# Patient Record
Sex: FEMALE | Race: WHITE | NOT HISPANIC OR LATINO
[De-identification: names, ages, dates, MRNs, and addresses within clinical notes are randomized per-mention and may not be internally consistent; named-entity substitution may affect disease eponyms.]

---

## 2019-09-03 ENCOUNTER — APPOINTMENT (OUTPATIENT)
Dept: OTOLARYNGOLOGY | Facility: CLINIC | Age: 49
End: 2019-09-03

## 2019-10-21 ENCOUNTER — OUTPATIENT (OUTPATIENT)
Dept: OUTPATIENT SERVICES | Facility: HOSPITAL | Age: 49
LOS: 1 days | Discharge: HOME | End: 2019-10-21

## 2019-10-21 DIAGNOSIS — E11.9 TYPE 2 DIABETES MELLITUS WITHOUT COMPLICATIONS: ICD-10-CM

## 2019-10-21 DIAGNOSIS — E55.9 VITAMIN D DEFICIENCY, UNSPECIFIED: ICD-10-CM

## 2019-10-21 DIAGNOSIS — E78.00 PURE HYPERCHOLESTEROLEMIA, UNSPECIFIED: ICD-10-CM

## 2019-10-21 DIAGNOSIS — D51.8 OTHER VITAMIN B12 DEFICIENCY ANEMIAS: ICD-10-CM

## 2020-11-24 ENCOUNTER — NON-APPOINTMENT (OUTPATIENT)
Age: 50
End: 2020-11-24

## 2020-12-03 ENCOUNTER — APPOINTMENT (OUTPATIENT)
Dept: BREAST CENTER | Facility: CLINIC | Age: 50
End: 2020-12-03
Payer: MEDICARE

## 2020-12-03 VITALS
WEIGHT: 195 LBS | HEIGHT: 64 IN | BODY MASS INDEX: 33.29 KG/M2 | TEMPERATURE: 97.8 F | DIASTOLIC BLOOD PRESSURE: 76 MMHG | SYSTOLIC BLOOD PRESSURE: 130 MMHG

## 2020-12-03 DIAGNOSIS — Z86.39 PERSONAL HISTORY OF OTHER ENDOCRINE, NUTRITIONAL AND METABOLIC DISEASE: ICD-10-CM

## 2020-12-03 DIAGNOSIS — Z87.09 PERSONAL HISTORY OF OTHER DISEASES OF THE RESPIRATORY SYSTEM: ICD-10-CM

## 2020-12-03 DIAGNOSIS — Z86.59 PERSONAL HISTORY OF OTHER MENTAL AND BEHAVIORAL DISORDERS: ICD-10-CM

## 2020-12-03 DIAGNOSIS — Z91.89 OTHER SPECIFIED PERSONAL RISK FACTORS, NOT ELSEWHERE CLASSIFIED: ICD-10-CM

## 2020-12-03 DIAGNOSIS — Z86.79 PERSONAL HISTORY OF OTHER DISEASES OF THE CIRCULATORY SYSTEM: ICD-10-CM

## 2020-12-03 PROCEDURE — 99072 ADDL SUPL MATRL&STAF TM PHE: CPT

## 2020-12-03 PROCEDURE — 99205 OFFICE O/P NEW HI 60 MIN: CPT

## 2020-12-03 PROCEDURE — 93702 BIS XTRACELL FLUID ANALYSIS: CPT

## 2020-12-03 RX ORDER — FLUPHENAZINE ENANTHATE 25 MG/ML
VIAL (ML) INJECTION
Refills: 0 | Status: ACTIVE | COMMUNITY

## 2020-12-03 NOTE — HISTORY OF PRESENT ILLNESS
[FreeTextEntry1] : PCP: Dr. Morales Moyer\par GYN: Dr. Willow Slater\par \par s/p US Guided Core Bx - 11/18/2020:\par Right, 10:00 N13, 4mm: (top-hat)\par - Invasive mammary carcinoma with lobular features, moderately differentiated\par - Atypical lobular hyperplasia \par Comment: Immunostain for E-cadherin is negative (granular cytoplasmic staining) supporting a lobular differentiation\par ER  (+) - % \par IA  (+) - % \par HER2 (-) (Score 1+)\par Ki-67 - 25%\par \par Pt denies any breast pain, palpable lumps, nipple discharge or inversion and / or skin changes.\par \par (-) family hx - breast / ovarian cancer.

## 2020-12-03 NOTE — PAST MEDICAL HISTORY
[Perimenopausal] : The patient is perimenopausal [Menarche Age ____] : age at menarche was [unfilled] [Total Preg ___] : G[unfilled] [Live Births ___] : P[unfilled]  [Age At Live Birth ___] : Age at live birth: [unfilled] [FreeTextEntry6] : Yes; Clomid. [FreeTextEntry7] : Yes; 12 years. [FreeTextEntry8] : No.

## 2020-12-03 NOTE — REASON FOR VISIT
[Initial Evaluation] : an initial evaluation [Parent] : parent [FreeTextEntry1] : Surgical consultation.

## 2020-12-03 NOTE — DATA REVIEWED
[FreeTextEntry1] : B/L Screening Mammo - 10/22/2020:\par Breast density: There are scattered areas of fibroglandular density.\par No significant masses, calcifications, or other findings are seen in the left breast. There is a stable small benign intramammary lymph node in the upper outer quadrant left breast.\par Examination of the right breast is significant for a  4 mm spiculated lesion in the upper outer quadrant. The patient will be recalled for 3-D spot compression views, 2-D/3-D 90 degrees lateral view and ultrasound of the right breast and right axilla.\par There are stable benign intramammary lymph nodes in the upper outer quadrant right breast. There are no suspicious microcalcifications in the right breast.\par There are scattered punctate benign calcifications in both breasts which are stable.\par IMPRESSION:\par 4 mm spiculated lesion in the upper outer quadrant right breast. The patient will be recalled for diagnostic mammographic views and right breast ultrasound\par There is no mammographic evidence of malignancy in the left breast. \par An additional imaging exam of the right breast(s) is recommended. The patient will be sent a letter to return for additional imaging.\par BI-RADS 0: INCOMPLETE - NEED ADDITIONAL IMAGING EVALUATION\par \par Right [facility]ammo [facility] - 10/27/2020:\par MAMMOGRAM: \par Tomosynthesis and 2D imaging of the breast(s) were performed.  Current study was also evaluated with a computer aided detection (CAD) system.\par Breast Density: There are scattered areas of fibroglandular density.\par There is a new 4 mm spiculated density in the 11:00 right breast at approximately 12 cm from the nipple, corresponding to a shadowing mass on ultrasound.\par Incidental mention is made of adjacent benign intramammary lymph nodes also in the 10:00 right breast. There are no enlarged axillary lymph nodes\par US BREAST LIMITED RIGHT\par Color flow, Gray scale and real-time ultrasound of the right breast was performed and targeted to the area(s) of interest.\par Ultrasound of the right breast was targeted to the posterior 10:00 location. There is a 4 mm discrete hypoechoic shadowing lesion, corresponding to the spiculated density on the mammogram. Further assessment with ultrasound-guided core biopsy is recommended. There is a 0.7 cm intramammary lymph node in the 10:00 right breast at 13 cm from the nipple. There is a 0.5 cm intramammary lymph node in the 10:00 right breast at 11 cm from..\par OVERALL IMPRESSION: \par 4 mm spiculated mass, corresponding to a shadowing lesion on ultrasound. The finding is suspicious and further assessment with ultrasound-guided core biopsy is recommended.\par The findings and recommendations were discussed with the patient by the dictating radiologist.\par Biopsy of the right breast(s) is recommended. A letter will be sent to the patient to return for a biopsy.\par The findings and recommendations were discussed with the patient.\par BI-RADS 4: SUSPICIOUS\par \par US Guided Core Bx - 11/18/2020:\par Right, 10:00 N13, 4mm: (top-hat)\par - Invasive mammary carcinoma with lobular features, moderately differentiated\par - Atypical lobular hyperplasia \par Comment: Immunostain for E-cadherin is negative (granular cytoplasmic staining) supporting a lobular differentiation\par ER  (+) - % \par MA  (+) - % \par HER2 (-) (Score 1+)\par Ki-67 - 25%

## 2020-12-03 NOTE — ASSESSMENT
[FreeTextEntry1] : SKYLAR is a miguel 50 year old patient who presented today for initial consultation.\par She is status post ultrasound guided core biopsy of the right breast on 11/18/2020.\par Pathology revealed invasive mammary carcinoma with lobular features, moderately differentiated\par Atypical lobular hyperplasia.\par The immunostain for E-cadherin is negative (granular cytoplasmic staining) supporting a lobular differentiation\par ER  (+) - %. CT  (+) - %. HER2 (-) (Score 1+). Ki-67 - 25%.\par \par On physical exam, there are no discrete masses in either breast or axilla.  There is no nipple discharge or inversion bilaterally.  There are no skin changes bilaterally.  \par \par We had a lengthy discussion regarding her diagnosis and treatment options.  Her pathology results were reviewed.  The patient will need a bilateral breast MRI w/wo contrast performed preoperatively.  This is for evaluation of extent of disease in the affected breast and to rule out disease in the contralateral breast. \par \par She will require evaluation of her axillary lymph nodes via sentinel lymph node biopsy since her disease is invasive.  Initial SOZO measurements were taken today.  [place] node biopsy is done by injecting the periareolar breast tissue with a radioactive tracer one day prior to her surgery and removing the first few lymph nodes that drain the breast.  If the sentinel lymph node is found to have metastatic disease either on the intraoperative evaluation or on final pathology, an axillary dissection may be performed/recommended.  Risks of axillary node dissection, including lymphedema, were discussed.  There is evidence that it may not be necessary to complete an axillary node dissection if one or two sentinel lymph nodes are positive and treated by lumpectomy and conventional tangential field radiation.  \par \par We discussed that there is no difference in survival rate between lumpectomy with radiation therapy versus a mastectomy.  However, the rate of local recurrence is slightly higher with lumpectomy.  The rate of recurrence with mastectomy is not zero, and is likely closer to 4-9%.  At this time, she is leaning towards undergoing breast conservation therapy with lumpectomy.  Since this is not readily palpable, it will need to be localized preoperatively with a wire placement on the day of her surgery.  She agrees to right breast lumpectomy with needle localization.   \par \par The benefits and risks of the lumpectomy procedure were explained to the patient, including but not limited to bleeding, infection, seroma and/or hematoma formation, pain, numbness of skin, scarring, and possible re-operation if the surgical excision demonstrates positive margins.  Informed consent was obtained today.  She is aware that she will meet with the pre-surgical department here at the hospital for pre-surgery testing.  She is also aware that she will likely need to meet with her primary care physician, and/or other medical specialists to obtain clearance for surgery, and to contact them appropriately.  \par \par Since the patient prefers lumpectomy, she will most likely need adjuvant radiation therapy.  The indication for radiation therapy and common side effects were discussed. She will be referred to radiation therapy to discuss her options.  \par \par With regard to systemic therapy, the general indications for the use of chemotherapy were discussed, but is dependent on final pathology results.  Oncotype DX, a genetic assay used to predict a patient’s benefit from chemotherapy, may be sent post operatively.  Hormonal therapy with an aromatase inhibitor or Tamoxifen, may be advised if the disease is estrogen receptor positive.  Not only can it help prevent breast cancer recurrence, it can help reduce the risk of developing a new primary tumor.  This medication is usually taken for five years.  She will be referred to medical oncology post-operatively for discussion.  \par \par PLAN:\par 1. Bilateral Breast MRI w/wo contrast.\par 2. Lumpectomy of right breast with needle localization, right sentinel lymph node mapping and biopsy with possible axillary node dissection.\par 3. Referral to Radiation Oncology will be made post operatively.\par 4. Patient will be referred to Medical Oncology post operatively.\par \par I spent a total of 60 minutes of face to face time with this patient, greater than 50% of which was spent in counseling and/or coordination of care.  All of her questions were appropriately answered.\par

## 2020-12-03 NOTE — PHYSICAL EXAM
[Normocephalic] : normocephalic [Atraumatic] : atraumatic [No Supraclavicular Adenopathy] : no supraclavicular adenopathy [No Cervical Adenopathy] : no cervical adenopathy [Examined in the supine and seated position] : examined in the supine and seated position [No dominant masses] : no dominant masses in right breast  [No dominant masses] : no dominant masses left breast [No Nipple Discharge] : no left nipple discharge [No Axillary Lymphadenopathy] : no left axillary lymphadenopathy [No Rashes] : no rashes [No Ulceration] : no ulceration [Breast Nipple Inversion] : nipples not inverted [Breast Nipple Retraction] : nipples not retracted [de-identified] : Right L-Dex Score: 16.1 (12/03/2020)

## 2020-12-10 ENCOUNTER — LABORATORY RESULT (OUTPATIENT)
Age: 50
End: 2020-12-10

## 2020-12-15 ENCOUNTER — RESULT REVIEW (OUTPATIENT)
Age: 50
End: 2020-12-15

## 2020-12-15 ENCOUNTER — OUTPATIENT (OUTPATIENT)
Dept: OUTPATIENT SERVICES | Facility: HOSPITAL | Age: 50
LOS: 1 days | Discharge: HOME | End: 2020-12-15
Payer: MEDICARE

## 2020-12-15 DIAGNOSIS — C50.411 MALIGNANT NEOPLASM OF UPPER-OUTER QUADRANT OF RIGHT FEMALE BREAST: ICD-10-CM

## 2020-12-15 PROCEDURE — 77049 MRI BREAST C-+ W/CAD BI: CPT | Mod: 26

## 2020-12-18 ENCOUNTER — OUTPATIENT (OUTPATIENT)
Dept: OUTPATIENT SERVICES | Facility: HOSPITAL | Age: 50
LOS: 1 days | Discharge: HOME | End: 2020-12-18

## 2020-12-18 DIAGNOSIS — Z02.9 ENCOUNTER FOR ADMINISTRATIVE EXAMINATIONS, UNSPECIFIED: ICD-10-CM

## 2020-12-22 ENCOUNTER — NON-APPOINTMENT (OUTPATIENT)
Age: 50
End: 2020-12-22

## 2020-12-23 ENCOUNTER — NON-APPOINTMENT (OUTPATIENT)
Age: 50
End: 2020-12-23

## 2020-12-28 ENCOUNTER — RESULT REVIEW (OUTPATIENT)
Age: 50
End: 2020-12-28

## 2020-12-28 ENCOUNTER — OUTPATIENT (OUTPATIENT)
Dept: OUTPATIENT SERVICES | Facility: HOSPITAL | Age: 50
LOS: 1 days | Discharge: HOME | End: 2020-12-28
Payer: MEDICARE

## 2020-12-28 VITALS
SYSTOLIC BLOOD PRESSURE: 131 MMHG | RESPIRATION RATE: 16 BRPM | TEMPERATURE: 98 F | HEART RATE: 83 BPM | DIASTOLIC BLOOD PRESSURE: 67 MMHG | HEIGHT: 64 IN | WEIGHT: 182.98 LBS | OXYGEN SATURATION: 96 %

## 2020-12-28 DIAGNOSIS — C50.411 MALIGNANT NEOPLASM OF UPPER-OUTER QUADRANT OF RIGHT FEMALE BREAST: ICD-10-CM

## 2020-12-28 DIAGNOSIS — Z01.818 ENCOUNTER FOR OTHER PREPROCEDURAL EXAMINATION: ICD-10-CM

## 2020-12-28 DIAGNOSIS — Z98.890 OTHER SPECIFIED POSTPROCEDURAL STATES: Chronic | ICD-10-CM

## 2020-12-28 LAB
A1C WITH ESTIMATED AVERAGE GLUCOSE RESULT: 10.6 % — HIGH (ref 4–5.6)
ALBUMIN SERPL ELPH-MCNC: 4.3 G/DL — SIGNIFICANT CHANGE UP (ref 3.5–5.2)
ALP SERPL-CCNC: 144 U/L — HIGH (ref 30–115)
ALT FLD-CCNC: 42 U/L — HIGH (ref 0–41)
ANION GAP SERPL CALC-SCNC: 14 MMOL/L — SIGNIFICANT CHANGE UP (ref 7–14)
APTT BLD: 32.7 SEC — SIGNIFICANT CHANGE UP (ref 27–39.2)
AST SERPL-CCNC: 23 U/L — SIGNIFICANT CHANGE UP (ref 0–41)
BASOPHILS # BLD AUTO: 0.01 K/UL — SIGNIFICANT CHANGE UP (ref 0–0.2)
BASOPHILS NFR BLD AUTO: 0.2 % — SIGNIFICANT CHANGE UP (ref 0–1)
BILIRUB SERPL-MCNC: 0.3 MG/DL — SIGNIFICANT CHANGE UP (ref 0.2–1.2)
BUN SERPL-MCNC: 13 MG/DL — SIGNIFICANT CHANGE UP (ref 10–20)
CALCIUM SERPL-MCNC: 10 MG/DL — SIGNIFICANT CHANGE UP (ref 8.5–10.1)
CHLORIDE SERPL-SCNC: 98 MMOL/L — SIGNIFICANT CHANGE UP (ref 98–110)
CO2 SERPL-SCNC: 22 MMOL/L — SIGNIFICANT CHANGE UP (ref 17–32)
CREAT SERPL-MCNC: 0.9 MG/DL — SIGNIFICANT CHANGE UP (ref 0.7–1.5)
EOSINOPHIL # BLD AUTO: 0.24 K/UL — SIGNIFICANT CHANGE UP (ref 0–0.7)
EOSINOPHIL NFR BLD AUTO: 3.8 % — SIGNIFICANT CHANGE UP (ref 0–8)
ESTIMATED AVERAGE GLUCOSE: 258 MG/DL — HIGH (ref 68–114)
GLUCOSE SERPL-MCNC: 385 MG/DL — HIGH (ref 70–99)
HCT VFR BLD CALC: 36.6 % — LOW (ref 37–47)
HGB BLD-MCNC: 12.2 G/DL — SIGNIFICANT CHANGE UP (ref 12–16)
IMM GRANULOCYTES NFR BLD AUTO: 0.8 % — HIGH (ref 0.1–0.3)
INR BLD: 0.92 RATIO — SIGNIFICANT CHANGE UP (ref 0.65–1.3)
LYMPHOCYTES # BLD AUTO: 1.31 K/UL — SIGNIFICANT CHANGE UP (ref 1.2–3.4)
LYMPHOCYTES # BLD AUTO: 20.8 % — SIGNIFICANT CHANGE UP (ref 20.5–51.1)
MCHC RBC-ENTMCNC: 28.4 PG — SIGNIFICANT CHANGE UP (ref 27–31)
MCHC RBC-ENTMCNC: 33.3 G/DL — SIGNIFICANT CHANGE UP (ref 32–37)
MCV RBC AUTO: 85.3 FL — SIGNIFICANT CHANGE UP (ref 81–99)
MONOCYTES # BLD AUTO: 0.31 K/UL — SIGNIFICANT CHANGE UP (ref 0.1–0.6)
MONOCYTES NFR BLD AUTO: 4.9 % — SIGNIFICANT CHANGE UP (ref 1.7–9.3)
NEUTROPHILS # BLD AUTO: 4.38 K/UL — SIGNIFICANT CHANGE UP (ref 1.4–6.5)
NEUTROPHILS NFR BLD AUTO: 69.5 % — SIGNIFICANT CHANGE UP (ref 42.2–75.2)
NRBC # BLD: 0 /100 WBCS — SIGNIFICANT CHANGE UP (ref 0–0)
PLATELET # BLD AUTO: 109 K/UL — LOW (ref 130–400)
POTASSIUM SERPL-MCNC: 4.2 MMOL/L — SIGNIFICANT CHANGE UP (ref 3.5–5)
POTASSIUM SERPL-SCNC: 4.2 MMOL/L — SIGNIFICANT CHANGE UP (ref 3.5–5)
PROT SERPL-MCNC: 6.8 G/DL — SIGNIFICANT CHANGE UP (ref 6–8)
PROTHROM AB SERPL-ACNC: 10.6 SEC — SIGNIFICANT CHANGE UP (ref 9.95–12.87)
RBC # BLD: 4.29 M/UL — SIGNIFICANT CHANGE UP (ref 4.2–5.4)
RBC # FLD: 14.3 % — SIGNIFICANT CHANGE UP (ref 11.5–14.5)
SODIUM SERPL-SCNC: 134 MMOL/L — LOW (ref 135–146)
WBC # BLD: 6.3 K/UL — SIGNIFICANT CHANGE UP (ref 4.8–10.8)
WBC # FLD AUTO: 6.3 K/UL — SIGNIFICANT CHANGE UP (ref 4.8–10.8)

## 2020-12-28 PROCEDURE — 71046 X-RAY EXAM CHEST 2 VIEWS: CPT | Mod: 26

## 2020-12-28 PROCEDURE — 93010 ELECTROCARDIOGRAM REPORT: CPT

## 2020-12-28 NOTE — H&P PST ADULT - REASON FOR ADMISSION
49 yo female presents for PAST in preparation for right breast lumpectomy with needle localization right sentinel node mapping and biopsy possible axillary node dissection on 1/8/21 under general anesthesia by Dr. Zabala

## 2020-12-28 NOTE — H&P PST ADULT - NSICDXPASTMEDICALHX_GEN_ALL_CORE_FT
PAST MEDICAL HISTORY:  Asthma     Bipolar disorder     Breast CA     DM (diabetes mellitus)     High cholesterol     HTN (hypertension)

## 2020-12-28 NOTE — H&P PST ADULT - HISTORY OF PRESENT ILLNESS
Pt recently diagnosed with breast CA and is having area removed. Denies any symptoms. Denies any chest pain, difficulty breathing, SOB, palpitations, dysuria, URI, or any other infections in the last 2 weeks. Denies any recent travel, contact, or exposure to any persons with known or suspected COVID-19. Pt also denies COVID testing within the last 2 weeks. Denies any suicidal or homicidal ideations. Pt advised to self quarantine until day of procedure. Exercise tolerance of 1 flight of stairs without dyspnea. ABI reviewed with patient. Pt verbalized understanding of all pre-operative instructions.    Anesthesia Alert  YES--Difficult Airway: Class IV  NO--History of neck surgery or radiation  NO--Limited ROM of neck  NO--History of Malignant hyperthermia  NO--No personal or family history of Pseudocholinesterase deficiency.  NO--Prior Anesthesia Complication  NO--Latex Allergy  NO--Loose teeth  NO--History of Rheumatoid Arthritis  YES--ABI  NO--Other_____

## 2021-01-04 PROBLEM — J45.909 UNSPECIFIED ASTHMA, UNCOMPLICATED: Chronic | Status: ACTIVE | Noted: 2020-12-28

## 2021-01-04 PROBLEM — I10 ESSENTIAL (PRIMARY) HYPERTENSION: Chronic | Status: ACTIVE | Noted: 2020-12-28

## 2021-01-04 PROBLEM — F31.9 BIPOLAR DISORDER, UNSPECIFIED: Chronic | Status: ACTIVE | Noted: 2020-12-28

## 2021-01-04 PROBLEM — E78.00 PURE HYPERCHOLESTEROLEMIA, UNSPECIFIED: Chronic | Status: ACTIVE | Noted: 2020-12-28

## 2021-01-05 ENCOUNTER — LABORATORY RESULT (OUTPATIENT)
Age: 51
End: 2021-01-05

## 2021-01-05 ENCOUNTER — OUTPATIENT (OUTPATIENT)
Dept: OUTPATIENT SERVICES | Facility: HOSPITAL | Age: 51
LOS: 1 days | Discharge: HOME | End: 2021-01-05

## 2021-01-05 DIAGNOSIS — Z98.890 OTHER SPECIFIED POSTPROCEDURAL STATES: Chronic | ICD-10-CM

## 2021-01-05 DIAGNOSIS — Z11.59 ENCOUNTER FOR SCREENING FOR OTHER VIRAL DISEASES: ICD-10-CM

## 2021-01-11 ENCOUNTER — NON-APPOINTMENT (OUTPATIENT)
Age: 51
End: 2021-01-11

## 2021-01-26 ENCOUNTER — OUTPATIENT (OUTPATIENT)
Dept: OUTPATIENT SERVICES | Facility: HOSPITAL | Age: 51
LOS: 1 days | Discharge: HOME | End: 2021-01-26

## 2021-01-26 DIAGNOSIS — Z11.59 ENCOUNTER FOR SCREENING FOR OTHER VIRAL DISEASES: ICD-10-CM

## 2021-01-26 DIAGNOSIS — Z98.890 OTHER SPECIFIED POSTPROCEDURAL STATES: Chronic | ICD-10-CM

## 2021-01-28 ENCOUNTER — RESULT REVIEW (OUTPATIENT)
Age: 51
End: 2021-01-28

## 2021-01-28 ENCOUNTER — OUTPATIENT (OUTPATIENT)
Dept: OUTPATIENT SERVICES | Facility: HOSPITAL | Age: 51
LOS: 1 days | Discharge: HOME | End: 2021-01-28
Payer: MEDICARE

## 2021-01-28 DIAGNOSIS — Z98.890 OTHER SPECIFIED POSTPROCEDURAL STATES: Chronic | ICD-10-CM

## 2021-01-28 PROCEDURE — 78195 LYMPH SYSTEM IMAGING: CPT | Mod: 26

## 2021-01-28 NOTE — ASU PATIENT PROFILE, ADULT - PMH
Asthma    Asthma    Bipolar disorder    Breast CA    DM (diabetes mellitus)    High cholesterol    HTN (hypertension)

## 2021-01-29 ENCOUNTER — APPOINTMENT (OUTPATIENT)
Dept: BREAST CENTER | Facility: AMBULATORY SURGERY CENTER | Age: 51
End: 2021-01-29
Payer: MEDICARE

## 2021-01-29 ENCOUNTER — RESULT REVIEW (OUTPATIENT)
Age: 51
End: 2021-01-29

## 2021-01-29 ENCOUNTER — OUTPATIENT (OUTPATIENT)
Dept: OUTPATIENT SERVICES | Facility: HOSPITAL | Age: 51
LOS: 1 days | Discharge: HOME | End: 2021-01-29
Payer: MEDICARE

## 2021-01-29 VITALS
DIASTOLIC BLOOD PRESSURE: 60 MMHG | SYSTOLIC BLOOD PRESSURE: 111 MMHG | RESPIRATION RATE: 20 BRPM | OXYGEN SATURATION: 95 % | HEART RATE: 87 BPM | TEMPERATURE: 99 F

## 2021-01-29 VITALS
WEIGHT: 190.92 LBS | DIASTOLIC BLOOD PRESSURE: 55 MMHG | HEIGHT: 64 IN | TEMPERATURE: 98 F | SYSTOLIC BLOOD PRESSURE: 119 MMHG | HEART RATE: 89 BPM | RESPIRATION RATE: 18 BRPM | OXYGEN SATURATION: 98 %

## 2021-01-29 DIAGNOSIS — Z98.890 OTHER SPECIFIED POSTPROCEDURAL STATES: Chronic | ICD-10-CM

## 2021-01-29 DIAGNOSIS — C50.411 MALIGNANT NEOPLASM OF UPPER-OUTER QUADRANT OF RIGHT FEMALE BREAST: ICD-10-CM

## 2021-01-29 LAB — GLUCOSE BLDC GLUCOMTR-MCNC: 115 MG/DL — HIGH (ref 70–99)

## 2021-01-29 PROCEDURE — 88307 TISSUE EXAM BY PATHOLOGIST: CPT | Mod: 26

## 2021-01-29 PROCEDURE — 88342 IMHCHEM/IMCYTCHM 1ST ANTB: CPT | Mod: 26

## 2021-01-29 PROCEDURE — 38525 BIOPSY/REMOVAL LYMPH NODES: CPT | Mod: RT

## 2021-01-29 PROCEDURE — 38900 IO MAP OF SENT LYMPH NODE: CPT | Mod: RT

## 2021-01-29 PROCEDURE — 88341 IMHCHEM/IMCYTCHM EA ADD ANTB: CPT | Mod: 26

## 2021-01-29 PROCEDURE — 19301 PARTIAL MASTECTOMY: CPT | Mod: RT

## 2021-01-29 PROCEDURE — 19285 PERQ DEV BREAST 1ST US IMAG: CPT | Mod: RT

## 2021-01-29 PROCEDURE — 77065 DX MAMMO INCL CAD UNI: CPT | Mod: 26,RT

## 2021-01-29 PROCEDURE — 88305 TISSUE EXAM BY PATHOLOGIST: CPT | Mod: 26

## 2021-01-29 RX ORDER — OXYCODONE AND ACETAMINOPHEN 5; 325 MG/1; MG/1
1 TABLET ORAL EVERY 4 HOURS
Refills: 0 | Status: DISCONTINUED | OUTPATIENT
Start: 2021-01-29 | End: 2021-01-29

## 2021-01-29 RX ORDER — HYDROMORPHONE HYDROCHLORIDE 2 MG/ML
1 INJECTION INTRAMUSCULAR; INTRAVENOUS; SUBCUTANEOUS
Refills: 0 | Status: DISCONTINUED | OUTPATIENT
Start: 2021-01-29 | End: 2021-01-29

## 2021-01-29 RX ORDER — ONDANSETRON 8 MG/1
4 TABLET, FILM COATED ORAL ONCE
Refills: 0 | Status: DISCONTINUED | OUTPATIENT
Start: 2021-01-29 | End: 2021-02-12

## 2021-01-29 RX ORDER — SODIUM CHLORIDE 9 MG/ML
1000 INJECTION, SOLUTION INTRAVENOUS
Refills: 0 | Status: DISCONTINUED | OUTPATIENT
Start: 2021-01-29 | End: 2021-02-12

## 2021-01-29 RX ORDER — HYDROMORPHONE HYDROCHLORIDE 2 MG/ML
0.5 INJECTION INTRAMUSCULAR; INTRAVENOUS; SUBCUTANEOUS
Refills: 0 | Status: DISCONTINUED | OUTPATIENT
Start: 2021-01-29 | End: 2021-01-29

## 2021-01-29 RX ADMIN — SODIUM CHLORIDE 100 MILLILITER(S): 9 INJECTION, SOLUTION INTRAVENOUS at 14:38

## 2021-01-29 NOTE — CHART NOTE - NSCHARTNOTEFT_GEN_A_CORE
PACU ANESTHESIA ADMISSION NOTE      Procedure: Lumpectomy, breast, after wire localization, with sentinel lymph node biopsy      Post op diagnosis:      ____  Intubated  TV:______       Rate: ______      FiO2: ______    __x__  Patent Airway    __x__  Full return of protective reflexes    __x__  Full recovery from anesthesia / back to baseline     Vitals:   T: 99.3          R: 18                 BP:  105/59                Sat:  98                 P: 78      Mental Status:  _x___ Awake   __x___ Alert   _____ Drowsy   _____ Sedated    Nausea/Vomiting:  __x__ NO  ______Yes,   See Post - Op Orders          Pain Scale (0-10):  _____    Treatment: __x__ None    ____ See Post - Op/PCA Orders    Post - Operative Fluids:   ____ Oral   __x__ See Post - Op Orders    Plan: Discharge:   ___x _Home       _____Floor     _____Critical Care    _____  Other:_________________    Comments:    Pt brought to RR spontaneously breathing, hemodynamically stable.

## 2021-01-29 NOTE — PRE-ANESTHESIA EVALUATION ADULT - NSPROPOSEDPROCEDFT_GEN_ALL_CORE
right breast lumpectomy with needle localization right sentinel node mapping and biopsy possible axillary node dissection

## 2021-01-29 NOTE — BRIEF OPERATIVE NOTE - NSICDXBRIEFPROCEDURE_GEN_ALL_CORE_FT
PROCEDURES:  Lumpectomy, breast, after wire localization, with sentinel lymph node biopsy 29-Jan-2021 13:36:08  Ankit Zabala

## 2021-01-29 NOTE — ASU PREOP CHECKLIST - AICD PRESENT
LFV:8/31/17 with TB  Future Appt: none on file  Last Rx:12/15/17 for 90 tablets  Last Labs:cbc and cmp on 9/17/17 abnormal  Per protocol to provider no

## 2021-02-03 LAB — SURGICAL PATHOLOGY STUDY: SIGNIFICANT CHANGE UP

## 2021-02-05 DIAGNOSIS — C50.919 MALIGNANT NEOPLASM OF UNSPECIFIED SITE OF UNSPECIFIED FEMALE BREAST: ICD-10-CM

## 2021-02-09 ENCOUNTER — APPOINTMENT (OUTPATIENT)
Dept: BREAST CENTER | Facility: CLINIC | Age: 51
End: 2021-02-09
Payer: MEDICARE

## 2021-02-09 PROBLEM — J45.909 UNSPECIFIED ASTHMA, UNCOMPLICATED: Chronic | Status: ACTIVE | Noted: 2021-01-29

## 2021-02-09 PROCEDURE — 99024 POSTOP FOLLOW-UP VISIT: CPT

## 2021-02-09 NOTE — REASON FOR VISIT
[Post Op: _________] : a [unfilled] post op visit [Family Member] : family member [FreeTextEntry1] : s/p Right NLOC / SNB - 01/29/2021.

## 2021-02-09 NOTE — ASSESSMENT
[FreeTextEntry1] : SKYLAR is a miguel 50 year old patient who presented today to the office for her initial post-operative visit.\par She is status post right NLOC / SNB on 01/29/2021.\par She is feeling well.\par She denies any fever / chills or erythema and / or drainage related to the incision.\par Her pain is well controlled, only complains of mild soreness of the area.\par Her sutures were removed and pathology was discussed.\par \par Plan:\par 1. Consultation with Radiation Oncology will be arranged.\par 2. Consultation with Medical Oncology will be arranged.\par 3. Follow-up and clinical breast exam to be scheduled for three months.\par \par I spent a total of 15 minutes of face to face time with this patient, greater than 50% of which was spent in counseling and/or coordination of care.\par All of her questions were appropriately answered.\par She knows to call with any concerns.\par

## 2021-02-09 NOTE — HISTORY OF PRESENT ILLNESS
[FreeTextEntry1] : PCP: Dr. Morales Moyer\par GYN: Dr. Willow Slater\par \par s/p US Guided Core Bx - 11/18/2020:\par Right, 10:00 N13, 4mm: (top-hat)\par - Invasive mammary carcinoma with lobular features, moderately differentiated\par - Atypical lobular hyperplasia \par Comment: Immunostain for E-cadherin is negative (granular cytoplasmic staining) supporting a lobular differentiation\par ER  (+) - % \par IL  (+) - % \par HER2 (-) (Score 1+)\par Ki-67 - 25%\par \par (-) family hx - breast / ovarian cancer.\par \par \par s/p Right NLOC / SNB - 01/29/2021.

## 2021-02-09 NOTE — DATA REVIEWED
[FreeTextEntry1] : Surgical Final Report\par \par \par Final Diagnosis\par 1. Breast, right axillary sentinel lymph node #1, biopsy:\par - One benign lymph node (0/1). Negative for carcinoma with\par evaluation of multiple H T E levels and with negative\par immunohistochemical stains for cytokeratins (CK AE1/AE3 and CK\par 8/18).\par \par 2. Breast, right upper outer quadrant 10 N12 mass, needle\par localized lumpectomy:\par - Healing prior biopsy site with invasive moderately\par differentiated classical type lobular carcinoma, 4.5 mm, located\par 3.5 mm from the nearest inked and designated inferior surgical\par margin (microscopic measurements).\par - Widespread lobular carcinoma in-situ (LCIS), classical type,\par and atypical lobular hyperplasia (ALH).\par - No lymphovascular or perineural invasion is seen.\par - Early atrophic fatty breast tissue with proliferative type\par fibrocystic changes.\par - For hormone receptor and oncoprotein expression/gene\par amplification status please see the pathology report from the\par prior needle core biopsy specimen ([id number]).\par - AJCC 8th Edition Pathologic Stage: pT1a, p(sn)N0, pMx.\par \par 3. Breast, right superior margin, excision:\par - Benign fibroadipose connective tissue without histopathologic\par abnormality. Negative for carcinoma.\par \par 4. Breast, right posterior margin, excision:\par - Benign fibroadipose connective tissue without histopathologic\par abnormality. Negative for carcinoma.\par \par 5. Breast, right lateral margin, excision:\par - Benign fibroadipose connective tissue without histopathologic\par abnormality.\par Negative for carcinoma.\par

## 2021-02-11 DIAGNOSIS — I10 ESSENTIAL (PRIMARY) HYPERTENSION: ICD-10-CM

## 2021-02-11 DIAGNOSIS — E11.9 TYPE 2 DIABETES MELLITUS WITHOUT COMPLICATIONS: ICD-10-CM

## 2021-02-11 DIAGNOSIS — C50.911 MALIGNANT NEOPLASM OF UNSPECIFIED SITE OF RIGHT FEMALE BREAST: ICD-10-CM

## 2021-02-11 DIAGNOSIS — Z79.84 LONG TERM (CURRENT) USE OF ORAL HYPOGLYCEMIC DRUGS: ICD-10-CM

## 2021-02-11 DIAGNOSIS — Z17.0 ESTROGEN RECEPTOR POSITIVE STATUS [ER+]: ICD-10-CM

## 2021-02-11 DIAGNOSIS — Z88.0 ALLERGY STATUS TO PENICILLIN: ICD-10-CM

## 2021-02-11 DIAGNOSIS — J45.909 UNSPECIFIED ASTHMA, UNCOMPLICATED: ICD-10-CM

## 2021-02-19 ENCOUNTER — APPOINTMENT (OUTPATIENT)
Dept: RADIATION ONCOLOGY | Facility: HOSPITAL | Age: 51
End: 2021-02-19
Payer: MEDICARE

## 2021-02-19 VITALS
HEIGHT: 64 IN | SYSTOLIC BLOOD PRESSURE: 124 MMHG | RESPIRATION RATE: 12 BRPM | WEIGHT: 188.5 LBS | BODY MASS INDEX: 32.18 KG/M2 | DIASTOLIC BLOOD PRESSURE: 58 MMHG | TEMPERATURE: 98.8 F | HEART RATE: 96 BPM

## 2021-02-19 DIAGNOSIS — Z78.9 OTHER SPECIFIED HEALTH STATUS: ICD-10-CM

## 2021-02-19 DIAGNOSIS — Z63.5 DISRUPTION OF FAMILY BY SEPARATION AND DIVORCE: ICD-10-CM

## 2021-02-19 PROCEDURE — 99205 OFFICE O/P NEW HI 60 MIN: CPT

## 2021-02-19 SDOH — SOCIAL STABILITY - SOCIAL INSECURITY: DISRUPTION OF FAMILY BY SEPARATION AND DIVORCE: Z63.5

## 2021-02-19 NOTE — HISTORY OF PRESENT ILLNESS
[FreeTextEntry1] : \par The patient is a 50 year  old woman who was recently noted on screening mammogram (10/26/2020) to have a 4 mm spiculated lesion in the right breast in the upper outer quadrant.  Diagnostic mammogram and ultrasound on 11/5/2020 showed a new 4 mm spiculated density in the 11 o'clock, 12 cm FN right breast.  BI-RADS 4 Suspicious.  Recommend biopsy. On 11/18/2020, she had a biopsy of the right breast abnormality at 10 o’clock, 12 cm FN and pathology showed invasive mammary carcinoma with lobular features, moderately differentiated.  \par \par On 12/10/2020, outside slides reviewed at [facility] from [facility] shows, invasive well differentiated lobular carcinoma, classical type and lobular carcinoma in-situ. It was ER/WA positive /HER 2 negative.  Ki-67 index was 25%. \par \par She also had an MRI of bilateral breasts on 12/15/2020, which showed right breast, there is a 0.4 cm mass with associated biopsy clip in the upper outer quadrant of the right breast consistent with the biopsy-proven carcinoma. Surrounding postbiopsy enhancement is noted. Benign intramammary lymph nodes are seen in the upper outer quadrant of the right breast. No additional suspicious abnormal enhancement is seen in the right breast. There is no axillary adenopathy.\par Left breast, benign intramammary lymph node in the upper outer quadrant of the left breast. No suspicious abnormal enhancement is seen in the left breast. There is no axillary adenopathy. BI-RADS 6: Known biopsy-proven malignancy.\par \par On 1/29/2021, She underwent a lumpectomy and sentinel node biopsy.  She was found to have a 4.5 mm  invasive lobular carcinoma, G2, ER/WA positive / HER 2 negative, Ki 67 index is 25 %.  Surgical margins were negative as well as 0/1 negative sentinel lymph node.  There was no LVSI/PNI. \par \par She has been doing well since surgery. However, has some breast discomfort but getting better.\par She  is here with her mother to discuss radiation. \par \par Med/Onc: Dr. Morgan 3/5/2021\par Sx: Dr. Zabala\par \par

## 2021-02-19 NOTE — OB/GYN HISTORY
[unknown] : the patient is unsure of the date of her LMP [Experiencing Menopausal Sxs] : experiencing menopausal symptoms [___] : Living: [unfilled]

## 2021-02-19 NOTE — PHYSICAL EXAM
[Obese] : obese [Sclera] : the sclera and conjunctiva were normal [Hearing Threshold Finger Rub Not [place]] : hearing was normal [Breast Abnormal Lactation (Galactorrhea)] : no nipple discharge [No Discharge] : no discharge [Tenderness Of The Right Breast] : tenderness [Abdomen Soft] : soft [Nail Clubbing] : no clubbing  or cyanosis of the fingernails [Normal Mental Status] : the patient's orientation, memory, attention, language and fund of knowledge were normal [Flat] : flat [Rate Slowed] : slowed [Heart Rate And Rhythm] : heart rate and rhythm were normal [Normal] : no palpable adenopathy [de-identified] : She is examined in both the upright and supine positions.  The right breast has a well healing scar and a sizeable seroma under the axillary scar. It is uncomfortable for her.  The left breast is unremarkable.  No palpable mass in either breast.

## 2021-02-19 NOTE — VITALS
[Date: ____________] : Patient's last distress assessment performed on [unfilled]. [3 - Distress Level] : Distress Level: 3 [80: Normal activity with effort; some signs or symptoms of disease.] : 80: Normal activity with effort; some signs or symptoms of disease.

## 2021-02-19 NOTE — LETTER CLOSING
[Consult Closing:] : Thank you for allowing me to participate in the care of this patient.  If you have any questions, please do not hesitate to contact me. [Sincerely yours,] : Sincerely yours, [FreeTextEntry3] : Judie Posada M.D. \par \par Electronically proofread and signed by:  Judie Posada MD\par Attending, Department of Radiation Medicine\par [facility]\par \par CC: Dr. Morgan\par

## 2021-02-19 NOTE — DISEASE MANAGEMENT
[Pathological] : TNM Stage: p [IA] : IA [FreeTextEntry4] : invasive lobular carcinoma of the right breast, G2, ER/ID positive / HER 2 negative, upper outer quadrant [TTNM] : 1a [NTNM] : 0 [MTNM] : 0 [de-identified] : right breast

## 2021-02-19 NOTE — REVIEW OF SYSTEMS
[Fatigue] : fatigue [Constipation] : constipation [Diarrhea] : diarrhea [Negative] : Integumentary [Patient Intake Form Reviewed] : Patient intake form was reviewed [Fever] : no fever [Chills] : no chills [Dysphagia] : no dysphagia [Chest Pain] : no chest pain [Lower Ext Edema] : no lower extremity edema [Palpitations] : no palpitations [Abdominal Pain] : no abdominal pain [Vomiting] : no vomiting [Vaginal Discharge] : no vaginal discharge [Dysmenorrhea/Abn Vaginal Bleeding] : no dysmenorrhea/abnormal vaginal bleeding [Confused] : no confusion [Dizziness] : no dizziness [Fainting] : no fainting [Suicidal] : not suicidal [Insomnia] : no insomnia [Anxiety] : no anxiety [Depression] : no depression

## 2021-02-19 NOTE — REASON FOR VISIT
[Other: _________] : [unfilled] [Breast Cancer] : breast cancer [Family Member] : family member [Other: _____] : [unfilled]

## 2021-02-23 ENCOUNTER — APPOINTMENT (OUTPATIENT)
Dept: BREAST CENTER | Facility: CLINIC | Age: 51
End: 2021-02-23
Payer: MEDICARE

## 2021-02-23 PROCEDURE — 99024 POSTOP FOLLOW-UP VISIT: CPT

## 2021-02-23 NOTE — ASSESSMENT
[facility]    [address]    [address]    [facility] [address]    Phone:  [phone]    Fax:  [phone]                                       After Visit Summary   2/8/2018    Destiny Saint    MRN: [id number]           After Visit Summary Signature Page     I have received my discharge instructions, and my questions have been answered. I have discussed any challenges I see with this plan with the nurse or doctor.    ..........................................................................................................................................  Patient/Patient Representative Signature      ..........................................................................................................................................  Patient Representative Print Name and Relationship to Patient    ..................................................               ................................................  Date                                            Time    ..........................................................................................................................................  Reviewed by Signature/Title    ...................................................              ..............................................  Date                                                            Time           [FreeTextEntry1] : SKYLAR is a miguel 50 year old patient who presented today to the office for a post-operative visit.\par She is status post right NLOC / SNB on 01/29/2021.\par \par Right axillary seroma has decreased in size and currently no evidence of infection.\par Reassured patient and her mother that this will continue to decrease and there is no need for any further intervention at this time.\par \par Plan:\par 1. Continue follow-up with Radiation Oncology. \par 2. Consultation with Medical Oncology pending - set up for March 5, 2021.\par 3. Follow-up and clinical breast exam to be scheduled for two months.\par \par I spent a total of 15 minutes of face to face time with this patient, greater than 50% of which was spent in counseling and/or coordination of care.\par All of her questions were appropriately answered.\par She knows to call with any concerns.\par

## 2021-02-23 NOTE — REVIEW OF SYSTEMS
[Breast Pain] : breast pain [Breast Lump] : breast lump [Negative] : Constitutional [Fever] : no fever [Chills] : no chills

## 2021-02-23 NOTE — HISTORY OF PRESENT ILLNESS
[FreeTextEntry1] : PCP: Dr. Morales Moyer\par GYN: Dr. Willow Slater\par \par s/p US Guided Core Bx - 11/18/2020:\par Right, 10:00 N13, 4mm: (top-hat)\par - Invasive mammary carcinoma with lobular features, moderately differentiated\par - Atypical lobular hyperplasia \par Comment: Immunostain for E-cadherin is negative (granular cytoplasmic staining) supporting a lobular differentiation\par ER  (+) - % \par [place]  (+) - % \par HER2 (-) (Score 1+)\par Ki-67 - 25%\par \par (-) family hx - breast / ovarian cancer.\par \par s/p Right NLOC / SNB - 01/29/2021 = (0/1); invasive moderately\par differentiated classical type lobular carcinoma, 4.5 mm, Widespread lobular carcinoma in-situ (LCIS), classical type,\par and atypical lobular hyperplasia (ALH). No lymphovascular or perineural invasion is seen.\par AJCC 8th Edition Pathologic Stage: pT1a, p(sn)N0, pMx.\par \par Pt sent by Dr. Judie Posada for evaluation of right axillary seroma. \par

## 2021-03-02 PROCEDURE — 77290 THER RAD SIMULAJ FIELD CPLX: CPT | Mod: 26

## 2021-03-03 PROCEDURE — 77263 THER RADIOLOGY TX PLNG CPLX: CPT

## 2021-03-05 ENCOUNTER — OUTPATIENT (OUTPATIENT)
Dept: OUTPATIENT SERVICES | Facility: HOSPITAL | Age: 51
LOS: 1 days | Discharge: HOME | End: 2021-03-05

## 2021-03-05 ENCOUNTER — APPOINTMENT (OUTPATIENT)
Dept: HEMATOLOGY ONCOLOGY | Facility: CLINIC | Age: 51
End: 2021-03-05
Payer: MEDICARE

## 2021-03-05 ENCOUNTER — LABORATORY RESULT (OUTPATIENT)
Age: 51
End: 2021-03-05

## 2021-03-05 VITALS
HEIGHT: 64 IN | SYSTOLIC BLOOD PRESSURE: 124 MMHG | BODY MASS INDEX: 32.1 KG/M2 | WEIGHT: 188 LBS | DIASTOLIC BLOOD PRESSURE: 68 MMHG | TEMPERATURE: 98.5 F | HEART RATE: 86 BPM

## 2021-03-05 DIAGNOSIS — Z98.890 OTHER SPECIFIED POSTPROCEDURAL STATES: Chronic | ICD-10-CM

## 2021-03-05 PROCEDURE — 99205 OFFICE O/P NEW HI 60 MIN: CPT

## 2021-03-05 RX ORDER — FLUPHENAZINE DECANOATE 25 MG/ML
25 INJECTION, SOLUTION INTRAMUSCULAR; SUBCUTANEOUS
Refills: 0 | Status: ACTIVE | COMMUNITY

## 2021-03-08 PROCEDURE — 77295 3-D RADIOTHERAPY PLAN: CPT | Mod: 26

## 2021-03-08 PROCEDURE — 77300 RADIATION THERAPY DOSE PLAN: CPT | Mod: 26

## 2021-03-08 PROCEDURE — 77334 RADIATION TREATMENT AID(S): CPT | Mod: 26

## 2021-03-10 PROCEDURE — 77280 THER RAD SIMULAJ FIELD SMPL: CPT | Mod: 26

## 2021-03-10 NOTE — CONSULT LETTER
[Dear  ___] : Dear  [unfilled], [Consult Letter:] : I had the pleasure of evaluating your patient, [unfilled]. [Please see my note below.] : Please see my note below. [Consult Closing:] : Thank you very much for allowing me to participate in the care of this patient.  If you have any questions, please do not hesitate to contact me. [Sincerely,] : Sincerely, [DrLaila  ___] : Dr. LEWIS [FreeTextEntry3] : Angel Morgan MD

## 2021-03-10 NOTE — OB HISTORY
[Menarche Age: ____] : age at menarche was [unfilled] [___] : Full Term: [unfilled] [Regular Cycle Intervals] : periods have been irregular

## 2021-03-10 NOTE — ASSESSMENT
[FreeTextEntry1] : 50 year old perimenopausal female has Stage IA (bV2wT5C2) classical type lobular carcinoma of the right breast, G2, ER/WY positive, Her-2 negative, s/p right breast lumpectomy and SLNB with negative margins. \par \par H/o iron deficiency anemia - off Iron tabs for 2 months now\par \par Recommendations:\par We had a detailed discussion regarding to her diagnosis, staging, prognosis and adjuvant systemic therapy. We reviewed pathology report. Shama has hormone receptor positive and Her-2 negative early stage breast cancer with favorable pathologic features (4.5 mm small tumor, G2, negative SLN, Her-2 negative). Given small subcentimeter tumor with low clinical risk, she is unlikely benefit from adjuvant chemotherapy. She is recommended to take adjuvant endocrine therapy. \par \par We discussed the choice, benefits and side effects of adjuvant endocrine therapy. She is perimenopause and her last period was within one year. We will order blood work to check her hormone levels. She will need to start with Tamoxifen and may switch to Tamoxifen after she is confirmed postmenopause. We discussed S.E profile for tamoxifen which may include but not limit to small increased risk of cardiovascular events, blood clots and endometrial cancer, hot flashes, vasomotor symptoms, depression and cataract. She has bipolar disorder and has been on carbamazepine which is a strong CY inducer and may decrease serum concentration of tamoxifen and its active metabolites. \par \par We also discussed S.E profile for aromatase inhibitor which may include but not limit to muscular and skeletal symptoms, arthralgia, hot flashes, increasing osteopenia and osteoporosis, a small increased risk of cardiovascular events and slight increase of hyperlipidemia. She will need to Zoladex injection for OFS if she is going to take AI. \par \par We will check her Estradiol, FSH and LH and will decide which endocrine therapy she should start with. We will contact her psychiatrist to find out if there alternative drug for carbamazepine.  \par \par She will followup with Dr. Posada for adjuvant breast radiotherapy. She will start endocrine therapy after completion of radiation. \par \par Will also check CBC, iron studies given her h/o iron def anemia. \par \par All questions were answered appropriately.  They agreed with the plan.  \par \par We will call her to discuss blood work results and decision on endocrine therapy. She will come back for followup visit in 3 months. \par \par Patient was seen and examined and discussed with . \par

## 2021-03-10 NOTE — PHYSICAL EXAM
[Restricted in physically strenuous activity but ambulatory and able to carry out work of a light or sedentary nature] : Status 1- Restricted in physically strenuous activity but ambulatory and able to carry out work of a light or sedentary nature, e.g., light house work, office work [Normal] : grossly intact [de-identified] : Rt lumpectomy scar is healing well. No palpable masses in b/l breast and no axillary LNs

## 2021-03-10 NOTE — HISTORY OF PRESENT ILLNESS
[de-identified] :  50 year old female with PMH of Bipolar disorder, HTN, DM , asthma is referred for consultation of adjuvant systemic treatment for new diagnosis of breast cancer. She is here accompanied by her mother. \par \par She had a screening mammogram (10/26/2020) which showed a 4 mm spiculated lesion in the right breast in the upper outer quadrant. Diagnostic mammogram and ultrasound on 2020 showed a new 4 mm spiculated density in the 11 o'clock, 12 cm FN right breast. BI-RADS 4 Suspicious. Recommend biopsy. On 2020, she had a biopsy of the right breast abnormality at 10 o’clock, 12 cm FN and pathology that showed invasive mammary carcinoma with lobular features, moderately differentiated. \par \par On 12/10/2020, outside slides reviewed at [facility] shows, invasive well differentiated lobular carcinoma, classical type and lobular carcinoma in-situ. It was ER/SD positive %, HER 2 negative. Ki-67 index was 25%.  She also had an MRI of bilateral breasts on 12/15/2020, which showed in right breast, there is a 0.4 cm mass with associated biopsy clip in the upper outer quadrant of the right breast consistent with the biopsy-proven carcinoma. Surrounding postbiopsy enhancement is noted. Benign intramammary lymph nodes are seen in the upper outer quadrant of the right breast. No additional suspicious abnormal enhancement is seen in the right breast. There is no axillary adenopathy. In the left breast, benign intramammary lymph node in the upper outer quadrant of the left breast. No suspicious abnormal enhancement is seen in the left breast. There is no axillary adenopathy. BI-RADS 6: Known biopsy-proven malignancy.\par \par On 2021, She underwent a lumpectomy and sentinel node biopsy. She was found to have a 4.5 mm invasive lobular carcinoma, G2, ER/SD positive % / HER 2 negative, Ki 67 index is 25 %. Surgical margins were negative as well as 0/1 negative sentinel lymph node. There was no LVSI/PNI.  She recovered well since surgery. However, has some breast discomfort/lump at the lumpectomy site but getting better. She also saw Dr Swetha Vega onc and is going to be offered radiation therapy. \par \par She is . The age at menarche was 12.  Age at live birth was 32  She received fertility Treatments with Clomid.  She used Birth Control Pill for 14 years. She is perimenopause. She does not remember when her last periods was but was less than a year ago. Also to note she has h/o bipolar disorder and is controlled with medications and receives Prolixin shot every 2 weeks and is on Lithium and carbamazepine. \par \par She has no family h/o of breast or ovarian cancer. \par \par She was also on oral ferrous sulfate for h/o NESTOR in the past and was started by PMD which she stopped taking in Dec 2020.

## 2021-03-11 ENCOUNTER — APPOINTMENT (OUTPATIENT)
Dept: OBGYN | Facility: CLINIC | Age: 51
End: 2021-03-11
Payer: MEDICARE

## 2021-03-11 VITALS
BODY MASS INDEX: 31.58 KG/M2 | SYSTOLIC BLOOD PRESSURE: 132 MMHG | TEMPERATURE: 98 F | HEIGHT: 64 IN | DIASTOLIC BLOOD PRESSURE: 81 MMHG | WEIGHT: 185 LBS | HEART RATE: 87 BPM

## 2021-03-11 DIAGNOSIS — Z78.9 OTHER SPECIFIED HEALTH STATUS: ICD-10-CM

## 2021-03-11 DIAGNOSIS — Z87.42 PERSONAL HISTORY OF OTHER DISEASES OF THE FEMALE GENITAL TRACT: ICD-10-CM

## 2021-03-11 DIAGNOSIS — Z01.419 ENCOUNTER FOR GYNECOLOGICAL EXAMINATION (GENERAL) (ROUTINE) W/OUT ABNORMAL FINDINGS: ICD-10-CM

## 2021-03-11 DIAGNOSIS — Z83.3 FAMILY HISTORY OF DIABETES MELLITUS: ICD-10-CM

## 2021-03-11 PROCEDURE — G0101: CPT

## 2021-03-15 ENCOUNTER — NON-APPOINTMENT (OUTPATIENT)
Age: 51
End: 2021-03-15

## 2021-03-15 VITALS
WEIGHT: 186 LBS | BODY MASS INDEX: 31.93 KG/M2 | HEART RATE: 94 BPM | TEMPERATURE: 98.1 F | RESPIRATION RATE: 16 BRPM | DIASTOLIC BLOOD PRESSURE: 61 MMHG | SYSTOLIC BLOOD PRESSURE: 130 MMHG

## 2021-03-15 LAB
C TRACH RRNA SPEC QL NAA+PROBE: NOT DETECTED
HPV HIGH+LOW RISK DNA PNL CVX: NOT DETECTED
N GONORRHOEA RRNA SPEC QL NAA+PROBE: NOT DETECTED
SOURCE AMPLIFICATION: NORMAL
SOURCE AMPLIFICATION: NORMAL
T VAGINALIS RRNA SPEC QL NAA+PROBE: NOT DETECTED

## 2021-03-15 NOTE — DISEASE MANAGEMENT
[Pathological] : TNM Stage: p [IA] : IA [FreeTextEntry4] : invasive lobular carcinoma of the right breast, G2, ER/LA positive / HER 2 negative, upper outer quadrant [TTNM] : 1a [NTNM] : 0 [MTNM] : 0 [de-identified] : [id number] [de-identified] : [id number] [de-identified] : right breast

## 2021-03-15 NOTE — PHYSICAL EXAM
[] : no respiratory distress [Exaggerated Use Of Accessory Muscles For Inspiration] : no accessory muscle use [Normal] : oriented to person, place and time, the affect was normal, the mood was normal and not anxious [de-identified] : No skin reaction yet

## 2021-03-15 NOTE — HISTORY OF PRESENT ILLNESS
[FreeTextEntry1] : 3/15/21 OTV Nursing: occasional pain 6/10 to right breast and arm. pt denies any N/V/D. skin care reviewed. pt has Aquaphor. \par \par MD Note:  She is doing well.  Mild breast discomfort.  No new concerns\par

## 2021-03-16 ENCOUNTER — NON-APPOINTMENT (OUTPATIENT)
Age: 51
End: 2021-03-16

## 2021-03-18 LAB
ESTRADIOL SERPL-MCNC: 6 PG/ML
FERRITIN SERPL-MCNC: 106 NG/ML
FSH SERPL-MCNC: 22.5 IU/L
HCT VFR BLD CALC: 38 %
HGB BLD-MCNC: 13 G/DL
IRON SATN MFR SERPL: 22 %
IRON SERPL-MCNC: 75 UG/DL
LH SERPL-ACNC: 11.1 IU/L
MCHC RBC-ENTMCNC: 28.6 PG
MCHC RBC-ENTMCNC: 34.2 G/DL
MCV RBC AUTO: 83.7 FL
PLATELET # BLD AUTO: 124 K/UL
PMV BLD: 11.5 FL
RBC # BLD: 4.54 M/UL
RBC # FLD: 14.1 %
TIBC SERPL-MCNC: 341 UG/DL
UIBC SERPL-MCNC: 266 UG/DL
WBC # FLD AUTO: 9.5 K/UL

## 2021-03-19 LAB — CYTOLOGY CVX/VAG DOC THIN PREP: ABNORMAL

## 2021-03-22 ENCOUNTER — LABORATORY RESULT (OUTPATIENT)
Age: 51
End: 2021-03-22

## 2021-03-22 ENCOUNTER — NON-APPOINTMENT (OUTPATIENT)
Age: 51
End: 2021-03-22

## 2021-03-22 VITALS
SYSTOLIC BLOOD PRESSURE: 127 MMHG | DIASTOLIC BLOOD PRESSURE: 67 MMHG | OXYGEN SATURATION: 96 % | RESPIRATION RATE: 14 BRPM | WEIGHT: 191.5 LBS | TEMPERATURE: 97.7 F | BODY MASS INDEX: 32.87 KG/M2 | HEART RATE: 95 BPM

## 2021-03-22 VITALS
SYSTOLIC BLOOD PRESSURE: 127 MMHG | HEART RATE: 95 BPM | WEIGHT: 191.5 LBS | OXYGEN SATURATION: 96 % | RESPIRATION RATE: 14 BRPM | DIASTOLIC BLOOD PRESSURE: 67 MMHG | TEMPERATURE: 97.7 F | BODY MASS INDEX: 32.87 KG/M2

## 2021-03-22 DIAGNOSIS — R31.9 HEMATURIA, UNSPECIFIED: ICD-10-CM

## 2021-03-22 NOTE — DISEASE MANAGEMENT
[Pathological] : TNM Stage: p [IA] : IA [FreeTextEntry4] : invasive lobular carcinoma of the right breast, G2, ER/UT positive / HER 2 negative, upper outer quadrant [TTNM] : 1a [NTNM] : 0 [MTNM] : 0 [de-identified] : 4953kGy [de-identified] : 8606oBq [de-identified] : right breast

## 2021-03-22 NOTE — DISEASE MANAGEMENT
[Pathological] : TNM Stage: p [IA] : IA [FreeTextEntry4] : invasive lobular carcinoma of the right breast, G2, ER/WY positive / HER 2 negative, upper outer quadrant [TTNM] : 1a [NTNM] : 0 [MTNM] : 0 [de-identified] : 3165rGy [de-identified] : [id number] [de-identified] : right breast

## 2021-03-22 NOTE — REVIEW OF SYSTEMS
[Fatigue: Grade 1 - Fatigue relieved by rest] : Fatigue: Grade 1 - Fatigue relieved by rest [Hematuria: Grade 1 - Asymptomatic; clinical or diagnostic observations only; intervention not indicated] : Hematuria: Grade 1 - Asymptomatic; clinical or diagnostic observations only; intervention not indicated [Dermatitis Radiation: Grade 0] : Dermatitis Radiation: Grade 0

## 2021-03-22 NOTE — PHYSICAL EXAM
[] : no respiratory distress [Normal] : oriented to person, place and time, the affect was normal, the mood was normal and not anxious [de-identified] : Mild erythema in radiation field.  No desquamation.

## 2021-03-22 NOTE — PHYSICAL EXAM
[] : no respiratory distress [Normal] : oriented to person, place and time, the affect was normal, the mood was normal and not anxious [de-identified] : Mild erythema in radiation field.  No desquamation.

## 2021-03-22 NOTE — REASON FOR VISIT
[Routine On-Treatment] : a routine on-treatment visit for [Breast Cancer] : breast cancer Detail Level: Detailed Wound Evaluated By: An HEATH Add 83815 Cpt? (Important Note: In 2017 The Use Of 00680 Is Being Tracked By Cms To Determine Future Global Period Reimbursement For Global Periods): yes

## 2021-03-22 NOTE — HISTORY OF PRESENT ILLNESS
[FreeTextEntry1] : Patient reports doing well.  She is not applying moisturizer nightly. Otherwise, she denies breast pain.and reports the "lump" under her right armpit remains but getting smaller.  Also, she mentions of slight blood , pinkish in color in the toilet after urination and clear urine today.  She has not started her Anastrozole.  She had pap smear on 3/11/2021.  She can't recall her last day of her period.  She denies any dysuria.

## 2021-03-22 NOTE — DISEASE MANAGEMENT
[Pathological] : TNM Stage: p [IA] : IA [FreeTextEntry4] : invasive lobular carcinoma of the right breast, G2, ER/WI positive / HER 2 negative, upper outer quadrant [TTNM] : 1a [NTNM] : 0 [MTNM] : 0 [de-identified] : 9995yGy [de-identified] : 4628hOs [de-identified] : right breast

## 2021-03-29 ENCOUNTER — NON-APPOINTMENT (OUTPATIENT)
Age: 51
End: 2021-03-29

## 2021-03-29 VITALS
HEART RATE: 99 BPM | BODY MASS INDEX: 32.44 KG/M2 | TEMPERATURE: 99 F | OXYGEN SATURATION: 100 % | RESPIRATION RATE: 12 BRPM | WEIGHT: 189 LBS | DIASTOLIC BLOOD PRESSURE: 83 MMHG | SYSTOLIC BLOOD PRESSURE: 140 MMHG

## 2021-03-29 NOTE — PHYSICAL EXAM
[Obese] : obese [Heart Rate And Rhythm] : heart rate and rhythm were normal [Nail Clubbing] : no clubbing  or cyanosis of the fingernails [No Focal Deficits] : no focal deficits [Normal] : oriented to person, place and time, the affect was normal, the mood was normal and not anxious [de-identified] : mod erythma to right breast/skin folds, no desquamation

## 2021-03-29 NOTE — VITALS
[Maximal Pain Intensity: 2/10] : 2/10 [Least Pain Intensity: 0/10] : 0/10 [OTC] : OTC [80: Normal activity with effort; some signs or symptoms of disease.] : 80: Normal activity with effort; some signs or symptoms of disease.

## 2021-03-29 NOTE — HISTORY OF PRESENT ILLNESS
[FreeTextEntry1] : Patient reports doing well,  right axillary seroma is getting smaller, has mild fatigue, increased sensitivity to the nipple, no pain.  She continues to provide skin care.  Will start Anastrozole on 4/28/2021, as instructed by Dr. Morgan.

## 2021-03-29 NOTE — DISEASE MANAGEMENT
[Pathological] : TNM Stage: p [IA] : IA [FreeTextEntry4] : invasive lobular carcinoma of the right breast, G2, ER/IL positive / HER 2 negative, upper outer quadrant [TTNM] : 1a [NTNM] : 0 [MTNM] : 0 [de-identified] : [id number] [de-identified] : [id number] [de-identified] : right breast

## 2021-03-30 PROBLEM — Z01.419 ENCOUNTER FOR ANNUAL ROUTINE GYNECOLOGICAL EXAMINATION: Status: RESOLVED | Noted: 2021-03-30 | Resolved: 2021-04-13

## 2021-03-30 PROBLEM — Z78.9 CONSUMES ALCOHOL OCCASIONALLY: Status: ACTIVE | Noted: 2021-03-30

## 2021-03-30 PROBLEM — Z78.9 DOES NOT USE ILLICIT DRUGS: Status: ACTIVE | Noted: 2021-03-30

## 2021-03-30 PROBLEM — Z83.3 FAMILY HISTORY OF DIABETES MELLITUS: Status: ACTIVE | Noted: 2021-03-30

## 2021-03-30 PROBLEM — Z78.9 EXERCISES OCCASIONALLY: Status: ACTIVE | Noted: 2021-03-30

## 2021-03-30 PROBLEM — Z87.42 HISTORY OF ABNORMAL CERVICAL PAPANICOLAOU SMEAR: Status: RESOLVED | Noted: 2021-03-30 | Resolved: 2021-03-30

## 2021-03-30 RX ORDER — CHROMIUM 200 MCG
TABLET ORAL
Refills: 0 | Status: ACTIVE | COMMUNITY

## 2021-03-30 NOTE — COUNSELING
[Nutrition/ Exercise/ Weight Management] : nutrition, exercise, weight management [Vitamins/Supplements] : vitamins/supplements [Breast Self Exam] : breast self exam [Bladder Hygiene] : bladder hygiene [Contraception/ Emergency Contraception/ Safe Sexual Practices] : contraception, emergency contraception, safe sexual practices [Lab Results] : lab results [STD (testing, results, tx)] : STD (testing, results, tx) [Medication Management] : medication management

## 2021-03-30 NOTE — HISTORY OF PRESENT ILLNESS
[Patient reported mammogram was abnormal] : Patient reported mammogram was abnormal [Patient reported PAP Smear was normal] : Patient reported PAP Smear was normal [Patient reported colonoscopy was normal] : Patient reported colonoscopy was normal [LMP unknown] : LMP unknown [perimenopausal] : perimenopausal [N] : Patient is not sexually active [Y] : Positive pregnancy history [unknown] : Patient is unsure of the date of her LMP [Menarche Age: ____] : age at menarche was [unfilled] [Previously active] : previously active [Men] : men [Gonorrhea test offered] : Gonorrhea test offered [Chlamydia test offered] : Chlamydia test offered [Trichomonas test offered] : Trichomonas test offered [HPV test offered] : HPV test offered [No] : No [FreeTextEntry1] : Pt is here for here annual exam. Overall pt feels good, denies any pelvic pain or abnormal bleeding. Pt does state she was just diagnosed with Breast Ca stage 1 and will start radiation today. [TextBox_4] : 49yo  in perimenopause (patient unsure of last menses, but states has not been over a year, has been intermittent), encourage menstrual diary, but patient was just recently diagnosed with breast cancer and due to start treatment which she was counseled may also alter/limit menses.  She denies AUB, pelvic pain, discharge, dysuria or other GYN complaints.  She does have a h/o abnl pap and colposcopy but states has been normal since, denies other STDs, fibroids or cysts.  She is not currently sexually active. [Mammogramdate] : 10/2020 [TextBox_19] : Pt diagnosed stage 1 Breast CA [PapSmeardate] : 2019 [ColonoscopyDate] : 2017 [TextBox_43] : f/u 2022 [PGHxTotal] : 2 [[facility]] : 2 [PGHxPremature] : 0 [PGHxAbortions] : 0 [[facility] MaryvalexLiving] : 2 [PGHxABInduced] : 0 [PGHxABSpont] : 0 [PGHxEctopic] : 0 [PGHxMultBirths] : 0

## 2021-03-30 NOTE — DISCUSSION/SUMMARY
[FreeTextEntry1] : A: 49yo for annual GYN exam, perimenopause, breast cancer, BMI 31\par \par P: GYN Exam done\par     pap smear done\par      safe sex practices if active\par      menstrual diary\par      perimenopause/menopause counseling\par      pain and bleeding precautions\par       f/u with breast surgeon/oncology as scheduled\par       encourage healthy diet and exercise\par       f/u 1 yr or PRN

## 2021-03-30 NOTE — PHYSICAL EXAM
[Appropriately responsive] : appropriately responsive [Alert] : alert [No Acute Distress] : no acute distress [No Lymphadenopathy] : no lymphadenopathy [Soft] : soft [Non-tender] : non-tender [Non-distended] : non-distended [No Mass] : no mass [Oriented x3] : oriented x3 [FreeTextEntry6] : declined due to recent surgery and starting radiation today [No Lesions] : no lesions  [Labia Majora] : normal [Labia Minora] : normal [No Bleeding] : There was no active vaginal bleeding [Normal] : normal [Tenderness] : nontender [Enlarged ___ wks] : not enlarged [Mass ___ cm] : no uterine mass was palpated [Uterine Adnexae] : normal [FreeTextEntry5] : pap smear done

## 2021-04-01 ENCOUNTER — OUTPATIENT (OUTPATIENT)
Dept: OUTPATIENT SERVICES | Facility: HOSPITAL | Age: 51
LOS: 1 days | Discharge: HOME | End: 2021-04-01
Payer: MEDICARE

## 2021-04-01 DIAGNOSIS — Z98.890 OTHER SPECIFIED POSTPROCEDURAL STATES: Chronic | ICD-10-CM

## 2021-04-01 DIAGNOSIS — C50.411 MALIGNANT NEOPLASM OF UPPER-OUTER QUADRANT OF RIGHT FEMALE BREAST: ICD-10-CM

## 2021-04-06 DIAGNOSIS — C50.411 MALIGNANT NEOPLASM OF UPPER-OUTER QUADRANT OF RIGHT FEMALE BREAST: ICD-10-CM

## 2021-04-19 ENCOUNTER — NON-APPOINTMENT (OUTPATIENT)
Age: 51
End: 2021-04-19

## 2021-04-19 VITALS
BODY MASS INDEX: 32.96 KG/M2 | SYSTOLIC BLOOD PRESSURE: 119 MMHG | OXYGEN SATURATION: 99 % | HEART RATE: 89 BPM | DIASTOLIC BLOOD PRESSURE: 60 MMHG | RESPIRATION RATE: 12 BRPM | TEMPERATURE: 96.8 F | WEIGHT: 192 LBS

## 2021-04-19 DIAGNOSIS — L29.9 PRURITUS, UNSPECIFIED: ICD-10-CM

## 2021-04-21 NOTE — DISEASE MANAGEMENT
[Pathological] : TNM Stage: p [IA] : IA [FreeTextEntry4] : invasive lobular carcinoma of the right breast, G2, ER/MO positive / HER 2 negative, upper outer quadrant [TTNM] : 1a [NTNM] : 0 [MTNM] : 0 [de-identified] : right breast Clear bilaterally, pupils equal, round and reactive to light. Clear bilaterally, pupils equal, round and reactive to light. pink conjunctiva

## 2021-04-21 NOTE — HISTORY OF PRESENT ILLNESS
[FreeTextEntry1] : Patient reports, her right breast is very itchy without any significant relief of hydrocortisone cream and Benadryl gel.  She denies any open wounds and breast discomfort.  Otherwise, she is well.

## 2021-04-21 NOTE — REVIEW OF SYSTEMS
[Skin Rash] : skin rash [Negative] : Constitutional [de-identified] : localized to the right breast -

## 2021-04-21 NOTE — PHYSICAL EXAM
[Obese] : obese [Hearing Threshold Finger Rub Not [place]] : hearing was normal [Normal] : no respiratory distress, lungs were clear to auscultation bilaterally [No Discharge] : no discharge [___] : a [unfilled] ~Ucm area of erythema [Tenderness Of The Right Breast] : tenderness [Oriented To Time, Place, And Person] : oriented to person, place, and time [Not Anxious] : not anxious [de-identified] : pruritus, no broken skin - mod  erythema, tender to touch at the nipple

## 2021-05-05 ENCOUNTER — APPOINTMENT (OUTPATIENT)
Dept: RADIATION ONCOLOGY | Facility: HOSPITAL | Age: 51
End: 2021-05-05
Payer: MEDICARE

## 2021-05-05 VITALS
HEART RATE: 85 BPM | WEIGHT: 182 LBS | SYSTOLIC BLOOD PRESSURE: 134 MMHG | OXYGEN SATURATION: 99 % | BODY MASS INDEX: 31.24 KG/M2 | RESPIRATION RATE: 16 BRPM | DIASTOLIC BLOOD PRESSURE: 71 MMHG | TEMPERATURE: 97.7 F

## 2021-05-05 PROCEDURE — 99024 POSTOP FOLLOW-UP VISIT: CPT

## 2021-05-05 RX ORDER — ATORVASTATIN CALCIUM 80 MG/1
TABLET, FILM COATED ORAL
Refills: 0 | Status: DISCONTINUED | COMMUNITY
End: 2021-05-05

## 2021-05-05 RX ORDER — AMLODIPINE BESYLATE 5 MG/1
TABLET ORAL
Refills: 0 | Status: DISCONTINUED | COMMUNITY
End: 2021-05-05

## 2021-05-05 RX ORDER — MONTELUKAST 10 MG/1
10 TABLET, FILM COATED ORAL
Refills: 0 | Status: DISCONTINUED | COMMUNITY
End: 2021-05-05

## 2021-05-05 NOTE — PHYSICAL EXAM
[Sclera] : the sclera and conjunctiva were normal [Heart Rate And Rhythm] : heart rate and rhythm were normal [Heart Sounds] : normal S1 and S2 [Normal] : oriented to person, place and time, the affect was normal, the mood was normal and not anxious [de-identified] : She is examined in both the upright and supine positions.  The right breast has mild residual hyperpigmentation.   The left breast is unremarkable. .  No palpable mass in either breast.

## 2021-05-05 NOTE — VITALS
[Maximal Pain Intensity: 1/10] : 1/10 [Least Pain Intensity: 0/10] : 0/10 [90: Able to carry normal activity; minor signs or symptoms of disease.] : 90: Able to carry normal activity; minor signs or symptoms of disease.

## 2021-05-05 NOTE — DISEASE MANAGEMENT
[Pathological] : TNM Stage: p [IA] : IA [FreeTextEntry4] : invasive lobular carcinoma of the right breast, G2, ER/WY positive / HER 2 negative, upper outer quadrant [TTNM] : 1a [NTNM] : 0 [MTNM] : 0

## 2021-05-05 NOTE — LETTER CLOSING
[Consult Closing:] : Thank you for allowing me to participate in the care of this patient.  If you have any questions, please do not hesitate to contact me. [Sincerely yours,] : Sincerely yours, [FreeTextEntry3] : Judie Posada M.D. \par \par Electronically proofread and signed by:  Judie Posada MD\par Attending, Department of Radiation Medicine\par [facility]\par \par CC: Dr. Morgan\par

## 2021-05-05 NOTE — HISTORY OF PRESENT ILLNESS
[FreeTextEntry1] : SKYLAR VUONG returns in follow up visit.  As you know, SKYLAR VUONG is a 50 year old female with invasive lobular carcinoma of the right breast, ER/AR positive, HER 2 negative, Stage IA. She is s/p breast conserving surgery. She received 4,240cGy to the right breast from 3/11/21 to 4/1/2021. The course of radiation therapy was completed without any complications. She returned for a skin check April 19th and is here today for her post treatment follow up. Right breast pruritus has resolved, no longer needing cortisone or Benadryl cream. She denies pain or discomfort at treatment. Started taking anastrozole as prescribed, tolerating well. She is scheduled to follow up with Dr. Zabala 5/13 and Dr. Morgan 6/9.

## 2021-06-09 ENCOUNTER — APPOINTMENT (OUTPATIENT)
Dept: HEMATOLOGY ONCOLOGY | Facility: CLINIC | Age: 51
End: 2021-06-09
Payer: MEDICARE

## 2021-06-09 ENCOUNTER — RESULT REVIEW (OUTPATIENT)
Age: 51
End: 2021-06-09

## 2021-06-09 VITALS
WEIGHT: 190 LBS | DIASTOLIC BLOOD PRESSURE: 72 MMHG | SYSTOLIC BLOOD PRESSURE: 138 MMHG | HEIGHT: 64 IN | TEMPERATURE: 97.9 F | RESPIRATION RATE: 16 BRPM | HEART RATE: 97 BPM | BODY MASS INDEX: 32.44 KG/M2

## 2021-06-09 DIAGNOSIS — E61.1 IRON DEFICIENCY: ICD-10-CM

## 2021-06-09 PROCEDURE — 99214 OFFICE O/P EST MOD 30 MIN: CPT

## 2021-06-18 PROBLEM — E61.1 IRON DEFICIENCY: Status: ACTIVE | Noted: 2021-06-18

## 2021-06-18 NOTE — ASSESSMENT
[FreeTextEntry1] : 50 year old perimenopausal female has Stage IA ([id number]) classical type lobular carcinoma of the right breast, G2, ER/WA positive, Her-2 negative, s/p right breast lumpectomy and SLNB with negative margins. \par \par H/o iron deficiency anemia - off Iron tabs for 2 months now\par \par Recommendations:\par -- Continue Anastrozole daily. \par -- Breast exam today did not reveal palpable abnormality. She will have right breast dx mammo and US in 7/2021.\par -- Discussed bone health management while on AI. She was referred for bone density. Advised to take calcium and vitamin D supplement, regular exercise. \par -- H/o iron deficiency anemia. Blood work from 3/2021 showed normal serum Fe, Ferritin and % saturation.\par -- Followup with Dr. Zabala and Dr. Posada as scheduled. \par -- She will come back for followup visit in 3 months. \par \par

## 2021-06-18 NOTE — PHYSICAL EXAM
[Restricted in physically strenuous activity but ambulatory and able to carry out work of a light or sedentary nature] : Status 1- Restricted in physically strenuous activity but ambulatory and able to carry out work of a light or sedentary nature, e.g., light house work, office work [Normal] : grossly intact [de-identified] : Rt lumpectomy scar is healing well. No palpable masses in b/l breast and no axillary LNs

## 2021-06-18 NOTE — HISTORY OF PRESENT ILLNESS
[de-identified] :  50 year old female with PMH of Bipolar disorder, HTN, DM , asthma is referred for consultation of adjuvant systemic treatment for new diagnosis of breast cancer. She is here accompanied by her mother. \par \par She had a screening mammogram (10/26/2020) which showed a 4 mm spiculated lesion in the right breast in the upper outer quadrant. Diagnostic mammogram and ultrasound on 2020 showed a new 4 mm spiculated density in the 11 o'clock, 12 cm FN right breast. BI-RADS 4 Suspicious. Recommend biopsy. On 2020, she had a biopsy of the right breast abnormality at 10 o’clock, 12 cm FN and pathology that showed invasive mammary carcinoma with lobular features, moderately differentiated. \par \par On 12/10/2020, outside slides reviewed at [facility] from [facility] shows, invasive well differentiated lobular carcinoma, classical type and lobular carcinoma in-situ. It was ER/HI positive %, HER 2 negative. Ki-67 index was 25%.  She also had an MRI of bilateral breasts on 12/15/2020, which showed in right breast, there is a 0.4 cm mass with associated biopsy clip in the upper outer quadrant of the right breast consistent with the biopsy-proven carcinoma. Surrounding postbiopsy enhancement is noted. Benign intramammary lymph nodes are seen in the upper outer quadrant of the right breast. No additional suspicious abnormal enhancement is seen in the right breast. There is no axillary adenopathy. In the left breast, benign intramammary lymph node in the upper outer quadrant of the left breast. No suspicious abnormal enhancement is seen in the left breast. There is no axillary adenopathy. BI-RADS 6: Known biopsy-proven malignancy.\par \par On 2021, She underwent a lumpectomy and sentinel node biopsy. She was found to have a 4.5 mm invasive lobular carcinoma, G2, ER/HI positive % / HER 2 negative, Ki 67 index is 25 %. Surgical margins were negative as well as 0/1 negative sentinel lymph node. There was no LVSI/PNI.  She recovered well since surgery. However, has some breast discomfort/lump at the lumpectomy site but getting better. She also saw Dr Swetha Vega onc and is going to be offered radiation therapy. \par \par She is . The age at menarche was 12.  Age at live birth was 32  She received fertility Treatments with Clomid.  She used Birth Control Pill for 14 years. She is perimenopause. She does not remember when her last periods was but was less than a year ago. Also to note she has h/o bipolar disorder and is controlled with medications and receives Prolixin shot every 2 weeks and is on Lithium and carbamazepine. \par \par She has no family h/o of breast or ovarian cancer. \par \par She was also on oral ferrous sulfate for h/o NESTOR in the past and was started by PMD which she stopped taking in Dec 2020.  [de-identified] : 6/9/21:\par The patient is here today for f/u visit. She has Stage IA ([id number]) classical type lobular carcinoma of the right breast, G2, ER/MI positive, Her-2 negative, s/p right breast lumpectomy and SLNB on 1/29/2021 with negative margins. She saw Dr. ray and received adjuvant WBI between 3/11 to 4/1/21. She has started on adjuvant endocrine therapy with Anastrozole and tolerated well so far. She is feeling well and dose not have new complains.\par

## 2021-06-18 NOTE — ASSESSMENT
[FreeTextEntry1] : 50 year old perimenopausal female has Stage IA ([id number]) classical type lobular carcinoma of the right breast, G2, ER/[place] positive, Her-2 negative, s/p right breast lumpectomy and SLNB with negative margins. \par \par H/o iron deficiency anemia - off Iron tabs for 2 months now\par \par Recommendations:\par -- Continue Anastrozole daily. \par -- Breast exam today did not reveal palpable abnormality. She will have right breast dx mammo and US in 7/2021.\par -- Discussed bone health management while on AI. She was referred for bone density. Advised to take calcium and vitamin D supplement, regular exercise. \par -- H/o iron deficiency anemia. Blood work from 3/2021 showed normal serum Fe, Ferritin and % saturation.\par -- Followup with Dr. Zabala and Dr. Posada as scheduled. \par -- She will come back for followup visit in 3 months. \par \par

## 2021-06-18 NOTE — HISTORY OF PRESENT ILLNESS
[de-identified] :  50 year old female with PMH of Bipolar disorder, HTN, DM , asthma is referred for consultation of adjuvant systemic treatment for new diagnosis of breast cancer. She is here accompanied by her mother. \par \par She had a screening mammogram (10/26/2020) which showed a 4 mm spiculated lesion in the right breast in the upper outer quadrant. Diagnostic mammogram and ultrasound on 2020 showed a new 4 mm spiculated density in the 11 o'clock, 12 cm FN right breast. BI-RADS 4 Suspicious. Recommend biopsy. On 2020, she had a biopsy of the right breast abnormality at 10 o’clock, 12 cm FN and pathology that showed invasive mammary carcinoma with lobular features, moderately differentiated. \par \par On 12/10/2020, outside slides reviewed at [facility] from [facility] shows, invasive well differentiated lobular carcinoma, classical type and lobular carcinoma in-situ. It was ER/OH positive %, HER 2 negative. Ki-67 index was 25%.  She also had an MRI of bilateral breasts on 12/15/2020, which showed in right breast, there is a 0.4 cm mass with associated biopsy clip in the upper outer quadrant of the right breast consistent with the biopsy-proven carcinoma. Surrounding postbiopsy enhancement is noted. Benign intramammary lymph nodes are seen in the upper outer quadrant of the right breast. No additional suspicious abnormal enhancement is seen in the right breast. There is no axillary adenopathy. In the left breast, benign intramammary lymph node in the upper outer quadrant of the left breast. No suspicious abnormal enhancement is seen in the left breast. There is no axillary adenopathy. BI-RADS 6: Known biopsy-proven malignancy.\par \par On 2021, She underwent a lumpectomy and sentinel node biopsy. She was found to have a 4.5 mm invasive lobular carcinoma, G2, ER/OH positive % / HER 2 negative, Ki 67 index is 25 %. Surgical margins were negative as well as 0/1 negative sentinel lymph node. There was no LVSI/PNI.  She recovered well since surgery. However, has some breast discomfort/lump at the lumpectomy site but getting better. She also saw Dr Swetha Vega onc and is going to be offered radiation therapy. \par \par She is . The age at menarche was 12.  Age at live birth was 32  She received fertility Treatments with Clomid.  She used Birth Control Pill for 14 years. She is perimenopause. She does not remember when her last periods was but was less than a year ago. Also to note she has h/o bipolar disorder and is controlled with medications and receives Prolixin shot every 2 weeks and is on Lithium and carbamazepine. \par \par She has no family h/o of breast or ovarian cancer. \par \par She was also on oral ferrous sulfate for h/o NESTOR in the past and was started by PMD which she stopped taking in Dec 2020.  [de-identified] : 6/9/21:\par The patient is here today for f/u visit. She has Stage IA ([id number]) classical type lobular carcinoma of the right breast, G2, ER/MT positive, Her-2 negative, s/p right breast lumpectomy and SLNB on 1/29/2021 with negative margins. She saw Dr. ray and received adjuvant WBI between 3/11 to 4/1/21. She has started on adjuvant endocrine therapy with Anastrozole and tolerated well so far. She is feeling well and dose not have new complains.\par

## 2021-06-18 NOTE — PHYSICAL EXAM
[Restricted in physically strenuous activity but ambulatory and able to carry out work of a light or sedentary nature] : Status 1- Restricted in physically strenuous activity but ambulatory and able to carry out work of a light or sedentary nature, e.g., light house work, office work [Normal] : grossly intact [de-identified] : Rt lumpectomy scar is healing well. No palpable masses in b/l breast and no axillary LNs

## 2021-06-18 NOTE — CONSULT LETTER
[Dear  ___] : Dear  [unfilled], [Please see my note below.] : Please see my note below. [Sincerely,] : Sincerely, [DrLaila  ___] : Dr. LEWIS [Courtesy Letter:] : I had the pleasure of seeing your patient, [unfilled], in my office today. [FreeTextEntry3] : Angel Morgan MD

## 2021-06-28 ENCOUNTER — RESULT REVIEW (OUTPATIENT)
Age: 51
End: 2021-06-28

## 2021-06-28 ENCOUNTER — OUTPATIENT (OUTPATIENT)
Dept: OUTPATIENT SERVICES | Facility: HOSPITAL | Age: 51
LOS: 1 days | Discharge: HOME | End: 2021-06-28
Payer: MEDICARE

## 2021-06-28 DIAGNOSIS — Z98.890 OTHER SPECIFIED POSTPROCEDURAL STATES: Chronic | ICD-10-CM

## 2021-06-28 DIAGNOSIS — R92.8 OTHER ABNORMAL AND INCONCLUSIVE FINDINGS ON DIAGNOSTIC IMAGING OF BREAST: ICD-10-CM

## 2021-06-28 PROCEDURE — G0279: CPT | Mod: 26

## 2021-06-28 PROCEDURE — 76641 ULTRASOUND BREAST COMPLETE: CPT | Mod: 26,RT

## 2021-06-28 PROCEDURE — 77065 DX MAMMO INCL CAD UNI: CPT | Mod: 26,RT

## 2021-06-29 DIAGNOSIS — M89.9 DISORDER OF BONE, UNSPECIFIED: ICD-10-CM

## 2021-06-29 DIAGNOSIS — Z13.820 ENCOUNTER FOR SCREENING FOR OSTEOPOROSIS: ICD-10-CM

## 2021-06-29 DIAGNOSIS — Z78.0 ASYMPTOMATIC MENOPAUSAL STATE: ICD-10-CM

## 2021-06-30 ENCOUNTER — NON-APPOINTMENT (OUTPATIENT)
Age: 51
End: 2021-06-30

## 2021-08-02 NOTE — ASU DISCHARGE PLAN (ADULT/PEDIATRIC) - ***IN THE EVENT THAT YOU DEVELOP A COMPLICATION AND YOU ARE UNABLE TO REACH YOUR OWN PHYSICIAN, YOU MAY CONTACT:
Statement Selected Abnormal echocardiogram CVA (cerebral vascular accident) Colon cancer Palliative care encounter

## 2021-08-06 ENCOUNTER — APPOINTMENT (OUTPATIENT)
Dept: BREAST CENTER | Facility: CLINIC | Age: 51
End: 2021-08-06
Payer: MEDICARE

## 2021-08-06 VITALS
HEIGHT: 64 IN | TEMPERATURE: 98 F | WEIGHT: 190 LBS | DIASTOLIC BLOOD PRESSURE: 74 MMHG | BODY MASS INDEX: 32.44 KG/M2 | SYSTOLIC BLOOD PRESSURE: 118 MMHG

## 2021-08-06 PROCEDURE — 99213 OFFICE O/P EST LOW 20 MIN: CPT

## 2021-08-06 NOTE — DATA REVIEWED
[FreeTextEntry1] : Right Uni Dx Mammo & Sono - 06/28/2021:\par \par Diagnostic mammogram with spot magnification views of the right lumpectomy site were performed and submitted for evaluation.\par \par Computer-aided detection was utilized in the interpretation of this examination.\par \par Comparison is made to the prior studies dated 2016.\par \par Breast composition:The breasts are heterogeneously dense, which may obscure small masses.\par \par The patient is status post a right lumpectomy with expected architectural distortion most consistent with postsurgical change. Enlarged ovoid masses noted in the right axilla measuring up to 4 cm. Scattered calcifications in the central aspect of the right breast are stable since 2016.\par \par Right whole breast ultrasound:\par \par Predominantly anechoic collection is noted in the right breast/axillary tail in the region of the palpable concern most likely reflecting a seroma in the region of the axillary scar. This collection measures 3.3 x 3.2 x 2.7 cm.\par \par Impression:\par \par Expected postoperative changes of the right lumpectomy site\par \par Seroma in the right axilla is benign. This is amenable to aspiration if clinically warranted.\par \par No evidence of malignancy\par \par Recommendation: As per post lumpectomy routine, a follow-up unilateral right mammogram is recommended.\par \par BI-RADS Category 2: Benign

## 2021-08-06 NOTE — PHYSICAL EXAM
[Normocephalic] : normocephalic [Atraumatic] : atraumatic [No Supraclavicular Adenopathy] : no supraclavicular adenopathy [No dominant masses] : no dominant masses in right breast  [No dominant masses] : no dominant masses left breast [No Nipple Discharge] : no left nipple discharge [No Rashes] : no rashes [No Ulceration] : no ulceration [Breast Nipple Inversion] : nipples not inverted [Breast Nipple Retraction] : nipples not retracted [de-identified] : well healed surgical scars.\par palpable scar tissue / seroma.  [de-identified] : No axillary lymphadenopathy appreciated. [de-identified] : No axillary lymphadenopathy appreciated.

## 2021-08-06 NOTE — REASON FOR VISIT
[Follow-Up: _____] : a [unfilled] follow-up visit [Family Member] : family member [FreeTextEntry1] : right breast cancer; imaging review.

## 2021-08-06 NOTE — HISTORY OF PRESENT ILLNESS
[FreeTextEntry1] : PCP: Dr. Morales Moyer\par GYN: Dr. Willow Slater\par \par s/p US Guided Core Bx - 11/18/2020:\par Right, 10:00 N13, 4mm: (top-hat)\par - Invasive mammary carcinoma with lobular features, moderately differentiated\par - Atypical lobular hyperplasia \par Comment: Immunostain for E-cadherin is negative (granular cytoplasmic staining) supporting a lobular differentiation\par ER  (+) - % \par PA  (+) - % \par HER2 (-) (Score 1+)\par Ki-67 - 25%\par \par (-) family hx - breast / ovarian cancer.\par \par s/p Right NLOC / SNB - 01/29/2021 = (0/1); invasive moderately\par differentiated classical type lobular carcinoma, 4.5 mm, Widespread lobular carcinoma in-situ (LCIS), classical type,\par and atypical lobular hyperplasia (ALH). No lymphovascular or perineural invasion is seen.\par AJCC 8th Edition Pathologic Stage: pT1a, p(sn)N0, pMx.\par \par Dr. Judie Posada - (+) WBI (03/11 - 04/01/2021).\par Dr. Angel Morgan - Anastrozole.\par \par SKYLAR VUONG is a 51 year old female patient who presents today in follow up for right breast cancer.\par Since her last visit, she has no new breast related complaints. \par Imaging of the right breast was done on 06/28/2021, which revealed expected postoperative changes of the right lumpectomy site. Seroma in the right axilla is benign. No evidence of malignancy.\par \par She presents today for evaluation and imaging review.\par \par

## 2021-08-06 NOTE — ASSESSMENT
[FreeTextEntry1] : SKYLAR is a miguel 51 year old patient who presented today in follow up for a history of right breast cancer.  \par She has been doing well with no new breast related complaints. \par Imaging of the right breast was done recently which revealed expected postoperative changes of the right lumpectomy site. Seroma in the right axilla is benign. No evidence of malignancy, as detailed above. \par Physical exam was unrevealing today.\par \par Imaging with a bilateral diagnostic mammogram and sonogram will be due in December 2021, and that will be scheduled today. \par She will return for follow-up and clinical breast exam following.\par She will continue follow-up with medical oncology and radiation oncology as scheduled.\par \par The patient was informed that Dr. Ankit Zabala will no longer be practicing here as of the end of August 2021; her care will be continued with the practice.\par \par I spent a total of 20 minutes of face to face time with this patient, greater than 50% of which was spent in counseling and/or coordination of care.\par All of her questions were appropriately answered.\par She knows to call with any concerns.\par \par \par \par \par \par

## 2021-09-08 ENCOUNTER — APPOINTMENT (OUTPATIENT)
Dept: HEMATOLOGY ONCOLOGY | Facility: CLINIC | Age: 51
End: 2021-09-08
Payer: MEDICARE

## 2021-09-08 ENCOUNTER — OUTPATIENT (OUTPATIENT)
Dept: OUTPATIENT SERVICES | Facility: HOSPITAL | Age: 51
LOS: 1 days | Discharge: HOME | End: 2021-09-08

## 2021-09-08 VITALS
BODY MASS INDEX: 33.31 KG/M2 | HEIGHT: 63 IN | DIASTOLIC BLOOD PRESSURE: 65 MMHG | TEMPERATURE: 98.2 F | WEIGHT: 188 LBS | HEART RATE: 89 BPM | SYSTOLIC BLOOD PRESSURE: 127 MMHG

## 2021-09-08 DIAGNOSIS — E61.1 IRON DEFICIENCY: ICD-10-CM

## 2021-09-08 DIAGNOSIS — Z98.890 OTHER SPECIFIED POSTPROCEDURAL STATES: Chronic | ICD-10-CM

## 2021-09-08 DIAGNOSIS — C50.411 MALIGNANT NEOPLASM OF UPPER-OUTER QUADRANT OF RIGHT FEMALE BREAST: ICD-10-CM

## 2021-09-08 DIAGNOSIS — Z51.81 ENCOUNTER FOR THERAPEUTIC DRUG LEVEL MONITORING: ICD-10-CM

## 2021-09-08 PROCEDURE — 99214 OFFICE O/P EST MOD 30 MIN: CPT

## 2021-09-12 NOTE — ASSESSMENT
[FreeTextEntry1] : 50 year old perimenopausal female has Stage IA ([id number]) classical type lobular carcinoma of the right breast, G2, ER/IL positive, Her-2 negative, s/p right breast lumpectomy and SLNB with negative margins. \par \par H/o iron deficiency anemia - off Iron tabs for 2 months now\par \par Recommendations:\par -- Continue Anastrozole daily. \par -- Breast exam today did not reveal palpable abnormality. The right breast dx mammo and US on 6/28/21 showed expected postoperative changes of the right lumpectomy site. She will have b/l dx mammo in 12/2021.\par -- Discussed bone health management while on AI. Bone density showed osteopenia in the femoral neck. Continue calcium and vitamin D supplement, regular exercise. \par -- H/o iron deficiency anemia. Blood work from 3/2021 showed normal serum Fe, Ferritin and % saturation.\par -- Followup with Dr. Lim and Dr. Posada as scheduled. \par -- She will come back for followup visit in 6 months. \par \par

## 2021-09-12 NOTE — HISTORY OF PRESENT ILLNESS
[de-identified] :  50 year old female with PMH of Bipolar disorder, HTN, DM , asthma is referred for consultation of adjuvant systemic treatment for new diagnosis of breast cancer. She is here accompanied by her mother. \par \par She had a screening mammogram (10/26/2020) which showed a 4 mm spiculated lesion in the right breast in the upper outer quadrant. Diagnostic mammogram and ultrasound on 2020 showed a new 4 mm spiculated density in the 11 o'clock, 12 cm FN right breast. BI-RADS 4 Suspicious. Recommend biopsy. On 2020, she had a biopsy of the right breast abnormality at 10 o’clock, 12 cm FN and pathology that showed invasive mammary carcinoma with lobular features, moderately differentiated. \par \par On 12/10/2020, outside slides reviewed at [facility] from [facility] shows, invasive well differentiated lobular carcinoma, classical type and lobular carcinoma in-situ. It was ER/PA positive %, HER 2 negative. Ki-67 index was 25%.  She also had an MRI of bilateral breasts on 12/15/2020, which showed in right breast, there is a 0.4 cm mass with associated biopsy clip in the upper outer quadrant of the right breast consistent with the biopsy-proven carcinoma. Surrounding postbiopsy enhancement is noted. Benign intramammary lymph nodes are seen in the upper outer quadrant of the right breast. No additional suspicious abnormal enhancement is seen in the right breast. There is no axillary adenopathy. In the left breast, benign intramammary lymph node in the upper outer quadrant of the left breast. No suspicious abnormal enhancement is seen in the left breast. There is no axillary adenopathy. BI-RADS 6: Known biopsy-proven malignancy.\par \par On 2021, She underwent a lumpectomy and sentinel node biopsy. She was found to have a 4.5 mm invasive lobular carcinoma, G2, ER/PA positive % / HER 2 negative, Ki 67 index is 25 %. Surgical margins were negative as well as 0/1 negative sentinel lymph node. There was no LVSI/PNI.  She recovered well since surgery. However, has some breast discomfort/lump at the lumpectomy site but getting better. She also saw Dr Swetha Vega onc and is going to be offered radiation therapy. \par \par She is . The age at menarche was 12.  Age at live birth was 32  She received fertility Treatments with Clomid.  She used Birth Control Pill for 14 years. She is perimenopause. She does not remember when her last periods was but was less than a year ago. Also to note she has h/o bipolar disorder and is controlled with medications and receives Prolixin shot every 2 weeks and is on Lithium and carbamazepine. \par \par She has no family h/o of breast or ovarian cancer. \par \par She was also on oral ferrous sulfate for h/o NESTOR in the past and was started by PMD which she stopped taking in Dec 2020.  [de-identified] : 6/9/21:\par The patient is here today for f/u visit. She has Stage IA ([id number]) classical type lobular carcinoma of the right breast, G2, ER/NM positive, Her-2 negative, s/p right breast lumpectomy and SLNB on 1/29/2021 with negative margins. She saw Dr. ray and received adjuvant WBI between 3/11 to 4/1/21. She has started on adjuvant endocrine therapy with Anastrozole and tolerated well so far. She is feeling well and dose not have new complains.\par \par 9/8/21:\par The patient is here today for f/u visit. She has Stage IA ([id number]) classical type lobular carcinoma of the right breast, G2, ER/NM positive, Her-2 negative, s/p right breast lumpectomy and SLNB on 1/29/2021 with negative margins. She saw Dr. Ray and received adjuvant WBI between 3/11 to 4/1/21. She has been taking adjuvant endocrine therapy with Anastrozole and tolerated well. She is feeling well and dose not have new complains. On 6/28/21, she had right breast dx mammo and US which showed Expected postoperative changes of the right lumpectomy site. There was no suspicious finding. Bone density on 6/28/21 showed osteopenia in the femoral neck with T score -1.7.

## 2021-09-12 NOTE — PHYSICAL EXAM
[Restricted in physically strenuous activity but ambulatory and able to carry out work of a light or sedentary nature] : Status 1- Restricted in physically strenuous activity but ambulatory and able to carry out work of a light or sedentary nature, e.g., light house work, office work [Normal] : grossly intact [de-identified] : Rt lumpectomy scar is healing well. No palpable masses in b/l breast and no axillary LNs

## 2021-09-12 NOTE — CONSULT LETTER
[Courtesy Letter:] : I had the pleasure of seeing your patient, [unfilled], in my office today. [Please see my note below.] : Please see my note below. [Sincerely,] : Sincerely, [DrLaila  ___] : Dr. LEWIS [Dear  ___] : Dear  [unfilled], [FreeTextEntry3] : Angel Morgan MD

## 2021-09-16 DIAGNOSIS — M85.80 OTHER SPECIFIED DISORDERS OF BONE DENSITY AND STRUCTURE, UNSPECIFIED SITE: ICD-10-CM

## 2021-11-10 ENCOUNTER — OUTPATIENT (OUTPATIENT)
Dept: OUTPATIENT SERVICES | Facility: HOSPITAL | Age: 51
LOS: 1 days | Discharge: HOME | End: 2021-11-10

## 2021-11-10 ENCOUNTER — APPOINTMENT (OUTPATIENT)
Dept: RADIATION ONCOLOGY | Facility: HOSPITAL | Age: 51
End: 2021-11-10
Payer: MEDICARE

## 2021-11-10 VITALS
DIASTOLIC BLOOD PRESSURE: 60 MMHG | WEIGHT: 184.38 LBS | BODY MASS INDEX: 32.66 KG/M2 | OXYGEN SATURATION: 97 % | HEART RATE: 95 BPM | TEMPERATURE: 97 F | RESPIRATION RATE: 14 BRPM | SYSTOLIC BLOOD PRESSURE: 120 MMHG

## 2021-11-10 DIAGNOSIS — Z98.890 OTHER SPECIFIED POSTPROCEDURAL STATES: Chronic | ICD-10-CM

## 2021-11-10 DIAGNOSIS — C50.411 MALIGNANT NEOPLASM OF UPPER-OUTER QUADRANT OF RIGHT FEMALE BREAST: ICD-10-CM

## 2021-11-10 DIAGNOSIS — R53.83 OTHER FATIGUE: ICD-10-CM

## 2021-11-10 PROCEDURE — 99213 OFFICE O/P EST LOW 20 MIN: CPT

## 2021-11-11 NOTE — REVIEW OF SYSTEMS
[Fatigue] : fatigue [Negative] : Neurological [Chills] : no chills [Dysphagia] : no dysphagia [Chest Pain] : no chest pain [Palpitations] : no palpitations [Shortness Of Breath] : no shortness of breath [Wheezing] : no wheezing [Cough] : no cough [Urinary Frequency] : no change in urinary frequency [Vaginal Discharge] : no vaginal discharge [Dysmenorrhea/Abn Vaginal Bleeding] : no dysmenorrhea/abnormal vaginal bleeding [Joint Pain] : no joint pain [Muscle Pain] : no muscle pain [Suicidal] : not suicidal [Anxiety] : no anxiety [Depression] : no depression

## 2021-11-11 NOTE — LETTER CLOSING
[Consult Closing:] : Thank you for allowing me to participate in the care of this patient.  If you have any questions, please do not hesitate to contact me. [Sincerely yours,] : Sincerely yours, [FreeTextEntry3] : Judie Posada M.D. \par \par Electronically proofread and signed by:  Judie Posada MD\par Attending, Department of Radiation Medicine\par [facility]\par \par CC: Dr. Morgan\par

## 2021-11-11 NOTE — PHYSICAL EXAM
[Obese] : obese [Sclera] : the sclera and conjunctiva were normal [Hearing Threshold Finger Rub Not [place]] : hearing was normal [] : no respiratory distress [Respiration, Rhythm And Depth] : normal respiratory rhythm and effort [Heart Rate And Rhythm] : heart rate and rhythm were normal [Exaggerated Use Of Accessory Muscles For Inspiration] : no accessory muscle use [No Discharge] : no discharge [No Masses] : no breast masses were palpable [Cervical Lymph Nodes Enlarged Posterior Bilaterally] : posterior cervical [Cervical Lymph Nodes Enlarged Anterior Bilaterally] : anterior cervical [Supraclavicular Lymph Nodes Enlarged Bilaterally] : supraclavicular [Musculoskeletal - Swelling] : no joint swelling [Nail Clubbing] : no clubbing  or cyanosis of the fingernails [Motor Tone] : muscle strength and tone were normal [No Focal Deficits] : no focal deficits [Normal Mental Status] : the patient's orientation, memory, attention, language and fund of knowledge were normal [Flat] : flat [___] : no erythema [Axillary Lymph Nodes Enlarged Bilaterally] : no enlarged nodes [Normal] : no palpable adenopathy [de-identified] : She is examined in both the upright and supine positions.  The right breast has some resudual hyperpigmentation.   The left breast is unremarkable.  No palpable mass in either breast.

## 2021-11-11 NOTE — DISEASE MANAGEMENT
[Pathological] : TNM Stage: p [IA] : IA [FreeTextEntry4] : invasive lobular carcinoma of the right breast, G2, ER/FL positive / HER 2 negative, upper outer quadrant [TTNM] : 1a [NTNM] : 0 [MTNM] : 0

## 2021-11-11 NOTE — HISTORY OF PRESENT ILLNESS
[FreeTextEntry1] : SKYLAR VUONG returns in follow up visit.  As you know, SKYLAR VUONG is a 51 year old female with invasive lobular carcinoma of the right breast, ER/AL positive, HER 2 negative, Stage IA. She is s/p breast conserving surgery. She received 4,240cGy to the right breast from 3/11/21 to 4/1/2021. The course of radiation therapy was completed without any complications. She returned for a skin check April 19th.  Right breast pruritus has resolved, no longer needing cortisone or Benadryl cream. She denies pain or discomfort at treatment site. Since she started taking anastrozole, she has been feeling more fatigued at times.  \par \par Dr. Morgan 03/24/2022\par Beena Bean 1/12/2022\par B/L mammo/US in Dec 2021

## 2021-12-17 ENCOUNTER — RESULT REVIEW (OUTPATIENT)
Age: 51
End: 2021-12-17

## 2021-12-17 ENCOUNTER — OUTPATIENT (OUTPATIENT)
Dept: OUTPATIENT SERVICES | Facility: HOSPITAL | Age: 51
LOS: 1 days | Discharge: HOME | End: 2021-12-17
Payer: MEDICARE

## 2021-12-17 DIAGNOSIS — Z98.890 OTHER SPECIFIED POSTPROCEDURAL STATES: Chronic | ICD-10-CM

## 2021-12-17 DIAGNOSIS — R92.8 OTHER ABNORMAL AND INCONCLUSIVE FINDINGS ON DIAGNOSTIC IMAGING OF BREAST: ICD-10-CM

## 2021-12-17 PROCEDURE — G0279: CPT | Mod: 26

## 2021-12-17 PROCEDURE — 77066 DX MAMMO INCL CAD BI: CPT | Mod: 26

## 2021-12-17 PROCEDURE — 76641 ULTRASOUND BREAST COMPLETE: CPT | Mod: 26,50

## 2022-01-12 ENCOUNTER — APPOINTMENT (OUTPATIENT)
Dept: BREAST CENTER | Facility: CLINIC | Age: 52
End: 2022-01-12
Payer: MEDICARE

## 2022-01-12 VITALS
WEIGHT: 184 LBS | HEIGHT: 63 IN | TEMPERATURE: 97.2 F | BODY MASS INDEX: 32.6 KG/M2 | SYSTOLIC BLOOD PRESSURE: 112 MMHG | DIASTOLIC BLOOD PRESSURE: 84 MMHG

## 2022-01-12 PROCEDURE — 99213 OFFICE O/P EST LOW 20 MIN: CPT

## 2022-01-12 NOTE — REASON FOR VISIT
[Follow-Up: _____] : a [unfilled] follow-up visit [Family Member] : family member [FreeTextEntry1] : Stage IA right breast cancer; imaging review.

## 2022-01-12 NOTE — PHYSICAL EXAM
[Normocephalic] : normocephalic [Atraumatic] : atraumatic [No Supraclavicular Adenopathy] : no supraclavicular adenopathy [No dominant masses] : no dominant masses in right breast  [No dominant masses] : no dominant masses left breast [No Nipple Discharge] : no left nipple discharge [No Rashes] : no rashes [No Ulceration] : no ulceration [Breast Nipple Inversion] : nipples not inverted [Breast Nipple Retraction] : nipples not retracted [de-identified] : well healed surgical scars.\par palpable scar tissue / seroma.  [de-identified] : No axillary lymphadenopathy appreciated. [de-identified] : No axillary lymphadenopathy appreciated.

## 2022-01-12 NOTE — DATA REVIEWED
[FreeTextEntry1] : B/L Dx Mammo & Sono - 12/17/2021:\par \par Breast composition: The breasts are heterogeneously dense, which may obscure small masses.\par \par The patient is status post a right lumpectomy with expected architectural distortion most consistent with postsurgical change.\par \par Stable asymmetry is again seen in the left breast. No suspicious masses, calcifications or abnormalities are seen within the left breast.\par \par Bilateral whole breast ultrasound:\par \par No suspicious solid or cystic masses. No axillary adenopathy. Previously identified seroma in the right axilla has resolved.\par \par Impression:\par \par Expected postoperative changes of the right lumpectomy site.\par \par No mammographic and sonographic evidence of malignancy in the left breast.\par \par Recommendation: As per post lumpectomy routine, a follow-up unilateral right mammogram is recommended.\par \par BI-RADS Category 2: Benign

## 2022-01-12 NOTE — ASSESSMENT
[FreeTextEntry1] : SKYLAR is a miguel 51 year old patient who presented today in follow up for a history of right breast cancer.  \par She has been doing well with no new breast related complaints. She continues to complain of occasional right breast pain and tenderness.\par Imaging with a bilateral diagnostic mammogram and sonogram was done on 12/17/2021, which revealed expected postoperative changes of the right lumpectomy site. No mammographic and sonographic evidence of malignancy in the left breast.\par BI-RADS Category 2: Benign, as detailed above. \par Physical exam was unrevealing today.\par \par Imaging with a right unilateral diagnostic mammogram and sonogram will be due in June 2022, and that will be scheduled today. \par She will return for follow-up and clinical breast exam following.\par She will continue follow-up with medical oncology and radiation oncology as scheduled.\par \par The patient was informed that Dr. Ankit Zabala will no longer be practicing here as of the end of August 2021; her care will be continued with the practice.\par \par I spent a total of 20 minutes of face to face time with this patient, greater than 50% of which was spent in counseling and/or coordination of care.\par All of her questions were appropriately answered.\par She knows to call with any concerns.\par \par \par \par \par \par

## 2022-01-12 NOTE — HISTORY OF PRESENT ILLNESS
[FreeTextEntry1] : PCP: Dr. Morales Moyer\par GYN: Dr. Willow Slater\par \par s/p US Guided Core Bx - 11/18/2020:\par Right, 10:00 N13, 4mm: (top-hat)\par - Invasive mammary carcinoma with lobular features, moderately differentiated\par - Atypical lobular hyperplasia \par Comment: Immunostain for E-cadherin is negative (granular cytoplasmic staining) supporting a lobular differentiation\par ER  (+) - % \par NH  (+) - % \par HER2 (-) (Score 1+)\par Ki-67 - 25%\par \par (-) family hx - breast / ovarian cancer.\par \par s/p Right NLOC / SNB - 01/29/2021 = (0/1); invasive moderately\par differentiated classical type lobular carcinoma, 4.5 mm, Widespread lobular carcinoma in-situ (LCIS), classical type,\par and atypical lobular hyperplasia (ALH). No lymphovascular or perineural invasion is seen.\par AJCC 8th Edition Pathologic Stage: pT1a, p(sn)N0, pMx.\par \par Dr. Judie Posada - (+) WBI (03/11 - 04/01/2021).\par Dr. Angel Morgan - Anastrozole.\par \par SKYLAR VUONG is a 51 year old female patient who presents today in follow up for Stage IA right breast cancer.\par Since her last visit, she has no new breast related complaints. She continues to complain of occasional right breast pain and tenderness.\par Imaging with a bilateral diagnostic mammogram and sonogram was done on 12/17/2021, which revealed expected postoperative changes of the right lumpectomy site. No mammographic and sonographic evidence of malignancy in the left breast.\par BI-RADS Category 2: Benign\par \par \par She presents today for evaluation and imaging review.\par \par

## 2022-02-08 ENCOUNTER — APPOINTMENT (OUTPATIENT)
Dept: OBGYN | Facility: CLINIC | Age: 52
End: 2022-02-08
Payer: MEDICARE

## 2022-02-08 VITALS
TEMPERATURE: 97.7 F | BODY MASS INDEX: 31.58 KG/M2 | WEIGHT: 185 LBS | SYSTOLIC BLOOD PRESSURE: 116 MMHG | HEART RATE: 88 BPM | DIASTOLIC BLOOD PRESSURE: 73 MMHG | HEIGHT: 64 IN

## 2022-02-08 DIAGNOSIS — Z78.0 ASYMPTOMATIC MENOPAUSAL STATE: ICD-10-CM

## 2022-02-08 DIAGNOSIS — Z85.3 PERSONAL HISTORY OF MALIGNANT NEOPLASM OF BREAST: ICD-10-CM

## 2022-02-08 DIAGNOSIS — R82.998 OTHER ABNORMAL FINDINGS IN URINE: ICD-10-CM

## 2022-02-08 DIAGNOSIS — N89.8 OTHER SPECIFIED NONINFLAMMATORY DISORDERS OF VAGINA: ICD-10-CM

## 2022-02-08 LAB
BILIRUB UR QL STRIP: NORMAL
CLARITY UR: CLEAR
COLLECTION METHOD: NORMAL
GLUCOSE UR-MCNC: ABNORMAL
HCG UR QL: 0.2 EU/DL
HGB UR QL STRIP.AUTO: NORMAL
KETONES UR-MCNC: ABNORMAL
LEUKOCYTE ESTERASE UR QL STRIP: ABNORMAL
NITRITE UR QL STRIP: NORMAL
PH UR STRIP: 6.5
PROT UR STRIP-MCNC: NORMAL
SP GR UR STRIP: 1.02

## 2022-02-08 PROCEDURE — 99213 OFFICE O/P EST LOW 20 MIN: CPT

## 2022-02-08 PROCEDURE — 81003 URINALYSIS AUTO W/O SCOPE: CPT | Mod: QW

## 2022-02-10 NOTE — DISCUSSION/SUMMARY
[Diagnosis Date (year): ____] : Diagnosis Date (year): [unfilled] [Surgery] : Surgery: Yes [Surgery Date(s) (year): ____] : Surgery Date(s) (year): [unfilled] [Surgical Procedure / Location / Findings: _________] : Surgical Procedure / Location / Findings: [unfilled] [Cancer Type / Location / Histology Subtype: ________] : Cancer Type / Location / Histology Subtype: [unfilled] [Radiation] : Radiation: Yes [Body Area Treated: _________] : Body Area Treated: [unfilled] [End Date (year): ____] : End Date (year): [unfilled] [Follow up with Oncologist in _____] : Follow up with Oncologist in [unfilled] [Follow up with Surgeon in _____] : Follow up with Surgeon in [unfilled] [Follow up with Radiation MD in _____] : Follow up with Radiation MD in [unfilled] [Primary care physician] : primary care physician [Colonoscopy] : colonoscopy [Mammogram] : Mammogram [Skin Checks] : skin checks [Anxiety and Depression] : anxiety and depression [Emotional and mental health] : Emotional and mental health [Physical Functioning] : Physical functioning [Fatigue] : Fatigue [Diet] : Diet [Sun screen use] : Sun screen use [Path to Wellness Survivorship Program] : Path to Wellness Survivorship Program [Bridge to Survivorship Breast Cancer] : Bridge to Survivorship Breast Cancer [FreeTextEntry1] : [id number] [FreeTextEntry2] : Anastrazole [FreeTextEntry8] : Alicia Peña RN

## 2022-02-12 LAB
A VAGINAE DNA VAG QL NAA+PROBE: NORMAL
BACTERIA UR CULT: NORMAL
BVAB2 DNA VAG QL NAA+PROBE: NORMAL
C KRUSEI DNA VAG QL NAA+PROBE: NEGATIVE
C TRACH RRNA SPEC QL NAA+PROBE: NEGATIVE
MEGA1 DNA VAG QL NAA+PROBE: NORMAL
N GONORRHOEA RRNA SPEC QL NAA+PROBE: NEGATIVE
T VAGINALIS RRNA SPEC QL NAA+PROBE: NEGATIVE

## 2022-03-06 PROBLEM — Z78.0 MENOPAUSE: Status: ACTIVE | Noted: 2021-03-30

## 2022-03-06 PROBLEM — Z85.3 HISTORY OF MALIGNANT NEOPLASM OF BREAST: Status: RESOLVED | Noted: 2022-03-06 | Resolved: 2022-03-06

## 2022-03-06 RX ORDER — CARBAMAZEPINE 200 MG/1
TABLET ORAL
Refills: 0 | Status: DISCONTINUED | COMMUNITY
End: 2022-03-06

## 2022-03-06 NOTE — DISCUSSION/SUMMARY
[FreeTextEntry1] : A: 51-year-old in menopause, vaginal itching, BMI 31\par \par P: GYN exam done\par Genital cultures done\par Pain and bleeding precautions\par Safe sex practices if active\par Encourage healthy diet and exercise\par Follow-up for routine exam or as needed

## 2022-03-06 NOTE — HISTORY OF PRESENT ILLNESS
[unknown] : Patient is unsure of the date of her LMP [Previously active] : previously active [Gonorrhea test offered] : Gonorrhea test offered [Chlamydia test offered] : Chlamydia test offered [Trichomonas test offered] : Trichomonas test offered [Currently In Menopause] : currently in menopause [Men] : men [No] : No [TextBox_4] : 51-year-old para 2-0-0-2 and presumed menopause with no vaginal bleeding for greater than a year however had started breast cancer treatment which may also cause amenorrhea.  She denies any postmenopausal bleeding or pelvic pain or dysuria.  1 patient made appointment she had noted some vaginal discharge with itching but the symptoms have since resolved.  She attributes this to starting Myrbetriq for overactive bladder and had stopped the carbamazepine.  She has a history of abnormal Pap and colposcopy but denies other STDs fibroids or cysts.  She is not currently sexually active. [FreeTextEntry1] : Pt is here for a follow-up exam, pt states she started taking bladder medication and starting having vaginal itchiness, pt states symptoms have subsided.

## 2022-03-06 NOTE — PHYSICAL EXAM
[Appropriately responsive] : appropriately responsive [Alert] : alert [No Acute Distress] : no acute distress [Regular Rate Rhythm] : regular rate rhythm [Soft] : soft [Non-tender] : non-tender [Non-distended] : non-distended [No Mass] : no mass [Oriented x3] : oriented x3 [No Lesions] : no lesions  [Labia Minora] : normal [Labia Majora] : normal [No Bleeding] : There was no active vaginal bleeding [Normal] : normal [Tenderness] : nontender [Enlarged ___ wks] : not enlarged [Mass ___ cm] : no uterine mass was palpated [Uterine Adnexae] : normal [FreeTextEntry4] : White/yellow discharge appears physiologic-GEN culture done [FreeTextEntry6] : Exam limited to habitus

## 2022-03-24 ENCOUNTER — OUTPATIENT (OUTPATIENT)
Dept: OUTPATIENT SERVICES | Facility: HOSPITAL | Age: 52
LOS: 1 days | Discharge: HOME | End: 2022-03-24

## 2022-03-24 ENCOUNTER — APPOINTMENT (OUTPATIENT)
Dept: HEMATOLOGY ONCOLOGY | Facility: CLINIC | Age: 52
End: 2022-03-24
Payer: MEDICARE

## 2022-03-24 VITALS
HEIGHT: 64 IN | BODY MASS INDEX: 31.58 KG/M2 | DIASTOLIC BLOOD PRESSURE: 63 MMHG | WEIGHT: 185 LBS | HEART RATE: 99 BPM | TEMPERATURE: 98.6 F | RESPIRATION RATE: 16 BRPM | SYSTOLIC BLOOD PRESSURE: 132 MMHG

## 2022-03-24 DIAGNOSIS — Z98.890 OTHER SPECIFIED POSTPROCEDURAL STATES: Chronic | ICD-10-CM

## 2022-03-24 DIAGNOSIS — C50.919 MALIGNANT NEOPLASM OF UNSPECIFIED SITE OF UNSPECIFIED FEMALE BREAST: ICD-10-CM

## 2022-03-24 PROCEDURE — 99214 OFFICE O/P EST MOD 30 MIN: CPT

## 2022-03-24 NOTE — ASSESSMENT
[FreeTextEntry1] : 50 year old perimenopausal female has Stage IA ([id number]) classical type lobular carcinoma of the right breast, G2, ER/TN positive, Her-2 negative, s/p right breast lumpectomy and SLNB with negative margins. \par \par H/o iron deficiency anemia - off Iron tabs for 2 months now\par \par Recommendations:\par -- Continue Anastrozole daily. \par -- Breast exam today revealed stable scar tissue at lumpectomy site of the right breast. b/l dx mammo in 12/2021  showed no suspicious finding. She will have right breast dx mammo and US in 6/2022.\par -- Discussed bone health management while on AI. Bone density showed osteopenia in the femoral neck. Continue calcium and vitamin D supplement, regular exercise. \par -- H/o iron deficiency anemia. Blood work from 3/2021 showed normal serum Fe, Ferritin and % saturation.\par -- Followup with Dr. Lim on 6/23/22 and Dr. Posada on 5/11/22 as scheduled. \par -- She will come back for followup visit in 6 months. \par \par Case was seen and discussed with Dr. Morgan who agreed with the assessment and plan.\par

## 2022-03-24 NOTE — HISTORY OF PRESENT ILLNESS
[de-identified] :  50 year old female with PMH of Bipolar disorder, HTN, DM , asthma is referred for consultation of adjuvant systemic treatment for new diagnosis of breast cancer. She is here accompanied by her mother. \par \par She had a screening mammogram (10/26/2020) which showed a 4 mm spiculated lesion in the right breast in the upper outer quadrant. Diagnostic mammogram and ultrasound on 2020 showed a new 4 mm spiculated density in the 11 o'clock, 12 cm FN right breast. BI-RADS 4 Suspicious. Recommend biopsy. On 2020, she had a biopsy of the right breast abnormality at 10 o’clock, 12 cm FN and pathology that showed invasive mammary carcinoma with lobular features, moderately differentiated. \par \par On 12/10/2020, outside slides reviewed at [facility] from [facility] shows, invasive well differentiated lobular carcinoma, classical type and lobular carcinoma in-situ. It was ER/AK positive %, HER 2 negative. Ki-67 index was 25%.  She also had an MRI of bilateral breasts on 12/15/2020, which showed in right breast, there is a 0.4 cm mass with associated biopsy clip in the upper outer quadrant of the right breast consistent with the biopsy-proven carcinoma. Surrounding postbiopsy enhancement is noted. Benign intramammary lymph nodes are seen in the upper outer quadrant of the right breast. No additional suspicious abnormal enhancement is seen in the right breast. There is no axillary adenopathy. In the left breast, benign intramammary lymph node in the upper outer quadrant of the left breast. No suspicious abnormal enhancement is seen in the left breast. There is no axillary adenopathy. BI-RADS 6: Known biopsy-proven malignancy.\par \par On 2021, She underwent a lumpectomy and sentinel node biopsy. She was found to have a 4.5 mm invasive lobular carcinoma, G2, ER/AK positive % / HER 2 negative, Ki 67 index is 25 %. Surgical margins were negative as well as 0/1 negative sentinel lymph node. There was no LVSI/PNI.  She recovered well since surgery. However, has some breast discomfort/lump at the lumpectomy site but getting better. She also saw Dr Swetha Vega onc and is going to be offered radiation therapy. \par \par She is . The age at menarche was 12.  Age at live birth was 32  She received fertility Treatments with Clomid.  She used Birth Control Pill for 14 years. She is perimenopause. She does not remember when her last periods was but was less than a year ago. Also to note she has h/o bipolar disorder and is controlled with medications and receives Prolixin shot every 2 weeks and is on Lithium and carbamazepine. \par \par She has no family h/o of breast or ovarian cancer. \par \par She was also on oral ferrous sulfate for h/o NESTOR in the past and was started by PMD which she stopped taking in Dec 2020.  [de-identified] : 6/9/21:\par The patient is here today for f/u visit. She has Stage IA ([id number]) classical type lobular carcinoma of the right breast, G2, ER/OH positive, Her-2 negative, s/p right breast lumpectomy and SLNB on 1/29/2021 with negative margins. She saw Dr. ray and received adjuvant WBI between 3/11 to 4/1/21. She has started on adjuvant endocrine therapy with Anastrozole and tolerated well so far. She is feeling well and dose not have new complains.\par \par 9/8/21:\par The patient is here today for f/u visit. She has Stage IA ([id number]) classical type lobular carcinoma of the right breast, G2, ER/OH positive, Her-2 negative, s/p right breast lumpectomy and SLNB on 1/29/2021 with negative margins. She saw Dr. Ray and received adjuvant WBI between 3/11 to 4/1/21. She has been taking adjuvant endocrine therapy with Anastrozole and tolerated well. She is feeling well and dose not have new complains. On 6/28/21, she had right breast dx mammo and US which showed Expected postoperative changes of the right lumpectomy site. There was no suspicious finding. Bone density on 6/28/21 showed osteopenia in the femoral neck with T score -1.7.\par \par 3/24/22\par The patient is here today for f/u visit. She has Stage IA ([id number]) classical type lobular carcinoma of the right breast, G2, ER/OH positive, Her-2 negative, s/p right breast lumpectomy and SLNB on 1/29/2021 with negative margins. She saw Dr. Ray and received adjuvant WBI between 3/11 to 4/1/21. She has been taking adjuvant endocrine therapy with Anastrozole since 3/2021 and tolerated well. On 12/17/21, she had b/l breast dx mammo and US which showed Expected postoperative changes of the right lumpectomy site. There was no suspicious finding. Bone density on 6/28/21 showed osteopenia in the femoral neck with T score -1.7. She complains pain in the right lumpectomy site sometimes.

## 2022-03-24 NOTE — PHYSICAL EXAM
[Restricted in physically strenuous activity but ambulatory and able to carry out work of a light or sedentary nature] : Status 1- Restricted in physically strenuous activity but ambulatory and able to carry out work of a light or sedentary nature, e.g., light house work, office work [Normal] : grossly intact [de-identified] : Rt lumpectomy scar is healing well. No palpable masses in b/l breast and no axillary LNs

## 2022-03-24 NOTE — CONSULT LETTER
[Dear  ___] : Dear  [unfilled], [Courtesy Letter:] : I had the pleasure of seeing your patient, [unfilled], in my office today. [Please see my note below.] : Please see my note below. [Sincerely,] : Sincerely, [DrLaila  ___] : Dr. LEWIS [FreeTextEntry3] : Angel Morgan MD

## 2022-03-25 DIAGNOSIS — Z51.81 ENCOUNTER FOR THERAPEUTIC DRUG LEVEL MONITORING: ICD-10-CM

## 2022-03-25 DIAGNOSIS — C50.411 MALIGNANT NEOPLASM OF UPPER-OUTER QUADRANT OF RIGHT FEMALE BREAST: ICD-10-CM

## 2022-03-25 DIAGNOSIS — M85.80 OTHER SPECIFIED DISORDERS OF BONE DENSITY AND STRUCTURE, UNSPECIFIED SITE: ICD-10-CM

## 2022-05-11 ENCOUNTER — OUTPATIENT (OUTPATIENT)
Dept: OUTPATIENT SERVICES | Facility: HOSPITAL | Age: 52
LOS: 1 days | Discharge: HOME | End: 2022-05-11

## 2022-05-11 ENCOUNTER — APPOINTMENT (OUTPATIENT)
Dept: RADIATION ONCOLOGY | Facility: HOSPITAL | Age: 52
End: 2022-05-11
Payer: MEDICARE

## 2022-05-11 VITALS
TEMPERATURE: 97.2 F | WEIGHT: 186.13 LBS | DIASTOLIC BLOOD PRESSURE: 66 MMHG | RESPIRATION RATE: 12 BRPM | OXYGEN SATURATION: 97 % | BODY MASS INDEX: 31.95 KG/M2 | HEART RATE: 94 BPM | SYSTOLIC BLOOD PRESSURE: 123 MMHG

## 2022-05-11 DIAGNOSIS — Z98.890 OTHER SPECIFIED POSTPROCEDURAL STATES: Chronic | ICD-10-CM

## 2022-05-11 PROCEDURE — 99213 OFFICE O/P EST LOW 20 MIN: CPT

## 2022-05-15 NOTE — LETTER CLOSING
[Consult Closing:] : Thank you for allowing me to participate in the care of this patient.  If you have any questions, please do not hesitate to contact me. [Sincerely yours,] : Sincerely yours, [FreeTextEntry3] : Judie Posada M.D. \par \par Electronically proofread and signed by:  Judie Posada MD\par Attending, Department of Radiation Medicine\par [facility]\par \par CC: Dr. Morgan\par

## 2022-05-15 NOTE — REVIEW OF SYSTEMS
[Fatigue] : fatigue [Negative] : Neurological [Fever] : no fever [Chills] : no chills [Chest Pain] : no chest pain [Palpitations] : no palpitations [Shortness Of Breath] : no shortness of breath [Wheezing] : no wheezing [Cough] : no cough

## 2022-05-15 NOTE — HISTORY OF PRESENT ILLNESS
[FreeTextEntry1] : SKYLAR VUONG returns in follow up visit.  As you know, SKYLAR VUONG is a 51 year old female with invasive lobular carcinoma of the right breast, ER/MD positive, HER 2 negative, Stage IA. She is s/p breast conserving surgery. She received 4,240cGy to the right breast from 3/11/21 to 4/1/2021. The course of radiation therapy was completed without any complications. In the interim, patient reports doing well.  She denies breast pain.  She is tolerating Anastrozole.  \par \par Dr. Morgan 9/12/2022\par Dr. Lim 6/23/2022\par B/L mammo/US 12/17/2021 shows no evidence of malignancy.\par Upcoming Dx right breast mammo & US 6/13/2022

## 2022-05-15 NOTE — PHYSICAL EXAM
[Sclera] : the sclera and conjunctiva were normal [Heart Rate And Rhythm] : heart rate and rhythm were normal [Heart Sounds] : normal S1 and S2 [Murmurs] : no murmurs present [Edema] : no peripheral edema present [No Discharge] : no discharge [No Masses] : no breast masses were palpable [Abdomen Soft] : soft [Nondistended] : nondistended [Abdomen Tenderness] : non-tender [Cervical Lymph Nodes Enlarged Posterior Bilaterally] : posterior cervical [Cervical Lymph Nodes Enlarged Anterior Bilaterally] : anterior cervical [Supraclavicular Lymph Nodes Enlarged Bilaterally] : supraclavicular [Normal] : no joint swelling, no clubbing or cyanosis of the fingernails and muscle strength and tone were normal [Skin Color & Pigmentation] : normal skin color and pigmentation [No Focal Deficits] : no focal deficits [Oriented To Time, Place, And Person] : oriented to person, place, and time [Not Anxious] : not anxious [Flat] : flat [Enlargement Of The Right Breast] : no swelling [Tenderness Of The Right Breast] : no tenderness [___] :  no erythema [Tenderness Of The Left Breast] : no tenderness [Enlargement Of The Left Breast] : no swelling [Axillary Lymph Nodes Enlarged Bilaterally] : no enlarged nodes [de-identified] : faint residual hyperpigmentation

## 2022-05-15 NOTE — DISEASE MANAGEMENT
[Pathological] : TNM Stage: p [IA] : IA [FreeTextEntry4] : invasive lobular carcinoma of the right breast, G2, ER/[place] positive / HER 2 negative, upper outer quadrant [TTNM] : 1a [NTNM] : 0 [MTNM] : 0 [de-identified] : Right Breast

## 2022-06-16 ENCOUNTER — RESULT REVIEW (OUTPATIENT)
Age: 52
End: 2022-06-16

## 2022-06-16 ENCOUNTER — OUTPATIENT (OUTPATIENT)
Dept: OUTPATIENT SERVICES | Facility: HOSPITAL | Age: 52
LOS: 1 days | Discharge: HOME | End: 2022-06-16
Payer: MEDICARE

## 2022-06-16 DIAGNOSIS — Z98.890 OTHER SPECIFIED POSTPROCEDURAL STATES: Chronic | ICD-10-CM

## 2022-06-16 DIAGNOSIS — R92.8 OTHER ABNORMAL AND INCONCLUSIVE FINDINGS ON DIAGNOSTIC IMAGING OF BREAST: ICD-10-CM

## 2022-06-16 PROCEDURE — 76642 ULTRASOUND BREAST LIMITED: CPT | Mod: 26,RT

## 2022-06-16 PROCEDURE — G0279: CPT | Mod: 26

## 2022-06-16 PROCEDURE — 77065 DX MAMMO INCL CAD UNI: CPT | Mod: 26,RT

## 2022-07-06 ENCOUNTER — APPOINTMENT (OUTPATIENT)
Dept: BREAST CENTER | Facility: CLINIC | Age: 52
End: 2022-07-06

## 2022-07-25 NOTE — HISTORY OF PRESENT ILLNESS
[FreeTextEntry1] : PCP: Dr. Morales Moyer\par GYN: Dr. Willow Slater\par \par s/p US Guided Core Bx - 11/18/2020:\par Right, 10:00 N13, 4mm: (top-hat)\par - Invasive mammary carcinoma with lobular features, moderately differentiated\par - Atypical lobular hyperplasia \par Comment: Immunostain for E-cadherin is negative (granular cytoplasmic staining) supporting a lobular differentiation\par ER  (+) - % \par AK  (+) - % \par HER2 (-) (Score 1+)\par Ki-67 - 25%\par \par (-) family hx - breast / ovarian cancer.\par \par s/p Right NLOC / SNB - 01/29/2021 = (0/1); invasive moderately\par differentiated classical type lobular carcinoma, 4.5 mm, Widespread lobular carcinoma in-situ (LCIS), classical type,\par and atypical lobular hyperplasia (ALH). No lymphovascular or perineural invasion is seen.\par AJCC 8th Edition Pathologic Stage: pT1a, p(sn)N0, pMx.\par \par Dr. Judie Posada - (+) WBI (03/11 - 04/01/2021).\par Dr. Angel Morgan - Anastrozole.\par \par SKYLAR VUONG is a 51 year old female patient who presents today in follow up for Stage IA right breast cancer.\par Since her last visit, she has no new breast related complaints. She continues to complain of occasional right breast pain and tenderness.\par Imaging with a bilateral diagnostic mammogram and sonogram was done on 12/17/2021, which revealed expected postoperative changes of the right lumpectomy site. No mammographic and sonographic evidence of malignancy in the left breast.\par BI-RADS Category 2: Benign\par \par \par INTERVAL HISTORY 7/26/22\dima Sesay is here for her six months follow up visit\par \par Her imaging is as follows:\par 06/16/2022 left dx mammo and US\par -breasts are heterogeneously dense\par -Unchanged post surgical architectural distortion right breast.\par \par RIGHT US:-Postsurgical changes noted in the upper outer quadrant of the right breast at lumpectomy site\par BI-RADS2\par \par

## 2022-07-25 NOTE — DATA REVIEWED
[FreeTextEntry1] : ACC: [id number]     EXAM:  MG US BREAST LIMITED RT\par ACC: [id number]     EXAM:  MG MAMMO DIAG W NAILA RT#\par \par PROCEDURE DATE:  06/16/2022\par \par \par \par INTERPRETATION:  Clinical History / Reason for exam: Right breast malignancy status post lumpectomy 2021.\par \par The patient reports last clinical breast examination was performed about: Within the past year.\par \par Family history of breast cancer: There is no family history of breast cancer.\par \par Comparisons: Mammograms dating back 2019.\par \par Views obtained: right full field digital 2D and digital tomosynthesis images as well as magnification views.\par \par Computer-aided detection was utilized in the interpretation of this examination.\par \par Breast composition: The breasts are heterogeneously dense, which may obscure small masses.\par \par Findings:\par \par Mammogram:\par \par No suspicious mass, microcalcifications or areas of architectural distortion seen in the right breast. Unchanged post surgical architectural distortion right breast.\par \par Ultrasound:\par \par Targeted unilateral right breast ultrasound was performed.\par \par Postsurgical changes noted in the upper outer quadrant of the right breast at lumpectomy site. No suspicious mass or architectural distortion noted at lumpectomy site.\par \par Impression: No mammographic evidence of malignancy.\par \par Recommendation: As per post lumpectomy routine, a follow-up bilateral mammogram is recommended.\par \par BI-RADS Category 2: Benign\par \par

## 2022-07-25 NOTE — ASSESSMENT
[FreeTextEntry1] : SKYLAR is a miguel 52 year old patient who presented today in follow up for a history of right breast cancer.  \par She has been doing well with no new breast related complaints. She continues to complain of occasional right breast pain and tenderness.\par . \par Physical exam was unrevealing today.\par \par \par Her imaging is as follows:\par 06/16/2022 left dx mammo and US\par -breasts are heterogeneously dense\par -Unchanged post surgical architectural distortion right breast.\par \par RIGHT US:-Postsurgical changes noted in the upper outer quadrant of the right breast at lumpectomy site\par BI-RADS2\par \par Imaging with a B/L diagnostic mammogram and sonogram will be due in December 18, 2022, and that will be scheduled today. \par She will return for follow-up and clinical breast exam following.\par She will continue follow-up with medical oncology and radiation oncology as scheduled.\par \par All of her questions were appropriately answered.\par She knows to call with any concerns.\par \par \par \par \par \par

## 2022-07-26 ENCOUNTER — APPOINTMENT (OUTPATIENT)
Dept: BREAST CENTER | Facility: CLINIC | Age: 52
End: 2022-07-26

## 2022-09-12 ENCOUNTER — OUTPATIENT (OUTPATIENT)
Dept: OUTPATIENT SERVICES | Facility: HOSPITAL | Age: 52
LOS: 1 days | Discharge: HOME | End: 2022-09-12

## 2022-09-12 ENCOUNTER — APPOINTMENT (OUTPATIENT)
Dept: HEMATOLOGY ONCOLOGY | Facility: CLINIC | Age: 52
End: 2022-09-12

## 2022-09-12 VITALS
DIASTOLIC BLOOD PRESSURE: 93 MMHG | HEIGHT: 64 IN | HEART RATE: 87 BPM | WEIGHT: 186 LBS | TEMPERATURE: 97.6 F | BODY MASS INDEX: 31.76 KG/M2 | SYSTOLIC BLOOD PRESSURE: 162 MMHG | RESPIRATION RATE: 18 BRPM

## 2022-09-12 VITALS — WEIGHT: 177 LBS | BODY MASS INDEX: 30.38 KG/M2

## 2022-09-12 DIAGNOSIS — Z98.890 OTHER SPECIFIED POSTPROCEDURAL STATES: Chronic | ICD-10-CM

## 2022-09-12 DIAGNOSIS — Z00.00 ENCOUNTER FOR GENERAL ADULT MEDICAL EXAMINATION W/OUT ABNORMAL FINDINGS: ICD-10-CM

## 2022-09-12 PROCEDURE — 99214 OFFICE O/P EST MOD 30 MIN: CPT

## 2022-09-18 LAB
ESTRADIOL SERPL-MCNC: <5 PG/ML
FSH SERPL-MCNC: 20.7 IU/L
LH SERPL-ACNC: 6 IU/L

## 2022-09-18 NOTE — HISTORY OF PRESENT ILLNESS
[de-identified] :  50 year old female with PMH of Bipolar disorder, HTN, DM , asthma is referred for consultation of adjuvant systemic treatment for new diagnosis of breast cancer. She is here accompanied by her mother. \par \par She had a screening mammogram (10/26/2020) which showed a 4 mm spiculated lesion in the right breast in the upper outer quadrant. Diagnostic mammogram and ultrasound on 2020 showed a new 4 mm spiculated density in the 11 o'clock, 12 cm FN right breast. BI-RADS 4 Suspicious. Recommend biopsy. On 2020, she had a biopsy of the right breast abnormality at 10 o’clock, 12 cm FN and pathology that showed invasive mammary carcinoma with lobular features, moderately differentiated. \par \par On 12/10/2020, outside slides reviewed at [facility] from [facility] shows, invasive well differentiated lobular carcinoma, classical type and lobular carcinoma in-situ. It was ER/UT positive %, HER 2 negative. Ki-67 index was 25%.  She also had an MRI of bilateral breasts on 12/15/2020, which showed in right breast, there is a 0.4 cm mass with associated biopsy clip in the upper outer quadrant of the right breast consistent with the biopsy-proven carcinoma. Surrounding postbiopsy enhancement is noted. Benign intramammary lymph nodes are seen in the upper outer quadrant of the right breast. No additional suspicious abnormal enhancement is seen in the right breast. There is no axillary adenopathy. In the left breast, benign intramammary lymph node in the upper outer quadrant of the left breast. No suspicious abnormal enhancement is seen in the left breast. There is no axillary adenopathy. BI-RADS 6: Known biopsy-proven malignancy.\par \par On 2021, She underwent a lumpectomy and sentinel node biopsy. She was found to have a 4.5 mm invasive lobular carcinoma, G2, ER/UT positive % / HER 2 negative, Ki 67 index is 25 %. Surgical margins were negative as well as 0/1 negative sentinel lymph node. There was no LVSI/PNI.  She recovered well since surgery. However, has some breast discomfort/lump at the lumpectomy site but getting better. She also saw Dr Swetha Vega onc and is going to be offered radiation therapy. \par \par She is . The age at menarche was 12.  Age at live birth was 32  She received fertility Treatments with Clomid.  She used Birth Control Pill for 14 years. She is perimenopause. She does not remember when her last periods was but was less than a year ago. Also to note she has h/o bipolar disorder and is controlled with medications and receives Prolixin shot every 2 weeks and is on Lithium and carbamazepine. \par \par She has no family h/o of breast or ovarian cancer. \par \par She was also on oral ferrous sulfate for h/o NESTOR in the past and was started by PMD which she stopped taking in Dec 2020.  [de-identified] : 6/9/21:\par The patient is here today for f/u visit. She has Stage IA ([id number]) classical type lobular carcinoma of the right breast, G2, ER/FL positive, Her-2 negative, s/p right breast lumpectomy and SLNB on 1/29/2021 with negative margins. She saw Dr. ray and received adjuvant WBI between 3/11 to 4/1/21. She has started on adjuvant endocrine therapy with Anastrozole and tolerated well so far. She is feeling well and dose not have new complains.\par \par 9/8/21:\par The patient is here today for f/u visit. She has Stage IA ([id number]) classical type lobular carcinoma of the right breast, G2, ER/FL positive, Her-2 negative, s/p right breast lumpectomy and SLNB on 1/29/2021 with negative margins. She saw Dr. Ray and received adjuvant WBI between 3/11 to 4/1/21. She has been taking adjuvant endocrine therapy with Anastrozole and tolerated well. She is feeling well and dose not have new complains. On 6/28/21, she had right breast dx mammo and US which showed Expected postoperative changes of the right lumpectomy site. There was no suspicious finding. Bone density on 6/28/21 showed osteopenia in the femoral neck with T score -1.7.\par \par 3/24/22\par The patient is here today for f/u visit. She has Stage IA ([id number]) classical type lobular carcinoma of the right breast, G2, ER/FL positive, Her-2 negative, s/p right breast lumpectomy and SLNB on 1/29/2021 with negative margins. She saw Dr. Ray and received adjuvant WBI between 3/11 to 4/1/21. She has been taking adjuvant endocrine therapy with Anastrozole since 3/2021 and tolerated well. On 12/17/21, she had b/l breast dx mammo and US which showed Expected postoperative changes of the right lumpectomy site. There was no suspicious finding. Bone density on 6/28/21 showed osteopenia in the femoral neck with T score -1.7. She complains pain in the right lumpectomy site sometimes. \par \par 9/12/22\par The patient is here today for f/u visit, accompanied by her mother. She has Stage IA ([id number]) classical type lobular carcinoma of the right breast, G2, ER/FL positive, Her-2 negative, s/p right breast lumpectomy and SLNB on 1/29/2021 with negative margins. She saw Dr. Ray and received adjuvant WBI between 3/11 to 4/1/21. She has been taking adjuvant endocrine therapy with Anastrozole since 3/2021 and tolerated well. On 12/17/21, she had b/l breast dx mammo and US which showed Expected postoperative changes of the right lumpectomy site. There was no suspicious finding. Bone density on 6/28/21 showed osteopenia in the femoral neck with T score -1.7. She had R diagnostic mammogram done in 6/22 which was negative for malignancy, and R US showed post surgical changes in the upper outer quadrant with no suspicious masses BIRADS2. She has had 2  hospitalizations this summer for psychiatric issues, one of the admissions for lithium overdose requiring dialysis in the hospital. She is currently on partial hospitalization program. Per pt, she had her LMP in 8/2022, with spotting for 4-5 days, which occurred after 1-2 years of no menstrual periods.

## 2022-09-18 NOTE — ASSESSMENT
[FreeTextEntry1] : 50 year old perimenopausal female has Stage IA ([id number]) classical type lobular carcinoma of the right breast, G2, ER/OH positive, Her-2 negative, s/p right breast lumpectomy and SLNB  with negative margins. \par \par H/o iron deficiency anemia - off Iron tabs for 2 months now\par \par Recommendations:\par -- As pt reported having menstrual periods last month with spotting occurring over a few days, after having no periods for 1-2 years, we would hold Anastrozole at this time. Her last estradiol levels in 3/2021 were  6, LH 11.1 and FSH 22.5; We will recheck the hormone levels today. \par -- We discussed switching to Tamoxifen. Given her recent flare up of psychiatric issues, we will hold off on starting Tamoxifen yet. Pt encouraged to continue psych follow up. She will also follow up with Ob/Gyn with regards to recent bleeding. She will call us after she sees Gyn, and we will discuss with her restarting endocrine therapy. \par -- Breast exam today revealed stable scar tissue at lumpectomy site of the right breast. Right breast dx mammo and US in 6/2022 did not show any suspicious masses. She is scheduled for B/L dx mammo and US in 12/22 (script given). \par -- Continue calcium and vitamin D supplement, regular exercise. \par -- H/o iron deficiency anemia. Blood work from 3/2021 showed normal serum Fe, Ferritin and % saturation.\par -- Followup with Breast surgery and Dr. Posada as scheduled. \par -- She will come back for followup visit in 3 months. \par \par Case was seen and discussed with Dr. Morgan who agreed with the assessment and plan.\par \par Addendum:\par Repeat blood work reviewed. Estradiol is < 5 pg/ml, consistent with menopausal status. She may resume Anastrozole. She will f/u GYN for an episode of vaginal spotting.

## 2022-09-18 NOTE — PHYSICAL EXAM
[Restricted in physically strenuous activity but ambulatory and able to carry out work of a light or sedentary nature] : Status 1- Restricted in physically strenuous activity but ambulatory and able to carry out work of a light or sedentary nature, e.g., light house work, office work [Normal] : grossly intact [de-identified] : Rt lumpectomy scar is healing well. No palpable masses in b/l breast and no axillary LNs

## 2022-09-19 DIAGNOSIS — Z00.00 ENCOUNTER FOR GENERAL ADULT MEDICAL EXAMINATION WITHOUT ABNORMAL FINDINGS: ICD-10-CM

## 2022-09-19 DIAGNOSIS — Z51.81 ENCOUNTER FOR THERAPEUTIC DRUG LEVEL MONITORING: ICD-10-CM

## 2022-09-19 DIAGNOSIS — C50.411 MALIGNANT NEOPLASM OF UPPER-OUTER QUADRANT OF RIGHT FEMALE BREAST: ICD-10-CM

## 2022-09-22 ENCOUNTER — INPATIENT (INPATIENT)
Facility: HOSPITAL | Age: 52
LOS: 13 days | Discharge: HOME | End: 2022-10-06
Attending: PSYCHIATRY & NEUROLOGY | Admitting: PSYCHIATRY & NEUROLOGY

## 2022-09-22 ENCOUNTER — NON-APPOINTMENT (OUTPATIENT)
Age: 52
End: 2022-09-22

## 2022-09-22 VITALS
HEIGHT: 64 IN | HEART RATE: 82 BPM | SYSTOLIC BLOOD PRESSURE: 158 MMHG | TEMPERATURE: 98 F | OXYGEN SATURATION: 98 % | RESPIRATION RATE: 20 BRPM | DIASTOLIC BLOOD PRESSURE: 79 MMHG | WEIGHT: 173.94 LBS

## 2022-09-22 DIAGNOSIS — F31.9 BIPOLAR DISORDER, UNSPECIFIED: ICD-10-CM

## 2022-09-22 DIAGNOSIS — I10 ESSENTIAL (PRIMARY) HYPERTENSION: ICD-10-CM

## 2022-09-22 DIAGNOSIS — F31.63 BIPOLAR DISORDER, CURRENT EPISODE MIXED, SEVERE, WITHOUT PSYCHOTIC FEATURES: ICD-10-CM

## 2022-09-22 DIAGNOSIS — J45.909 UNSPECIFIED ASTHMA, UNCOMPLICATED: ICD-10-CM

## 2022-09-22 DIAGNOSIS — Z98.890 OTHER SPECIFIED POSTPROCEDURAL STATES: Chronic | ICD-10-CM

## 2022-09-22 DIAGNOSIS — E11.9 TYPE 2 DIABETES MELLITUS WITHOUT COMPLICATIONS: ICD-10-CM

## 2022-09-22 DIAGNOSIS — K59.00 CONSTIPATION, UNSPECIFIED: ICD-10-CM

## 2022-09-22 DIAGNOSIS — E78.00 PURE HYPERCHOLESTEROLEMIA, UNSPECIFIED: ICD-10-CM

## 2022-09-22 LAB
ALBUMIN SERPL ELPH-MCNC: 5 G/DL — SIGNIFICANT CHANGE UP (ref 3.5–5.2)
ALP SERPL-CCNC: 92 U/L — SIGNIFICANT CHANGE UP (ref 30–115)
ALT FLD-CCNC: 15 U/L — SIGNIFICANT CHANGE UP (ref 0–41)
ANION GAP SERPL CALC-SCNC: 15 MMOL/L — HIGH (ref 7–14)
APAP SERPL-MCNC: <5 UG/ML — LOW (ref 10–30)
AST SERPL-CCNC: 16 U/L — SIGNIFICANT CHANGE UP (ref 0–41)
BASOPHILS # BLD AUTO: 0.03 K/UL — SIGNIFICANT CHANGE UP (ref 0–0.2)
BASOPHILS NFR BLD AUTO: 0.4 % — SIGNIFICANT CHANGE UP (ref 0–1)
BILIRUB SERPL-MCNC: 0.3 MG/DL — SIGNIFICANT CHANGE UP (ref 0.2–1.2)
BUN SERPL-MCNC: 18 MG/DL — SIGNIFICANT CHANGE UP (ref 10–20)
CALCIUM SERPL-MCNC: 10.1 MG/DL — SIGNIFICANT CHANGE UP (ref 8.4–10.5)
CHLORIDE SERPL-SCNC: 101 MMOL/L — SIGNIFICANT CHANGE UP (ref 98–110)
CO2 SERPL-SCNC: 21 MMOL/L — SIGNIFICANT CHANGE UP (ref 17–32)
CREAT SERPL-MCNC: 0.9 MG/DL — SIGNIFICANT CHANGE UP (ref 0.7–1.5)
EGFR: 76 ML/MIN/1.73M2 — SIGNIFICANT CHANGE UP
EOSINOPHIL # BLD AUTO: 0.22 K/UL — SIGNIFICANT CHANGE UP (ref 0–0.7)
EOSINOPHIL NFR BLD AUTO: 3.1 % — SIGNIFICANT CHANGE UP (ref 0–8)
ETHANOL SERPL-MCNC: <10 MG/DL — SIGNIFICANT CHANGE UP
GLUCOSE BLDC GLUCOMTR-MCNC: 299 MG/DL — HIGH (ref 70–99)
GLUCOSE BLDC GLUCOMTR-MCNC: 337 MG/DL — HIGH (ref 70–99)
GLUCOSE SERPL-MCNC: 243 MG/DL — HIGH (ref 70–99)
HCG SERPL QL: NEGATIVE — SIGNIFICANT CHANGE UP
HCT VFR BLD CALC: 34.2 % — LOW (ref 37–47)
HGB BLD-MCNC: 12.2 G/DL — SIGNIFICANT CHANGE UP (ref 12–16)
IMM GRANULOCYTES NFR BLD AUTO: 0.4 % — HIGH (ref 0.1–0.3)
LYMPHOCYTES # BLD AUTO: 1.67 K/UL — SIGNIFICANT CHANGE UP (ref 1.2–3.4)
LYMPHOCYTES # BLD AUTO: 23.6 % — SIGNIFICANT CHANGE UP (ref 20.5–51.1)
MCHC RBC-ENTMCNC: 28.1 PG — SIGNIFICANT CHANGE UP (ref 27–31)
MCHC RBC-ENTMCNC: 35.7 G/DL — SIGNIFICANT CHANGE UP (ref 32–37)
MCV RBC AUTO: 78.8 FL — LOW (ref 81–99)
MONOCYTES # BLD AUTO: 0.37 K/UL — SIGNIFICANT CHANGE UP (ref 0.1–0.6)
MONOCYTES NFR BLD AUTO: 5.2 % — SIGNIFICANT CHANGE UP (ref 1.7–9.3)
NEUTROPHILS # BLD AUTO: 4.77 K/UL — SIGNIFICANT CHANGE UP (ref 1.4–6.5)
NEUTROPHILS NFR BLD AUTO: 67.3 % — SIGNIFICANT CHANGE UP (ref 42.2–75.2)
NRBC # BLD: 0 /100 WBCS — SIGNIFICANT CHANGE UP (ref 0–0)
PLATELET # BLD AUTO: 127 K/UL — LOW (ref 130–400)
POTASSIUM SERPL-MCNC: 4.4 MMOL/L — SIGNIFICANT CHANGE UP (ref 3.5–5)
POTASSIUM SERPL-SCNC: 4.4 MMOL/L — SIGNIFICANT CHANGE UP (ref 3.5–5)
PROT SERPL-MCNC: 7.5 G/DL — SIGNIFICANT CHANGE UP (ref 6–8)
RBC # BLD: 4.34 M/UL — SIGNIFICANT CHANGE UP (ref 4.2–5.4)
RBC # FLD: 14.3 % — SIGNIFICANT CHANGE UP (ref 11.5–14.5)
SALICYLATES SERPL-MCNC: <0.3 MG/DL — LOW (ref 4–30)
SARS-COV-2 RNA SPEC QL NAA+PROBE: SIGNIFICANT CHANGE UP
SODIUM SERPL-SCNC: 137 MMOL/L — SIGNIFICANT CHANGE UP (ref 135–146)
WBC # BLD: 7.09 K/UL — SIGNIFICANT CHANGE UP (ref 4.8–10.8)
WBC # FLD AUTO: 7.09 K/UL — SIGNIFICANT CHANGE UP (ref 4.8–10.8)

## 2022-09-22 PROCEDURE — 90792 PSYCH DIAG EVAL W/MED SRVCS: CPT | Mod: 95

## 2022-09-22 PROCEDURE — 99285 EMERGENCY DEPT VISIT HI MDM: CPT

## 2022-09-22 PROCEDURE — 93010 ELECTROCARDIOGRAM REPORT: CPT

## 2022-09-22 RX ORDER — FLUPHENAZINE HYDROCHLORIDE 1 MG/1
10 TABLET, FILM COATED ORAL AT BEDTIME
Refills: 0 | Status: DISCONTINUED | OUTPATIENT
Start: 2022-09-22 | End: 2022-10-06

## 2022-09-22 RX ORDER — SODIUM CHLORIDE 9 MG/ML
1000 INJECTION, SOLUTION INTRAVENOUS
Refills: 0 | Status: DISCONTINUED | OUTPATIENT
Start: 2022-09-22 | End: 2022-10-06

## 2022-09-22 RX ORDER — DEXTROSE 50 % IN WATER 50 %
12.5 SYRINGE (ML) INTRAVENOUS ONCE
Refills: 0 | Status: DISCONTINUED | OUTPATIENT
Start: 2022-09-22 | End: 2022-10-06

## 2022-09-22 RX ORDER — ALBUTEROL 90 UG/1
2 AEROSOL, METERED ORAL EVERY 6 HOURS
Refills: 0 | Status: DISCONTINUED | OUTPATIENT
Start: 2022-09-22 | End: 2022-10-06

## 2022-09-22 RX ORDER — ATORVASTATIN CALCIUM 80 MG/1
10 TABLET, FILM COATED ORAL AT BEDTIME
Refills: 0 | Status: DISCONTINUED | OUTPATIENT
Start: 2022-09-22 | End: 2022-10-06

## 2022-09-22 RX ORDER — SENNA PLUS 8.6 MG/1
2 TABLET ORAL AT BEDTIME
Refills: 0 | Status: DISCONTINUED | OUTPATIENT
Start: 2022-09-22 | End: 2022-10-06

## 2022-09-22 RX ORDER — AMLODIPINE BESYLATE 2.5 MG/1
5 TABLET ORAL DAILY
Refills: 0 | Status: DISCONTINUED | OUTPATIENT
Start: 2022-09-22 | End: 2022-10-06

## 2022-09-22 RX ORDER — IBUPROFEN 200 MG
400 TABLET ORAL EVERY 6 HOURS
Refills: 0 | Status: DISCONTINUED | OUTPATIENT
Start: 2022-09-22 | End: 2022-10-06

## 2022-09-22 RX ORDER — GEMFIBROZIL 600 MG
600 TABLET ORAL EVERY 12 HOURS
Refills: 0 | Status: DISCONTINUED | OUTPATIENT
Start: 2022-09-22 | End: 2022-10-06

## 2022-09-22 RX ORDER — INSULIN LISPRO 100/ML
4 VIAL (ML) SUBCUTANEOUS ONCE
Refills: 0 | Status: COMPLETED | OUTPATIENT
Start: 2022-09-22 | End: 2022-09-22

## 2022-09-22 RX ORDER — METFORMIN HYDROCHLORIDE 850 MG/1
1000 TABLET ORAL EVERY 12 HOURS
Refills: 0 | Status: DISCONTINUED | OUTPATIENT
Start: 2022-09-22 | End: 2022-10-06

## 2022-09-22 RX ORDER — DEXTROSE 50 % IN WATER 50 %
25 SYRINGE (ML) INTRAVENOUS ONCE
Refills: 0 | Status: DISCONTINUED | OUTPATIENT
Start: 2022-09-22 | End: 2022-10-06

## 2022-09-22 RX ORDER — INSULIN GLARGINE 100 [IU]/ML
20 INJECTION, SOLUTION SUBCUTANEOUS AT BEDTIME
Refills: 0 | Status: DISCONTINUED | OUTPATIENT
Start: 2022-09-22 | End: 2022-10-06

## 2022-09-22 RX ORDER — DEXTROSE 50 % IN WATER 50 %
15 SYRINGE (ML) INTRAVENOUS ONCE
Refills: 0 | Status: DISCONTINUED | OUTPATIENT
Start: 2022-09-22 | End: 2022-10-06

## 2022-09-22 RX ORDER — FLUPHENAZINE HYDROCHLORIDE 1 MG/1
5 TABLET, FILM COATED ORAL EVERY 6 HOURS
Refills: 0 | Status: DISCONTINUED | OUTPATIENT
Start: 2022-09-22 | End: 2022-10-06

## 2022-09-22 RX ORDER — CHLORHEXIDINE GLUCONATE 213 G/1000ML
1 SOLUTION TOPICAL
Refills: 0 | Status: COMPLETED | OUTPATIENT
Start: 2022-09-22 | End: 2022-09-23

## 2022-09-22 RX ORDER — GLUCAGON INJECTION, SOLUTION 0.5 MG/.1ML
1 INJECTION, SOLUTION SUBCUTANEOUS ONCE
Refills: 0 | Status: DISCONTINUED | OUTPATIENT
Start: 2022-09-22 | End: 2022-10-06

## 2022-09-22 RX ORDER — POLYETHYLENE GLYCOL 3350 17 G/17G
17 POWDER, FOR SOLUTION ORAL DAILY
Refills: 0 | Status: DISCONTINUED | OUTPATIENT
Start: 2022-09-22 | End: 2022-10-06

## 2022-09-22 RX ORDER — INSULIN LISPRO 100/ML
VIAL (ML) SUBCUTANEOUS
Refills: 0 | Status: DISCONTINUED | OUTPATIENT
Start: 2022-09-22 | End: 2022-10-06

## 2022-09-22 RX ADMIN — METFORMIN HYDROCHLORIDE 1000 MILLIGRAM(S): 850 TABLET ORAL at 19:57

## 2022-09-22 RX ADMIN — Medication 600 MILLIGRAM(S): at 19:57

## 2022-09-22 RX ADMIN — Medication 4 UNIT(S): at 20:06

## 2022-09-22 RX ADMIN — FLUPHENAZINE HYDROCHLORIDE 10 MILLIGRAM(S): 1 TABLET, FILM COATED ORAL at 19:58

## 2022-09-22 RX ADMIN — INSULIN GLARGINE 20 UNIT(S): 100 INJECTION, SOLUTION SUBCUTANEOUS at 19:58

## 2022-09-22 RX ADMIN — SENNA PLUS 2 TABLET(S): 8.6 TABLET ORAL at 19:57

## 2022-09-22 RX ADMIN — Medication 1 MILLIGRAM(S): at 19:57

## 2022-09-22 RX ADMIN — Medication 400 MILLIGRAM(S): at 20:56

## 2022-09-22 RX ADMIN — ATORVASTATIN CALCIUM 10 MILLIGRAM(S): 80 TABLET, FILM COATED ORAL at 19:57

## 2022-09-22 RX ADMIN — FLUPHENAZINE HYDROCHLORIDE 5 MILLIGRAM(S): 1 TABLET, FILM COATED ORAL at 19:57

## 2022-09-22 NOTE — ED BEHAVIORAL HEALTH ASSESSMENT NOTE - DESCRIPTION
HTN, DM2, asthma, HLD In past six years was doing well - before that, was psychiatrically hospitalized once every year on average. Flight of ideas, emotional lability. Worsening/declining psychiatrically for past few weeks. Calling about random things early in the morning. Not sleeping.     Briefly admitted psychiatrically about one month ago. see hpi Collateral from cousin :   In past six years was doing well - before that, was psychiatrically hospitalized once every year on average. Flight of ideas, emotional lability. Worsening/declining psychiatrically for past few weeks. Calling about random things early in the morning. Not sleeping. Spending a lot of money lately. Impulsive. This is not her baseline. Family very concerned. Briefly admitted psychiatrically about one month ago.

## 2022-09-22 NOTE — ED PROVIDER NOTE - ATTENDING CONTRIBUTION TO CARE
52-year-old female with history of hypertension, diabetes, bipolar presents evaluation of psych evaluation.  Family ports that patient been erratic with impulsive behavior and was missing portion last night there something that the patient labs to care for self and will need psychiatric evaluation.  Here the patient denies any suicidal homicidal ideation.  Agree with above exam  Impression  Patient here with erratic and disorganized behavior seen by psych and admitted.  Patient is medically cleared.

## 2022-09-22 NOTE — BH SAFETY PLAN - ENVIRONMENT SAFETY 1:
I would feel safer if I had someone to make sure I stayed consistent in taking my medications, so I don't get flooded with symptoms.

## 2022-09-22 NOTE — ED PROVIDER NOTE - PHYSICAL EXAMINATION
GENERAL: NAD   SKIN: warm, dry  HEAD: Normocephalic; atraumatic.  EYES: PERRLA, EOMI, no conjunctival erythema  CARD: S1, S2 normal; no murmurs, gallops, or rubs. Regular rate and rhythm.   RESP: LCTAB; No wheezes, rales, rhonchi, or stridor.  ABD: soft, nontender, and nondistended  NEURO: Alert, oriented, grossly unremarkable  PSYCH: Flat affect

## 2022-09-22 NOTE — ED BEHAVIORAL HEALTH ASSESSMENT NOTE - SUMMARY
Pt is a 52 year old woman, , living with her two daughters and dog, on disability; pmhx HTN, DM2, asthma, HLD; pphx bipolar disorder, multiple psychiatric admissions in past (up to 4-5 in lifetime per pt), recently admitted to [facility] about one month ago? and previous suicide attempt (unclear), no self harming/cutting; social/intermittent alcohol use; no known legal/violence hx; BIBA after family called EMS for wellness check.    On interview, pt is tangential, pressured, with labile/noncongruent affect, tearful for most of exam. She does endorse impulsive behavior lately including spending significant amounts of money as well as insomnia/decreased need for sleep. Collateral from her cousin is concerning for worsening psychiatric symptoms, very different from baseline, family concerned that she needs psychiatric admission at this time.    Pt does meet criteria for inpatient psychiatric admission at this time for acute stabilization.

## 2022-09-22 NOTE — H&P ADULT - NSICDXPASTMEDICALHX_GEN_ALL_CORE_FT
PAST MEDICAL HISTORY:  Asthma     Asthma     Bipolar disorder     Breast CA Right: s/p lumpectomy x 2, + Radiation Rx    DM (diabetes mellitus) Type 2    H/O suicide attempt 1986    High cholesterol     History of herpes zoster of eye     HTN (hypertension)      PAST MEDICAL HISTORY:  Asthma     Asthma     Bipolar disorder     Breast CA Right: s/p lumpectomy x 2, + Radiation Rx    DM (diabetes mellitus) Type 2    H/O suicide attempt 1986    High cholesterol     History of herpes zoster of eye     HTN (hypertension)     ABI (obstructive sleep apnea) doesn't use her CPAP at home

## 2022-09-22 NOTE — ED BEHAVIORAL HEALTH ASSESSMENT NOTE - DETAILS
20yo and 21yo daughters lithium toxicity in past? SI/suicide attempt in past - unspecified Spoke with on call psychiatrist unspecified spoke to family

## 2022-09-22 NOTE — ED PROVIDER NOTE - NS ED ATTENDING STATEMENT MOD
This was a shared visit with the MARCELO. I reviewed and verified the documentation and independently performed the documented: Attending with

## 2022-09-22 NOTE — ED BEHAVIORAL HEALTH ASSESSMENT NOTE - RISK ASSESSMENT
moderate acute risk: mother concerned that pt had expressed SI; currently with manic symptoms, recent hospitalization.  elevated chronic risk: hx of suicide attempt in past? hx of psychiatric admission/s in past. Moderate Acute Suicide Risk

## 2022-09-22 NOTE — PATIENT PROFILE BEHAVIORAL HEALTH - FALL HARM RISK - UNIVERSAL INTERVENTIONS
Bed in lowest position, wheels locked, appropriate side rails in place/Call bell, personal items and telephone in reach/Instruct patient to call for assistance before getting out of bed or chair/Non-slip footwear when patient is out of bed/Aneta to call system/Physically safe environment - no spills, clutter or unnecessary equipment/Purposeful Proactive Rounding/Room/bathroom lighting operational, light cord in reach

## 2022-09-22 NOTE — H&P ADULT - NSHPPHYSICALEXAM_GEN_ALL_CORE
Vital Signs Last 24 Hrs    T(F): 98.1 (09-22-22 @ 16:36), Max: 98.3 (09-22-22 @ 09:32)  HR: 75 (09-22-22 @ 16:36) (75 - 82)  BP: 140/74 (09-22-22 @ 16:36)  RR: 20 (09-22-22 @ 16:36) (20 - 20)  SpO2: 98% (09-22-22 @ 09:32) (98% - 98%)    PHYSICAL EXAM:      Constitutional: NAD, A&O x3, conversation: needs redirection often    Eyes: PERRLA    Respiratory: +air entry, no rales, no rhonchi, no wheezes    Cardiovascular: +S1 and S2, regular rate and rhythm    Gastrointestinal: +BS, Firm, non-tender, not distended    Extremities:  no edema, no calf tenderness    Vascular: +dorsal pedis and radial pulses, no extremity cyanosis    Neurological: sensation intact, ROM equal B/L, CN II-XII intact    Skin: no rashes, normal turgor

## 2022-09-22 NOTE — H&P ADULT - HISTORY OF PRESENT ILLNESS
Pt is a 52 year old woman, , living with her two daughters and dog, on disability; pmhx HTN, DM2, asthma, HLD; pphx bipolar disorder, multiple psychiatric admissions in past (up to 4-5 in lifetime per pt), recently admitted to [facility] about one month ago? and previous suicide attempt (unclear), no self harming/cutting; social/intermittent alcohol use; no known legal/violence hx; BIBA after family called EMS for wellness check    On interview, pt is tangential, pressured, with labile/noncongruent affect, tearful for most of exam. She does endorse impulsive behavior lately including spending significant amounts of money as well as insomnia/decreased need for sleep. Collateral from her cousin is concerning for worsening psychiatric symptoms, very different from baseline, family concerned that she needs psychiatric admission at this time.    Patient meets criteria for admission to [facility] for stabilazation of Bipolar Disorder

## 2022-09-22 NOTE — H&P ADULT - NSHPLABSRESULTS_GEN_ALL_CORE
12.2   7.09  )-----------( 127      ( 22 Sep 2022 09:50 )             34.2     09-22    137  |  101  |  18  ----------------------------<  243<H>  4.4   |  21  |  0.9    Ca    10.1      22 Sep 2022 09:50    TPro  7.5  /  Alb  5.0  /  TBili  0.3  /  DBili  x   /  AST  16  /  ALT  15  /  AlkPhos  92  09-22      LIVER FUNCTIONS - ( 22 Sep 2022 09:50 )  Alb: 5.0 g/dL / Pro: 7.5 g/dL / ALK PHOS: 92 U/L / ALT: 15 U/L / AST: 16 U/L / GGT: x

## 2022-09-22 NOTE — ED BEHAVIORAL HEALTH NOTE - BEHAVIORAL HEALTH NOTE
==================  PRE-HOSPITAL COURSE  ===================  SOURCE:  RN and secondhand ED documentation  DETAILS:  Per chart, patient was BIBEMS after family called 911 for a psychiatric evaluation due to increase spending, missing for the past few days, and not sleeping.   ============  ED COURSE  =========  SOURCE:  RN and secondhand ED documentation.  ARRIVAL:  Per chart, patient was BIBEMS after family called 911 for a psychiatric evaluation due to increase spending, missing for the past few days, and not sleeping.   BELONGINGS:  No belongings of note. All belongings were provided to hospital security, and patient currently in a gown with a 1:1 staff member.  BEHAVIOR: RN described patient to be currently calm and cooperative, normal speech and rhythm, not endorsing SI/HI, remains in good behavioral and impulse control. RN stated that there are no visible marks, bruises, or lacerations on the body. RN stated that the patient appears to have OK grooming.   TREATMENT:  Per chart and RN, patient did not require PRN medications.   VISITORS:  None.

## 2022-09-22 NOTE — ED PROVIDER NOTE - OBJECTIVE STATEMENT
53 yo female w/ PMH of HTN, DM, Bipolar asthma presents for psych evaluation.  Pt stating she has no SI/HI or auditory/visual hallucinations.  Denies drug or alcohol use.  Called mother Ivon [phone], per mother pt was discharged last week after being hospitalized for psych condition, since being discharged has been having erratic and impulsive behavior, was reported as a missing person last night, mother is concerned that she is a danger to herself since over the summer pt tried to overdose on lithium, so sent pt to ED. Pt denies any sxs.

## 2022-09-22 NOTE — ED PROVIDER NOTE - NSICDXPASTMEDICALHX_GEN_ALL_CORE_FT
PAST MEDICAL HISTORY:  Asthma     Asthma     Bipolar disorder     Breast CA     DM (diabetes mellitus)     H/O suicide attempt 1986    High cholesterol     HTN (hypertension)

## 2022-09-22 NOTE — ED PROVIDER NOTE - CLINICAL SUMMARY MEDICAL DECISION MAKING FREE TEXT BOX
Patient here with erratic and disorganized behavior seen by psych and admitted.  Patient is medically cleared.

## 2022-09-22 NOTE — ED BEHAVIORAL HEALTH ASSESSMENT NOTE - HPI (INCLUDE ILLNESS QUALITY, SEVERITY, DURATION, TIMING, CONTEXT, MODIFYING FACTORS, ASSOCIATED SIGNS AND SYMPTOMS)
HTN, DM2, asthma, HLD; pphx bipolar disorder Pt is a 52 year old woman, , living with her two daughters and dog, on disability; pmhx HTN, DM2, asthma, HLD; pphx bipolar disorder, multiple psychiatric admissions in past (up to 4-5 in lifetime per pt), recently admitted to [facility] about one month ago? and previous suicide attempt (unclear), no self harming/cutting; social/intermittent alcohol use; no known legal/violence hx; BIBA after family called EMS for wellness check.    On interview, pt is cooperative, tearful, tangential/pressured. Notes that she has not been doing well, specifically reporting that her mother is concerned about her being suicidal. Pt herself denies SI at this time. She also denies AVH or HI. She does endorse significant sleep disturbance recently. Also notes feeling upset about spending significant amount of money in past month. States that things are "not good" at home, but cannot elaborate. She talks about how various family members have been doing and other unrelated topics throughout the interview.     She denies substance use other than occasional alcohol socially.    Pt offers verbal consent to speak with her cousin  for more information.    COVID questions:  vaccinated x 3  no known exposures in past ten days  no positive covid test in past three months

## 2022-09-23 LAB
A1C WITH ESTIMATED AVERAGE GLUCOSE RESULT: 7.5 % — HIGH (ref 4–5.6)
ESTIMATED AVERAGE GLUCOSE: 169 MG/DL — HIGH (ref 68–114)
GLUCOSE BLDC GLUCOMTR-MCNC: 202 MG/DL — HIGH (ref 70–99)
GLUCOSE BLDC GLUCOMTR-MCNC: 237 MG/DL — HIGH (ref 70–99)

## 2022-09-23 PROCEDURE — 99232 SBSQ HOSP IP/OBS MODERATE 35: CPT

## 2022-09-23 RX ADMIN — Medication 2: at 11:32

## 2022-09-23 RX ADMIN — FLUPHENAZINE HYDROCHLORIDE 10 MILLIGRAM(S): 1 TABLET, FILM COATED ORAL at 19:54

## 2022-09-23 RX ADMIN — METFORMIN HYDROCHLORIDE 1000 MILLIGRAM(S): 850 TABLET ORAL at 19:52

## 2022-09-23 RX ADMIN — INSULIN GLARGINE 20 UNIT(S): 100 INJECTION, SOLUTION SUBCUTANEOUS at 20:00

## 2022-09-23 RX ADMIN — ATORVASTATIN CALCIUM 10 MILLIGRAM(S): 80 TABLET, FILM COATED ORAL at 19:53

## 2022-09-23 RX ADMIN — SENNA PLUS 2 TABLET(S): 8.6 TABLET ORAL at 19:52

## 2022-09-23 RX ADMIN — Medication 400 MILLIGRAM(S): at 15:29

## 2022-09-23 RX ADMIN — Medication 600 MILLIGRAM(S): at 19:53

## 2022-09-23 RX ADMIN — POLYETHYLENE GLYCOL 3350 17 GRAM(S): 17 POWDER, FOR SOLUTION ORAL at 08:16

## 2022-09-23 RX ADMIN — AMLODIPINE BESYLATE 5 MILLIGRAM(S): 2.5 TABLET ORAL at 08:16

## 2022-09-23 RX ADMIN — METFORMIN HYDROCHLORIDE 1000 MILLIGRAM(S): 850 TABLET ORAL at 08:13

## 2022-09-23 RX ADMIN — Medication 600 MILLIGRAM(S): at 08:14

## 2022-09-23 RX ADMIN — Medication 1 MILLIGRAM(S): at 19:52

## 2022-09-23 RX ADMIN — Medication 2: at 16:31

## 2022-09-23 NOTE — PROGRESS NOTE BEHAVIORAL HEALTH - NSBHFUPINTERVALHXFT_PSY_A_CORE
The patient suffers from recurrent major depressive illness symptoms in the context of Bipolar I Disorder. She has had recurrently exacerbating symptoms over the past few months after years of doing well and avoiding hospitalization - the reason for the recent mood worsening is not clear. Pt admitted to [facility] (records reviewed) from 7/24-8/5 then referred to [facility] from where she was discharged on 9/16/22, less than seven days prior to this admission. On her return home the patient was reclusive, isolated and ruminating and began to leave home unpredictably, causing her family to contact the police when she did not return home one evening a few days ago. Due to this and other behavior the patient's family urged her to come into the hospital.

## 2022-09-23 NOTE — PROGRESS NOTE BEHAVIORAL HEALTH - SUMMARY
It is unclear why the patient deteriorated so quickly and also unclear why her mental health, stable for several years, has very rapidly worsened. We are collecting history from family and monitoring patients response to current, monitored medication regimen.

## 2022-09-24 LAB
GLUCOSE BLDC GLUCOMTR-MCNC: 135 MG/DL — HIGH (ref 70–99)
GLUCOSE BLDC GLUCOMTR-MCNC: 182 MG/DL — HIGH (ref 70–99)
GLUCOSE BLDC GLUCOMTR-MCNC: 240 MG/DL — HIGH (ref 70–99)
GLUCOSE BLDC GLUCOMTR-MCNC: 277 MG/DL — HIGH (ref 70–99)

## 2022-09-24 PROCEDURE — 99231 SBSQ HOSP IP/OBS SF/LOW 25: CPT | Mod: GC

## 2022-09-24 RX ADMIN — FLUPHENAZINE HYDROCHLORIDE 10 MILLIGRAM(S): 1 TABLET, FILM COATED ORAL at 21:32

## 2022-09-24 RX ADMIN — SENNA PLUS 2 TABLET(S): 8.6 TABLET ORAL at 20:33

## 2022-09-24 RX ADMIN — ATORVASTATIN CALCIUM 10 MILLIGRAM(S): 80 TABLET, FILM COATED ORAL at 20:33

## 2022-09-24 RX ADMIN — METFORMIN HYDROCHLORIDE 1000 MILLIGRAM(S): 850 TABLET ORAL at 08:08

## 2022-09-24 RX ADMIN — Medication 400 MILLIGRAM(S): at 18:50

## 2022-09-24 RX ADMIN — Medication 400 MILLIGRAM(S): at 15:57

## 2022-09-24 RX ADMIN — AMLODIPINE BESYLATE 5 MILLIGRAM(S): 2.5 TABLET ORAL at 08:09

## 2022-09-24 RX ADMIN — Medication 600 MILLIGRAM(S): at 08:09

## 2022-09-24 RX ADMIN — METFORMIN HYDROCHLORIDE 1000 MILLIGRAM(S): 850 TABLET ORAL at 20:33

## 2022-09-24 RX ADMIN — Medication 400 MILLIGRAM(S): at 03:28

## 2022-09-24 RX ADMIN — Medication 1 MILLIGRAM(S): at 21:32

## 2022-09-24 RX ADMIN — INSULIN GLARGINE 20 UNIT(S): 100 INJECTION, SOLUTION SUBCUTANEOUS at 20:32

## 2022-09-24 RX ADMIN — Medication 3: at 16:30

## 2022-09-24 RX ADMIN — Medication 400 MILLIGRAM(S): at 03:48

## 2022-09-24 RX ADMIN — Medication 600 MILLIGRAM(S): at 20:33

## 2022-09-24 NOTE — PROGRESS NOTE BEHAVIORAL HEALTH - NSBHATTESTCOMMENTATTENDFT_PSY_A_CORE
Evaluated patient with resident Dr Shannon. As per nurses, patient has been "cooperative, but somewhat needy and internally preoccupied". Patient was seen in her room. She reports "good" mood but starts crying during the interview and her affect generally is labile. Her speech at times was loud and pressured. Shama appears to be very upset with her mother, feeling that she was wronged by her. At some point, referring to [employer] book in her room, Shama said that she "found from this book how her mother tricked everyone". Dx is likely bipolar d/o mixed. Agree with resident's assessment and plan.

## 2022-09-24 NOTE — PROGRESS NOTE BEHAVIORAL HEALTH - NSBHFUPINTERVALHXFT_PSY_A_CORE
Chart reviewed. Patient seen and evaluated at bedside. No acute overnight events reported.    Encounter found patient to be laying in bed, sits up for interview. Patient reports that she is okay, reporting that she is here because of her mother wanting to put her in here. She continued to express some paranoid thoughts about her mother, reporting that she was reading a book discovered "that's how she tricked everyone" to "think that she was a nice person". She was noted to be intermittently tearful as well. She otherwise denied si/hi, denied auditory or visual hallucinations and did not believe she was in danger at this time.

## 2022-09-25 LAB
GLUCOSE BLDC GLUCOMTR-MCNC: 177 MG/DL — HIGH (ref 70–99)
GLUCOSE BLDC GLUCOMTR-MCNC: 187 MG/DL — HIGH (ref 70–99)
GLUCOSE BLDC GLUCOMTR-MCNC: 256 MG/DL — HIGH (ref 70–99)
GLUCOSE BLDC GLUCOMTR-MCNC: 274 MG/DL — HIGH (ref 70–99)

## 2022-09-25 PROCEDURE — 99232 SBSQ HOSP IP/OBS MODERATE 35: CPT

## 2022-09-25 RX ADMIN — Medication 1 MILLIGRAM(S): at 20:17

## 2022-09-25 RX ADMIN — FLUPHENAZINE HYDROCHLORIDE 10 MILLIGRAM(S): 1 TABLET, FILM COATED ORAL at 20:15

## 2022-09-25 RX ADMIN — Medication 1: at 16:13

## 2022-09-25 RX ADMIN — Medication 3: at 07:26

## 2022-09-25 RX ADMIN — Medication 600 MILLIGRAM(S): at 08:10

## 2022-09-25 RX ADMIN — ATORVASTATIN CALCIUM 10 MILLIGRAM(S): 80 TABLET, FILM COATED ORAL at 20:15

## 2022-09-25 RX ADMIN — Medication 400 MILLIGRAM(S): at 09:14

## 2022-09-25 RX ADMIN — SENNA PLUS 2 TABLET(S): 8.6 TABLET ORAL at 20:16

## 2022-09-25 RX ADMIN — Medication 400 MILLIGRAM(S): at 02:20

## 2022-09-25 RX ADMIN — Medication 1: at 11:07

## 2022-09-25 RX ADMIN — Medication 400 MILLIGRAM(S): at 08:39

## 2022-09-25 RX ADMIN — INSULIN GLARGINE 20 UNIT(S): 100 INJECTION, SOLUTION SUBCUTANEOUS at 20:14

## 2022-09-25 RX ADMIN — METFORMIN HYDROCHLORIDE 1000 MILLIGRAM(S): 850 TABLET ORAL at 08:10

## 2022-09-25 RX ADMIN — Medication 600 MILLIGRAM(S): at 20:15

## 2022-09-25 RX ADMIN — METFORMIN HYDROCHLORIDE 1000 MILLIGRAM(S): 850 TABLET ORAL at 20:16

## 2022-09-25 RX ADMIN — Medication 400 MILLIGRAM(S): at 01:31

## 2022-09-25 RX ADMIN — AMLODIPINE BESYLATE 5 MILLIGRAM(S): 2.5 TABLET ORAL at 08:10

## 2022-09-25 NOTE — PROGRESS NOTE BEHAVIORAL HEALTH - NSBHFUPINTERVALHXFT_PSY_A_CORE
. Nursing reports no acute events overnight. Patient received PRN ibuprofen at 1AM today, no other PRNs.     Chart reviewed, patient seen and evaluated in AM. On approach, patient is alert and oriented, pleasant and cooperative to interview. Patient endorsed OK sleep and appetite. States that she sees Dr. Huerta outpt psychiatry. Endorses that sometimes when she gets up quickly or spins around she gets lightheaded, reports this has been a chronic problem for her. Patient reports no suicidal ideation, intent or plan on interview. Denied auditory or visual hallucinations on exam today; no overt psychosis. Patient compliant with medications; reports no side effects of medications. Nursing reports no acute events overnight. Patient received PRN ibuprofen at 1AM today, no other PRNs.     Chart reviewed, patient seen and evaluated in AM. On approach, patient is alert and oriented, pleasant and cooperative to interview, mildly elated affect. Patient endorsed OK sleep and appetite. States that she sees Dr. Huerta outpt psychiatry. Endorses that sometimes when she gets up quickly or spins around she gets lightheaded, reports this has been a chronic problem for her. Patient reports no suicidal ideation, intent or plan on interview. Denied auditory or visual hallucinations on exam today; no overt psychosis. Patient compliant with medications; reports no side effects of medications. Nursing reports no acute events overnight. Patient received PRN ibuprofen at 1AM today, no other PRNs.     Chart reviewed, patient seen and evaluated in AM. On approach, patient is alert and oriented, pleasant and cooperative to interview, mildly elated affect. Patient endorsed OK sleep and appetite. States that she sees Dr. Huerta outpt psychiatry. Endorses that sometimes when she gets up quickly or spins around she gets lightheaded, reports this has been a chronic problem for her. Patient reports no suicidal ideation, intent or plan on interview. Denied auditory or visual hallucinations on exam today; no overt psychosis. Patient compliant with medications; reports no side effects of medications.    Reports that her "primary contact" (PCP?) is Dr. Bruce Real, [phone]. Nursing reports no acute events overnight. Patient received PRN ibuprofen at 1AM today, no other PRNs.     Chart reviewed, patient seen and evaluated in AM. On approach, patient is alert and oriented, pleasant and cooperative to interview, mildly elated affect. Patient complimented interviewer's clothing. Patient endorsed OK sleep and appetite. States that she sees Dr. Huerta outpt psychiatry. Endorses that sometimes when she gets up quickly or spins around she gets lightheaded, reports this has been a chronic problem for her. Denies any recent falls. Advised to avoid getting up quickly, maintain adequate hydration, and to be careful to avoid falls. Patient reports no suicidal ideation, intent or plan on interview. Denied auditory or visual hallucinations on exam today; no overt psychosis. Patient compliant with medications; reports no side effects of medications.    Reports that her "primary contact" (PCP?) is Dr. Bruce Real, [phone].

## 2022-09-25 NOTE — PROGRESS NOTE BEHAVIORAL HEALTH - SUMMARY
Pt is a 52 year old woman, , living with her two daughters and dog, on disability; pmhx HTN, DM2, asthma, HLD; pphx bipolar disorder, multiple psychiatric admissions in past (up to 4-5 in lifetime per pt), recently admitted to [facility] about one month ago? and previous suicide attempt (unclear), no self harming/cutting; social/intermittent alcohol use; no known legal/violence hx; BIBA after family called EMS for wellness check.    On encounter patient appears to be less grandiose, less irritable and less tangential than on presentation, however, she remains with paranoid thoughts about her mother and intermittently tearful. At this time, patient remains at elevated risk to self and warrants continued hospitalization for safety and stabilization.     Plan  - Prolixin 10mg qhs  - ativan 1 qhs    # Acute agitation  For acute agitation not responsive to verbal redirection may give prolixin 5mg po q6h prn along with ativan 1 q4h prn with escalation to IM if patient becomes a danger to him/herself/others or property. Pt is a 52 year old woman, , living with her two daughters and dog, on disability; pmhx HTN, DM2, asthma, HLD; pphx bipolar disorder, multiple psychiatric admissions in past (up to 4-5 in lifetime per pt), recently admitted to [facility] about one month ago? and previous suicide attempt (unclear), no self harming/cutting; social/intermittent alcohol use; no known legal/violence hx; BIBA after family called EMS for wellness check.    On encounter patient appears to be less grandiose, less irritable and less tangential than on presentation, however, patient had recent remains with paranoid thoughts about her mother and intermittently tearful.    On re-assessment today, affect is mildly elated, no overt psychosis on exam. At this time, patient remains at elevated risk to self and warrants continued hospitalization for safety and stabilization.     Plan  - c/w Prolixin 10mg qhs  - c/w ativan 1 qhs    # Acute agitation  - For acute agitation not responsive to verbal redirection may give prolixin 5mg po q6h prn along with ativan 1 q4h prn with escalation to IM if patient becomes a danger to him/herself/others or property.

## 2022-09-25 NOTE — PROGRESS NOTE BEHAVIORAL HEALTH - NSBHFUPINTERVALCCFT_PSY_A_CORE
****THIS IS AN INCOMPLETE NOTE. ****PLAN HAS NOT BEEN FINALIZED BY ATTENDING. ****FULL NOTE TO FOLLOW SHORTLY. **** "doing well"

## 2022-09-26 DIAGNOSIS — I10 ESSENTIAL (PRIMARY) HYPERTENSION: ICD-10-CM

## 2022-09-26 DIAGNOSIS — E11.65 TYPE 2 DIABETES MELLITUS WITH HYPERGLYCEMIA: ICD-10-CM

## 2022-09-26 LAB
GLUCOSE BLDC GLUCOMTR-MCNC: 217 MG/DL — HIGH (ref 70–99)
GLUCOSE BLDC GLUCOMTR-MCNC: 232 MG/DL — HIGH (ref 70–99)
GLUCOSE BLDC GLUCOMTR-MCNC: 235 MG/DL — HIGH (ref 70–99)
GLUCOSE BLDC GLUCOMTR-MCNC: 257 MG/DL — HIGH (ref 70–99)
GLUCOSE BLDC GLUCOMTR-MCNC: 264 MG/DL — HIGH (ref 70–99)
GLUCOSE BLDC GLUCOMTR-MCNC: 274 MG/DL — HIGH (ref 70–99)

## 2022-09-26 PROCEDURE — 99232 SBSQ HOSP IP/OBS MODERATE 35: CPT

## 2022-09-26 RX ADMIN — Medication 600 MILLIGRAM(S): at 20:57

## 2022-09-26 RX ADMIN — INSULIN GLARGINE 20 UNIT(S): 100 INJECTION, SOLUTION SUBCUTANEOUS at 20:56

## 2022-09-26 RX ADMIN — Medication 3: at 16:25

## 2022-09-26 RX ADMIN — FLUPHENAZINE HYDROCHLORIDE 10 MILLIGRAM(S): 1 TABLET, FILM COATED ORAL at 21:07

## 2022-09-26 RX ADMIN — ATORVASTATIN CALCIUM 10 MILLIGRAM(S): 80 TABLET, FILM COATED ORAL at 20:58

## 2022-09-26 RX ADMIN — Medication 3: at 07:49

## 2022-09-26 RX ADMIN — METFORMIN HYDROCHLORIDE 1000 MILLIGRAM(S): 850 TABLET ORAL at 22:19

## 2022-09-26 RX ADMIN — Medication 600 MILLIGRAM(S): at 08:17

## 2022-09-26 RX ADMIN — Medication 1 MILLIGRAM(S): at 22:19

## 2022-09-26 RX ADMIN — AMLODIPINE BESYLATE 5 MILLIGRAM(S): 2.5 TABLET ORAL at 08:17

## 2022-09-26 RX ADMIN — SENNA PLUS 2 TABLET(S): 8.6 TABLET ORAL at 22:19

## 2022-09-26 RX ADMIN — Medication 2: at 12:10

## 2022-09-26 RX ADMIN — METFORMIN HYDROCHLORIDE 1000 MILLIGRAM(S): 850 TABLET ORAL at 08:17

## 2022-09-26 NOTE — PROGRESS NOTE BEHAVIORAL HEALTH - SUMMARY
Patient's mood has not stabilized since she had to stop lithium. We will try to optimize carbamazepine and consider adjunctive trial of lamotrigine. The patient is receiving very high doses of antipsychotic that could be adversely affecting mood and mentation - we will evaluate for possible med reduction. Pt educated about social rhythms and the importance of socialization.

## 2022-09-26 NOTE — PROGRESS NOTE BEHAVIORAL HEALTH - NSBHFUPINTERVALHXFT_PSY_A_CORE
The patient was seen with her mother. Pt reported persistent depressed mood though she denied hopelessness or suicidality. She stated that she "has a lot of problems with  my family" and that occupies most of her thinking. The pt did not identify any medication side effects. She allowed that she was feeling a "bit less anxious" since admission. The pt's mother noted that the patient's sx worsening has occurred over the last 4 months ever since her lithium overdose in May, which required her to stop lithium, on which she had been stable for several years. That hospitalization in May, at [facility], required hemodialysis due to acute kidney injury. The patient insists the overdose was accidental but others felt it was a suicide attempt, including her family who noted that the patient had been grieving for several months since the death of her aunt (her mother's sister), with whom the patient was very close. Although the patient does not work and spends a great deal of time watching TV, she passed her day - prior to her aunt's death in December 2021 - speaking to her aunt on the phone, several times a day, about the programs of which both were fond; so that her aunt's death has significantly altered an important social rhythm, for which the patient has yet to find a compensation or adaptation.

## 2022-09-27 LAB
GLUCOSE BLDC GLUCOMTR-MCNC: 142 MG/DL — HIGH (ref 70–99)
GLUCOSE BLDC GLUCOMTR-MCNC: 218 MG/DL — HIGH (ref 70–99)

## 2022-09-27 PROCEDURE — 99231 SBSQ HOSP IP/OBS SF/LOW 25: CPT

## 2022-09-27 RX ORDER — CARBAMAZEPINE 200 MG
200 TABLET ORAL
Refills: 0 | Status: DISCONTINUED | OUTPATIENT
Start: 2022-09-27 | End: 2022-10-06

## 2022-09-27 RX ADMIN — SENNA PLUS 2 TABLET(S): 8.6 TABLET ORAL at 21:10

## 2022-09-27 RX ADMIN — Medication 200 MILLIGRAM(S): at 21:10

## 2022-09-27 RX ADMIN — INSULIN GLARGINE 20 UNIT(S): 100 INJECTION, SOLUTION SUBCUTANEOUS at 21:10

## 2022-09-27 RX ADMIN — Medication 600 MILLIGRAM(S): at 21:10

## 2022-09-27 RX ADMIN — METFORMIN HYDROCHLORIDE 1000 MILLIGRAM(S): 850 TABLET ORAL at 21:10

## 2022-09-27 RX ADMIN — FLUPHENAZINE HYDROCHLORIDE 10 MILLIGRAM(S): 1 TABLET, FILM COATED ORAL at 21:09

## 2022-09-27 RX ADMIN — Medication 1 MILLIGRAM(S): at 21:09

## 2022-09-27 RX ADMIN — METFORMIN HYDROCHLORIDE 1000 MILLIGRAM(S): 850 TABLET ORAL at 08:09

## 2022-09-27 RX ADMIN — Medication 600 MILLIGRAM(S): at 08:09

## 2022-09-27 RX ADMIN — POLYETHYLENE GLYCOL 3350 17 GRAM(S): 17 POWDER, FOR SOLUTION ORAL at 08:10

## 2022-09-27 RX ADMIN — ATORVASTATIN CALCIUM 10 MILLIGRAM(S): 80 TABLET, FILM COATED ORAL at 21:09

## 2022-09-27 RX ADMIN — AMLODIPINE BESYLATE 5 MILLIGRAM(S): 2.5 TABLET ORAL at 08:09

## 2022-09-27 NOTE — PROGRESS NOTE BEHAVIORAL HEALTH - SUMMARY
Patient's mood has not stabilized since she had to stop lithium. We will try to optimize carbamazepine and consider adjunctive trial of lamotrigine. The patient is receiving very high doses of antipsychotic that could be adversely affecting mood and mentation - we will evaluate for possible med reduction. Pt educated about social rhythms and the importance of socialization. Pt is a 52 year old woman, , living with her two daughters and dog, on disability; pmhx HTN, DM2, asthma, HLD; pphx bipolar disorder, multiple psychiatric admissions in past (up to 4-5 in lifetime per pt), recently admitted to [facility] about one month ago? and previous suicide attempt (unclear), no self harming/cutting; social/intermittent alcohol use; no known legal/violence hx; BIBA after family called EMS for wellness check.    Patient seen and evaluated. As per nursing report no acute events. On approach patient calm and cooperative. No agitation or aggression noted. Patient stated she was tired today. Continues to admit to periods of depression. States she has a lot of things to think about. Expressed having issues with family that bother her. Patients mood continues to be labile. Carbamazepine to be titrated accordingly. Patient denies suicidal/homicidal ideations. Denies any A/V hallucinations at this time. Patient visible on the unit engaging with peers and attending groups.    #Admit    #Bipolar disorder  -Carbamazapine 200mg BID  -Prolixin 10mg BID    #Anxiety  -Lorazepam 1mg Bedtime    -Prolixin 5mg Q6 PRN for agitation  -Lorazepam 1mg Q6 PRN for agitation/aggression    #Medical meds  -Insulin sliding scale  -Metformin  -Albuterol  -Amlodipine  -Atorvastatin  -Gemfibrozil  -Motrin PRN for pain  -Miralax PRN for Dyspepsia  -Senna for constipation    Lorazepam

## 2022-09-27 NOTE — PROGRESS NOTE BEHAVIORAL HEALTH - NSBHATTESTBILLING_PSY_A_CORE
[id number] Inpatient care - high complexity - 35 minutes [id number] Inpatient care - low complexity - 15 minutes [id number] Inpatient care - moderate complexity - 25 minutes

## 2022-09-27 NOTE — PROGRESS NOTE BEHAVIORAL HEALTH - NSBHCHARTREVIEWINVESTIGATE_PSY_A_CORE FT
< from: 12 Lead ECG (09.22.22 @ 10:04) >    QTC Calculation(Bazett) 459 ms    < end of copied text >
< from: 12 Lead ECG (09.22.22 @ 10:04) >    Ventricular Rate 85 BPM    Atrial Rate 85 BPM    P-R Interval 156 ms    QRS Duration 96 ms    Q-T Interval 386 ms    QTC Calculation(Bazett) 459 ms    P Axis 59 degrees    R Axis -44 degrees    T Axis 44 degrees    Diagnosis Line Normal sinus rhythm  Left axis deviation  Minimal voltage criteria for LVH, may be normal variant  Poor R wave progression  Nonspecific ST-T changes    < end of copied text >

## 2022-09-27 NOTE — PROGRESS NOTE BEHAVIORAL HEALTH - NSBHCHARTREVIEWLAB_PSY_A_CORE FT
CAPILLARY BLOOD GLUCOSE      POCT Blood Glucose.: 274 mg/dL (25 Sep 2022 06:35)
Complete Blood Count + Automated Diff (09.22.22 @ 09:50)   WBC Count: 7.09 K/uL   RBC Count: 4.34 M/uL   Hemoglobin: 12.2 g/dL   Hematocrit: 34.2    Mean Cell Volume: 78.8 fL   Mean Cell Hemoglobin: 28.1 pg   Mean Cell Hemoglobin Conc: 35.7 g/dL   Red Cell Distrib Width: 14.3    Platelet Count - Automated: 127 K/uL   Auto Neutrophil #: 4.77 K/uL   Auto Lymphocyte #: 1.67 K/uL   Auto Monocyte #: 0.37 K/uL   Auto Eosinophil #: 0.22 K/uL   Auto Basophil #: 0.03 K/uL   Auto Neutrophil %: 67.3  Auto Lymphocyte %: 23.6    Auto Monocyte %: 5.2   Auto Eosinophil %: 3.1    Auto Basophil %: 0.4   Auto Immature Granulocyte %: 0.4: (Includes meta, myelo and promyelocytes). Mild elevations in immature   granulocytes may be seen with many inflammatory processes and pregnancy;   clinical correlation suggested.   Nucleated RBC: 0 /100 WBCs

## 2022-09-27 NOTE — PROGRESS NOTE BEHAVIORAL HEALTH - NSBHFUPINTERVALHXFT_PSY_A_CORE
Patient seen and evaluated. As per nursing report no acute events. On approach patient calm and cooperative. No agitation or aggression noted. Patient stated she was tired today. Continues to admit to periods of depression. States she has a lot of things to think about. Expressed having issues with family that bother her. Patients mood continues to be labile. Carbamazepine to be titrated accordingly. Patient denies suicidal/homicidal ideations. Denies any A/V hallucinations at this time. Patient visible on the unit engaging with peers and attending groups.

## 2022-09-28 DIAGNOSIS — I10 ESSENTIAL (PRIMARY) HYPERTENSION: ICD-10-CM

## 2022-09-28 DIAGNOSIS — J45.909 UNSPECIFIED ASTHMA, UNCOMPLICATED: ICD-10-CM

## 2022-09-28 DIAGNOSIS — E11.641 TYPE 2 DIABETES MELLITUS WITH HYPOGLYCEMIA WITH COMA: ICD-10-CM

## 2022-09-28 LAB
GLUCOSE BLDC GLUCOMTR-MCNC: 202 MG/DL — HIGH (ref 70–99)
GLUCOSE BLDC GLUCOMTR-MCNC: 207 MG/DL — HIGH (ref 70–99)
GLUCOSE BLDC GLUCOMTR-MCNC: 226 MG/DL — HIGH (ref 70–99)

## 2022-09-28 PROCEDURE — 99232 SBSQ HOSP IP/OBS MODERATE 35: CPT

## 2022-09-28 RX ADMIN — Medication 200 MILLIGRAM(S): at 08:54

## 2022-09-28 RX ADMIN — Medication 600 MILLIGRAM(S): at 08:54

## 2022-09-28 RX ADMIN — Medication 2: at 07:39

## 2022-09-28 RX ADMIN — Medication 600 MILLIGRAM(S): at 20:31

## 2022-09-28 RX ADMIN — Medication 1 MILLIGRAM(S): at 20:36

## 2022-09-28 RX ADMIN — Medication 200 MILLIGRAM(S): at 20:31

## 2022-09-28 RX ADMIN — METFORMIN HYDROCHLORIDE 1000 MILLIGRAM(S): 850 TABLET ORAL at 08:54

## 2022-09-28 RX ADMIN — FLUPHENAZINE HYDROCHLORIDE 10 MILLIGRAM(S): 1 TABLET, FILM COATED ORAL at 20:34

## 2022-09-28 RX ADMIN — METFORMIN HYDROCHLORIDE 1000 MILLIGRAM(S): 850 TABLET ORAL at 20:31

## 2022-09-28 RX ADMIN — ATORVASTATIN CALCIUM 10 MILLIGRAM(S): 80 TABLET, FILM COATED ORAL at 20:32

## 2022-09-28 RX ADMIN — INSULIN GLARGINE 20 UNIT(S): 100 INJECTION, SOLUTION SUBCUTANEOUS at 20:33

## 2022-09-28 RX ADMIN — AMLODIPINE BESYLATE 5 MILLIGRAM(S): 2.5 TABLET ORAL at 08:54

## 2022-09-28 RX ADMIN — Medication 2: at 16:29

## 2022-09-28 NOTE — PROGRESS NOTE BEHAVIORAL HEALTH - SUMMARY
Patient is tolerating trial, as yet sees no effect on mood. Will review response to carbamazepine on 9/30 and determine whether to increase dose. Also introduced pt to concept of adding another mood stabilizer, e.g., lamotrigine, which has been shown to provide some protection from recurrent mood sx. Psychoeducation and support provided

## 2022-09-28 NOTE — PROGRESS NOTE BEHAVIORAL HEALTH - NSBHFUPINTERVALHXFT_PSY_A_CORE
Patient has restarted carbamazepine and reports no side effects. She is socializing on the unit and appears well composed. Denies acute suicidality in hospital - feels safe here.

## 2022-09-29 LAB
GLUCOSE BLDC GLUCOMTR-MCNC: 149 MG/DL — HIGH (ref 70–99)
GLUCOSE BLDC GLUCOMTR-MCNC: 189 MG/DL — HIGH (ref 70–99)
GLUCOSE BLDC GLUCOMTR-MCNC: 190 MG/DL — HIGH (ref 70–99)
GLUCOSE BLDC GLUCOMTR-MCNC: 252 MG/DL — HIGH (ref 70–99)

## 2022-09-29 PROCEDURE — 99232 SBSQ HOSP IP/OBS MODERATE 35: CPT

## 2022-09-29 RX ADMIN — Medication 600 MILLIGRAM(S): at 20:06

## 2022-09-29 RX ADMIN — Medication 200 MILLIGRAM(S): at 20:06

## 2022-09-29 RX ADMIN — METFORMIN HYDROCHLORIDE 1000 MILLIGRAM(S): 850 TABLET ORAL at 20:06

## 2022-09-29 RX ADMIN — Medication 200 MILLIGRAM(S): at 08:28

## 2022-09-29 RX ADMIN — Medication 1 MILLIGRAM(S): at 20:07

## 2022-09-29 RX ADMIN — Medication 600 MILLIGRAM(S): at 08:28

## 2022-09-29 RX ADMIN — FLUPHENAZINE HYDROCHLORIDE 10 MILLIGRAM(S): 1 TABLET, FILM COATED ORAL at 20:06

## 2022-09-29 RX ADMIN — ATORVASTATIN CALCIUM 10 MILLIGRAM(S): 80 TABLET, FILM COATED ORAL at 20:05

## 2022-09-29 RX ADMIN — METFORMIN HYDROCHLORIDE 1000 MILLIGRAM(S): 850 TABLET ORAL at 08:27

## 2022-09-29 RX ADMIN — SENNA PLUS 2 TABLET(S): 8.6 TABLET ORAL at 20:07

## 2022-09-29 RX ADMIN — INSULIN GLARGINE 20 UNIT(S): 100 INJECTION, SOLUTION SUBCUTANEOUS at 20:05

## 2022-09-29 RX ADMIN — AMLODIPINE BESYLATE 5 MILLIGRAM(S): 2.5 TABLET ORAL at 08:28

## 2022-09-29 NOTE — PROGRESS NOTE BEHAVIORAL HEALTH - SUMMARY
Pt is a 53yo female, , living with her two daughters and dog, on disability; pmhx HTN, DM2, asthma, HLD; pphx bipolar disorder, multiple psychiatric admissions in past (four to five lifetime per pt), recently admitted to [facility] about one month ago? and previous suicide attempt (unclear), no self harming/cutting; social/intermittent alcohol use; no known legal/violence hx; BIBA after family called EMS for wellness check.    #Bipolar disorder  -Carbamazapine 200mg twice daily  -Prolixin 10mg twice daily    #Anxiety  -Lorazepam 1mg Bedtime    -Prolixin 5mg Q6 PRN for agitation  -Lorazepam 1mg Q6 PRN for agitation/aggression    #Medical meds  -Insulin sliding scale  -Metformin  -Albuterol  -Amlodipine  -Atorvastatin  -Gemfibrozil  -Motrin PRN for pain  -Miralax PRN for Dyspepsia  -Senna for constipation

## 2022-09-29 NOTE — PROGRESS NOTE BEHAVIORAL HEALTH - NSBHCHARTREVIEWVS_PSY_A_CORE FT
Vital Signs Last 24 Hrs  T(C): 36.4 (24 Sep 2022 15:49), Max: 36.4 (24 Sep 2022 15:49)  T(F): 97.5 (24 Sep 2022 15:49), Max: 97.5 (24 Sep 2022 15:49)  HR: 95 (24 Sep 2022 15:49) (90 - 95)  BP: 119/73 (24 Sep 2022 15:49) (119/73 - 140/66)  BP(mean): --  RR: 19 (24 Sep 2022 15:49) (16 - 19)  SpO2: --
Vital Signs Last 24 Hrs  T(C): 36.2 (27 Sep 2022 08:35), Max: 36.6 (26 Sep 2022 16:01)  T(F): 97.2 (27 Sep 2022 08:35), Max: 97.9 (26 Sep 2022 16:01)  HR: 85 (27 Sep 2022 08:35) (77 - 97)  BP: 123/67 (27 Sep 2022 08:35) (122/62 - 129/91)  BP(mean): --  RR: 18 (27 Sep 2022 08:35) (16 - 19)  SpO2: --
T(F): 96 (09-25-22 @ 08:18), Max: 97.7 (09-25-22 @ 06:00)  HR: 91 (09-25-22 @ 08:18) (83 - 95)  BP: 147/97 (09-25-22 @ 08:18) (119/73 - 167/95)  BP(mean): --  ABP: --  ABP(mean): --  RR: 18 (09-25-22 @ 08:18) (18 - 19)  SpO2: --
ICU Vital Signs Last 24 Hrs  T(C): 36.3 (29 Sep 2022 08:46), Max: 36.4 (28 Sep 2022 16:57)  T(F): 97.3 (29 Sep 2022 08:46), Max: 97.6 (28 Sep 2022 16:57)  HR: 105 (29 Sep 2022 08:46) (89 - 105)  BP: 145/100 (29 Sep 2022 08:46) (113/61 - 145/100)  BP(mean): --  ABP: --  ABP(mean): --  RR: 18 (29 Sep 2022 08:46) (17 - 18)  SpO2: --

## 2022-09-29 NOTE — PROGRESS NOTE BEHAVIORAL HEALTH - NSBHFUPINTERVALHXFT_PSY_A_CORE
Chart reviewed, she did not need prn medications overnight.  She reports feeling sedated, expresses Tegretol was discontinued by previous psychiatrist due to feeling oversedated.  She reports increased sleep, mood is 'better', thinks about her children.  She is attending unit groups, conversing with peers, has not exhibited agitation on the unit. She is adherent with medication regimen. She denies thoughts to hurt self or others.

## 2022-09-30 DIAGNOSIS — J45.20 MILD INTERMITTENT ASTHMA, UNCOMPLICATED: ICD-10-CM

## 2022-09-30 LAB
GLUCOSE BLDC GLUCOMTR-MCNC: 151 MG/DL — HIGH (ref 70–99)
GLUCOSE BLDC GLUCOMTR-MCNC: 173 MG/DL — HIGH (ref 70–99)
GLUCOSE BLDC GLUCOMTR-MCNC: 191 MG/DL — HIGH (ref 70–99)
GLUCOSE BLDC GLUCOMTR-MCNC: 196 MG/DL — HIGH (ref 70–99)

## 2022-09-30 PROCEDURE — 99232 SBSQ HOSP IP/OBS MODERATE 35: CPT

## 2022-09-30 RX ADMIN — SENNA PLUS 2 TABLET(S): 8.6 TABLET ORAL at 20:18

## 2022-09-30 RX ADMIN — Medication 600 MILLIGRAM(S): at 08:08

## 2022-09-30 RX ADMIN — AMLODIPINE BESYLATE 5 MILLIGRAM(S): 2.5 TABLET ORAL at 08:08

## 2022-09-30 RX ADMIN — METFORMIN HYDROCHLORIDE 1000 MILLIGRAM(S): 850 TABLET ORAL at 08:08

## 2022-09-30 RX ADMIN — ATORVASTATIN CALCIUM 10 MILLIGRAM(S): 80 TABLET, FILM COATED ORAL at 20:13

## 2022-09-30 RX ADMIN — INSULIN GLARGINE 20 UNIT(S): 100 INJECTION, SOLUTION SUBCUTANEOUS at 20:16

## 2022-09-30 RX ADMIN — METFORMIN HYDROCHLORIDE 1000 MILLIGRAM(S): 850 TABLET ORAL at 20:15

## 2022-09-30 RX ADMIN — Medication 1: at 11:25

## 2022-09-30 RX ADMIN — Medication 1: at 17:08

## 2022-09-30 RX ADMIN — Medication 200 MILLIGRAM(S): at 08:08

## 2022-09-30 RX ADMIN — FLUPHENAZINE HYDROCHLORIDE 10 MILLIGRAM(S): 1 TABLET, FILM COATED ORAL at 20:14

## 2022-09-30 RX ADMIN — Medication 600 MILLIGRAM(S): at 20:16

## 2022-09-30 RX ADMIN — Medication 1: at 08:04

## 2022-09-30 RX ADMIN — Medication 200 MILLIGRAM(S): at 20:14

## 2022-09-30 NOTE — PROGRESS NOTE BEHAVIORAL HEALTH - NSBHCONSORIP_PSY_A_CORE
Inpatient Admission...
Consult...
Inpatient Admission...

## 2022-09-30 NOTE — PROGRESS NOTE BEHAVIORAL HEALTH - GAIT / STATION
Normal gait / station
Other
Normal gait / station

## 2022-09-30 NOTE — PROGRESS NOTE BEHAVIORAL HEALTH - THOUGHT PROCESS
Tangential
Illogical
Overinclusive/Tangential/Perseverative/Illogical
Illogical
Tangential
Illogical

## 2022-09-30 NOTE — PROGRESS NOTE BEHAVIORAL HEALTH - AXIS III
HTN, DM2, asthma, HLD

## 2022-09-30 NOTE — PROGRESS NOTE BEHAVIORAL HEALTH - NSBHLEGALSTATUS_PSY_A_CORE
9.39 (Emergency)
9.27 (2PC)
9.39 (Emergency)

## 2022-09-30 NOTE — PROGRESS NOTE BEHAVIORAL HEALTH - PRIMARY DX
Bipolar disorder, current episode mixed, severe, without psychotic features

## 2022-09-30 NOTE — PROGRESS NOTE BEHAVIORAL HEALTH - SUMMARY
Location: [place] [id number]    HEARING AID ORIENTATION / FITTING    Yes:  Hearing evaluation performed within past 6 months,  purchase agreement signed and reviewed warranty / hearing aid life expectancy, payment collected and  receipt completed, insurance authorization received, if applicable, reviewed departmental policies.    Medical Clearance Date:  Signed today  Audiogram Date:  11-2 6-2019     Accompanied by: Self    HEARING AIDS:  Date of Purchase:  5/1/2019   repair warranty expiration date: 7-   L/D expiration date: Same  :  Umberto  Model/style/color: Nura 2400, black  Serial # right: [id number]  //  Serial # left: [id number]  : #3-50 decibels  Battery size: Internal Lithium Ion   Dome:  9 mm closed    Wax filters: Yellow sticks  Accessory: Umberto  ---Accessory serial # [id number] //     ---Accessory warranty expiration date: Same   used: Wireless Sierra Link  Payor: Epic    Initial Programming: EStat, first fit, new user, loud gain -1, volume control right, program control left, adjustments made to speech mapping  P1: Normal  P2: Pub    PATIENT REVIEW:  yes:  Reviewed: Volume Control, Program Button,  Microphones, Battery compartment, manual, tools, troubleshooting  yes:  Review of battery changing and safety. Patient can perform:  yes  yes:  Insertion / removal of hearing aid, earmold or dome. Patient can perform:  yes  yes:  Cleaning of hearing aid, earmold, dome. Patient can perform:  yes     yes:  Reviewed telephone use.      Provided: cleaning tool, manufacturing manual, cases, batteries (if used),    Patient Impressions: Lots of questions, his own voice was an adjustment for him, he thinks he will do better with time.    Other: Did well, encouraged to a sterile to thrive killian on his own,       Recommendations: daily usage, call if problems,     Follow-up: Scheduled     Pt is tolerating carbamazepine trial without side effects and demonstrates improvement. If improvement persists recommend discharge mid-week next week (10/5 - pt would like to attend [facility] services). Need carbamazepine level and recommend meeting with daughters prior to discharge to answer questions and recommend approaches to assist patient in managing mood outside hospital.

## 2022-09-30 NOTE — PROGRESS NOTE BEHAVIORAL HEALTH - SECONDARY DX2
Type 2 diabetes mellitus with hyperglycemia, with long-term current use of insulin
Moderate asthma without complication, unspecified whether persistent
Type 2 diabetes mellitus with hyperglycemia, with long-term current use of insulin
Primary hypertension
Type 2 diabetes mellitus with hyperglycemia, with long-term current use of insulin

## 2022-09-30 NOTE — PROGRESS NOTE BEHAVIORAL HEALTH - SECONDARY DX1
Type 2 diabetes mellitus with hypoglycemia and coma, with long-term current use of insulin
Asthma
Asthma
Type 2 diabetes mellitus with hypoglycemia and coma, with long-term current use of insulin
Asthma

## 2022-09-30 NOTE — PROGRESS NOTE BEHAVIORAL HEALTH - RISK ASSESSMENT
Although the patient denies current suicidal ideation, she remains at high risk due to past behavior and current mood instability, and would not be safe outside a supervised setting.
Not acutely suicidal in hospital
Pt is unable to care for herself safely; has passive suicidal ideation and a history of impulsivity, together with severe mood sx. Pt is future-oriented and identifies her daughters as a major reason for living.
Although the patient denies current suicidal ideation, she remains at high risk due to past behavior and current mood instability, and would not be safe outside a supervised setting.
no acute risk in hospital but chronic elevated risk
Although the patient denies current suicidal ideation, she remains at high risk due to past behavior and current mood instability, and would not be safe outside a supervised setting.

## 2022-09-30 NOTE — PROGRESS NOTE BEHAVIORAL HEALTH - NSBHADMITDANGERSELF_PSY_A_CORE
unable to care for self
suicidal ideation with plan and means
suicidal behavior/unable to care for self
suicidal behavior/unable to care for self
suicidal ideation with plan and means
unable to care for self
suicidal ideation with plan and means

## 2022-09-30 NOTE — PROGRESS NOTE BEHAVIORAL HEALTH - THOUGHT CONTENT
Unremarkable
Preoccupations/Ruminations/Other
Unremarkable

## 2022-09-30 NOTE — PROGRESS NOTE BEHAVIORAL HEALTH - NSBHPTASSESSDT_PSY_A_CORE
23-Sep-2022 10:30
27-Sep-2022 14:19
28-Sep-2022 13:46
30-Sep-2022 09:55
25-Sep-2022 10:31
24-Sep-2022 16:46
26-Sep-2022 13:35
29-Sep-2022 14:51

## 2022-09-30 NOTE — PROGRESS NOTE BEHAVIORAL HEALTH - MUSCLE TONE / STRENGTH
Other
Normal muscle tone/strength
Normal muscle tone/strength
Other
Normal muscle tone/strength
Other
Other
Normal muscle tone/strength

## 2022-10-01 LAB
GLUCOSE BLDC GLUCOMTR-MCNC: 182 MG/DL — HIGH (ref 70–99)
GLUCOSE BLDC GLUCOMTR-MCNC: 186 MG/DL — HIGH (ref 70–99)
GLUCOSE BLDC GLUCOMTR-MCNC: 283 MG/DL — HIGH (ref 70–99)

## 2022-10-01 RX ADMIN — Medication 600 MILLIGRAM(S): at 20:33

## 2022-10-01 RX ADMIN — FLUPHENAZINE HYDROCHLORIDE 10 MILLIGRAM(S): 1 TABLET, FILM COATED ORAL at 20:33

## 2022-10-01 RX ADMIN — Medication 400 MILLIGRAM(S): at 15:41

## 2022-10-01 RX ADMIN — Medication 200 MILLIGRAM(S): at 08:09

## 2022-10-01 RX ADMIN — AMLODIPINE BESYLATE 5 MILLIGRAM(S): 2.5 TABLET ORAL at 08:09

## 2022-10-01 RX ADMIN — Medication 200 MILLIGRAM(S): at 20:33

## 2022-10-01 RX ADMIN — ATORVASTATIN CALCIUM 10 MILLIGRAM(S): 80 TABLET, FILM COATED ORAL at 20:33

## 2022-10-01 RX ADMIN — METFORMIN HYDROCHLORIDE 1000 MILLIGRAM(S): 850 TABLET ORAL at 20:32

## 2022-10-01 RX ADMIN — SENNA PLUS 2 TABLET(S): 8.6 TABLET ORAL at 20:32

## 2022-10-01 RX ADMIN — INSULIN GLARGINE 20 UNIT(S): 100 INJECTION, SOLUTION SUBCUTANEOUS at 20:32

## 2022-10-01 RX ADMIN — METFORMIN HYDROCHLORIDE 1000 MILLIGRAM(S): 850 TABLET ORAL at 08:09

## 2022-10-01 RX ADMIN — Medication 600 MILLIGRAM(S): at 08:09

## 2022-10-02 LAB
GLUCOSE BLDC GLUCOMTR-MCNC: 180 MG/DL — HIGH (ref 70–99)
GLUCOSE BLDC GLUCOMTR-MCNC: 202 MG/DL — HIGH (ref 70–99)
GLUCOSE BLDC GLUCOMTR-MCNC: 215 MG/DL — HIGH (ref 70–99)
GLUCOSE BLDC GLUCOMTR-MCNC: 221 MG/DL — HIGH (ref 70–99)

## 2022-10-02 RX ADMIN — METFORMIN HYDROCHLORIDE 1000 MILLIGRAM(S): 850 TABLET ORAL at 08:18

## 2022-10-02 RX ADMIN — INSULIN GLARGINE 20 UNIT(S): 100 INJECTION, SOLUTION SUBCUTANEOUS at 20:25

## 2022-10-02 RX ADMIN — ATORVASTATIN CALCIUM 10 MILLIGRAM(S): 80 TABLET, FILM COATED ORAL at 20:26

## 2022-10-02 RX ADMIN — Medication 2: at 08:15

## 2022-10-02 RX ADMIN — Medication 200 MILLIGRAM(S): at 20:26

## 2022-10-02 RX ADMIN — Medication 400 MILLIGRAM(S): at 16:34

## 2022-10-02 RX ADMIN — Medication 600 MILLIGRAM(S): at 20:26

## 2022-10-02 RX ADMIN — AMLODIPINE BESYLATE 5 MILLIGRAM(S): 2.5 TABLET ORAL at 08:18

## 2022-10-02 RX ADMIN — Medication 600 MILLIGRAM(S): at 08:18

## 2022-10-02 RX ADMIN — METFORMIN HYDROCHLORIDE 1000 MILLIGRAM(S): 850 TABLET ORAL at 20:26

## 2022-10-02 RX ADMIN — Medication 200 MILLIGRAM(S): at 08:18

## 2022-10-02 RX ADMIN — Medication 400 MILLIGRAM(S): at 22:14

## 2022-10-02 RX ADMIN — FLUPHENAZINE HYDROCHLORIDE 10 MILLIGRAM(S): 1 TABLET, FILM COATED ORAL at 20:26

## 2022-10-02 RX ADMIN — Medication 2: at 16:36

## 2022-10-03 LAB
CARBAMAZEPINE SERPL-MCNC: 5.2 UG/ML — SIGNIFICANT CHANGE UP (ref 4–12)
GLUCOSE BLDC GLUCOMTR-MCNC: 162 MG/DL — HIGH (ref 70–99)
GLUCOSE BLDC GLUCOMTR-MCNC: 196 MG/DL — HIGH (ref 70–99)
GLUCOSE BLDC GLUCOMTR-MCNC: 201 MG/DL — HIGH (ref 70–99)

## 2022-10-03 RX ADMIN — INSULIN GLARGINE 20 UNIT(S): 100 INJECTION, SOLUTION SUBCUTANEOUS at 20:32

## 2022-10-03 RX ADMIN — Medication 2: at 07:25

## 2022-10-03 RX ADMIN — Medication 600 MILLIGRAM(S): at 08:30

## 2022-10-03 RX ADMIN — AMLODIPINE BESYLATE 5 MILLIGRAM(S): 2.5 TABLET ORAL at 08:31

## 2022-10-03 RX ADMIN — ATORVASTATIN CALCIUM 10 MILLIGRAM(S): 80 TABLET, FILM COATED ORAL at 20:33

## 2022-10-03 RX ADMIN — Medication 1: at 16:10

## 2022-10-03 RX ADMIN — Medication 200 MILLIGRAM(S): at 20:33

## 2022-10-03 RX ADMIN — METFORMIN HYDROCHLORIDE 1000 MILLIGRAM(S): 850 TABLET ORAL at 20:33

## 2022-10-03 RX ADMIN — Medication 200 MILLIGRAM(S): at 08:30

## 2022-10-03 RX ADMIN — METFORMIN HYDROCHLORIDE 1000 MILLIGRAM(S): 850 TABLET ORAL at 08:30

## 2022-10-03 RX ADMIN — Medication 600 MILLIGRAM(S): at 20:33

## 2022-10-03 RX ADMIN — FLUPHENAZINE HYDROCHLORIDE 10 MILLIGRAM(S): 1 TABLET, FILM COATED ORAL at 22:40

## 2022-10-04 LAB
GLUCOSE BLDC GLUCOMTR-MCNC: 125 MG/DL — HIGH (ref 70–99)
GLUCOSE BLDC GLUCOMTR-MCNC: 154 MG/DL — HIGH (ref 70–99)

## 2022-10-04 RX ADMIN — SENNA PLUS 2 TABLET(S): 8.6 TABLET ORAL at 21:01

## 2022-10-04 RX ADMIN — INSULIN GLARGINE 20 UNIT(S): 100 INJECTION, SOLUTION SUBCUTANEOUS at 21:02

## 2022-10-04 RX ADMIN — Medication 200 MILLIGRAM(S): at 21:01

## 2022-10-04 RX ADMIN — FLUPHENAZINE HYDROCHLORIDE 10 MILLIGRAM(S): 1 TABLET, FILM COATED ORAL at 21:02

## 2022-10-04 RX ADMIN — METFORMIN HYDROCHLORIDE 1000 MILLIGRAM(S): 850 TABLET ORAL at 21:01

## 2022-10-04 RX ADMIN — Medication 1: at 08:01

## 2022-10-04 RX ADMIN — Medication 600 MILLIGRAM(S): at 08:19

## 2022-10-04 RX ADMIN — METFORMIN HYDROCHLORIDE 1000 MILLIGRAM(S): 850 TABLET ORAL at 08:19

## 2022-10-04 RX ADMIN — AMLODIPINE BESYLATE 5 MILLIGRAM(S): 2.5 TABLET ORAL at 08:19

## 2022-10-04 RX ADMIN — Medication 600 MILLIGRAM(S): at 21:01

## 2022-10-04 RX ADMIN — ATORVASTATIN CALCIUM 10 MILLIGRAM(S): 80 TABLET, FILM COATED ORAL at 21:01

## 2022-10-04 NOTE — DISCHARGE NOTE BEHAVIORAL HEALTH - NS MD DC FALL RISK RISK
For information on Fall & Injury Prevention, visit: [email or web address] OR  [email or web address] OR  https://www.cdc.gov/steadi/patient.html

## 2022-10-04 NOTE — DISCHARGE NOTE BEHAVIORAL HEALTH - NSBHDCMEDSFT_PSY_A_CORE
Carbamazepine PO 200mg BID. Good response.  Prolixin tablets 10 mg PO at bedtime. Good response.  Prolixin Decanoate IM 50 mg every two weeks, last dose administered today 10/06/2022, and next dose due on 10/20/2022. Good response.

## 2022-10-04 NOTE — DISCHARGE NOTE BEHAVIORAL HEALTH - NSBHDCMEDICALFT_PSY_A_CORE
Past Med hx: HTN, DM2, asthma, HLD. Patient continued her treatment during this hospitalization and was stable.

## 2022-10-04 NOTE — DISCHARGE NOTE BEHAVIORAL HEALTH - NSBHDCTESTSFT_PSY_A_CORE
POCT Blood Glucose (10.06.22 @ 11:11)   POCT Blood Glucose.: 139 mg/dL     A1C with Estimated Average Glucose (09.23.22 @ 08:58)   A1C with Estimated Average Glucose Result: 7.5: Method: Immunoassay   Reference Range 4.0-5.6%   High risk (prediabetic) 5.7-6.4%   Diabetic, diagnostic >=6.5%     Comprehensive Metabolic Panel (09.22.22 @ 09:50)   Sodium, Serum: 137 mmol/L   Potassium, Serum: 4.4 mmol/L   Chloride, Serum: 101 mmol/L   Carbon Dioxide, Serum: 21 mmol/L   Anion Gap, Serum: 15 mmol/L   Blood Urea Nitrogen, Serum: 18 mg/dL   Creatinine, Serum: 0.9 mg/dL   Glucose, Serum: 243 mg/dL   Calcium, Total Serum: 10.1 mg/dL   Protein Total, Serum: 7.5 g/dL   Albumin, Serum: 5.0 g/dL

## 2022-10-04 NOTE — PROGRESS NOTE BEHAVIORAL HEALTH - NSBHFUPSUICINTERVALFT_PSY_A_CORE
No
mostly remitted
patient says these thoughts are not present now but her family is concerned that she has often concealed such thoughts and impulses in the past
patient says these thoughts are not present now but her family is concerned that she has often concealed such thoughts and impulses in the past

## 2022-10-04 NOTE — DISCHARGE NOTE BEHAVIORAL HEALTH - MEDICATION SUMMARY - MEDICATIONS TO TAKE
I will START or STAY ON the medications listed below when I get home from the hospital:    carBAMazepine 200 mg oral tablet  -- 1 tab(s) by mouth 2 times a day x 14 days continue to take until told otherwise by MD.  -- Indication: For Mood Disorder    Prolixin Decanoate 25 mg/mL injectable solution  -- 50 milligram(s) injectable every 2 weeks  -- Indication: For Psychotic Disorder    fluPHENAZine 10 mg oral tablet  -- 1 tab(s) by mouth once a day (at bedtime) x 14 days Continue taking it until told otherwise by MD.   -- Indication: For PSYCHotic Disorder

## 2022-10-04 NOTE — DISCHARGE NOTE BEHAVIORAL HEALTH - MEDICATION SUMMARY - MEDICATIONS TO STOP TAKING
I will STOP taking the medications listed below when I get home from the hospital:    Cogentin 2 mg oral tablet  -- 1 tab(s) by mouth 2 times a day

## 2022-10-04 NOTE — DISCHARGE NOTE BEHAVIORAL HEALTH - HPI (INCLUDE ILLNESS QUALITY, SEVERITY, DURATION, TIMING, CONTEXT, MODIFYING FACTORS, ASSOCIATED SIGNS AND SYMPTOMS)
Pt is a 52 year old woman, , living with her two daughters and dog, on disability; BIBA after family called EMS for wellness check. On ED Behavioral Health assessment: "pt is cooperative, tearful, pressured speech, with labile/noncongruent affect, tearful for most of exam, and tangential thought process. She endorsed impulsive behavior lately including spending significant amounts of money as well as insomnia/decreased need for sleep. . Notes that she has not been doing well, specifically reporting that her mother is concerned about her being suicidal. Pt herself denies SI at this time. She also denies AVH or HI. She does endorse significant sleep disturbance recently. Also notes feeling upset about spending significant amount of money in past month. States that things are "not good" at home, but cannot elaborate. She talks about how various family members have been doing and other unrelated topics throughout the interview."  Collateral from her cousin is concerning for worsening psychiatric symptoms, very different from baseline, family concerned that she needs psychiatric admission at this time.

## 2022-10-04 NOTE — DISCHARGE NOTE BEHAVIORAL HEALTH - PAST PSYCHIATRIC HISTORY
pphx bipolar disorder, multiple psychiatric admissions in past (up to 4-5 in lifetime per pt), recently admitted to [facility] about one month ago? and previous suicide attempt (unclear), no self harming/cutting; social/intermittent alcohol use; no known legal/violence hx

## 2022-10-05 LAB
GLUCOSE BLDC GLUCOMTR-MCNC: 134 MG/DL — HIGH (ref 70–99)
GLUCOSE BLDC GLUCOMTR-MCNC: 139 MG/DL — HIGH (ref 70–99)
GLUCOSE BLDC GLUCOMTR-MCNC: 167 MG/DL — HIGH (ref 70–99)
GLUCOSE BLDC GLUCOMTR-MCNC: 174 MG/DL — HIGH (ref 70–99)

## 2022-10-05 RX ADMIN — Medication 1: at 07:46

## 2022-10-05 RX ADMIN — ATORVASTATIN CALCIUM 10 MILLIGRAM(S): 80 TABLET, FILM COATED ORAL at 20:17

## 2022-10-05 RX ADMIN — Medication 200 MILLIGRAM(S): at 20:18

## 2022-10-05 RX ADMIN — METFORMIN HYDROCHLORIDE 1000 MILLIGRAM(S): 850 TABLET ORAL at 08:05

## 2022-10-05 RX ADMIN — METFORMIN HYDROCHLORIDE 1000 MILLIGRAM(S): 850 TABLET ORAL at 20:18

## 2022-10-05 RX ADMIN — Medication 600 MILLIGRAM(S): at 08:05

## 2022-10-05 RX ADMIN — AMLODIPINE BESYLATE 5 MILLIGRAM(S): 2.5 TABLET ORAL at 08:05

## 2022-10-05 RX ADMIN — Medication 200 MILLIGRAM(S): at 08:05

## 2022-10-05 RX ADMIN — INSULIN GLARGINE 20 UNIT(S): 100 INJECTION, SOLUTION SUBCUTANEOUS at 20:17

## 2022-10-05 RX ADMIN — Medication 600 MILLIGRAM(S): at 20:18

## 2022-10-05 RX ADMIN — SENNA PLUS 2 TABLET(S): 8.6 TABLET ORAL at 20:17

## 2022-10-05 RX ADMIN — FLUPHENAZINE HYDROCHLORIDE 10 MILLIGRAM(S): 1 TABLET, FILM COATED ORAL at 20:17

## 2022-10-06 VITALS
SYSTOLIC BLOOD PRESSURE: 133 MMHG | HEART RATE: 88 BPM | RESPIRATION RATE: 18 BRPM | DIASTOLIC BLOOD PRESSURE: 60 MMHG | TEMPERATURE: 99 F

## 2022-10-06 LAB
GLUCOSE BLDC GLUCOMTR-MCNC: 139 MG/DL — HIGH (ref 70–99)
GLUCOSE BLDC GLUCOMTR-MCNC: 150 MG/DL — HIGH (ref 70–99)

## 2022-10-06 PROCEDURE — 99238 HOSP IP/OBS DSCHRG MGMT 30/<: CPT

## 2022-10-06 RX ORDER — BENZTROPINE MESYLATE 1 MG
1 TABLET ORAL
Qty: 0 | Refills: 0 | DISCHARGE

## 2022-10-06 RX ORDER — ALBUTEROL 90 UG/1
2 AEROSOL, METERED ORAL
Qty: 0 | Refills: 0 | DISCHARGE

## 2022-10-06 RX ORDER — CARBAMAZEPINE 200 MG
1 TABLET ORAL
Qty: 28 | Refills: 0
Start: 2022-10-06 | End: 2022-10-19

## 2022-10-06 RX ORDER — CARBAMAZEPINE 200 MG
1 TABLET ORAL
Qty: 0 | Refills: 0 | DISCHARGE

## 2022-10-06 RX ORDER — MONTELUKAST 4 MG/1
1 TABLET, CHEWABLE ORAL
Qty: 0 | Refills: 0 | DISCHARGE

## 2022-10-06 RX ORDER — AMLODIPINE BESYLATE 2.5 MG/1
1 TABLET ORAL
Qty: 0 | Refills: 0 | DISCHARGE

## 2022-10-06 RX ORDER — FOLIC ACID 0.8 MG
1 TABLET ORAL
Qty: 0 | Refills: 0 | DISCHARGE

## 2022-10-06 RX ORDER — ATORVASTATIN CALCIUM 80 MG/1
1 TABLET, FILM COATED ORAL
Qty: 0 | Refills: 0 | DISCHARGE

## 2022-10-06 RX ORDER — METFORMIN HYDROCHLORIDE 850 MG/1
1 TABLET ORAL
Qty: 0 | Refills: 0 | DISCHARGE

## 2022-10-06 RX ORDER — FLUPHENAZINE HYDROCHLORIDE 1 MG/1
1 TABLET, FILM COATED ORAL
Qty: 14 | Refills: 0
Start: 2022-10-06 | End: 2022-10-19

## 2022-10-06 RX ORDER — GEMFIBROZIL 600 MG
1 TABLET ORAL
Qty: 0 | Refills: 0 | DISCHARGE

## 2022-10-06 RX ORDER — FLUPHENAZINE HYDROCHLORIDE 1 MG/1
50 TABLET, FILM COATED ORAL ONCE
Refills: 0 | Status: COMPLETED | OUTPATIENT
Start: 2022-10-06 | End: 2022-10-06

## 2022-10-06 RX ORDER — INSULIN GLARGINE 100 [IU]/ML
20 INJECTION, SOLUTION SUBCUTANEOUS
Qty: 0 | Refills: 0 | DISCHARGE

## 2022-10-06 RX ADMIN — METFORMIN HYDROCHLORIDE 1000 MILLIGRAM(S): 850 TABLET ORAL at 08:24

## 2022-10-06 RX ADMIN — Medication 200 MILLIGRAM(S): at 08:25

## 2022-10-06 RX ADMIN — AMLODIPINE BESYLATE 5 MILLIGRAM(S): 2.5 TABLET ORAL at 08:25

## 2022-10-06 RX ADMIN — Medication 600 MILLIGRAM(S): at 08:25

## 2022-10-06 RX ADMIN — FLUPHENAZINE HYDROCHLORIDE 50 MILLIGRAM(S): 1 TABLET, FILM COATED ORAL at 09:50

## 2022-10-06 NOTE — CHART NOTE - NSCHARTNOTESELECT_GEN_ALL_CORE
Event Note
Limited Note/Event Note

## 2022-10-06 NOTE — CHART NOTE - NSCHARTNOTEFT_GEN_A_CORE
Limited Note:  RD Consult received for diagnosis of diabetes    Interval History: The patient suffers from recurrent major depressive illness symptoms in the context of Bipolar I Disorder. She has had recurrently exacerbating symptoms over the past few months after years of doing well and avoiding hospitalization - the reason for the recent mood worsening is not clear. Pt admitted to [facility] (records reviewed) from 7/24-8/5 then referred to [facility] from where she was discharged on 9/16/22, less than seven days prior to this admission. On her return home the patient was reclusive, isolated and ruminating and began to leave home unpredictably, causing her family to contact the police when she did not return home one evening a few days ago. Due to this and other behavior the patient's family urged her to come into the hospital.    **Education at this time not appropriate during patients current admission, would recommend outpatient follow up with diabetes educator/dietitian after discharge  Can c/s again if needed    A1c: 7.5% 9/22    Bc Guerrero RD  #[id number] or via TEAMS
Social Work Admit Note:     Pt is a 52 year old woman, , living with her two daughters and dog, on disability; pmhx HTN, DM2, asthma, HLD; pphx bipolar disorder, multiple psychiatric admissions in past (up to 4-5 in lifetime per pt), recently admitted to [facility] about one month ago? and previous suicide attempt (unclear), no self harming/cutting; social/intermittent alcohol use; no known legal/violence hx; BIBA after family called EMS for wellness check.    On interview, pt is cooperative, tearful, tangential/pressured. Notes that she has not been doing well, specifically reporting that her mother is concerned about her being suicidal. Pt herself denies SI at this time. She also denies AVH or HI. She does endorse significant sleep disturbance recently. Also notes feeling upset about spending significant amount of money in past month. States that things are "not good" at home, but cannot elaborate. She talks about how various family members have been doing and other unrelated topics throughout the interview.     She denies substance use other than occasional alcohol socially.      Sexual History – patient identifies as heterosexual. 	     Family relationships and history – Patient has two children.  She reports good relations with family members.       Leisure Activity Assessment – spending time with her family and children.          Community Supports – No known attendance in any self- help groups or other organizations.      Employment – patient is not employed at this time.      Substance Use Assessment – use of alcohol or other substances denied.        History of suicidality or self- injurious behaviors – self harming/cutting denied       Significant Loses – None identified.       Life Goals – patient verbalized goal of returning to work in the future.        will continue to meet with patient 1:1 and with treatment team daily.  Discharge plan is for continued mental health treatment in outpatient setting.       Please refer to Social Work Psychosocial for additional information.
Social Work Note:     met with patient to discuss treatment plan, medications and discharge plan. During the day patient is observed to be visible on the unit and attending groups. Patient is reported to be doing well on the unit with no issues. Upon discharge patient will be referred to [facility]     Mental Status Exam:    Mood – Normal       Sleep - Fair    Appetite - Good    ADLs - Good    Thought Process – Linear    Observation – [id number]    No barriers to discharge identified at this time.    At this time patient is not psychiatrically stable for discharge.
Social Work Discharge Note:     Patient is ready for discharge today.  She is alert and oriented x3.  Mood is improved.  Anxiety has decreased.  Insight and judgment have improved.  Suicidal / homicidal ideation denied.       Patient will be discharged to her home at [address] [address]     Patient has an appointment at [facility] [address], [address] - Intake appointment with Jeanie blum 10/7/2022 at 9:45am    Patient's mother, Ivon was informed of discharge and has no concerns.        [facility] [phone] provided as an additional resource.        Patient is aware and agreeable to discharge today.
Social Work Note:     met with patient to discuss treatment plan, medications and discharge plan. During the day patient is observed to be visible on the unit and attending groups. Patient is reported to be doing well on the unit with no issues.    Mental Status Exam:    Mood – Normal       Sleep - Fair    Appetite - Good    ADLs - Good    Thought Process – Linear    Observation – [id number]    No barriers to discharge identified at this time.    At this time patient is not psychiatrically stable for discharge.
Social Work Note:     met with patient to discuss treatment plan, medications and discharge plan. During the day patient is observed to be visible on the unit and attending groups. Patient is reported to be doing well on the unit with no issues. Patient reports she is tired and not sleeping well. She would like a family meeting with her mother and daughters prior to discharge.     Mental Status Exam:    Mood – Normal       Sleep - Fair    Appetite - Good    ADLs - Good    Thought Process – Linear    Observation – [id number]    No barriers to discharge identified at this time.    At this time patient is not psychiatrically stable for discharge.
Social Work Note:     met with patient to discuss treatment plan, medications and discharge plan. During the day patient is observed to be visible on the unit and attending groups. Patient is reported to be doing well on the unit with no issues. She asked writer to contact her daughter for personal items.     Mental Status Exam:    Mood – Normal       Sleep - Fair    Appetite - Good    ADLs - Good    Thought Process – Linear    Observation – [id number]    No barriers to discharge identified at this time.    At this time patient is not psychiatrically stable for discharge.
Social Work Note:    Treatment team met with patient during morning rounds discuss treatment plan, medications and discharge plan. During the day patient is observed to be visible on the unit and attending groups. Patient is reported to be doing well on the unit with no issues.     Mental Status Exam:    Mood – Normal       Sleep - Fair    Appetite - Good    ADLs - Good    Thought Process – Linear    Observation – [id number]    No barriers to discharge identified at this time.    At this time patient is not psychiatrically stable for discharge.

## 2022-10-10 DIAGNOSIS — E11.649 TYPE 2 DIABETES MELLITUS WITH HYPOGLYCEMIA WITHOUT COMA: ICD-10-CM

## 2022-10-10 DIAGNOSIS — I10 ESSENTIAL (PRIMARY) HYPERTENSION: ICD-10-CM

## 2022-10-10 DIAGNOSIS — F31.63 BIPOLAR DISORDER, CURRENT EPISODE MIXED, SEVERE, WITHOUT PSYCHOTIC FEATURES: ICD-10-CM

## 2022-10-10 DIAGNOSIS — Z79.899 OTHER LONG TERM (CURRENT) DRUG THERAPY: ICD-10-CM

## 2022-10-10 DIAGNOSIS — E78.00 PURE HYPERCHOLESTEROLEMIA, UNSPECIFIED: ICD-10-CM

## 2022-10-10 DIAGNOSIS — J45.20 MILD INTERMITTENT ASTHMA, UNCOMPLICATED: ICD-10-CM

## 2022-10-10 DIAGNOSIS — Z88.0 ALLERGY STATUS TO PENICILLIN: ICD-10-CM

## 2022-11-07 PROBLEM — Z91.51 PERSONAL HISTORY OF SUICIDAL BEHAVIOR: Chronic | Status: ACTIVE | Noted: 2022-09-22

## 2022-11-07 PROBLEM — G47.33 OBSTRUCTIVE SLEEP APNEA (ADULT) (PEDIATRIC): Chronic | Status: ACTIVE | Noted: 2022-09-22

## 2022-11-07 PROBLEM — Z86.19 PERSONAL HISTORY OF OTHER INFECTIOUS AND PARASITIC DISEASES: Chronic | Status: ACTIVE | Noted: 2022-09-22

## 2022-11-07 PROBLEM — C50.919 MALIGNANT NEOPLASM OF UNSPECIFIED SITE OF UNSPECIFIED FEMALE BREAST: Chronic | Status: ACTIVE | Noted: 2020-12-28

## 2022-11-07 PROBLEM — E11.9 TYPE 2 DIABETES MELLITUS WITHOUT COMPLICATIONS: Chronic | Status: ACTIVE | Noted: 2020-12-28

## 2022-12-02 ENCOUNTER — EMERGENCY (EMERGENCY)
Facility: HOSPITAL | Age: 52
LOS: 0 days | Discharge: HOME | End: 2022-12-02
Attending: STUDENT IN AN ORGANIZED HEALTH CARE EDUCATION/TRAINING PROGRAM | Admitting: STUDENT IN AN ORGANIZED HEALTH CARE EDUCATION/TRAINING PROGRAM

## 2022-12-02 VITALS
OXYGEN SATURATION: 98 % | TEMPERATURE: 97 F | HEART RATE: 69 BPM | RESPIRATION RATE: 18 BRPM | WEIGHT: 175.05 LBS | SYSTOLIC BLOOD PRESSURE: 136 MMHG | HEIGHT: 64 IN | DIASTOLIC BLOOD PRESSURE: 78 MMHG

## 2022-12-02 DIAGNOSIS — F31.9 BIPOLAR DISORDER, UNSPECIFIED: ICD-10-CM

## 2022-12-02 DIAGNOSIS — G47.33 OBSTRUCTIVE SLEEP APNEA (ADULT) (PEDIATRIC): ICD-10-CM

## 2022-12-02 DIAGNOSIS — Z98.890 OTHER SPECIFIED POSTPROCEDURAL STATES: Chronic | ICD-10-CM

## 2022-12-02 DIAGNOSIS — R07.0 PAIN IN THROAT: ICD-10-CM

## 2022-12-02 DIAGNOSIS — E78.5 HYPERLIPIDEMIA, UNSPECIFIED: ICD-10-CM

## 2022-12-02 DIAGNOSIS — E11.9 TYPE 2 DIABETES MELLITUS WITHOUT COMPLICATIONS: ICD-10-CM

## 2022-12-02 DIAGNOSIS — J45.909 UNSPECIFIED ASTHMA, UNCOMPLICATED: ICD-10-CM

## 2022-12-02 DIAGNOSIS — J02.9 ACUTE PHARYNGITIS, UNSPECIFIED: ICD-10-CM

## 2022-12-02 DIAGNOSIS — Z91.048 OTHER NONMEDICINAL SUBSTANCE ALLERGY STATUS: ICD-10-CM

## 2022-12-02 DIAGNOSIS — I10 ESSENTIAL (PRIMARY) HYPERTENSION: ICD-10-CM

## 2022-12-02 DIAGNOSIS — Z88.0 ALLERGY STATUS TO PENICILLIN: ICD-10-CM

## 2022-12-02 PROCEDURE — 99283 EMERGENCY DEPT VISIT LOW MDM: CPT | Mod: FS

## 2022-12-02 NOTE — ED PROVIDER NOTE - PROGRESS NOTE DETAILS
I was directly involved in the care of this patient. PA Fellow Wagner note/plan reviewed and agreed.

## 2022-12-02 NOTE — ED PROVIDER NOTE - OBJECTIVE STATEMENT
52-year-old female past medical history of hypertension, hyperlipidemia, diabetes, asthma, obstructive sleep apnea, bipolar disorder presents to the ED complaining of rib pain x2 days.  Patient states that pain gradually getting worse over the last 2 days, worse with p.o. intake, no palliating factors.  Admits to sick contacts.  Has not taken anything for pain.  Denies fever, chills, cough, voice change, nausea, vomiting, diarrhea, hemoptysis. 52-year-old female past medical history of hypertension, hyperlipidemia, diabetes, asthma, obstructive sleep apnea, bipolar disorder presents to the ED complaining of throat pain x2 days.  Patient states that pain gradually getting worse over the last 2 days, worse with p.o. intake, no palliating factors.  Admits to sick contacts.  Has not taken anything for pain.  Denies fever, chills, cough, voice change, nausea, vomiting, diarrhea, hemoptysis.

## 2022-12-02 NOTE — ED ADULT NURSE NOTE - NSICDXPASTMEDICALHX_GEN_ALL_CORE_FT
PAST MEDICAL HISTORY:  Asthma     Asthma     Bipolar disorder     Breast CA Right: s/p lumpectomy x 2, + Radiation Rx    DM (diabetes mellitus) Type 2    H/O suicide attempt 1986    High cholesterol     History of herpes zoster of eye     HTN (hypertension)     ABI (obstructive sleep apnea) doesn't use her CPAP at home

## 2022-12-02 NOTE — ED PROVIDER NOTE - ATTENDING APP SHARED VISIT CONTRIBUTION OF CARE
52-year-old female with a past medical history significant for hypertension hyperlipidemia diabetes asthma who presents with sore throat.  Patient states that she has been having sore throat for the last 2 days.  Patient is concerned because she recently had sick contacts.  Patient denies any other medical complaints.    VITAL SIGNS: I have reviewed nursing notes and confirm.  CONSTITUTIONAL: non-toxic, well appearing  SKIN: no rash, no petechiae.  EYES:  EOMI, pink conjunctiva, anicteric  ENT: tongue midline, no exudates, MMM, uvula midline   NECK: Supple; no meningismus, no JVD  CARD: RRR, no murmurs, equal radial pulses bilaterally 2+  RESP: CTAB, no respiratory distress  ABD: Soft, non-tender, non-distended, no peritoneal signs, no HSM, no CVA tenderness  EXT: Normal ROM x4. No edema. No calves tenderness  NEURO: Alert, oriented x3.     a/p  52 yr old f that presents with sore throat probably 2/2 viral illness. will obtain viral panel. will dc pt with pcp follow up and strict return precautions.

## 2022-12-02 NOTE — ED PROVIDER NOTE - PATIENT PORTAL LINK FT
You can access the FollowMyHealth Patient Portal offered by [facility] by registering at the following website: [email or web address]. By joining [facility]’s FollowMyHealth portal, you will also be able to view your health information using other applications (apps) compatible with our system.

## 2022-12-02 NOTE — ED PROVIDER NOTE - NS ED ROS FT
Review of Systems:  	•	CONSTITUTIONAL - no fever, no diaphoresis, no chills  	•	SKIN - no rash  	•	HEMATOLOGIC - no bleeding, no bruising  	•	EYES - no eye pain, no blurry vision  	•	ENT - no congestion  	•	RESPIRATORY - no shortness of breath, no cough  	•	CARDIAC - no chest pain, no palpitations

## 2022-12-02 NOTE — ED PROVIDER NOTE - NS ED ATTENDING STATEMENT MOD
This was a shared visit with the MARCELO. I reviewed and verified the documentation and independently performed the documented:

## 2022-12-02 NOTE — ED ADULT NURSE NOTE - ALCOHOL PRE SCREEN (AUDIT - C)
Patient admitted from [facility]. Patient states prior to rehab he lives in an apartment building with no steps to enter/elevator building and has a maid. Statement Selected

## 2022-12-02 NOTE — ED PROVIDER NOTE - CLINICAL SUMMARY MEDICAL DECISION MAKING FREE TEXT BOX
52 yr old f that presents with sore throat probably 2/2 viral illness. will obtain viral panel. will dc pt with pcp follow up and strict return precautions.     I have discussed the discharge plan with the patient. The patient agrees with the plan, as discussed.  The patient understands Emergency Department diagnosis is a preliminary diagnosis often based on limited information and that the patient must adhere to the follow-up plan as discussed.  The patient understands that if the symptoms worsen or if prescribed medications do not have the desired/planned effect that the patient may return to the Emergency Department at any time for further evaluation and treatment.

## 2022-12-28 ENCOUNTER — RESULT REVIEW (OUTPATIENT)
Age: 52
End: 2022-12-28

## 2022-12-28 ENCOUNTER — OUTPATIENT (OUTPATIENT)
Dept: OUTPATIENT SERVICES | Facility: HOSPITAL | Age: 52
LOS: 1 days | Discharge: HOME | End: 2022-12-28

## 2022-12-28 DIAGNOSIS — Z98.890 OTHER SPECIFIED POSTPROCEDURAL STATES: Chronic | ICD-10-CM

## 2022-12-28 DIAGNOSIS — R92.8 OTHER ABNORMAL AND INCONCLUSIVE FINDINGS ON DIAGNOSTIC IMAGING OF BREAST: ICD-10-CM

## 2022-12-28 PROCEDURE — 76641 ULTRASOUND BREAST COMPLETE: CPT | Mod: 26,50

## 2022-12-28 PROCEDURE — 77066 DX MAMMO INCL CAD BI: CPT | Mod: 26

## 2022-12-28 PROCEDURE — G0279: CPT | Mod: 26

## 2023-01-17 ENCOUNTER — APPOINTMENT (OUTPATIENT)
Dept: ORTHOPEDIC SURGERY | Facility: CLINIC | Age: 53
End: 2023-01-17
Payer: MEDICARE

## 2023-01-17 VITALS — HEIGHT: 64 IN | BODY MASS INDEX: 29.88 KG/M2 | WEIGHT: 175 LBS

## 2023-01-17 PROCEDURE — 73030 X-RAY EXAM OF SHOULDER: CPT | Mod: RT

## 2023-01-17 PROCEDURE — 99203 OFFICE O/P NEW LOW 30 MIN: CPT

## 2023-01-17 NOTE — HISTORY OF PRESENT ILLNESS
[de-identified] : 52-year-old male is here today for evaluation of her left shoulder.  Patient states she fell about 10 days ago and injured her left shoulder.  She states since then she been having pain and difficulty lifting her left arm.  States pain radiates into her upper arm.  She denies any pain in the elbow wrist or hand.  She denies any numbness or tingling down the arm.  She denies any previous injuries or surgeries on this arm.

## 2023-01-17 NOTE — DISCUSSION/SUMMARY
[de-identified] : At this time I would like to get an MRI of her left shoulder to rule out a rotator cuff tear.  I have given her prescription for this.  I also gave her prescription to start doing physical therapy.  Warm compresses, anti-inflammatories.  I sent a prescription to the pharmacy.  She can call me after the MRI to go over the results.  We will schedule her follow-up for repeat evaluation. Patient will call me if any other problems or concerns.  Patient verbalized understanding and agreed with the plan, all questions were answered in the office today.\par

## 2023-01-17 NOTE — IMAGING
[de-identified] : On examination of her left shoulder she has no erythema, no swelling, no ecchymosis.  She has some tenderness over the anterior glenohumeral joint.  She is nontender over the AC joint or the clavicle.  She has limited range of motion of the shoulder.  Pain with passive range of motion.  Negative drop arm, pain with apprehension and Berrios.  She has 4-5 strength, sensation is intact throughout, 2+ radial pulse.\par \par X-rays taken in the office today of the left shoulder show no acute fractures, dislocations, or other bony abnormalities.

## 2023-01-31 ENCOUNTER — EMERGENCY (EMERGENCY)
Facility: HOSPITAL | Age: 53
LOS: 0 days | Discharge: HOME | End: 2023-01-31
Attending: EMERGENCY MEDICINE | Admitting: EMERGENCY MEDICINE
Payer: MEDICARE

## 2023-01-31 VITALS
OXYGEN SATURATION: 99 % | TEMPERATURE: 96 F | DIASTOLIC BLOOD PRESSURE: 71 MMHG | HEIGHT: 64 IN | HEART RATE: 91 BPM | RESPIRATION RATE: 18 BRPM | WEIGHT: 175.05 LBS | SYSTOLIC BLOOD PRESSURE: 146 MMHG

## 2023-01-31 VITALS
WEIGHT: 175.05 LBS | DIASTOLIC BLOOD PRESSURE: 90 MMHG | HEART RATE: 88 BPM | OXYGEN SATURATION: 97 % | TEMPERATURE: 97 F | SYSTOLIC BLOOD PRESSURE: 147 MMHG | HEIGHT: 64 IN | RESPIRATION RATE: 20 BRPM

## 2023-01-31 DIAGNOSIS — J45.909 UNSPECIFIED ASTHMA, UNCOMPLICATED: ICD-10-CM

## 2023-01-31 DIAGNOSIS — Z87.2 PERSONAL HISTORY OF DISEASES OF THE SKIN AND SUBCUTANEOUS TISSUE: ICD-10-CM

## 2023-01-31 DIAGNOSIS — G47.33 OBSTRUCTIVE SLEEP APNEA (ADULT) (PEDIATRIC): ICD-10-CM
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
## 2023-01-31 DIAGNOSIS — Z98.890 OTHER SPECIFIED POSTPROCEDURAL STATES: Chronic | ICD-10-CM

## 2023-01-31 DIAGNOSIS — Z91.52 PERSONAL HISTORY OF NONSUICIDAL SELF-HARM: ICD-10-CM
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
## 2023-01-31 DIAGNOSIS — Z88.0 ALLERGY STATUS TO PENICILLIN: ICD-10-CM

## 2023-01-31 DIAGNOSIS — I10 ESSENTIAL (PRIMARY) HYPERTENSION: ICD-10-CM

## 2023-01-31 DIAGNOSIS — Z88.8 ALLERGY STATUS TO OTHER DRUGS, MEDICAMENTS AND BIOLOGICAL SUBSTANCES STATUS: ICD-10-CM

## 2023-01-31 DIAGNOSIS — Z90.11 ACQUIRED ABSENCE OF RIGHT BREAST AND NIPPLE: ICD-10-CM

## 2023-01-31 DIAGNOSIS — Z91.51 PERSONAL HISTORY OF SUICIDAL BEHAVIOR: ICD-10-CM

## 2023-01-31 DIAGNOSIS — Z79.84 LONG TERM (CURRENT) USE OF ORAL HYPOGLYCEMIC DRUGS: ICD-10-CM

## 2023-01-31 DIAGNOSIS — F31.9 BIPOLAR DISORDER, UNSPECIFIED: ICD-10-CM

## 2023-01-31 DIAGNOSIS — Z85.3 PERSONAL HISTORY OF MALIGNANT NEOPLASM OF BREAST: ICD-10-CM

## 2023-01-31 DIAGNOSIS — M25.512 PAIN IN LEFT SHOULDER: ICD-10-CM

## 2023-01-31 DIAGNOSIS — Z91.09 OTHER ALLERGY STATUS, OTHER THAN TO DRUGS AND BIOLOGICAL SUBSTANCES: ICD-10-CM

## 2023-01-31 DIAGNOSIS — Z98.890 OTHER SPECIFIED POSTPROCEDURAL STATES: ICD-10-CM

## 2023-01-31 DIAGNOSIS — E11.9 TYPE 2 DIABETES MELLITUS WITHOUT COMPLICATIONS: ICD-10-CM

## 2023-01-31 DIAGNOSIS — Z92.3 PERSONAL HISTORY OF IRRADIATION: ICD-10-CM

## 2023-01-31 DIAGNOSIS — E78.00 PURE HYPERCHOLESTEROLEMIA, UNSPECIFIED: ICD-10-CM

## 2023-01-31 DIAGNOSIS — Z88.8 ALLERGY STATUS TO OTHER DRUGS, MEDICAMENTS AND BIOLOGICAL SUBSTANCES: ICD-10-CM

## 2023-01-31 DIAGNOSIS — S46.012A STRAIN OF MUSCLE(S) AND TENDON(S) OF THE ROTATOR CUFF OF LEFT SHOULDER, INITIAL ENCOUNTER: ICD-10-CM

## 2023-01-31 PROCEDURE — 99284 EMERGENCY DEPT VISIT MOD MDM: CPT | Mod: FS

## 2023-01-31 RX ORDER — METHOCARBAMOL 500 MG/1
1 TABLET, FILM COATED ORAL
Qty: 15 | Refills: 0
Start: 2023-01-31 | End: 2023-02-04

## 2023-01-31 NOTE — ED ADULT NURSE NOTE - IN THE PAST 12 MONTHS HAVE YOU USED DRUGS OTHER THAN THOSE REQUIRED FOR MEDICAL REASON?
69 yo female with CKD 3, T2DM, KAMRAN on CPAP, CABG, morbid obesity, recent CABG (8/12/19) discharged from hospital 8/21/2019 (course complicated by Afibb converted with amiodarone) who presents with 1 week of fatigue, decreased appetite, multiple falls. Patient reports onset of lethargy, malaise, decreased PO intake beginning over the weekend and multiple falls starting Monday. She reports hitting her head but no loss of consciousness. She also reports nonbloody/nonbilious vomiting assosciated with taking oxycodone. She denies fevers/chills, diaphoresis, chest pain, altered mental status, dysuria, diarrhea, flank pain, dyspnea, visual disturbances.     On presentation she was febrile, tachycardic, tachypneic, and hypotensive per ED documentation. She recieved 2L NS bolus with resolution in her hemodynamic derangement. She was started on broad spectrum antibiotics.   No

## 2023-01-31 NOTE — ED PROVIDER NOTE - NS ED ROS FT
Review of Systems    Constitutional: (-) fever or chills  respiratory: (-) cough (-) shortness of breath  Cardiovascular: (-) syncope, palpitations or chest pain  GI: (-) no abdominal pain, vomiting or diarrhea  Integumentary: (-) rash or painful lymph nodes  msk: no joint pain or painful ROM  skin: no laceration , swelling or bruising   Neurological: (-) headache or head injury

## 2023-01-31 NOTE — ED ADULT NURSE NOTE - NSIMPLEMENTINTERV_GEN_ALL_ED
Implemented All Universal Safety Interventions:  [place] to call system. Call bell, personal items and telephone within reach. Instruct patient to call for assistance. Room bathroom lighting operational. Non-slip footwear when patient is off stretcher. Physically safe environment: no spills, clutter or unnecessary equipment. Stretcher in lowest position, wheels locked, appropriate side rails in place.

## 2023-01-31 NOTE — ED ADULT NURSE NOTE - NSIMPLEMENTINTERV_GEN_ALL_ED
Implemented All Universal Safety Interventions:  [place] to call system. Call bell, personal items and telephone within reach. Instruct patient to call for assistance. Room bathroom lighting operational. Non-slip footwear when patient is off stretcher. Physically safe environment: no spills, clutter or unnecessary equipment. Stretcher in lowest position, wheels locked, appropriate side rails in place.

## 2023-01-31 NOTE — ED PROVIDER NOTE - CARE PROVIDER_API CALL
Tim Segovia (MD)  Orthopaedic Surgery  [address] [place] [address]  Phone: [phone]  Fax: [phone]  Follow Up Time:

## 2023-01-31 NOTE — ED ADULT NURSE NOTE - NSFALLRSKPASTHIST_ED_ALL_ED
Patient Summary Document

                             Created on: 2019



BONNIE WU

External Reference #: [id number]

: 1954

Sex: Female



Demographics







                          Address                   [address] [place] [address]

 

                          Home Phone                [phone]

 

                          Preferred Language        Unknown

 

                          Marital Status            Unknown

 

                          Buddhism Affiliation     Unknown

 

                          Race                      Unknown

 

                                        Additional Race(s)  

 

                          Ethnic Group              Unknown





Author







                          [facility]                    [facility]

 

                          [facility]

 

                          Address                   Unknown

 

                          Phone                     Unavailable







Care Team Providers







                    Care Team Member Name    Role                Phone

 

                    GLENYS SINGLETON      Unavailable         Unavailable







Problems

This patient has no known problems.



Allergies, Adverse Reactions, Alerts

This patient has no known allergies or adverse reactions.



Medications

This patient has no known medications.



Results







           Test Description    Test Time    Test Comments    Text Results    Atomic Results    Result

 Comments

 

                CHEST 2 VIEWS    2019 13:18:00                                                             

                                             [place] [address]  
   Patient Name: BONNIE WU                                   MR #: 
[id number]                     : 1954                                  
Age/Sex: 65/F  Acct #: [id number]                              Req #: [id number]
[id number]  [facility] Physician:                                                      
Ordered by: GLENYS SINGLETON MD                            Report #: [id number]     
  Location: OR                                      Room/Bed:                   
 
___________________________________________________________________________________________________
   Procedure: [id number] DX/CHEST 2 VIEWS  Exam Date: 19                   
        Exam Time: 1220                                              REPORT 
STATUS: Signed    EXAMINATION:  CHEST 2 VIEWS          INDICATION: Pre-operative
     COMPARISON: None           FINDINGS:      LINES/TUBES:None      LUNGS:The 
lungs are well-inflated. No focal consolidation or pulmonary edema.      
PLEURA:No pleural effusion or pneumothorax.      MEDIASTINUM:The 
cardiomediastinal silhouette appears normal in size and shape.   Atherosclerotic
calcifications of the thoracic aorta.      BONES/SOFT TISSUES:No acute osseous 
injury.      ABDOMEN:No free air under the diaphragm. Status post 
cholecystectomy.         IMPRESSION:    No focal pneumonia or pulmonary edema.  
   Signed by: Love Díaz MD on 2019 1:19 PM        Dictated By: LOVE DÍAZ MD  Electronically Signed By: LOVE DÍAZ MD on 19  Transcribed By:
HAVEN on 19       COPY TO:   GLENYS SINGLETON MD              

 

                SCR MAMM BILATERAL RAN CAD DIGITAL    2019 14:04:26                     - SCR MAMM BILATERAL

 RAN CAD DIGITALBILATERAL DIGITAL SCREENING MAMMOGRAM 3D/2D WITH CAD: 
2019CLINICAL: Asymptomatic.  Digital breast tomosynthesis was performed in 
addition to routine CC and MLO views.  Current mammographic images were 
evaluated by either a [employer]u or a [employer] CAD (computer aided 
detection system).  Comparison is made to exams dated  2017 mammogram, 2016 mammogram, and 2015 mammogram - [facility]-.  There are
scattered fibroglandular tissues in both breasts.  There are benign 
calcifications in both breasts.  No suspicious mass, architectural distortion, 
malignant type calcification, or lymph node abnormality detected.  Breast 
architecture is stable compared to prior exams.IMPRESSION: BENIGNThere is no 
mammographic evidence of malignancy. Resume annual screening mammography in one 
year.  Michelle gerber/penrad:2019 14:04:26  Imaging 
Technologist: Salena PERAZA, [facility]-FWletter sent: 
BIRADS 1-2 Normal  Mammogram BI-RADS: 2 Benign no

## 2023-01-31 NOTE — ED PROVIDER NOTE - OBJECTIVE STATEMENT
Pt is a 52 y.o female presenting for left shoulder pain. She fell on 1/8/2023 which was the inciting injury. She followed up with Dr Segovia who had xrays taken with no significant findings or fractures. She had an MRI done a few days ago and has not yet received the results. She presents today because she was having difficulty sleeping due to her left shoulder pain. She denies any chest pain or other complaints at this time.

## 2023-01-31 NOTE — ED PROVIDER NOTE - OBJECTIVE STATEMENT
53 y/o female with hx of bipolar disorder, recent left shoulder pain s/p fall. patient eval by ortho and had mri. patient dx with tear rotator cuff. patient without any relief with ibuprofen. patient unable to sleep due to pain. no tingling or weakness of extremities. no neck pain . patient c/o pain worse with movement.

## 2023-01-31 NOTE — ED PROVIDER NOTE - NSFOLLOWUPINSTRUCTIONS_ED_ALL_ED_FT
Follow up with your doctor for you MRI results.    Shoulder Pain    WHAT YOU NEED TO KNOW:    Shoulder pain is a common problem that can affect your daily activities. Pain can be caused by a problem within your shoulder, such as soreness of a tendon or bursa. A tendon is a cord of tough tissue that connects your muscles to your bones. The bursa is a fluid-filled sac that acts as a cushion between a bone and a tendon. Shoulder pain may also be caused by pain that spreads to your shoulder from another part of your body.    Return to the emergency department if:     You have severe pain.    You cannot move your arm or shoulder.    You have numbness or tingling in your shoulder or arm.    Contact your healthcare provider if:     Your pain gets worse or does not go away with treatment.    You have trouble moving your arm or shoulder.    You have questions or concerns about your condition or care.    Medicines: You may need any of the following:     Acetaminophen decreases pain and fever. It is available without a doctor's order. Ask how much to take and how often to take it. Follow directions. Read the labels of all other medicines you are using to see if they also contain acetaminophen, or ask your doctor or pharmacist. Acetaminophen can cause liver damage if not taken correctly. Do not use more than 4 grams (4,000 milligrams) total of acetaminophen in one day.     NSAIDs, such as ibuprofen, help decrease swelling, pain, and fever. This medicine is available with or without a doctor's order. NSAIDs can cause stomach bleeding or kidney problems in certain people. If you take blood thinner medicine, always ask your healthcare provider if NSAIDs are safe for you. Always read the medicine label and follow directions.    Take your medicine as directed. Contact your healthcare provider if you think your medicine is not helping or if you have side effects. Tell him of her if you are allergic to any medicine. Keep a list of the medicines, vitamins, and herbs you take. Include the amounts, and when and why you take them. Bring the list or the pill bottles to follow-up visits. Carry your medicine list with you in case of an emergency.    Manage your symptoms:     Apply ice on your shoulder for 20 to 30 minutes every 2 hours or as directed. Use an ice pack, or put crushed ice in a plastic bag. Cover it with a towel before you apply it to your shoulder. Ice helps prevent tissue damage and decreases swelling and pain.    Apply heat if ice does not help your symptoms. Apply heat on your shoulder for 20 to 30 minutes every 2 hours for as many days as directed. Heat helps decrease pain and muscle spasms.    Limit activities as directed. Try to avoid repeated overhead movements.    Go to physical or occupational therapy as directed. A physical therapist teaches you exercises to help improve movement and strength, and to decrease pain. An occupational therapist teaches you skills to help with your daily activities.     Prevent shoulder pain:     Maintain a good range of motion in your shoulder. Ask your healthcare provider which exercises you should do on a regular basis after you have healed.     Stretch and strengthen your shoulder. Use proper technique during exercises and sports.    Follow up with your healthcare provider or orthopedist as directed: Write down your questions so you remember to ask them during your visits.

## 2023-01-31 NOTE — ED PROVIDER NOTE - CLINICAL SUMMARY MEDICAL DECISION MAKING FREE TEXT BOX
52-year-old female presents to the ED for left shoulder pain status post fall.  Patient was evaluated by Ortho and had an MRI outpatient and brought the results to the ED.  MRI results consistent with a rotator cuff tear.  Due to persistent pain and with difficulty sleeping at night patient came in for evaluation.  Physical exam noted to have decreased range of motion of the left upper extremity and noted to have shoulder pain on range of motion of the left shoulder.  Good distal pulses and 5 out of 5 strength of the elbow and wrist.  For pain management patient's wrist was placed in a sling and advised supportive care with Tylenol and muscle relaxant.  Discharged home with orthopedic follow-up.  Patient understood plan.

## 2023-01-31 NOTE — ED PROVIDER NOTE - PATIENT PORTAL LINK FT
You can access the FollowMyHealth Patient Portal offered by [facility] by registering at the following website: [email or web address]. By joining [facility]’s FollowMyHealth portal, you will also be able to view your health information using other applications (apps) compatible with our system.

## 2023-01-31 NOTE — ED PROVIDER NOTE - CLINICAL SUMMARY MEDICAL DECISION MAKING FREE TEXT BOX
52-year-old female presents with persistent left shoulder pain after fall 2 weeks ago with shoulder pain patient was followed up with Ortho Dr. Carpenter and MRI results.  Patient still in pain despite taking Naprosyn so came to ED.  No new features to pain no new trauma nvi analgesia given and continued outpatient Ortho follow-up

## 2023-01-31 NOTE — ED PROVIDER NOTE - PHYSICAL EXAMINATION
As Follows:  CONST: Well appearing in NAD  MS: Decreased abduction to the left shoulder. No tenderness. Normal ROM in all other extremities.   SKIN: No ecchymosis or injury to overlying skin of LUE. Warm, dry, no acute rashes. Good turgor  NEUROVASC: Strength and sensation intact. Radial pulse 2+. Cap refill <2sec.

## 2023-01-31 NOTE — ED PROVIDER NOTE - PHYSICAL EXAMINATION
Vital Signs: I have reviewed the initial vital signs.  Constitutional: well-nourished, no acute distress  Musculoskeletal: left shoulder- no ac tenderness, axillary nerve in tact, good ROM of extremity,  no bony tenderness, no deformity, good peripheral pulses  Integumentary: (-) laceration, (-) ecchymosis (-) swelling   Neurologic: awake, alert, extremities’ motor and sensory functions grossly intact, no focal deficits  heme: (-) no adenopathy (-)lymphangitis    ;

## 2023-02-02 ENCOUNTER — EMERGENCY (EMERGENCY)
Facility: HOSPITAL | Age: 53
LOS: 0 days | Discharge: HOME | End: 2023-02-03
Attending: EMERGENCY MEDICINE | Admitting: EMERGENCY MEDICINE
Payer: MEDICARE

## 2023-02-02 VITALS
HEART RATE: 93 BPM | HEIGHT: 64 IN | SYSTOLIC BLOOD PRESSURE: 196 MMHG | OXYGEN SATURATION: 98 % | DIASTOLIC BLOOD PRESSURE: 85 MMHG | RESPIRATION RATE: 18 BRPM | TEMPERATURE: 96 F

## 2023-02-02 DIAGNOSIS — Z98.890 OTHER SPECIFIED POSTPROCEDURAL STATES: Chronic | ICD-10-CM

## 2023-02-02 DIAGNOSIS — G47.33 OBSTRUCTIVE SLEEP APNEA (ADULT) (PEDIATRIC): ICD-10-CM

## 2023-02-02 DIAGNOSIS — T59.811A TOXIC EFFECT OF SMOKE, ACCIDENTAL (UNINTENTIONAL), INITIAL ENCOUNTER: ICD-10-CM

## 2023-02-02 DIAGNOSIS — R07.0 PAIN IN THROAT: ICD-10-CM

## 2023-02-02 DIAGNOSIS — E78.00 PURE HYPERCHOLESTEROLEMIA, UNSPECIFIED: ICD-10-CM
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
## 2023-02-02 DIAGNOSIS — Z79.84 LONG TERM (CURRENT) USE OF ORAL HYPOGLYCEMIC DRUGS: ICD-10-CM

## 2023-02-02 DIAGNOSIS — Z87.891 PERSONAL HISTORY OF NICOTINE DEPENDENCE: ICD-10-CM
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
## 2023-02-02 DIAGNOSIS — Z99.89 DEPENDENCE ON OTHER ENABLING MACHINES AND DEVICES: ICD-10-CM

## 2023-02-02 DIAGNOSIS — Z88.0 ALLERGY STATUS TO PENICILLIN: ICD-10-CM

## 2023-02-02 DIAGNOSIS — J45.909 UNSPECIFIED ASTHMA, UNCOMPLICATED: ICD-10-CM

## 2023-02-02 DIAGNOSIS — F31.9 BIPOLAR DISORDER, UNSPECIFIED: ICD-10-CM

## 2023-02-02 DIAGNOSIS — Z88.8 ALLERGY STATUS TO OTHER DRUGS, MEDICAMENTS AND BIOLOGICAL SUBSTANCES: ICD-10-CM

## 2023-02-02 DIAGNOSIS — E11.9 TYPE 2 DIABETES MELLITUS WITHOUT COMPLICATIONS: ICD-10-CM

## 2023-02-02 DIAGNOSIS — Z86.19 PERSONAL HISTORY OF OTHER INFECTIOUS AND PARASITIC DISEASES: ICD-10-CM

## 2023-02-02 DIAGNOSIS — Z85.3 PERSONAL HISTORY OF MALIGNANT NEOPLASM OF BREAST: ICD-10-CM

## 2023-02-02 PROCEDURE — 99283 EMERGENCY DEPT VISIT LOW MDM: CPT

## 2023-02-02 NOTE — ED PROVIDER NOTE - PATIENT PORTAL LINK FT
You can access the FollowMyHealth Patient Portal offered by [facility] by registering at the following website: [email or web address]. By joining [facility]’s FollowMyHealth portal, you will also be able to view your health information using other applications (apps) compatible with our system.
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
## 2023-02-07 ENCOUNTER — APPOINTMENT (OUTPATIENT)
Dept: ORTHOPEDIC SURGERY | Facility: CLINIC | Age: 53
End: 2023-02-07
Payer: MEDICARE

## 2023-02-07 DIAGNOSIS — S49.92XA UNSPECIFIED INJURY OF LEFT SHOULDER AND UPPER ARM, INITIAL ENCOUNTER: ICD-10-CM

## 2023-02-07 PROCEDURE — 99213 OFFICE O/P EST LOW 20 MIN: CPT

## 2023-02-07 NOTE — HISTORY OF PRESENT ILLNESS
[de-identified] : Patient is here for evaluation of left shoulder pain\par Affecting quality of life\par Has pain and weakness with loss of rom\par Wakes up at night due to pain\par \par had a traumatic fall\par \par NAD\par Left shoulder:\par TTP ant GH joint, bicipital groove\par FF 0-140 (passive 175)\par ER 40\par IR L5\par Pain with terminal rom\par Weakness to abduction and ER\par Pos Impingement\par Pos Carlos\par Pos Cross Arm Adduction\par Negative instability\par Positive scapula dyskinesia\par \par XRay Left shoulder negative for fracture, dislocation, arthritis\par \par mri left shoulder high grade pRCT and slap tear\par \par Plan\par went over findings\par explained the mri\par explained tx options\par needs pt\par explained possible need for surgery\par will fu in 4-6 weeks\par \par

## 2023-02-09 ENCOUNTER — APPOINTMENT (OUTPATIENT)
Dept: HEMATOLOGY ONCOLOGY | Facility: CLINIC | Age: 53
End: 2023-02-09
Payer: MEDICARE

## 2023-02-09 ENCOUNTER — APPOINTMENT (OUTPATIENT)
Age: 53
End: 2023-02-09
Payer: MEDICARE

## 2023-02-09 ENCOUNTER — OUTPATIENT (OUTPATIENT)
Dept: OUTPATIENT SERVICES | Facility: HOSPITAL | Age: 53
LOS: 1 days | End: 2023-02-09
Payer: MEDICARE

## 2023-02-09 ENCOUNTER — APPOINTMENT (OUTPATIENT)
Dept: HEMATOLOGY ONCOLOGY | Facility: CLINIC | Age: 53
End: 2023-02-09

## 2023-02-09 VITALS
WEIGHT: 190 LBS | HEIGHT: 64 IN | SYSTOLIC BLOOD PRESSURE: 125 MMHG | TEMPERATURE: 98.6 F | BODY MASS INDEX: 32.44 KG/M2 | RESPIRATION RATE: 20 BRPM | DIASTOLIC BLOOD PRESSURE: 80 MMHG | HEART RATE: 82 BPM

## 2023-02-09 DIAGNOSIS — Z98.890 OTHER SPECIFIED POSTPROCEDURAL STATES: Chronic | ICD-10-CM

## 2023-02-09 DIAGNOSIS — C50.411 MALIGNANT NEOPLASM OF UPPER-OUTER QUADRANT OF RIGHT FEMALE BREAST: ICD-10-CM

## 2023-02-09 DIAGNOSIS — N93.9 ABNORMAL UTERINE AND VAGINAL BLEEDING, UNSPECIFIED: ICD-10-CM

## 2023-02-09 PROCEDURE — 99214 OFFICE O/P EST MOD 30 MIN: CPT

## 2023-02-09 PROCEDURE — 83002 ASSAY OF GONADOTROPIN (LH): CPT

## 2023-02-09 PROCEDURE — 83001 ASSAY OF GONADOTROPIN (FSH): CPT

## 2023-02-09 PROCEDURE — 36415 COLL VENOUS BLD VENIPUNCTURE: CPT

## 2023-02-09 PROCEDURE — 82670 ASSAY OF TOTAL ESTRADIOL: CPT

## 2023-02-10 DIAGNOSIS — C50.411 MALIGNANT NEOPLASM OF UPPER-OUTER QUADRANT OF RIGHT FEMALE BREAST: ICD-10-CM

## 2023-02-14 PROBLEM — N93.9 VAGINAL BLEEDING: Status: ACTIVE | Noted: 2023-02-14
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
## 2023-02-14 NOTE — PHYSICAL EXAM
[Restricted in physically strenuous activity but ambulatory and able to carry out work of a light or sedentary nature] : Status 1- Restricted in physically strenuous activity but ambulatory and able to carry out work of a light or sedentary nature, e.g., light house work, office work [Normal] : grossly intact [de-identified] : Rt lumpectomy scar is healing well. No palpable masses in b/l breast and no axillary LNs

## 2023-02-14 NOTE — CONSULT LETTER
[Dear  ___] : Dear  [unfilled], [Courtesy Letter:] : I had the pleasure of seeing your patient, [unfilled], in my office today. [Please see my note below.] : Please see my note below. [Sincerely,] : Sincerely, [FreeTextEntry3] : Angel Morgan MD [DrLaila  ___] : Dr. LEWIS

## 2023-02-14 NOTE — OB HISTORY
[Regular Cycle Intervals] : periods have been irregular [Menarche Age: ____] : age at menarche was [unfilled] [___] : Full Term: [unfilled]

## 2023-02-14 NOTE — ASSESSMENT
[FreeTextEntry1] : 52 year old perimenopausal female has Stage IA ([id number]) classical type lobular carcinoma of the right breast, G2, ER/IN positive, Her-2 negative, s/p right breast lumpectomy and SLNB  with negative margins. \par \par H/o iron deficiency anemia - off Iron tabs for 2 months now\par \par Assessment and Plan:\par -- Anastrozole has been held due to an episode of vaginal bleeding in 8/2022 which did not occur again. Will order repeat estradiol, FSH and LH levels. She is advised to f/u with her GYN. \par -- We discussed switching to Tamoxifen. Given her recent flare up of psychiatric issues, it was concerning to put her on tamoxifen.\par -- Breast exam today revealed stable scar tissue at lumpectomy site of the right breast.  B/L dx mammo and US in 12/22 did not show suspicious finding. She will resume annual screening mammo. \par -- Osteopenia. Continue calcium and vitamin D supplement, regular exercise. \par -- H/o iron deficiency anemia. Blood work showed normal serum Fe, Ferritin and % saturation.\par -- Followup with Breast surgery and Dr. Posada as scheduled. \par -- She will come back for followup visit in 3 months. \par \par \par Addendum:\par Repeat blood work reviewed. Estradiol is 116 pg/ml, FSH 20.7 and LH 6. Her hormone levels are not consistent with menopause. Will discuss if she can take Tamoxifen.

## 2023-02-23 ENCOUNTER — APPOINTMENT (OUTPATIENT)
Dept: ORTHOPEDIC SURGERY | Facility: CLINIC | Age: 53
End: 2023-02-23

## 2023-03-07 LAB
ESTRADIOL SERPL-MCNC: 116 PG/ML
FSH SERPL-MCNC: 26.6 IU/L
LH SERPL-ACNC: 12.5 IU/L

## 2023-03-21 ENCOUNTER — APPOINTMENT (OUTPATIENT)
Dept: ORTHOPEDIC SURGERY | Facility: CLINIC | Age: 53
End: 2023-03-21

## 2023-03-22 ENCOUNTER — EMERGENCY (EMERGENCY)
Facility: HOSPITAL | Age: 53
LOS: 0 days | Discharge: HOME | End: 2023-03-22
Attending: EMERGENCY MEDICINE
Payer: MEDICARE

## 2023-03-22 VITALS — SYSTOLIC BLOOD PRESSURE: 137 MMHG | DIASTOLIC BLOOD PRESSURE: 90 MMHG | HEART RATE: 81 BPM

## 2023-03-22 VITALS
SYSTOLIC BLOOD PRESSURE: 167 MMHG | OXYGEN SATURATION: 98 % | HEIGHT: 64 IN | HEART RATE: 87 BPM | RESPIRATION RATE: 18 BRPM | DIASTOLIC BLOOD PRESSURE: 120 MMHG | TEMPERATURE: 96 F

## 2023-03-22 DIAGNOSIS — Z88.8 ALLERGY STATUS TO OTHER DRUGS, MEDICAMENTS AND BIOLOGICAL SUBSTANCES STATUS: ICD-10-CM

## 2023-03-22 DIAGNOSIS — Z85.3 PERSONAL HISTORY OF MALIGNANT NEOPLASM OF BREAST: ICD-10-CM

## 2023-03-22 DIAGNOSIS — K13.0 DISEASES OF LIPS: ICD-10-CM

## 2023-03-22 DIAGNOSIS — Z91.51 PERSONAL HISTORY OF SUICIDAL BEHAVIOR: ICD-10-CM

## 2023-03-22 DIAGNOSIS — Z79.84 LONG TERM (CURRENT) USE OF ORAL HYPOGLYCEMIC DRUGS: ICD-10-CM

## 2023-03-22 DIAGNOSIS — Z87.2 PERSONAL HISTORY OF DISEASES OF THE SKIN AND SUBCUTANEOUS TISSUE: ICD-10-CM

## 2023-03-22 DIAGNOSIS — G47.00 INSOMNIA, UNSPECIFIED: ICD-10-CM

## 2023-03-22 DIAGNOSIS — Z98.890 OTHER SPECIFIED POSTPROCEDURAL STATES: Chronic | ICD-10-CM

## 2023-03-22 DIAGNOSIS — J45.909 UNSPECIFIED ASTHMA, UNCOMPLICATED: ICD-10-CM

## 2023-03-22 DIAGNOSIS — Z90.11 ACQUIRED ABSENCE OF RIGHT BREAST AND NIPPLE: ICD-10-CM

## 2023-03-22 DIAGNOSIS — L29.9 PRURITUS, UNSPECIFIED: ICD-10-CM

## 2023-03-22 DIAGNOSIS — F31.9 BIPOLAR DISORDER, UNSPECIFIED: ICD-10-CM

## 2023-03-22 DIAGNOSIS — G47.33 OBSTRUCTIVE SLEEP APNEA (ADULT) (PEDIATRIC): ICD-10-CM

## 2023-03-22 DIAGNOSIS — Z98.890 OTHER SPECIFIED POSTPROCEDURAL STATES: ICD-10-CM

## 2023-03-22 DIAGNOSIS — Z88.0 ALLERGY STATUS TO PENICILLIN: ICD-10-CM

## 2023-03-22 DIAGNOSIS — E11.9 TYPE 2 DIABETES MELLITUS WITHOUT COMPLICATIONS: ICD-10-CM

## 2023-03-22 DIAGNOSIS — Z91.09 OTHER ALLERGY STATUS, OTHER THAN TO DRUGS AND BIOLOGICAL SUBSTANCES: ICD-10-CM

## 2023-03-22 DIAGNOSIS — I10 ESSENTIAL (PRIMARY) HYPERTENSION: ICD-10-CM

## 2023-03-22 DIAGNOSIS — E78.00 PURE HYPERCHOLESTEROLEMIA, UNSPECIFIED: ICD-10-CM

## 2023-03-22 PROCEDURE — 99284 EMERGENCY DEPT VISIT MOD MDM: CPT | Mod: FS

## 2023-03-22 PROCEDURE — 99283 EMERGENCY DEPT VISIT LOW MDM: CPT | Mod: 25

## 2023-03-22 PROCEDURE — 96372 THER/PROPH/DIAG INJ SC/IM: CPT

## 2023-03-22 RX ORDER — DIPHENHYDRAMINE HCL 50 MG
50 CAPSULE ORAL ONCE
Refills: 0 | Status: COMPLETED | OUTPATIENT
Start: 2023-03-22 | End: 2023-03-22

## 2023-03-22 RX ADMIN — Medication 50 MILLIGRAM(S): at 02:55

## 2023-03-22 NOTE — ED PROVIDER NOTE - NSFOLLOWUPINSTRUCTIONS_ED_ALL_ED_FT
Follow up your primary care doctor and psychiatrist in 1-2 days    Insomnia    WHAT YOU NEED TO KNOW:    Insomnia is a condition that makes it hard to fall or stay asleep. Lack of sleep can lead to attention or memory problems during the day. You may also be hurtado, depressed, clumsy, or have headaches.    DISCHARGE INSTRUCTIONS:    Contact your healthcare provider if:     Your symptoms do not get better, or they get worse.      You begin to use drugs or alcohol to fall asleep.      You have questions or concerns about your condition or care.    Medicines:     Medicines may help you sleep more regularly or help you feel less anxious.      Take your medicine as directed. Contact your healthcare provider if you think your medicine is not helping or if you have side effects. Tell him or her if you are allergic to any medicine. Keep a list of the medicines, vitamins, and herbs you take. Include the amounts, and when and why you take them. Bring the list or the pill bottles to follow-up visits. Carry your medicine list with you in case of an emergency.    What you can do to improve your sleep:     Create a sleep schedule. Try to go to sleep and wake up at the same times every day. Keep a record of your sleep patterns, and any sleeping problems you have. Bring the record to follow-up visits with healthcare providers.      Do not take naps. Naps could make it hard for you to fall asleep at bedtime.      Keep your bedroom cool, quiet, and dark. Turn on white noise, such as a fan, to help you relax. Do not use your bed for any activity that will keep you awake. Do not read, exercise, eat, or watch TV in your bedroom.       Get up if you do not fall asleep within 20 minutes. Move to another room and do something relaxing until you become sleepy.      Limit caffeine, alcohol, and food to earlier in the day. Only drink caffeine in the morning. Do not drink alcohol within 6 hours of bedtime. Do not eat a heavy meal right before you go to bed.      Exercise regularly. Daily exercise may help you sleep better. Do not exercise within 4 hours of bedtime.    Follow up with your healthcare provider as directed: Your healthcare provider may refer you for cognitive behavioral therapy. A behavioral therapist may help you find ways to relax, decrease stress, and improve sleep. Write down your questions so you remember to ask them during your visits.        © Copyright [employer] 2019 All illustrations and images included in CareNotes are the copyrighted property of [employer] or [employer].

## 2023-03-22 NOTE — ED PROVIDER NOTE - PHYSICAL EXAMINATION
Sharing this message with you, I have to call Marcie back tomorrow in regards of patient.    Physical Exam    Vital Signs: I have reviewed the initial vital signs.  Constitutional: well-nourished, appears stated age, no acute distress  Eyes: Conjunctiva pink, Sclera clear, PERRLA, EOMI.  Mouth: Lower lip inner mucosa with tiny ulcers noted mild swelling no redness no fluctulance   Cardiovascular: S1 and S2, regular rate, regular rhythm, well-perfused extremities, radial pulses equal and 2+  Respiratory: unlabored respiratory effort, clear to auscultation bilaterally no wheezing, rales and rhonchi  Gastrointestinal: soft, non-tender abdomen, no pulsatile mass, normal bowl sounds  Musculoskeletal: supple neck, no lower extremity edema, no midline tenderness  Integumentary: warm, dry, no rash  Neurologic: awake, alert, cranial nerves II-XII grossly intact, extremities’ motor and sensory functions grossly intact  Psychiatric: appropriate mood, appropriate affect

## 2023-03-22 NOTE — ED PROVIDER NOTE - PATIENT PORTAL LINK FT
You can access the FollowMyHealth Patient Portal offered by [facility] by registering at the following website: [email or web address]. By joining [facility]’s FollowMyHealth portal, you will also be able to view your health information using other applications (apps) compatible with our system.

## 2023-03-22 NOTE — ED PROVIDER NOTE - CLINICAL SUMMARY MEDICAL DECISION MAKING FREE TEXT BOX
53 yo F, extensive hx as noted, Bipolar disorder here for assessment of difficulty sleeping -- states she has itching to her entire body, including lip which is making it difficult to sleep. No rash, eye redness, difficulty  breathing, vomiting.    Took oral benadryl around 2200 without improvement. Later on took ativan to help with sleep and when she did not fall asleep she came to ED.    States that lack of sleep for too many days can trigger her casi.     She has elevated BP but otherwise normal VS. Normal affect, no pressured speech, clear lungs, RRR, no rash, no angioedema, has slight redness to inner lower lip, no firm swelling.    No signs of anaphylaxis, no signs of decompensated Bipolar disorder.     Will give IV benadryl, dc with psych and PMD follow up.    At this time no indication for labs, will hold off on dex given risk of worsening insomnia, steroid induced mood change.

## 2023-03-22 NOTE — ED PROVIDER NOTE - OBJECTIVE STATEMENT
52 year old female past medical history of bipolar comes to emergency room for swollen itchy lip. patient states that she had dental work done last week and was on clindamycin but stopped due to fear of possible allergy as she is allergic to azithromycin. patient states that last night she started to have itching and swelling to lip and took benadryl at home with little relief. patient here tonight because she cannot sleep despite Benadryl at 9pm and ativan about 2 hours prior to arrival. no trouble breathing.

## 2023-03-22 NOTE — ED PROVIDER NOTE - NS ED ROS FT
Constitutional: (-) fever  Eyes/ENT: (-) blurry vision, (-) epistaxis  Cardiovascular: (-) chest pain, (-) syncope  Respiratory: (-) cough, (-) shortness of breath  Gastrointestinal: (-) vomiting, (-) diarrhea  Musculoskeletal: (-) neck pain, (-) back pain, (-) joint pain  Integumentary: (-) rash, (-) edema  Neurological: (-) headache, (-) altered mental status  Psychiatric: (-) hallucinations  Allergic/Immunologic: (+  pruritus

## 2023-03-27 ENCOUNTER — EMERGENCY (EMERGENCY)
Facility: HOSPITAL | Age: 53
LOS: 0 days | Discharge: NURSING, REHAB OR LONG-TERM CARE | End: 2023-03-27
Payer: MEDICARE

## 2023-03-27 ENCOUNTER — INPATIENT (INPATIENT)
Facility: HOSPITAL | Age: 53
LOS: 8 days | Discharge: HOME | DRG: 885 | End: 2023-04-05
Attending: PSYCHIATRY & NEUROLOGY | Admitting: PSYCHIATRY & NEUROLOGY
Payer: MEDICARE

## 2023-03-27 DIAGNOSIS — Z88.0 ALLERGY STATUS TO PENICILLIN: ICD-10-CM

## 2023-03-27 DIAGNOSIS — F31.9 BIPOLAR DISORDER, UNSPECIFIED: ICD-10-CM

## 2023-03-27 DIAGNOSIS — Z91.048 OTHER NONMEDICINAL SUBSTANCE ALLERGY STATUS: ICD-10-CM

## 2023-03-27 DIAGNOSIS — Z98.890 OTHER SPECIFIED POSTPROCEDURAL STATES: Chronic | ICD-10-CM

## 2023-03-27 DIAGNOSIS — Z88.8 ALLERGY STATUS TO OTHER DRUGS, MEDICAMENTS AND BIOLOGICAL SUBSTANCES: ICD-10-CM

## 2023-03-27 DIAGNOSIS — Z79.84 LONG TERM (CURRENT) USE OF ORAL HYPOGLYCEMIC DRUGS: ICD-10-CM

## 2023-03-27 DIAGNOSIS — J45.909 UNSPECIFIED ASTHMA, UNCOMPLICATED: ICD-10-CM

## 2023-03-27 DIAGNOSIS — F31.0 BIPOLAR DISORDER, CURRENT EPISODE HYPOMANIC: ICD-10-CM

## 2023-03-27 DIAGNOSIS — I10 ESSENTIAL (PRIMARY) HYPERTENSION: ICD-10-CM

## 2023-03-27 DIAGNOSIS — Z20.822 CONTACT WITH AND (SUSPECTED) EXPOSURE TO COVID-19: ICD-10-CM

## 2023-03-27 DIAGNOSIS — E11.9 TYPE 2 DIABETES MELLITUS WITHOUT COMPLICATIONS: ICD-10-CM

## 2023-03-27 LAB
ALBUMIN SERPL ELPH-MCNC: 5 G/DL — SIGNIFICANT CHANGE UP (ref 3.5–5.2)
ALP SERPL-CCNC: 92 U/L — SIGNIFICANT CHANGE UP (ref 30–115)
ALT FLD-CCNC: 12 U/L — SIGNIFICANT CHANGE UP (ref 0–41)
ANION GAP SERPL CALC-SCNC: 13 MMOL/L — SIGNIFICANT CHANGE UP (ref 7–14)
APAP SERPL-MCNC: <5 UG/ML — LOW (ref 10–30)
AST SERPL-CCNC: 16 U/L — SIGNIFICANT CHANGE UP (ref 0–41)
BASOPHILS # BLD AUTO: 0.02 K/UL — SIGNIFICANT CHANGE UP (ref 0–0.2)
BASOPHILS NFR BLD AUTO: 0.3 % — SIGNIFICANT CHANGE UP (ref 0–1)
BILIRUB SERPL-MCNC: 0.3 MG/DL — SIGNIFICANT CHANGE UP (ref 0.2–1.2)
BUN SERPL-MCNC: 14 MG/DL — SIGNIFICANT CHANGE UP (ref 10–20)
CALCIUM SERPL-MCNC: 10.8 MG/DL — HIGH (ref 8.4–10.5)
CHLORIDE SERPL-SCNC: 103 MMOL/L — SIGNIFICANT CHANGE UP (ref 98–110)
CO2 SERPL-SCNC: 25 MMOL/L — SIGNIFICANT CHANGE UP (ref 17–32)
CREAT SERPL-MCNC: 0.9 MG/DL — SIGNIFICANT CHANGE UP (ref 0.7–1.5)
EGFR: 76 ML/MIN/1.73M2 — SIGNIFICANT CHANGE UP
EOSINOPHIL # BLD AUTO: 0.14 K/UL — SIGNIFICANT CHANGE UP (ref 0–0.7)
EOSINOPHIL NFR BLD AUTO: 2 % — SIGNIFICANT CHANGE UP (ref 0–8)
ETHANOL SERPL-MCNC: <10 MG/DL — SIGNIFICANT CHANGE UP
GLUCOSE SERPL-MCNC: 176 MG/DL — HIGH (ref 70–99)
HCT VFR BLD CALC: 33.8 % — LOW (ref 37–47)
HGB BLD-MCNC: 11.7 G/DL — LOW (ref 12–16)
IMM GRANULOCYTES NFR BLD AUTO: 0.1 % — SIGNIFICANT CHANGE UP (ref 0.1–0.3)
LYMPHOCYTES # BLD AUTO: 1.59 K/UL — SIGNIFICANT CHANGE UP (ref 1.2–3.4)
LYMPHOCYTES # BLD AUTO: 23.1 % — SIGNIFICANT CHANGE UP (ref 20.5–51.1)
MCHC RBC-ENTMCNC: 29 PG — SIGNIFICANT CHANGE UP (ref 27–31)
MCHC RBC-ENTMCNC: 34.6 G/DL — SIGNIFICANT CHANGE UP (ref 32–37)
MCV RBC AUTO: 83.9 FL — SIGNIFICANT CHANGE UP (ref 81–99)
MONOCYTES # BLD AUTO: 0.38 K/UL — SIGNIFICANT CHANGE UP (ref 0.1–0.6)
MONOCYTES NFR BLD AUTO: 5.5 % — SIGNIFICANT CHANGE UP (ref 1.7–9.3)
NEUTROPHILS # BLD AUTO: 4.74 K/UL — SIGNIFICANT CHANGE UP (ref 1.4–6.5)
NEUTROPHILS NFR BLD AUTO: 69 % — SIGNIFICANT CHANGE UP (ref 42.2–75.2)
NRBC # BLD: 0 /100 WBCS — SIGNIFICANT CHANGE UP (ref 0–0)
PLATELET # BLD AUTO: 139 K/UL — SIGNIFICANT CHANGE UP (ref 130–400)
POTASSIUM SERPL-MCNC: 4.1 MMOL/L — SIGNIFICANT CHANGE UP (ref 3.5–5)
POTASSIUM SERPL-SCNC: 4.1 MMOL/L — SIGNIFICANT CHANGE UP (ref 3.5–5)
PROT SERPL-MCNC: 7.6 G/DL — SIGNIFICANT CHANGE UP (ref 6–8)
RBC # BLD: 4.03 M/UL — LOW (ref 4.2–5.4)
RBC # FLD: 14.4 % — SIGNIFICANT CHANGE UP (ref 11.5–14.5)
SALICYLATES SERPL-MCNC: <0.3 MG/DL — LOW (ref 4–30)
SARS-COV-2 RNA SPEC QL NAA+PROBE: SIGNIFICANT CHANGE UP
SODIUM SERPL-SCNC: 141 MMOL/L — SIGNIFICANT CHANGE UP (ref 135–146)
WBC # BLD: 6.88 K/UL — SIGNIFICANT CHANGE UP (ref 4.8–10.8)
WBC # FLD AUTO: 6.88 K/UL — SIGNIFICANT CHANGE UP (ref 4.8–10.8)

## 2023-03-27 PROCEDURE — 80307 DRUG TEST PRSMV CHEM ANLYZR: CPT | Mod: 59

## 2023-03-27 PROCEDURE — 83036 HEMOGLOBIN GLYCOSYLATED A1C: CPT

## 2023-03-27 PROCEDURE — 93005 ELECTROCARDIOGRAM TRACING: CPT

## 2023-03-27 PROCEDURE — 85025 COMPLETE CBC W/AUTO DIFF WBC: CPT

## 2023-03-27 PROCEDURE — 93010 ELECTROCARDIOGRAM REPORT: CPT

## 2023-03-27 PROCEDURE — 99232 SBSQ HOSP IP/OBS MODERATE 35: CPT

## 2023-03-27 PROCEDURE — 87635 SARS-COV-2 COVID-19 AMP PRB: CPT

## 2023-03-27 PROCEDURE — 82962 GLUCOSE BLOOD TEST: CPT

## 2023-03-27 PROCEDURE — 36415 COLL VENOUS BLD VENIPUNCTURE: CPT

## 2023-03-27 PROCEDURE — 90792 PSYCH DIAG EVAL W/MED SRVCS: CPT | Mod: 95

## 2023-03-27 PROCEDURE — 80307 DRUG TEST PRSMV CHEM ANLYZR: CPT

## 2023-03-27 PROCEDURE — 80053 COMPREHEN METABOLIC PANEL: CPT

## 2023-03-27 PROCEDURE — 80061 LIPID PANEL: CPT

## 2023-03-27 PROCEDURE — 84443 ASSAY THYROID STIM HORMONE: CPT

## 2023-03-27 PROCEDURE — 99285 EMERGENCY DEPT VISIT HI MDM: CPT

## 2023-03-27 PROCEDURE — 99285 EMERGENCY DEPT VISIT HI MDM: CPT | Mod: 25

## 2023-03-27 NOTE — ED BEHAVIORAL HEALTH NOTE - BEHAVIORAL HEALTH NOTE
=================  PRE-HOSPITAL COURSE  ===================  SOURCE:  RN and secondhand ED documentation  DETAILS:  Per chart, patient was BIB [facility] after patient was found at [facility], confused, telling individuals "her psychiatrist asked her to meet her here."   ============  ED COURSE  =========  SOURCE:  RN and secondhand ED documentation.  ARRIVAL: Per chart, patient was BIB [facility] after patient was found at [facility], confused, telling individuals "her psychiatrist asked her to meet her here."   BELONGINGS:  No belongings of note. All belongings were provided to hospital security, and patient currently in a gown with a 1:1 staff member.  BEHAVIOR: RN described patient to be currently calm and cooperative, hyper-verbal, non-pressured speech, tearful during interview at times, states "I don't know why I'm here... my family says I'm crazy," remains in good behavioral and impulse control, is not currently violent/aggressive. RN stated that the patient is denying current SI/HI/A/VH. RN stated that the patient appears to be well-groomed.  TREATMENT:  Per chart and RN, patient did not require PRN medications.   VISITORS:  None.

## 2023-03-28 VITALS — RESPIRATION RATE: 18 BRPM | DIASTOLIC BLOOD PRESSURE: 71 MMHG | SYSTOLIC BLOOD PRESSURE: 146 MMHG | HEART RATE: 106 BPM

## 2023-03-28 VITALS — HEIGHT: 64 IN

## 2023-03-28 LAB — GLUCOSE BLDC GLUCOMTR-MCNC: 168 MG/DL — HIGH (ref 70–99)

## 2023-03-28 PROCEDURE — 99231 SBSQ HOSP IP/OBS SF/LOW 25: CPT

## 2023-03-28 RX ORDER — AMLODIPINE BESYLATE 2.5 MG/1
5 TABLET ORAL DAILY
Refills: 0 | Status: DISCONTINUED | OUTPATIENT
Start: 2023-03-28 | End: 2023-04-05

## 2023-03-28 RX ORDER — HALOPERIDOL DECANOATE 100 MG/ML
5 INJECTION INTRAMUSCULAR EVERY 8 HOURS
Refills: 0 | Status: DISCONTINUED | OUTPATIENT
Start: 2023-03-28 | End: 2023-04-05

## 2023-03-28 RX ORDER — ALBUTEROL 90 UG/1
2 AEROSOL, METERED ORAL EVERY 6 HOURS
Refills: 0 | Status: DISCONTINUED | OUTPATIENT
Start: 2023-03-28 | End: 2023-04-05

## 2023-03-28 RX ORDER — GEMFIBROZIL 600 MG
600 TABLET ORAL
Refills: 0 | Status: DISCONTINUED | OUTPATIENT
Start: 2023-03-28 | End: 2023-04-05

## 2023-03-28 RX ORDER — METFORMIN HYDROCHLORIDE 850 MG/1
1000 TABLET ORAL
Refills: 0 | Status: DISCONTINUED | OUTPATIENT
Start: 2023-03-28 | End: 2023-04-05

## 2023-03-28 RX ORDER — ATORVASTATIN CALCIUM 80 MG/1
20 TABLET, FILM COATED ORAL AT BEDTIME
Refills: 0 | Status: DISCONTINUED | OUTPATIENT
Start: 2023-03-28 | End: 2023-04-05

## 2023-03-28 RX ORDER — TRAZODONE HCL 50 MG
50 TABLET ORAL AT BEDTIME
Refills: 0 | Status: DISCONTINUED | OUTPATIENT
Start: 2023-03-28 | End: 2023-04-05

## 2023-03-28 RX ORDER — CARBAMAZEPINE 200 MG
100 TABLET ORAL AT BEDTIME
Refills: 0 | Status: DISCONTINUED | OUTPATIENT
Start: 2023-03-28 | End: 2023-04-05

## 2023-03-28 RX ORDER — CARBAMAZEPINE 200 MG
200 TABLET ORAL
Refills: 0 | Status: DISCONTINUED | OUTPATIENT
Start: 2023-03-28 | End: 2023-04-05

## 2023-03-28 RX ORDER — FLUPHENAZINE HYDROCHLORIDE 1 MG/1
5 TABLET, FILM COATED ORAL DAILY
Refills: 0 | Status: DISCONTINUED | OUTPATIENT
Start: 2023-03-28 | End: 2023-03-31

## 2023-03-28 RX ORDER — FLUPHENAZINE HYDROCHLORIDE 1 MG/1
10 TABLET, FILM COATED ORAL AT BEDTIME
Refills: 0 | Status: DISCONTINUED | OUTPATIENT
Start: 2023-03-28 | End: 2023-04-05

## 2023-03-28 RX ORDER — CARBAMAZEPINE 200 MG
200 TABLET ORAL
Refills: 0 | Status: DISCONTINUED | OUTPATIENT
Start: 2023-03-28 | End: 2023-03-28

## 2023-03-28 RX ADMIN — METFORMIN HYDROCHLORIDE 1000 MILLIGRAM(S): 850 TABLET ORAL at 20:23

## 2023-03-28 RX ADMIN — Medication 50 MILLIGRAM(S): at 20:23

## 2023-03-28 RX ADMIN — Medication 600 MILLIGRAM(S): at 20:23

## 2023-03-28 RX ADMIN — Medication 100 MILLIGRAM(S): at 20:24

## 2023-03-28 RX ADMIN — Medication 2 MILLIGRAM(S): at 10:43

## 2023-03-28 RX ADMIN — Medication 200 MILLIGRAM(S): at 20:23

## 2023-03-28 RX ADMIN — ATORVASTATIN CALCIUM 20 MILLIGRAM(S): 80 TABLET, FILM COATED ORAL at 20:23

## 2023-03-28 NOTE — BH PATIENT PROFILE - HOW PATIENT ADDRESSED, PROFILE
[facility]  Savanah Quarles 59 [id number] [facility], [address] [place] [address]    Consent for Operation    Date: __________________    Time: _______________    1.  I authorize the performance upon Bonny Craig the following operation:    Procedure(s):  LAPAROSCOPIC PYLORO videotape. The [facility] will not be responsible for storage or maintenance of this tape. 6. For the purpose of advancing medical education, I consent to the admittance of observers to the Operating Room.     7. I authorize the use of any specimen, organs Signature of Patient:   ___________________________    When the patient is a minor or mentally incompetent to give consent:  Signature of person authorized to consent for patient: ___________________________   Relationship to patient: _____________________ drugs/illegal medications). Failure to inform my anesthesiologist about these medicines may increase my risk of anesthetic complications. · If I am allergic to anything or have had a reaction to anesthesia before.     3. I understand how the anesthesia med I have discussed the procedure and information above with the patient (or patient’s representative) and answered their questions. The patient or their representative has agreed to have anesthesia services.     _______________________________________________ Shama

## 2023-03-28 NOTE — BH INPATIENT PSYCHIATRY ASSESSMENT NOTE - NSBHCRANIAL_PSY_ALL_CORE
Smiles, shows teeth, opens mouth, sticks out tongue (V, VII, XI)/Extraocular Eye Movement Intact  (III, IV, VI)

## 2023-03-28 NOTE — CONSULT NOTE ADULT - ASSESSMENT
#bipolar - mgntm per psych, monitor qtc  #asthma  #DMII  #HTN  #doxy?    recall prn    #bipolar - mgntm per psych, monitor qtc  #asthma - stable - albuterol prn   #DMII - monitor FS - cont home meds   #HTN - stable   #on doxy for 6 months per pt for gingivitis ? need to confirm with dentist as outpt     recall prn

## 2023-03-28 NOTE — BH PATIENT PROFILE - HOME MEDICATIONS
methocarbamol 750 mg oral tablet , 1 tab(s) orally 3 times a day   atorvastatin  amLODIPine 5 mg oral tablet , 1 tab(s) orally once a day  metFORMIN 1000 mg oral tablet , 1 tab(s) orally 2 times a day  fluPHENAZine 10 mg oral tablet , 1 tab(s) orally once a day (at bedtime) x 14 days Continue taking it until told otherwise by MD.   Prolixin Decanoate 25 mg/mL injectable solution , 50 milligram(s) injectable every 2 weeks  carBAMazepine 200 mg oral tablet , 1 tab(s) orally 2 times a day x 14 days continue to take until told otherwise by MD.

## 2023-03-28 NOTE — BH INPATIENT PSYCHIATRY ASSESSMENT NOTE - NSBHMEDSOTHERFT_PSY_A_CORE
Confirmed with pharmacy ([facility])    amLODIPine   Tablet 5 milliGRAM(s) Oral daily  atorvastatin 20 milliGRAM(s) Oral at bedtime  carBAMazepine 200 milliGRAM(s) Oral two times a day  carBAMazepine ER Capsule. 100 milliGRAM(s) Oral at bedtime  doxycycline monohydrate Suspension 20 milliGRAM(s) Oral every 12 hours (90 days from January 6th)  fluPHENAZine 5 milliGRAM(s) Oral daily  fluPHENAZine 10 milliGRAM(s) Oral at bedtime  gemfibrozil 600 milliGRAM(s) Oral two times a day  metFORMIN 1000 milliGRAM(s) Oral two times a day  traZODone 50 milliGRAM(s) Oral at bedtime

## 2023-03-28 NOTE — BH INPATIENT PSYCHIATRY ASSESSMENT NOTE - NSBHATTESTSTAFFAMEND_PSY_A_CORE
Detail Level: Detailed Depth Of Biopsy: dermis Was A Bandage Applied: Yes Size Of Lesion In Cm: 0.6 X Size Of Lesion In Cm: 0 Biopsy Type: H and E Biopsy Method: 15 blade Anesthesia Type: 1% lidocaine without epinephrine Anesthesia Volume In Cc (Will Not Render If 0): 0.5 Hemostasis: Aluminum Chloride and Electrocautery Wound Care: Vaseline Dressing: bandage Destruction After The Procedure: No Type Of Destruction Used: Curettage Curettage Text: The wound bed was treated with curettage after the biopsy was performed. Cryotherapy Text: The wound bed was treated with cryotherapy after the biopsy was performed. Electrodesiccation Text: The wound bed was treated with electrodesiccation after the biopsy was performed. Electrodesiccation And Curettage Text: The wound bed was treated with electrodesiccation and curettage after the biopsy was performed. Silver Nitrate Text: The wound bed was treated with silver nitrate after the biopsy was performed. Lab: [facility] Lab Facility: [id number] Notes (To The Dermatopathologist): Please check margins Consent: The providerâs intent is to obtain a tissue sample solely for diagnostic purposes. Written consent was obtained and risks were reviewed including but not limited to scarring, infection, bleeding, scabbing, incomplete removal, nerve damage and allergy to anesthesia. Post-Care Instructions: I reviewed with the patient in detail post-care instructions. Patient is to keep the biopsy site dry overnight, and then apply bacitracin twice daily until healed. Patient may apply hydrogen peroxide soaks to remove any crusting. Notification Instructions: Patient will be notified of biopsy results. However, patient instructed to call the office if not contacted within 2 weeks. Billing Type: [employer] Size Of Lesion In Cm: 0.4 Lab: 249 Lab Facility: [id number] Consent: The provider’s intent is to obtain a tissue sample solely for diagnostic purposes.  Written consent was obtained and risks were reviewed including but not limited to scarring, infection, bleeding, scabbing, incomplete removal, nerve damage and allergy to anesthesia. Billing Type: Third-Party Bill I have personally seen and examined this patient. I fully participated in the care of this patient. I have made amendments to the documentation where appropriate and otherwise agree with the history, physical exam, and plan as documented by the

## 2023-03-28 NOTE — BH INPATIENT PSYCHIATRY ASSESSMENT NOTE - CURRENT MEDICATION
MEDICATIONS  (STANDING):  amLODIPine   Tablet 5 milliGRAM(s) Oral daily  atorvastatin 20 milliGRAM(s) Oral at bedtime  doxycycline monohydrate Suspension 20 milliGRAM(s) Oral every 12 hours  fluPHENAZine 5 milliGRAM(s) Oral daily  fluPHENAZine 10 milliGRAM(s) Oral at bedtime  gemfibrozil 600 milliGRAM(s) Oral two times a day  metFORMIN 1000 milliGRAM(s) Oral two times a day  traZODone 50 milliGRAM(s) Oral at bedtime    MEDICATIONS  (PRN):  albuterol    90 MICROgram(s) HFA Inhaler 2 Puff(s) Inhalation every 6 hours PRN SOB/home med  haloperidol     Tablet 5 milliGRAM(s) Oral every 8 hours PRN agitation  LORazepam     Tablet 2 milliGRAM(s) Oral every 8 hours PRN severe agitation  LORazepam     Tablet 0.5 milliGRAM(s) Oral two times a day PRN anxiety/home med   MEDICATIONS  (STANDING):  amLODIPine   Tablet 5 milliGRAM(s) Oral daily  atorvastatin 20 milliGRAM(s) Oral at bedtime  carBAMazepine 200 milliGRAM(s) Oral two times a day  carBAMazepine ER Capsule. 100 milliGRAM(s) Oral at bedtime  doxycycline monohydrate Suspension 20 milliGRAM(s) Oral every 12 hours  fluPHENAZine 5 milliGRAM(s) Oral daily  fluPHENAZine 10 milliGRAM(s) Oral at bedtime  gemfibrozil 600 milliGRAM(s) Oral two times a day  metFORMIN 1000 milliGRAM(s) Oral two times a day  traZODone 50 milliGRAM(s) Oral at bedtime    MEDICATIONS  (PRN):  albuterol    90 MICROgram(s) HFA Inhaler 2 Puff(s) Inhalation every 6 hours PRN SOB/home med  haloperidol     Tablet 5 milliGRAM(s) Oral every 8 hours PRN agitation  LORazepam     Tablet 2 milliGRAM(s) Oral every 8 hours PRN severe agitation  LORazepam     Tablet 0.5 milliGRAM(s) Oral two times a day PRN anxiety/home med

## 2023-03-28 NOTE — BH INPATIENT PSYCHIATRY ASSESSMENT NOTE - RISK ASSESSMENT
Risk factors: unclear cause for decompensation, psychosocial stressor from daughter  Protective factors: not endorsing SI/HI, in outpatient treatment, supportive family, domiciled

## 2023-03-28 NOTE — BH PATIENT PROFILE - NSINDCRISISTRIGGER_PSY_ALL_CORE
Spoke to Zurdo and conveyed results.  He verbalized understanding.      Was advised to call the office if no improvement is noted   Being ignored Being ignored/Embarrassment/Hearing voices/Needs not being met/Pain/Rejection/Someone in personal space/Temperature/Heat/Boredom/Crowded spaces/Guilt/Loud noises/Physically ill/Scared/Suspiciousness

## 2023-03-28 NOTE — BH INPATIENT PSYCHIATRY ASSESSMENT NOTE - OTHER PAST PSYCHIATRIC HISTORY (INCLUDE DETAILS REGARDING ONSET, COURSE OF ILLNESS, INPATIENT/OUTPATIENT TREATMENT)
Per mother patient was diagnosed with bipolar disorder at age 30 during her second pregnancy, has had numerous inpatient admissions, one prior overdose, unknown if suicide attempt

## 2023-03-28 NOTE — BH INPATIENT PSYCHIATRY ASSESSMENT NOTE - NSBHMETABOLIC_PSY_ALL_CORE_FT
BMI: BMI (kg/m2): 30.1 (03-28-23 @ 11:36)  HbA1c: A1C with Estimated Average Glucose Result: 7.5 % (09-23-22 @ 08:58)    Glucose: POCT Blood Glucose.: 168 mg/dL (03-28-23 @ 11:38)    BP: 145/63 (03-28-23 @ 11:36) (145/63 - 146/71)  Lipid Panel:  BMI: BMI (kg/m2): 30.1 (03-28-23 @ 11:36)  HbA1c: A1C with Estimated Average Glucose Result: 7.5 % (09-23-22 @ 08:58)    Glucose: POCT Blood Glucose.: 168 mg/dL (03-28-23 @ 11:38)    BP: 146/84 (03-28-23 @ 15:57) (145/63 - 146/84)  Lipid Panel:

## 2023-03-28 NOTE — BH INPATIENT PSYCHIATRY ASSESSMENT NOTE - DESCRIPTION
Patient lives in private residence with her two adult daughters. 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

## 2023-03-28 NOTE — BH SOCIAL WORK INITIAL PSYCHOSOCIAL EVALUATION - OTHER PAST PSYCHIATRIC HISTORY (INCLUDE DETAILS REGARDING ONSET, COURSE OF ILLNESS, INPATIENT/OUTPATIENT TREATMENT)
History of psychiatric admissions; most recent [facility] 9/2022. Previous suicide attempt.

## 2023-03-28 NOTE — BH INPATIENT PSYCHIATRY ASSESSMENT NOTE - NS ED BHA SUICIDALITY PRESENT CURRENT PASSIVE IDEATION
MEDICARE WELLNESS VISIT NOTE      HISTORY OF PRESENT ILLNESS:   Compa Ybarra presents for his Subsequent Annual Medicare Wellness Visit.        Patient Care Team:  Gallito Foy MD as PCP - General (Internal Medicine)  Timbo Black MD as Cardiologist (Cardiovascular Disease)        Patient Active Problem List   Diagnosis   • ASHD (arteriosclerotic heart disease)   • HTN (hypertension)   • GERD (gastroesophageal reflux disease)   • Dyslipidemia   • BPH (benign prostatic hyperplasia)   • Insomnia         Past Medical History:   Diagnosis Date   • ASHD (arteriosclerotic heart disease)    • Nath esophagus     ablation done at [facility]   • BPH (benign prostatic hyperplasia)    • Carotid bruit present    • Carotid stenosis 2013    CTA done showed <30 % stenosis R ICA   • Colitis    • DDD (degenerative disc disease), cervical    • Dyslipidemia    • ED (erectile dysfunction)    • GERD (gastroesophageal reflux disease)    • 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   • Hemorrhoids    • HTN (hypertension)    • Hx of colonic polyps    • Inguinal hernia    • Keratosis, actinic    • Prostate cancer (CMS/HCC)        • Rotator cuff tear, left    • Rotator cuff tear, right    • 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         Past Surgical History:   Procedure Laterality Date   • Bypass graft      4 vessel cabg   • Cataract extraction, bilateral     • Colonoscopy  10/31/2013   • Hemorrhoid surgery     • Hernia repair     • Radiofrequency ablation      for Nath's esophagus [facility]   • Upper gastrointestinal endoscopy  2013    with dilatation         Social History     Tobacco Use   • Smoking status: Former Smoker     Types: Cigarettes     Last attempt to quit: 2005     Years since quittin.6   • Smokeless tobacco: Never Used   Substance Use Topics   • Alcohol use: Yes     Alcohol/week: 0.0 oz     Comment: 1 BEER/DAY    • Drug use: No     Drug use:    Drug Use:    No              Family History - Reviewed    Current Outpatient Medications   Medication Sig Dispense Refill   • zolpidem (AMBIEN) 10 MG tablet TAKE 1 TABLET BY MOUTH EVERY NIGHT AT BEDTIME AS NEEDED FOR SLEEP 90 tablet 0   • ipratropium (ATROVENT) 0.06 % nasal spray Spray 2 sprays in each nostril 3 times daily. 15 mL 6   • dutasteride (AVODART) 0.5 MG capsule TAKE 1 CAPSULE BY MOUTH DAILY 90 capsule 0   • ipratropium (ATROVENT) 0.03 % nasal spray USE 1 SPRAY IN EACH NOSTRIL THREE TIMES DAILY. 30 mL 5   • folic acid (FOLVITE) 800 MCG tablet Take 400 mcg by mouth daily.     • atorvastatin (LIPITOR) 40 MG tablet Take 40 mg by mouth daily.     • Coenzyme Q10 200 MG Cap Take by mouth daily.     • Magnesium 400 MG Cap Take 400 mg by mouth daily.     • DISPENSE      • DISPENSE      • metoPROLOL tartrate (LOPRESSOR) 25 MG tablet TAKE 1 TABLET BY MOUTH TWICE DAILY 180 tablet 1   • Valacyclovir HCl (VALTREX) 1000 MG Tab Take 2 tabs twice a day x 1 day prn cold sores 20 tablet 0   • benazepril (LOTENSIN) 10 MG tablet TAKE 1 TABLET BY MOUTH DAILY 90 tablet 1   • sildenafil (VIAGRA) 100 MG tablet Take 1 tablet by mouth as needed for Erectile Dysfunction. 30 tablet 0   • fluticasone (FLONASE) 50 MCG/ACT nasal spray SHAKE LIQUID AND USE 1 SPRAY IN EACH NOSTRIL DAILY 16 mL 5   • clobetasol 0.05 % lotion Apply twice daily to affected area. 59 mL 0   • ranolazine (RANEXA) 500 MG 12 hr tablet Take 500 mg by mouth 2 times daily.     • aspirin 81 MG tablet Take 81 mg by mouth daily.     • esomeprazole (NEXIUM) 40 MG capsule Take 40 mg by mouth daily (before breakfast).       No current facility-administered medications for this visit.         The following items on the Medicare Health Risk Assessment were found to be positive  1.) Do you have an Advance directive, living will, or power of  for health care document that contains your wishes for end of life care?:  No     6 a.) How  many servings of Fruits and Vegetables do you have each day ( 1 serving = 1 piece of fruit, 1/2 cup fruits or vegetables):  1 per day     6 b.) How many servings of High Fiber / Whole Grain Foods to you have each day ( 1 serving = 1 cup cold cereal, 1/2 cup cooked cereal, 1 slice bread):  1 per day     11l.) Sexual Problems:  Often         Vision and Hearing screens: not performed    Advance Directive:   The patient has the following documents:  No Advance Directives on file. Patient offered documents.    Cognitive Assessment: no evidence of cognitive dysfunction by direct observation    Recent PHQ 2/9 Score    PHQ 2:  Date Adult PHQ 2 Score   7/10/2019 0       PHQ 9:       DEPRESSION ASSESSMENT/PLAN:  Depression screening is negative no further plan needed.     Body mass index is 26.64 kg/m².    BMI ASSESSMENT/PLAN:  Patient BMI is within normal range.     Needed Screening/Treatment:   Immunizations reviewed and patient needs: Herpes Zoster  Needed follow up:  None    See orders.   See Patient Instructions section.      No

## 2023-03-28 NOTE — BH INPATIENT PSYCHIATRY ASSESSMENT NOTE - NSBHCHARTREVIEWVS_PSY_A_CORE FT
Vital Signs Last 24 Hrs  T(C): --  T(F): --  HR: 80 (03-28-23 @ 11:36) (80 - 106)  BP: 145/63 (03-28-23 @ 11:36) (145/63 - 146/71)  BP(mean): --  RR: 18 (03-28-23 @ 08:40) (18 - 18)  SpO2: --     Vital Signs Last 24 Hrs  T(C): 36.1 (03-28-23 @ 15:57), Max: 36.1 (03-28-23 @ 15:57)  T(F): 97 (03-28-23 @ 15:57), Max: 97 (03-28-23 @ 15:57)  HR: 101 (03-28-23 @ 15:57) (80 - 106)  BP: 146/84 (03-28-23 @ 15:57) (145/63 - 146/84)  BP(mean): --  RR: 18 (03-28-23 @ 15:57) (18 - 18)  SpO2: --

## 2023-03-28 NOTE — BH PATIENT PROFILE - NSBHSNSOFSAFETY_PSY_A_CORE
being left alone being left alone/calling significant other/family member/counting to 10/drinking tea or coffee/exercising/fresh air breaks/going to their room/journaling/listening to music/meditating/reading/showering/sitting quietly with someone/taking a nap/taking some deep breaths/talking to someone/using a calming object

## 2023-03-28 NOTE — BH INPATIENT PSYCHIATRY ASSESSMENT NOTE - ADDITIONAL DETAILS / COMMENTS
Patient called mobile crisis at urging of family that brought her to unit, has been under good behavioral control

## 2023-03-28 NOTE — BH INPATIENT PSYCHIATRY ASSESSMENT NOTE - NSTXMANICGOAL_PSY_ALL_CORE
Be able to identify the early signs of casi (e.g. sleep and mood changes) and to employ coping strategies to minimize acting out

## 2023-03-28 NOTE — UM REPORT PROGRESS NOTE - NSUMRMPROVIDER_GEN_A_CORE FT
Kai Sesay    Medicare  No auth required    I was physically present for the E/M service provided. I agree with above history, physical, and plan which I have reviewed and edited where appropriate. I was physically present for the key portions of the service provided.    Vega: 56yoF hx of htn and vertigo pw intermittent tingling and lightheadedness. Tingling is left sided, intermittent, lasting minutes, then resolving, associated with subjective weakness, and some feelings of  off balance. had similar sx past couple months no improving. no fever, no chills, no visual changes, no sob, no cough, no cp, no palpitations, no leg swelling, no syncope, no abd pain, no n/v/d, no dysuria, no rashes.    *GEN:   comfortable, in no apparent distress, AOx3  *EYES:   PERRL, extra-occular movements intact  *HEENT:   airway patent, moist mucosal membranes, uvula midline  *CV:   regular rate and rhythm, normal S1/S2, no murmur  *RESP:   clear to auscultation bilaterally, non-labored, speaking in full sentences  *ABD:   soft, non tender, no guarding  *MSK:   no musculoskeletal tenderness, 5/5 strength, moving all extremity  *SKIN:   dry, intact, no rash  *NEURO:   AOx3, no focal weakness or loss of sensation, gait normal, GCS 15, normal finger to nose, non-ataxic gait    a/p: neuropathy unlikely MS based on age but possibility, less likely mass based on exam but rec to have outpt neuro with dr can and MRI.  no concern for GB/Transient myelitis.

## 2023-03-28 NOTE — BH INPATIENT PSYCHIATRY ASSESSMENT NOTE - HPI (INCLUDE ILLNESS QUALITY, SEVERITY, DURATION, TIMING, CONTEXT, MODIFYING FACTORS, ASSOCIATED SIGNS AND SYMPTOMS)
Pt is a 52 year old woman, , living with her two daughters and dog, on disability, past medical hx of breast cancer in remission, past medical hx of bipolar disorder in outpatient treatment on PATHAK at [facility], [facility] disorganized behavior at [facility], admitted to [facility] for treatment of manic episode. On approach, patient is alert, oriented, and cooperative with interview.    Ms. Quinn reports that she is upset that her older daughter Jessica doesn't want to live with her anymore, gives permission for team to talk to her ([phone]). She also gives permission for team to speak with her younger daughter Ritika ([phone]). Patient is intermittently tearful during interview, reports that she has been compliant with her medications including her long acting injectable which she gets at [facility] from [facility], and that the clinic is headed by Dr. Caal.     Patient's mother visits during visiting hour, reports privately to writer that patient has decompensated over the past 2 weeks for unknown reasons. Mother is not sure whether patient has been compliant with her medications, does believe that she has been getting her PATHAK every 2 weeks. She reports she has been driving around at 6 in the morning looking for psychiatrist Dr. Caal, overshopping, saying incoherent things, and not sleeping. She reports that patient's younger daughter Ritika who is 19 is "out of control", vaping, being sexually active without protection, and coming home at odd hours of the night which has made patient very upset and disrupted her sleep and general routine (i.e. sleeping in a chair in the living room waiting for her daughter to come home.) Mother not aware of any drug or alcohol use, reports that patient is also grieving the loss of her aunt 1.5 years ago. She does not know of any SI/HI.

## 2023-03-28 NOTE — BH INPATIENT PSYCHIATRY ASSESSMENT NOTE - NSBHASSESSSUMMFT_PSY_ALL_CORE
Pt is a 52 year old woman, , living with her two daughters and dog, on disability, past medical hx of breast cancer in remission, past medical hx of bipolar disorder in outpatient treatment on PATHAK at [facility], presented to ED after calling mobile crisis at the encouragement of family due to disorganized behavior at home, admitted to [facility] for treatment of manic episode. On approach, patient is alert, oriented, and cooperative with interview.    Patient's current presentation is consistent with decompensation of her known bipolar disorder. Patient endorses medication compliance, no clear indication that she has been noncompliant. Will explore possible reasons for decompensation during this admission. No SI/HI/AVH elicited on interview. Will resume home medications for now.    PLAN    #Bipolar Disorder, current episode mixed  - carBAMazepine 200 milliGRAM(s) Oral two times a day  - carBAMazepine ER Capsule. 100 milliGRAM(s) Oral at bedtime  - fluPHENAZine 5 milliGRAM(s) Oral daily  - fluPHENAZine 10 milliGRAM(s) Oral at bedtime  - traZODone 50 milliGRAM(s) Oral at bedtime  - f/u AM labs including urine tox    #HTN  - amLODIPine   Tablet 5 milliGRAM(s) Oral daily    #HLD  - atorvastatin 20 milliGRAM(s) Oral at bedtime  - gemfibrozil 600 milliGRAM(s) Oral two times a day    #DM  - metFORMIN 1000 milliGRAM(s) Oral two times a day    #dental infection/prophylaxis(?)   - doxycycline monohydrate Suspension 20 milliGRAM(s) Oral every 12 hours (90 days from January 6th)

## 2023-03-28 NOTE — ED ADULT TRIAGE NOTE - CHIEF COMPLAINT QUOTE
"[employer] - pt states "my parents want me admitted psychiatric" denies suicidal ideation. c/o tooth pain"

## 2023-03-28 NOTE — CONSULT NOTE ADULT - SUBJECTIVE AND OBJECTIVE BOX
HOSPITALIST CONSULT for IPP History and Physical     SKYLAR VUONG  52y, Female  Allergy: cats, trees, pollen, grass dust (Rhinorrhea)  Lamictal (Hives)  penicillins (Rash)      CHIEF COMPLAINT:     HPI:    HPI:    FAMILY HISTORY:  Family history of diabetes mellitus (DM)  Dad      PAST MEDICAL & SURGICAL HISTORY:  Breast CA  Right: s/p lumpectomy x 2, + Radiation Rx      Bipolar disorder      Asthma      DM (diabetes mellitus)  Type 2      HTN (hypertension)      High cholesterol      Asthma      H/O suicide attempt  1986      History of herpes zoster of eye      ABI (obstructive sleep apnea)  doesn't use her CPAP at home      H/O breast surgery      History of surgery  urethral sling          SOCIAL HISTORY  Social History:  Tobacco use: No  EtOH use: rarely  Illicit drug use: No  Marital Status:  (22 Sep 2022 17:18)      Home Medications:  amLODIPine 5 mg oral tablet: 1 tab(s) orally once a day (31 Jan 2023 01:43)  atorvastatin:  (31 Jan 2023 01:43)  metFORMIN 1000 mg oral tablet: 1 tab(s) orally 2 times a day (31 Jan 2023 01:43)  Prolixin Decanoate 25 mg/mL injectable solution: 50 milligram(s) injectable every 2 weeks (31 Jan 2023 01:43)      ROS  General: Denies fevers, chills, nightsweats, weight loss  HEENT: Denies headache, rhinorrhea, sore throat, eye pain  CV: Denies CP, palpitations  PULM: Denies SOB, cough  GI: Denies abdominal pain, diarrhea  : Denies dysuria, hematuria  MSK: Denies arthralgias  SKIN: Denies rash   NEURO: Denies paresthesias, weakness  PSYCH: Denies depression    VITALS:  T(F): --  HR: 106  BP: 146/71  RR: 18Vital Signs Last 24 Hrs  T(C): --  T(F): --  HR: 106 (28 Mar 2023 08:40) (106 - 106)  BP: 146/71 (28 Mar 2023 08:40) (146/71 - 146/71)  BP(mean): --  RR: 18 (28 Mar 2023 08:40) (18 - 18)  SpO2: --        PHYSICAL EXAM:  Gen: NAD, resting in bed  HEENT: Normocephalic, atraumatic  Neck: supple, no lymphadenopathy  CV: Regular rate & regular rhythm  Lungs: CTABL no wheeze  Abdomen: Soft, NTND+ BS present  Ext: Warm, well perfused no CCE  Neuro: non focal, awake, CN II-XII intact   Skin: no rash, no erythema  Psych: no SI, HI, Hallucination     TESTS & MEASUREMENTS:                        11.7   6.88  )-----------( 139      ( 27 Mar 2023 12:27 )             33.8     03-27    141  |  103  |  14  ----------------------------<  176<H>  4.1   |  25  |  0.9    Ca    10.8<H>      27 Mar 2023 12:27    TPro  7.6  /  Alb  5.0  /  TBili  0.3  /  DBili  x   /  AST  16  /  ALT  12  /  AlkPhos  92  03-27      LIVER FUNCTIONS - ( 27 Mar 2023 12:27 )  Alb: 5.0 g/dL / Pro: 7.6 g/dL / ALK PHOS: 92 U/L / ALT: 12 U/L / AST: 16 U/L / GGT: x               COVID-19 PCR: NotDetec (27 Mar 2023 21:26)      QRS axis to [] ° and NSR at a rate of [] BPM. There was no atrial enlargement. There was no ventricular hypertrophy. There were no ST-T changes and all intervals were normal.      INFECTIOUS DISEASES TESTING      RADIOLOGY & ADDITIONAL TESTS:  I have personally reviewed the last Chest xray  CXR      CT      CARDIOLOGY TESTING  12 Lead ECG:   Ventricular Rate 81 BPM    Atrial Rate 81 BPM    P-R Interval 146 ms    QRS Duration 88 ms    Q-T Interval 396 ms    QTC Calculation(Bazett) 460 ms    P Axis 43 degrees    R Axis -35 degrees    T Axis 28 degrees    Diagnosis Line Normal sinus rhythm with sinus arrhythmia  Left axis deviation  Moderate voltage criteria for LVH, may be normal variant  Abnormal ECG    Confirmed by DIAMOND DRIVER MD ([id number]) on 3/27/2023 12:21:36 PM (03-27-23 @ 12:10)      MEDICATIONS  (STANDING):  amLODIPine   Tablet 5 milliGRAM(s) Oral daily  atorvastatin 20 milliGRAM(s) Oral at bedtime  doxycycline monohydrate Suspension 20 milliGRAM(s) Oral every 12 hours  fluPHENAZine 5 milliGRAM(s) Oral daily  fluPHENAZine 10 milliGRAM(s) Oral at bedtime  gemfibrozil 600 milliGRAM(s) Oral two times a day  metFORMIN 1000 milliGRAM(s) Oral two times a day  traZODone 50 milliGRAM(s) Oral at bedtime    MEDICATIONS  (PRN):  albuterol    90 MICROgram(s) HFA Inhaler 2 Puff(s) Inhalation every 6 hours PRN SOB/home med  haloperidol     Tablet 5 milliGRAM(s) Oral every 8 hours PRN agitation  LORazepam     Tablet 2 milliGRAM(s) Oral every 8 hours PRN severe agitation  LORazepam     Tablet 0.5 milliGRAM(s) Oral two times a day PRN anxiety/home med      ANTIBIOTICS:  doxycycline monohydrate Suspension 20 milliGRAM(s) Oral every 12 hours      All available historical data has been reviewed    ASSESSMENT  52y F admitted with Bipolar affective disorder, current episode hypomanic        PROBLEMS

## 2023-03-29 LAB
A1C WITH ESTIMATED AVERAGE GLUCOSE RESULT: 6.9 % — HIGH (ref 4–5.6)
CHOLEST SERPL-MCNC: 180 MG/DL — SIGNIFICANT CHANGE UP
ESTIMATED AVERAGE GLUCOSE: 151 MG/DL — HIGH (ref 68–114)
HDLC SERPL-MCNC: 55 MG/DL — SIGNIFICANT CHANGE UP
LIPID PNL WITH DIRECT LDL SERPL: 93 MG/DL — SIGNIFICANT CHANGE UP
NON HDL CHOLESTEROL: 125 MG/DL — SIGNIFICANT CHANGE UP
PCP SPEC-MCNC: SIGNIFICANT CHANGE UP
TRIGL SERPL-MCNC: 162 MG/DL — HIGH
TSH SERPL-MCNC: 0.94 UIU/ML — SIGNIFICANT CHANGE UP (ref 0.27–4.2)

## 2023-03-29 PROCEDURE — 99232 SBSQ HOSP IP/OBS MODERATE 35: CPT

## 2023-03-29 PROCEDURE — 99231 SBSQ HOSP IP/OBS SF/LOW 25: CPT

## 2023-03-29 RX ORDER — LORATADINE 10 MG/1
10 TABLET ORAL ONCE
Refills: 0 | Status: COMPLETED | OUTPATIENT
Start: 2023-03-29 | End: 2023-03-29

## 2023-03-29 RX ORDER — LORATADINE 10 MG/1
10 TABLET ORAL DAILY
Refills: 0 | Status: DISCONTINUED | OUTPATIENT
Start: 2023-03-30 | End: 2023-04-05

## 2023-03-29 RX ORDER — SALICYLIC ACID 0.5 %
1 CLEANSER (GRAM) TOPICAL
Refills: 0 | Status: DISCONTINUED | OUTPATIENT
Start: 2023-03-29 | End: 2023-04-05

## 2023-03-29 RX ORDER — ACETAMINOPHEN 500 MG
650 TABLET ORAL EVERY 8 HOURS
Refills: 0 | Status: DISCONTINUED | OUTPATIENT
Start: 2023-03-29 | End: 2023-04-05

## 2023-03-29 RX ADMIN — AMLODIPINE BESYLATE 5 MILLIGRAM(S): 2.5 TABLET ORAL at 08:53

## 2023-03-29 RX ADMIN — Medication 600 MILLIGRAM(S): at 20:26

## 2023-03-29 RX ADMIN — Medication 20 MILLIGRAM(S): at 20:26

## 2023-03-29 RX ADMIN — FLUPHENAZINE HYDROCHLORIDE 10 MILLIGRAM(S): 1 TABLET, FILM COATED ORAL at 20:25

## 2023-03-29 RX ADMIN — Medication 650 MILLIGRAM(S): at 20:43

## 2023-03-29 RX ADMIN — Medication 600 MILLIGRAM(S): at 08:53

## 2023-03-29 RX ADMIN — METFORMIN HYDROCHLORIDE 1000 MILLIGRAM(S): 850 TABLET ORAL at 08:49

## 2023-03-29 RX ADMIN — ATORVASTATIN CALCIUM 20 MILLIGRAM(S): 80 TABLET, FILM COATED ORAL at 20:26

## 2023-03-29 RX ADMIN — METFORMIN HYDROCHLORIDE 1000 MILLIGRAM(S): 850 TABLET ORAL at 20:26

## 2023-03-29 RX ADMIN — Medication 0.5 MILLIGRAM(S): at 00:36

## 2023-03-29 RX ADMIN — Medication 50 MILLIGRAM(S): at 20:26

## 2023-03-29 RX ADMIN — LORATADINE 10 MILLIGRAM(S): 10 TABLET ORAL at 13:17

## 2023-03-29 RX ADMIN — Medication 200 MILLIGRAM(S): at 17:39

## 2023-03-29 RX ADMIN — Medication 100 MILLIGRAM(S): at 20:26

## 2023-03-29 RX ADMIN — Medication 1 APPLICATION(S): at 20:43

## 2023-03-29 RX ADMIN — Medication 200 MILLIGRAM(S): at 08:48

## 2023-03-29 NOTE — BH SAFETY PLAN - ENVIRONMENT SAFETY 1:
I feel like my environment would be safer if I made sure I stayed current on my medications, therapy and wellness. I also feel safe when I make sure I keep my door is locked and secure where I live.

## 2023-03-29 NOTE — BH INPATIENT PSYCHIATRY PROGRESS NOTE - NSBHFUPINTERVALHXFT_PSY_A_CORE
patient continues to be tearful at times and reports she is "okay part of the time but often feels like I could not make it".  We discussed the fact that she is getting better and she identifies this clearly

## 2023-03-29 NOTE — BH INPATIENT PSYCHIATRY PROGRESS NOTE - NSBHMETABOLIC_PSY_ALL_CORE_FT
BMI: BMI (kg/m2): 30.1 (03-28-23 @ 22:20)  HbA1c: A1C with Estimated Average Glucose Result: 6.9 % (03-29-23 @ 07:15)    Glucose: POCT Blood Glucose.: 168 mg/dL (03-28-23 @ 11:38)    BP: 147/77 (03-29-23 @ 16:20) (100/65 - 147/77)  Lipid Panel: Date/Time: 03-29-23 @ 07:15  Cholesterol, Serum: 180  Direct LDL: --  HDL Cholesterol, Serum: 55  Total Cholesterol/HDL Ration Measurement: --  Triglycerides, Serum: 162   BMI: BMI (kg/m2): 30.1 (03-28-23 @ 22:20)  HbA1c: A1C with Estimated Average Glucose Result: 6.9 % (03-29-23 @ 07:15)    Glucose: POCT Blood Glucose.: 216 mg/dL (03-30-23 @ 16:45)    BP: 136/90 (03-30-23 @ 16:16) (100/65 - 147/77)  Lipid Panel: Date/Time: 03-29-23 @ 07:15  Cholesterol, Serum: 180  Direct LDL: --  HDL Cholesterol, Serum: 55  Total Cholesterol/HDL Ration Measurement: --  Triglycerides, Serum: 162

## 2023-03-29 NOTE — BH INPATIENT PSYCHIATRY PROGRESS NOTE - PRN MEDS
MEDICATIONS  (PRN):  albuterol    90 MICROgram(s) HFA Inhaler 2 Puff(s) Inhalation every 6 hours PRN SOB/home med  haloperidol     Tablet 5 milliGRAM(s) Oral every 8 hours PRN agitation  LORazepam     Tablet 2 milliGRAM(s) Oral every 8 hours PRN severe agitation  LORazepam     Tablet 0.5 milliGRAM(s) Oral two times a day PRN anxiety/home med   MEDICATIONS  (PRN):  acetaminophen     Tablet .. 650 milliGRAM(s) Oral every 8 hours PRN Mild Pain (1 - 3)  albuterol    90 MICROgram(s) HFA Inhaler 2 Puff(s) Inhalation every 6 hours PRN SOB/home med  dextrose Oral Gel 15 Gram(s) Oral once PRN Blood Glucose LESS THAN 70 milliGRAM(s)/deciliter  haloperidol     Tablet 5 milliGRAM(s) Oral every 8 hours PRN agitation  LORazepam     Tablet 2 milliGRAM(s) Oral every 8 hours PRN severe agitation  LORazepam     Tablet 0.5 milliGRAM(s) Oral two times a day PRN anxiety/home med

## 2023-03-29 NOTE — BH SAFETY PLAN - LOCAL URGENT CARE ADDRESS
[facility] is located at [address] [place] [address] (inside the [facility]).

## 2023-03-29 NOTE — BH INPATIENT PSYCHIATRY PROGRESS NOTE - NSBHASSESSSUMMFT_PSY_ALL_CORE
Pt is a 52 year old woman, , living with her two daughters and dog, on disability, past medical hx of breast cancer in remission, past medical hx of bipolar disorder in outpatient treatment on PATHAK at [facility], presented to ED after calling mobile crisis at the encouragement of family due to disorganized behavior at home, admitted to [facility] for treatment of manic episode. On approach, patient is alert, oriented, and cooperative with interview.    Patient's current presentation is consistent with decompensation of her known bipolar disorder. Patient endorses medication compliance, no clear indication that she has been noncompliant. Will explore possible reasons for decompensation during this admission. No SI/HI/AVH elicited on interview. Will resume home medications for now.    PLAN    #Bipolar Disorder, current episode mixed  - carBAMazepine 200 milliGRAM(s) Oral two times a day  - carBAMazepine ER Capsule. 100 milliGRAM(s) Oral at bedtime  - fluPHENAZine 5 milliGRAM(s) Oral daily  - fluPHENAZine 10 milliGRAM(s) Oral at bedtime  - traZODone 50 milliGRAM(s) Oral at bedtime  - f/u AM labs including urine tox    #HTN  - amLODIPine   Tablet 5 milliGRAM(s) Oral daily    #HLD  - atorvastatin 20 milliGRAM(s) Oral at bedtime  - gemfibrozil 600 milliGRAM(s) Oral two times a day    #DM  - metFORMIN 1000 milliGRAM(s) Oral two times a day    #dental infection/prophylaxis(?)   - doxycycline monohydrate Suspension 20 milliGRAM(s) Oral every 12 hours (90 days from January 6th)         Pt is a 52 year old woman, , living with her two daughters and dog, on disability, past medical hx of breast cancer in remission, past medical hx of bipolar disorder in outpatient treatment on PATHAK at [facility], presented to ED after calling mobile crisis at the encouragement of family due to disorganized behavior at home, admitted to [facility] for treatment of manic episode. On approach, patient is alert, oriented, and cooperative with interview.    Patient's current presentation is consistent with decompensation of her known bipolar disorder. Patient endorses medication compliance, no clear indication that she has been noncompliant. Will explore possible reasons for decompensation during this admission. No SI/HI/AVH elicited on interview. Will resume home medications for now.      3/29/23  patient's affect is somewhat labile today but  Improving.    PLAN    #Bipolar Disorder, current episode mixed  - carBAMazepine 200 milliGRAM(s) Oral two times a day  - carBAMazepine ER Capsule. 100 milliGRAM(s) Oral at bedtime  - fluPHENAZine 5 milliGRAM(s) Oral daily  - fluPHENAZine 10 milliGRAM(s) Oral at bedtime  - traZODone 50 milliGRAM(s) Oral at bedtime  - f/u AM labs including urine tox    #HTN  - amLODIPine   Tablet 5 milliGRAM(s) Oral daily    #HLD  - atorvastatin 20 milliGRAM(s) Oral at bedtime  - gemfibrozil 600 milliGRAM(s) Oral two times a day    #DM  - metFORMIN 1000 milliGRAM(s) Oral two times a day    #dental infection/prophylaxis(?)   - doxycycline monohydrate Suspension 20 milliGRAM(s) Oral every 12 hours (90 days from January 6th)

## 2023-03-29 NOTE — BH INPATIENT PSYCHIATRY PROGRESS NOTE - NSBHCHARTREVIEWVS_PSY_A_CORE FT
Vital Signs Last 24 Hrs  T(C): 35.9 (03-29-23 @ 16:20), Max: 36.6 (03-29-23 @ 06:29)  T(F): 96.7 (03-29-23 @ 16:20), Max: 97.9 (03-29-23 @ 06:29)  HR: 100 (03-29-23 @ 16:20) (81 - 108)  BP: 147/77 (03-29-23 @ 16:20) (100/65 - 147/77)  BP(mean): --  RR: 20 (03-29-23 @ 16:20) (18 - 20)  SpO2: --     Vital Signs Last 24 Hrs  T(C): --  T(F): --  HR: 74 (03-30-23 @ 16:16) (74 - 74)  BP: 136/90 (03-30-23 @ 16:16) (136/90 - 136/90)  BP(mean): --  RR: 18 (03-30-23 @ 16:16) (18 - 18)  SpO2: 98% (03-30-23 @ 16:16) (98% - 98%)

## 2023-03-29 NOTE — BH INPATIENT PSYCHIATRY PROGRESS NOTE - CURRENT MEDICATION
MEDICATIONS  (STANDING):  amLODIPine   Tablet 5 milliGRAM(s) Oral daily  atorvastatin 20 milliGRAM(s) Oral at bedtime  carBAMazepine 200 milliGRAM(s) Oral two times a day  carBAMazepine ER Capsule. 100 milliGRAM(s) Oral at bedtime  doxycycline monohydrate Suspension 20 milliGRAM(s) Oral every 12 hours  fluPHENAZine 5 milliGRAM(s) Oral daily  fluPHENAZine 10 milliGRAM(s) Oral at bedtime  gemfibrozil 600 milliGRAM(s) Oral two times a day  loratadine 10 milliGRAM(s) Oral daily  metFORMIN 1000 milliGRAM(s) Oral two times a day  traZODone 50 milliGRAM(s) Oral at bedtime    MEDICATIONS  (PRN):  albuterol    90 MICROgram(s) HFA Inhaler 2 Puff(s) Inhalation every 6 hours PRN SOB/home med  haloperidol     Tablet 5 milliGRAM(s) Oral every 8 hours PRN agitation  LORazepam     Tablet 2 milliGRAM(s) Oral every 8 hours PRN severe agitation  LORazepam     Tablet 0.5 milliGRAM(s) Oral two times a day PRN anxiety/home med   MEDICATIONS  (STANDING):  amLODIPine   Tablet 5 milliGRAM(s) Oral daily  atorvastatin 20 milliGRAM(s) Oral at bedtime  carBAMazepine 200 milliGRAM(s) Oral two times a day  carBAMazepine ER Capsule. 100 milliGRAM(s) Oral at bedtime  dextrose 5%. 1000 milliLiter(s) (50 mL/Hr) IV Continuous <Continuous>  dextrose 5%. 1000 milliLiter(s) (100 mL/Hr) IV Continuous <Continuous>  dextrose 50% Injectable 25 Gram(s) IV Push once  dextrose 50% Injectable 12.5 Gram(s) IV Push once  dextrose 50% Injectable 25 Gram(s) IV Push once  doxycycline monohydrate Suspension 20 milliGRAM(s) Oral every 12 hours  fluPHENAZine 5 milliGRAM(s) Oral daily  fluPHENAZine 10 milliGRAM(s) Oral at bedtime  gemfibrozil 600 milliGRAM(s) Oral two times a day  glucagon  Injectable 1 milliGRAM(s) IntraMuscular once  insulin glargine Injectable (LANTUS) 10 Unit(s) SubCutaneous at bedtime  insulin lispro (ADMELOG) corrective regimen sliding scale   SubCutaneous three times a day before meals  loratadine 10 milliGRAM(s) Oral daily  metFORMIN 1000 milliGRAM(s) Oral two times a day  traZODone 50 milliGRAM(s) Oral at bedtime  vitamin A &amp; D Ointment 1 Application(s) Topical two times a day    MEDICATIONS  (PRN):  acetaminophen     Tablet .. 650 milliGRAM(s) Oral every 8 hours PRN Mild Pain (1 - 3)  albuterol    90 MICROgram(s) HFA Inhaler 2 Puff(s) Inhalation every 6 hours PRN SOB/home med  dextrose Oral Gel 15 Gram(s) Oral once PRN Blood Glucose LESS THAN 70 milliGRAM(s)/deciliter  haloperidol     Tablet 5 milliGRAM(s) Oral every 8 hours PRN agitation  LORazepam     Tablet 2 milliGRAM(s) Oral every 8 hours PRN severe agitation  LORazepam     Tablet 0.5 milliGRAM(s) Oral two times a day PRN anxiety/home med

## 2023-03-30 LAB
GLUCOSE BLDC GLUCOMTR-MCNC: 216 MG/DL — HIGH (ref 70–99)
GLUCOSE BLDC GLUCOMTR-MCNC: 270 MG/DL — HIGH (ref 70–99)

## 2023-03-30 PROCEDURE — 99231 SBSQ HOSP IP/OBS SF/LOW 25: CPT

## 2023-03-30 RX ORDER — INSULIN GLARGINE 100 [IU]/ML
10 INJECTION, SOLUTION SUBCUTANEOUS AT BEDTIME
Refills: 0 | Status: DISCONTINUED | OUTPATIENT
Start: 2023-03-30 | End: 2023-03-31

## 2023-03-30 RX ORDER — DEXTROSE 50 % IN WATER 50 %
15 SYRINGE (ML) INTRAVENOUS ONCE
Refills: 0 | Status: DISCONTINUED | OUTPATIENT
Start: 2023-03-30 | End: 2023-04-05

## 2023-03-30 RX ORDER — SODIUM CHLORIDE 9 MG/ML
1000 INJECTION, SOLUTION INTRAVENOUS
Refills: 0 | Status: DISCONTINUED | OUTPATIENT
Start: 2023-03-30 | End: 2023-04-05

## 2023-03-30 RX ORDER — GLUCAGON INJECTION, SOLUTION 0.5 MG/.1ML
1 INJECTION, SOLUTION SUBCUTANEOUS ONCE
Refills: 0 | Status: DISCONTINUED | OUTPATIENT
Start: 2023-03-30 | End: 2023-04-05

## 2023-03-30 RX ORDER — DEXTROSE 50 % IN WATER 50 %
25 SYRINGE (ML) INTRAVENOUS ONCE
Refills: 0 | Status: DISCONTINUED | OUTPATIENT
Start: 2023-03-30 | End: 2023-04-05

## 2023-03-30 RX ORDER — DEXTROSE 50 % IN WATER 50 %
12.5 SYRINGE (ML) INTRAVENOUS ONCE
Refills: 0 | Status: DISCONTINUED | OUTPATIENT
Start: 2023-03-30 | End: 2023-04-05

## 2023-03-30 RX ORDER — INSULIN LISPRO 100/ML
VIAL (ML) SUBCUTANEOUS
Refills: 0 | Status: DISCONTINUED | OUTPATIENT
Start: 2023-03-30 | End: 2023-04-05

## 2023-03-30 RX ADMIN — Medication 1 APPLICATION(S): at 08:47

## 2023-03-30 RX ADMIN — Medication 0.5 MILLIGRAM(S): at 02:27

## 2023-03-30 RX ADMIN — INSULIN GLARGINE 10 UNIT(S): 100 INJECTION, SOLUTION SUBCUTANEOUS at 21:00

## 2023-03-30 RX ADMIN — LORATADINE 10 MILLIGRAM(S): 10 TABLET ORAL at 20:28

## 2023-03-30 RX ADMIN — Medication 20 MILLIGRAM(S): at 08:45

## 2023-03-30 RX ADMIN — Medication 200 MILLIGRAM(S): at 20:28

## 2023-03-30 RX ADMIN — Medication 100 MILLIGRAM(S): at 21:27

## 2023-03-30 RX ADMIN — Medication 50 MILLIGRAM(S): at 20:28

## 2023-03-30 RX ADMIN — METFORMIN HYDROCHLORIDE 1000 MILLIGRAM(S): 850 TABLET ORAL at 08:44

## 2023-03-30 RX ADMIN — Medication 20 MILLIGRAM(S): at 20:26

## 2023-03-30 RX ADMIN — ATORVASTATIN CALCIUM 20 MILLIGRAM(S): 80 TABLET, FILM COATED ORAL at 20:28

## 2023-03-30 RX ADMIN — Medication 600 MILLIGRAM(S): at 20:30

## 2023-03-30 RX ADMIN — FLUPHENAZINE HYDROCHLORIDE 10 MILLIGRAM(S): 1 TABLET, FILM COATED ORAL at 20:28

## 2023-03-30 RX ADMIN — FLUPHENAZINE HYDROCHLORIDE 5 MILLIGRAM(S): 1 TABLET, FILM COATED ORAL at 08:44

## 2023-03-30 RX ADMIN — Medication 200 MILLIGRAM(S): at 08:44

## 2023-03-30 RX ADMIN — METFORMIN HYDROCHLORIDE 1000 MILLIGRAM(S): 850 TABLET ORAL at 20:28

## 2023-03-30 RX ADMIN — Medication 1 APPLICATION(S): at 20:28

## 2023-03-30 RX ADMIN — Medication 4: at 16:47

## 2023-03-30 NOTE — BH INPATIENT PSYCHIATRY PROGRESS NOTE - NSBHASSESSSUMMFT_PSY_ALL_CORE
Pt is a 52 year old woman, , living with her two daughters and dog, on disability, past medical hx of breast cancer in remission, past medical hx of bipolar disorder in outpatient treatment on PATHAK at [facility], presented to ED after calling mobile crisis at the encouragement of family due to disorganized behavior at home, admitted to [facility] for treatment of manic episode. On approach, patient is alert, oriented, and cooperative with interview.    Patient's current presentation is consistent with decompensation of her known bipolar disorder. Patient endorses medication compliance, no clear indication that she has been noncompliant. Will explore possible reasons for decompensation during this admission. No SI/HI/AVH elicited on interview. Will resume home medications for now.  3/30: Patient's decompensation likely related to going off of her oral medications due to daytime fatigue as well as sleep issues d/t youngest daughter's nocturnal lifestyle. Unable to reach outpt psychiatrist to discuss, left message w/ callback number. Patient does not appear oversedated at this time    PLAN    #Bipolar Disorder, current episode mixed  - carBAMazepine 200 milliGRAM(s) Oral two times a day  - carBAMazepine ER Capsule. 100 milliGRAM(s) Oral at bedtime  - fluPHENAZine 5 milliGRAM(s) Oral daily  - fluPHENAZine 10 milliGRAM(s) Oral at bedtime  - traZODone 50 milliGRAM(s) Oral at bedtime  - utox negative    #HTN  - amLODIPine   Tablet 5 milliGRAM(s) Oral daily    #HLD  - atorvastatin 20 milliGRAM(s) Oral at bedtime  - gemfibrozil 600 milliGRAM(s) Oral two times a day    #DM  - metFORMIN 1000 milliGRAM(s) Oral two times a day    #dental infection/prophylaxis(?)   - doxycycline monohydrate Suspension 20 milliGRAM(s) Oral every 12 hours (90 days from January 6th)         Pt is a 52 year old woman, , living with her two daughters and dog, on disability, past medical hx of breast cancer in remission, past medical hx of bipolar disorder in outpatient treatment on PATHAK at [facility], presented to ED after calling mobile crisis at the encouragement of family due to disorganized behavior at home, admitted to [facility] for treatment of manic episode. On approach, patient is alert, oriented, and cooperative with interview.    Patient's current presentation is consistent with decompensation of her known bipolar disorder. Patient endorses medication compliance, no clear indication that she has been noncompliant. Will explore possible reasons for decompensation during this admission. No SI/HI/AVH elicited on interview. Will resume home medications for now.  3/30: Patient's decompensation likely related to going off of her oral medications due to daytime fatigue as well as sleep issues d/t youngest daughter's nocturnal lifestyle. Unable to reach outpt psychiatrist to discuss, left message w/ callback number. Patient does not appear oversedated at this time    PLAN    #Bipolar Disorder, current episode mixed  - carBAMazepine 200 milliGRAM(s) Oral two times a day  - carBAMazepine ER Capsule. 100 milliGRAM(s) Oral at bedtime  - fluPHENAZine 5 milliGRAM(s) Oral daily  - fluPHENAZine 10 milliGRAM(s) Oral at bedtime  - traZODone 50 milliGRAM(s) Oral at bedtime  - utox negative    #HTN  - amLODIPine   Tablet 5 milliGRAM(s) Oral daily    #HLD  - atorvastatin 20 milliGRAM(s) Oral at bedtime  - gemfibrozil 600 milliGRAM(s) Oral two times a day    #DM  - metFORMIN 1000 milliGRAM(s) Oral two times a day  - Latus 10 mg qhs  - Sliding Scale    #dental infection/prophylaxis(?)   - doxycycline monohydrate Suspension 20 milliGRAM(s) Oral every 12 hours (90 days from January 6th)

## 2023-03-30 NOTE — BH TREATMENT PLAN - NSTXPLANTHERAPYSESSIONSFT_PSY_ALL_CORE
03-28-23  Type of therapy: Daily living skills  Type of session: Group  Level of patient participation: Engaged  Duration of participation: 60 minutes  Therapy conducted by: Social work  Therapy Summary: Group session facilitated by SW intern to highlight the importance of self-care during IPP hospitalization and post discharge.     Shama was present for the group duraiton, though she was disoganized and needed frequent redirection to stay oriented to the group topic.    03-29-23  Type of therapy: Dialectical behavior therapy,Coping skills  Type of session: Group  Level of patient participation: Engaged,Participated with encouragement  Duration of participation: 45 minutes  Therapy conducted by: Social work  Therapy Summary: Patient attended the Crisis Skills Group with  and peers. “Urge Surfing” was taught which helps with managing unwanted behaviors by acknowledging the urge, riding it out, managing triggers and using delay and distraction skills.     Shama was an active listener. She appeared open to the therapeutic intervention. She identified ways she can "delay and distract" such as "contacting the 8 suicide and crisis hotline" when needing support. She remained present for the duration of the group and is encouraged to attend future sessions.

## 2023-03-30 NOTE — BH TREATMENT PLAN - NSTXCOPEINTERSW_PSY_ALL_CORE
SW will provide resources to improve healthy coping skills.
SW will provide resources to improve healthy coping skills.

## 2023-03-30 NOTE — BH TREATMENT PLAN - NSTXMANICGOAL_PSY_ALL_CORE
Be able to identify the early signs of casi (e.g. sleep and mood changes) and to employ coping strategies to minimize acting out
Be able to identify the early signs of casi (e.g. sleep and mood changes) and to employ coping strategies to minimize acting out

## 2023-03-30 NOTE — BH INPATIENT PSYCHIATRY PROGRESS NOTE - PRN MEDS
MEDICATIONS  (PRN):  acetaminophen     Tablet .. 650 milliGRAM(s) Oral every 8 hours PRN Mild Pain (1 - 3)  albuterol    90 MICROgram(s) HFA Inhaler 2 Puff(s) Inhalation every 6 hours PRN SOB/home med  dextrose Oral Gel 15 Gram(s) Oral once PRN Blood Glucose LESS THAN 70 milliGRAM(s)/deciliter  haloperidol     Tablet 5 milliGRAM(s) Oral every 8 hours PRN agitation  LORazepam     Tablet 2 milliGRAM(s) Oral every 8 hours PRN severe agitation  LORazepam     Tablet 0.5 milliGRAM(s) Oral two times a day PRN anxiety/home med

## 2023-03-30 NOTE — BH INPATIENT PSYCHIATRY PROGRESS NOTE - CURRENT MEDICATION
MEDICATIONS  (STANDING):  amLODIPine   Tablet 5 milliGRAM(s) Oral daily  atorvastatin 20 milliGRAM(s) Oral at bedtime  carBAMazepine 200 milliGRAM(s) Oral two times a day  carBAMazepine ER Capsule. 100 milliGRAM(s) Oral at bedtime  dextrose 5%. 1000 milliLiter(s) (50 mL/Hr) IV Continuous <Continuous>  dextrose 5%. 1000 milliLiter(s) (100 mL/Hr) IV Continuous <Continuous>  dextrose 50% Injectable 25 Gram(s) IV Push once  dextrose 50% Injectable 12.5 Gram(s) IV Push once  dextrose 50% Injectable 25 Gram(s) IV Push once  doxycycline monohydrate Suspension 20 milliGRAM(s) Oral every 12 hours  fluPHENAZine 5 milliGRAM(s) Oral daily  fluPHENAZine 10 milliGRAM(s) Oral at bedtime  gemfibrozil 600 milliGRAM(s) Oral two times a day  glucagon  Injectable 1 milliGRAM(s) IntraMuscular once  insulin glargine Injectable (LANTUS) 10 Unit(s) SubCutaneous at bedtime  insulin lispro (ADMELOG) corrective regimen sliding scale   SubCutaneous three times a day before meals  loratadine 10 milliGRAM(s) Oral daily  metFORMIN 1000 milliGRAM(s) Oral two times a day  traZODone 50 milliGRAM(s) Oral at bedtime  vitamin A &amp; D Ointment 1 Application(s) Topical two times a day    MEDICATIONS  (PRN):  acetaminophen     Tablet .. 650 milliGRAM(s) Oral every 8 hours PRN Mild Pain (1 - 3)  albuterol    90 MICROgram(s) HFA Inhaler 2 Puff(s) Inhalation every 6 hours PRN SOB/home med  dextrose Oral Gel 15 Gram(s) Oral once PRN Blood Glucose LESS THAN 70 milliGRAM(s)/deciliter  haloperidol     Tablet 5 milliGRAM(s) Oral every 8 hours PRN agitation  LORazepam     Tablet 2 milliGRAM(s) Oral every 8 hours PRN severe agitation  LORazepam     Tablet 0.5 milliGRAM(s) Oral two times a day PRN anxiety/home med

## 2023-03-30 NOTE — BH INPATIENT PSYCHIATRY PROGRESS NOTE - NSBHCHARTREVIEWVS_PSY_A_CORE FT
Vital Signs Last 24 Hrs  T(C): 35.9 (03-29-23 @ 16:20), Max: 35.9 (03-29-23 @ 16:20)  T(F): 96.7 (03-29-23 @ 16:20), Max: 96.7 (03-29-23 @ 16:20)  HR: 100 (03-29-23 @ 16:20) (100 - 100)  BP: 147/77 (03-29-23 @ 16:20) (147/77 - 147/77)  BP(mean): --  RR: 20 (03-29-23 @ 16:20) (20 - 20)  SpO2: --

## 2023-03-30 NOTE — BH INPATIENT PSYCHIATRY PROGRESS NOTE - NSBHFUPINTERVALHXFT_PSY_A_CORE
Patient reports that her psychiatrist Dr. Hunt instructed her to stop taking the oral fluphenazine and just try the PATHAK due to daytime drowsiness that was impacting her ability to drive. She has vomited once but does not want anti-nausea medications. Otherwise she is feeling well. No SI/HI/AVH elicited.    Office staff reports that Dr. Hunt won't be back in office until Monday 4/3 and staff is unable to discuss medication changes, left message with callback number at [phone].

## 2023-03-30 NOTE — BH INPATIENT PSYCHIATRY PROGRESS NOTE - NSBHMETABOLIC_PSY_ALL_CORE_FT
BMI: BMI (kg/m2): 30.1 (03-28-23 @ 22:20)  HbA1c: A1C with Estimated Average Glucose Result: 6.9 % (03-29-23 @ 07:15)    Glucose: POCT Blood Glucose.: 168 mg/dL (03-28-23 @ 11:38)    BP: 147/77 (03-29-23 @ 16:20) (100/65 - 147/77)  Lipid Panel: Date/Time: 03-29-23 @ 07:15  Cholesterol, Serum: 180  Direct LDL: --  HDL Cholesterol, Serum: 55  Total Cholesterol/HDL Ration Measurement: --  Triglycerides, Serum: 162

## 2023-03-30 NOTE — BH TREATMENT PLAN - NSTXMANICINTERRN_PSY_ALL_CORE
RN will monitor for s/sx of casi  RN will offer PRN medications
RN will monitor for s/sx of casi  RN will offer PRN medications

## 2023-03-31 LAB
GLUCOSE BLDC GLUCOMTR-MCNC: 150 MG/DL — HIGH (ref 70–99)
GLUCOSE BLDC GLUCOMTR-MCNC: 214 MG/DL — HIGH (ref 70–99)
GLUCOSE BLDC GLUCOMTR-MCNC: 226 MG/DL — HIGH (ref 70–99)

## 2023-03-31 PROCEDURE — 99231 SBSQ HOSP IP/OBS SF/LOW 25: CPT

## 2023-03-31 RX ORDER — INSULIN GLARGINE 100 [IU]/ML
15 INJECTION, SOLUTION SUBCUTANEOUS AT BEDTIME
Refills: 0 | Status: DISCONTINUED | OUTPATIENT
Start: 2023-03-31 | End: 2023-04-05

## 2023-03-31 RX ORDER — PRAZOSIN HCL 2 MG
1 CAPSULE ORAL AT BEDTIME
Refills: 0 | Status: DISCONTINUED | OUTPATIENT
Start: 2023-03-31 | End: 2023-04-05

## 2023-03-31 RX ADMIN — INSULIN GLARGINE 15 UNIT(S): 100 INJECTION, SOLUTION SUBCUTANEOUS at 21:15

## 2023-03-31 RX ADMIN — Medication 600 MILLIGRAM(S): at 08:09

## 2023-03-31 RX ADMIN — FLUPHENAZINE HYDROCHLORIDE 5 MILLIGRAM(S): 1 TABLET, FILM COATED ORAL at 08:08

## 2023-03-31 RX ADMIN — AMLODIPINE BESYLATE 5 MILLIGRAM(S): 2.5 TABLET ORAL at 08:09

## 2023-03-31 RX ADMIN — Medication 50 MILLIGRAM(S): at 20:27

## 2023-03-31 RX ADMIN — Medication 1 APPLICATION(S): at 08:14

## 2023-03-31 RX ADMIN — Medication 600 MILLIGRAM(S): at 20:27

## 2023-03-31 RX ADMIN — ATORVASTATIN CALCIUM 20 MILLIGRAM(S): 80 TABLET, FILM COATED ORAL at 20:28

## 2023-03-31 RX ADMIN — Medication 1 MILLIGRAM(S): at 20:29

## 2023-03-31 RX ADMIN — Medication 200 MILLIGRAM(S): at 20:27

## 2023-03-31 RX ADMIN — Medication 200 MILLIGRAM(S): at 08:09

## 2023-03-31 RX ADMIN — FLUPHENAZINE HYDROCHLORIDE 10 MILLIGRAM(S): 1 TABLET, FILM COATED ORAL at 20:30

## 2023-03-31 RX ADMIN — Medication 4: at 08:05

## 2023-03-31 RX ADMIN — Medication 0.5 MILLIGRAM(S): at 00:14

## 2023-03-31 RX ADMIN — METFORMIN HYDROCHLORIDE 1000 MILLIGRAM(S): 850 TABLET ORAL at 20:30

## 2023-03-31 RX ADMIN — Medication 4: at 11:52

## 2023-03-31 RX ADMIN — LORATADINE 10 MILLIGRAM(S): 10 TABLET ORAL at 20:28

## 2023-03-31 RX ADMIN — Medication 100 MILLIGRAM(S): at 20:41

## 2023-03-31 RX ADMIN — Medication 20 MILLIGRAM(S): at 08:50

## 2023-03-31 RX ADMIN — METFORMIN HYDROCHLORIDE 1000 MILLIGRAM(S): 850 TABLET ORAL at 08:09

## 2023-03-31 RX ADMIN — Medication 20 MILLIGRAM(S): at 20:28

## 2023-03-31 NOTE — BH INPATIENT PSYCHIATRY PROGRESS NOTE - CURRENT MEDICATION
MEDICATIONS  (STANDING):  amLODIPine   Tablet 5 milliGRAM(s) Oral daily  atorvastatin 20 milliGRAM(s) Oral at bedtime  carBAMazepine 200 milliGRAM(s) Oral two times a day  carBAMazepine ER Capsule. 100 milliGRAM(s) Oral at bedtime  dextrose 5%. 1000 milliLiter(s) (50 mL/Hr) IV Continuous <Continuous>  dextrose 5%. 1000 milliLiter(s) (100 mL/Hr) IV Continuous <Continuous>  dextrose 50% Injectable 25 Gram(s) IV Push once  dextrose 50% Injectable 12.5 Gram(s) IV Push once  dextrose 50% Injectable 25 Gram(s) IV Push once  doxycycline monohydrate Suspension 20 milliGRAM(s) Oral every 12 hours  fluPHENAZine 10 milliGRAM(s) Oral at bedtime  gemfibrozil 600 milliGRAM(s) Oral two times a day  glucagon  Injectable 1 milliGRAM(s) IntraMuscular once  insulin glargine Injectable (LANTUS) 15 Unit(s) SubCutaneous at bedtime  insulin lispro (ADMELOG) corrective regimen sliding scale   SubCutaneous three times a day before meals  loratadine 10 milliGRAM(s) Oral daily  metFORMIN 1000 milliGRAM(s) Oral two times a day  prazosin. 1 milliGRAM(s) Oral at bedtime  traZODone 50 milliGRAM(s) Oral at bedtime  vitamin A &amp; D Ointment 1 Application(s) Topical two times a day    MEDICATIONS  (PRN):  acetaminophen     Tablet .. 650 milliGRAM(s) Oral every 8 hours PRN Mild Pain (1 - 3)  albuterol    90 MICROgram(s) HFA Inhaler 2 Puff(s) Inhalation every 6 hours PRN SOB/home med  dextrose Oral Gel 15 Gram(s) Oral once PRN Blood Glucose LESS THAN 70 milliGRAM(s)/deciliter  haloperidol     Tablet 5 milliGRAM(s) Oral every 8 hours PRN agitation  LORazepam     Tablet 2 milliGRAM(s) Oral every 8 hours PRN severe agitation  LORazepam     Tablet 0.5 milliGRAM(s) Oral two times a day PRN anxiety/home med

## 2023-03-31 NOTE — BH INPATIENT PSYCHIATRY PROGRESS NOTE - NSBHMETABOLIC_PSY_ALL_CORE_FT
BMI: BMI (kg/m2): 30.1 (03-28-23 @ 22:20)  HbA1c: A1C with Estimated Average Glucose Result: 6.9 % (03-29-23 @ 07:15)    Glucose: POCT Blood Glucose.: 226 mg/dL (03-31-23 @ 11:33)    BP: 133/65 (03-31-23 @ 09:39) (100/65 - 147/77)  Lipid Panel: Date/Time: 03-29-23 @ 07:15  Cholesterol, Serum: 180  Direct LDL: --  HDL Cholesterol, Serum: 55  Total Cholesterol/HDL Ration Measurement: --  Triglycerides, Serum: 162
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
## 2023-03-31 NOTE — BH INPATIENT PSYCHIATRY PROGRESS NOTE - NSBHASSESSSUMMFT_PSY_ALL_CORE
Pt is a 52 year old woman, , living with her two daughters and dog, on disability, past medical hx of breast cancer in remission, past medical hx of bipolar disorder in outpatient treatment on PATHAK at [facility], presented to ED after calling mobile crisis at the encouragement of family due to disorganized behavior at home, admitted to [facility] for treatment of manic episode. On approach, patient is alert, oriented, and cooperative with interview.    Patient's current presentation is consistent with decompensation of her known bipolar disorder. Patient endorses medication compliance, no clear indication that she has been noncompliant. Will explore possible reasons for decompensation during this admission. No SI/HI/AVH elicited on interview. Will resume home medications for now.  3/30: Patient's decompensation likely related to going off of her oral medications due to daytime fatigue as well as sleep issues d/t youngest daughter's nocturnal lifestyle. Unable to reach outpt psychiatrist to discuss, left message w/ callback number. Patient does not appear oversedated at this time.  3/31: Patient still overinclusive/tangential, complaining of PTSD nightmares, will add prazosin. Appears oversedated, was not taking any oral meds, will d/c AM oral prolixin    PLAN    #Bipolar Disorder, current episode mixed  - carBAMazepine 200 milliGRAM(s) Oral two times a day  - carBAMazepine ER Capsule. 100 milliGRAM(s) Oral at bedtime  - fluPHENAZine 10 milliGRAM(s) Oral at bedtime  - traZODone 50 milliGRAM(s) Oral at bedtime  - d/c fluPHENAZine 5 milliGRAM(s) Oral daily  - utox negative    #HTN  - amLODIPine   Tablet 5 milliGRAM(s) Oral daily    #HLD  - atorvastatin 20 milliGRAM(s) Oral at bedtime  - gemfibrozil 600 milliGRAM(s) Oral two times a day    #DM  - metFORMIN 1000 milliGRAM(s) Oral two times a day  - increase Lantus to 15 mg qhs  - Sliding Scale    #dental infection/prophylaxis(?)   - doxycycline monohydrate Suspension 20 milliGRAM(s) Oral every 12 hours (90 days from January 6th)         Pt is a 52 year old woman, , living with her two daughters and dog, on disability, past medical hx of breast cancer in remission, past medical hx of bipolar disorder in outpatient treatment on PATHAK at [facility], presented to ED after calling mobile crisis at the encouragement of family due to disorganized behavior at home, admitted to [facility] for treatment of manic episode. On approach, patient is alert, oriented, and cooperative with interview.    Patient's current presentation is consistent with decompensation of her known bipolar disorder. Patient endorses medication compliance, no clear indication that she has been noncompliant. Will explore possible reasons for decompensation during this admission. No SI/HI/AVH elicited on interview. Will resume home medications for now.  3/30: Patient's decompensation likely related to going off of her oral medications due to daytime fatigue as well as sleep issues d/t youngest daughter's nocturnal lifestyle. Unable to reach outpt psychiatrist to discuss, left message w/ callback number. Patient does not appear oversedated at this time.  3/31: Patient still overinclusive/tangential, complaining of PTSD nightmares, will add prazosin. Appears oversedated, was not taking any oral meds, will d/c AM oral prolixin    PLAN    #Bipolar Disorder, current episode mixed  - carBAMazepine 200 milliGRAM(s) Oral two times a day  - carBAMazepine ER Capsule. 100 milliGRAM(s) Oral at bedtime  - fluPHENAZine 10 milliGRAM(s) Oral at bedtime  - traZODone 50 milliGRAM(s) Oral at bedtime  - d/c fluPHENAZine 5 milliGRAM(s) Oral daily  - utox negative    #PTSD  #nightmares  - prazosin 1 mg qhs    #HTN  - amLODIPine   Tablet 5 milliGRAM(s) Oral daily    #HLD  - atorvastatin 20 milliGRAM(s) Oral at bedtime  - gemfibrozil 600 milliGRAM(s) Oral two times a day    #DM  - metFORMIN 1000 milliGRAM(s) Oral two times a day  - increase Lantus to 15 mg qhs  - Sliding Scale    #dental infection/prophylaxis(?)   - doxycycline monohydrate Suspension 20 milliGRAM(s) Oral every 12 hours (90 days from January 6th)

## 2023-03-31 NOTE — BH INPATIENT PSYCHIATRY PROGRESS NOTE - NSBHCHARTREVIEWVS_PSY_A_CORE FT
Vital Signs Last 24 Hrs  T(C): --  T(F): --  HR: 90 (03-31-23 @ 09:39) (74 - 90)  BP: 133/65 (03-31-23 @ 09:39) (133/65 - 136/90)  BP(mean): --  RR: 16 (03-31-23 @ 09:39) (16 - 18)  SpO2: 98% (03-30-23 @ 16:16) (98% - 98%)

## 2023-04-01 LAB
GLUCOSE BLDC GLUCOMTR-MCNC: 180 MG/DL — HIGH (ref 70–99)
GLUCOSE BLDC GLUCOMTR-MCNC: 290 MG/DL — HIGH (ref 70–99)

## 2023-04-01 PROCEDURE — 99232 SBSQ HOSP IP/OBS MODERATE 35: CPT

## 2023-04-01 RX ADMIN — AMLODIPINE BESYLATE 5 MILLIGRAM(S): 2.5 TABLET ORAL at 08:04

## 2023-04-01 RX ADMIN — Medication 6: at 16:17

## 2023-04-01 RX ADMIN — Medication 4: at 11:32

## 2023-04-01 RX ADMIN — Medication 1 APPLICATION(S): at 21:35

## 2023-04-01 RX ADMIN — Medication 200 MILLIGRAM(S): at 08:06

## 2023-04-01 RX ADMIN — FLUPHENAZINE HYDROCHLORIDE 10 MILLIGRAM(S): 1 TABLET, FILM COATED ORAL at 20:09

## 2023-04-01 RX ADMIN — METFORMIN HYDROCHLORIDE 1000 MILLIGRAM(S): 850 TABLET ORAL at 20:09

## 2023-04-01 RX ADMIN — Medication 600 MILLIGRAM(S): at 08:05

## 2023-04-01 RX ADMIN — Medication 50 MILLIGRAM(S): at 20:09

## 2023-04-01 RX ADMIN — ATORVASTATIN CALCIUM 20 MILLIGRAM(S): 80 TABLET, FILM COATED ORAL at 20:13

## 2023-04-01 RX ADMIN — Medication 1 APPLICATION(S): at 08:08

## 2023-04-01 RX ADMIN — Medication 200 MILLIGRAM(S): at 20:10

## 2023-04-01 RX ADMIN — Medication 600 MILLIGRAM(S): at 20:10

## 2023-04-01 RX ADMIN — METFORMIN HYDROCHLORIDE 1000 MILLIGRAM(S): 850 TABLET ORAL at 08:05

## 2023-04-01 RX ADMIN — Medication 1 MILLIGRAM(S): at 20:10

## 2023-04-01 RX ADMIN — Medication 6: at 07:52

## 2023-04-01 RX ADMIN — LORATADINE 10 MILLIGRAM(S): 10 TABLET ORAL at 20:09

## 2023-04-01 RX ADMIN — INSULIN GLARGINE 15 UNIT(S): 100 INJECTION, SOLUTION SUBCUTANEOUS at 20:09

## 2023-04-01 RX ADMIN — Medication 20 MILLIGRAM(S): at 21:22

## 2023-04-01 RX ADMIN — Medication 20 MILLIGRAM(S): at 09:51

## 2023-04-01 RX ADMIN — Medication 100 MILLIGRAM(S): at 21:34

## 2023-04-01 NOTE — BH INPATIENT PSYCHIATRY PROGRESS NOTE - CURRENT MEDICATION
MEDICATIONS  (STANDING):  amLODIPine   Tablet 5 milliGRAM(s) Oral daily  atorvastatin 20 milliGRAM(s) Oral at bedtime  carBAMazepine 200 milliGRAM(s) Oral two times a day  carBAMazepine ER Capsule. 100 milliGRAM(s) Oral at bedtime  dextrose 5%. 1000 milliLiter(s) (50 mL/Hr) IV Continuous <Continuous>  dextrose 5%. 1000 milliLiter(s) (100 mL/Hr) IV Continuous <Continuous>  dextrose 50% Injectable 25 Gram(s) IV Push once  dextrose 50% Injectable 12.5 Gram(s) IV Push once  dextrose 50% Injectable 25 Gram(s) IV Push once  doxycycline monohydrate Suspension 20 milliGRAM(s) Oral every 12 hours  fluPHENAZine 10 milliGRAM(s) Oral at bedtime  gemfibrozil 600 milliGRAM(s) Oral two times a day  glucagon  Injectable 1 milliGRAM(s) IntraMuscular once  insulin glargine Injectable (LANTUS) 15 Unit(s) SubCutaneous at bedtime  insulin lispro (ADMELOG) corrective regimen sliding scale   SubCutaneous three times a day before meals  loratadine 10 milliGRAM(s) Oral daily  metFORMIN 1000 milliGRAM(s) Oral two times a day  prazosin. 1 milliGRAM(s) Oral at bedtime  traZODone 50 milliGRAM(s) Oral at bedtime  vitamin A &amp; D Ointment 1 Application(s) Topical two times a day    MEDICATIONS  (PRN):  acetaminophen     Tablet .. 650 milliGRAM(s) Oral every 8 hours PRN Mild Pain (1 - 3)  albuterol    90 MICROgram(s) HFA Inhaler 2 Puff(s) Inhalation every 6 hours PRN SOB/home med  dextrose Oral Gel 15 Gram(s) Oral once PRN Blood Glucose LESS THAN 70 milliGRAM(s)/deciliter  haloperidol     Tablet 5 milliGRAM(s) Oral every 8 hours PRN agitation  LORazepam     Tablet 2 milliGRAM(s) Oral every 8 hours PRN severe agitation  LORazepam     Tablet 0.5 milliGRAM(s) Oral two times a day PRN anxiety/home med   MEDICATIONS  (STANDING):  amLODIPine   Tablet 5 milliGRAM(s) Oral daily  atorvastatin 20 milliGRAM(s) Oral at bedtime  carBAMazepine 200 milliGRAM(s) Oral two times a day  carBAMazepine ER Capsule. 100 milliGRAM(s) Oral at bedtime  dextrose 5%. 1000 milliLiter(s) (50 mL/Hr) IV Continuous <Continuous>  dextrose 5%. 1000 milliLiter(s) (100 mL/Hr) IV Continuous <Continuous>  dextrose 50% Injectable 25 Gram(s) IV Push once  dextrose 50% Injectable 25 Gram(s) IV Push once  dextrose 50% Injectable 12.5 Gram(s) IV Push once  doxycycline monohydrate Suspension 20 milliGRAM(s) Oral every 12 hours  fluPHENAZine 10 milliGRAM(s) Oral at bedtime  gemfibrozil 600 milliGRAM(s) Oral two times a day  glucagon  Injectable 1 milliGRAM(s) IntraMuscular once  insulin glargine Injectable (LANTUS) 15 Unit(s) SubCutaneous at bedtime  insulin lispro (ADMELOG) corrective regimen sliding scale   SubCutaneous three times a day before meals  loratadine 10 milliGRAM(s) Oral daily  metFORMIN 1000 milliGRAM(s) Oral two times a day  prazosin. 1 milliGRAM(s) Oral at bedtime  traZODone 50 milliGRAM(s) Oral at bedtime  vitamin A &amp; D Ointment 1 Application(s) Topical two times a day    MEDICATIONS  (PRN):  acetaminophen     Tablet .. 650 milliGRAM(s) Oral every 8 hours PRN Mild Pain (1 - 3)  albuterol    90 MICROgram(s) HFA Inhaler 2 Puff(s) Inhalation every 6 hours PRN SOB/home med  dextrose Oral Gel 15 Gram(s) Oral once PRN Blood Glucose LESS THAN 70 milliGRAM(s)/deciliter  haloperidol     Tablet 5 milliGRAM(s) Oral every 8 hours PRN agitation  LORazepam     Tablet 2 milliGRAM(s) Oral every 8 hours PRN severe agitation  LORazepam     Tablet 0.5 milliGRAM(s) Oral two times a day PRN anxiety/home med

## 2023-04-01 NOTE — BH INPATIENT PSYCHIATRY PROGRESS NOTE - NSBHFUPINTERVALHXFT_PSY_A_CORE
pat Patient is in a cheerful mood and talks with the writer about the possibility of being home before "the (Mandaeism) Holidays".  Patient is in a cheerful mood and talks with the writer about the possibility of being home before "the (Religion) Holidays". She discussed the fact that she had a "good visit" with her daughter and has been taking her medication as requested.  We discussed the possible stressors that may have led to her decompensation, particularly  poor sleep.  She reports she is very happy with the people here, both patients and staff.  Writer expresses gratitude for her complement and indicates that we are pleased to be helpful.

## 2023-04-01 NOTE — BH INPATIENT PSYCHIATRY PROGRESS NOTE - NSBHCHARTREVIEWVS_PSY_A_CORE FT
Pt received on NPPV on  ventilator.  Blood gas reported.  No changes made at this time. Will monitor.    Vital Signs Last 24 Hrs  T(C): 36.7 (04-01-23 @ 15:59), Max: 36.9 (04-01-23 @ 08:24)  T(F): 98 (04-01-23 @ 15:59), Max: 98.4 (04-01-23 @ 08:24)  HR: 97 (04-01-23 @ 15:59) (94 - 97)  BP: 113/77 (04-01-23 @ 15:59) (113/77 - 132/75)  BP(mean): --  RR: 16 (04-01-23 @ 15:59) (16 - 18)  SpO2: --     Vital Signs Last 24 Hrs  T(C): 36.7 (04-01-23 @ 15:59), Max: 36.7 (04-01-23 @ 15:59)  T(F): 98 (04-01-23 @ 15:59), Max: 98 (04-01-23 @ 15:59)  HR: 76 (04-02-23 @ 11:09) (76 - 97)  BP: 147/71 (04-02-23 @ 11:09) (113/77 - 147/71)  BP(mean): --  RR: 118 (04-02-23 @ 11:09) (16 - 118)  SpO2: --

## 2023-04-01 NOTE — BH INPATIENT PSYCHIATRY PROGRESS NOTE - ADDITIONAL DETAILS / COMMENTS
Patient called mobile crisis at urging of family that brought her to unit, has been under good behavioral control Patient called mobile crisis at urging of family that brought her to unit, has been under good behavioral control and identifies the fact that coming to the hospital was a good idea.

## 2023-04-01 NOTE — BH INPATIENT PSYCHIATRY PROGRESS NOTE - NSBHASSESSSUMMFT_PSY_ALL_CORE
Pt is a 52 year old woman, , living with her two daughters and dog, on disability, past medical hx of breast cancer in remission, past medical hx of bipolar disorder in outpatient treatment on PATHAK at [facility], presented to ED after calling mobile crisis at the encouragement of family due to disorganized behavior at home, admitted to [facility] for treatment of manic episode. On approach, patient is alert, oriented, and cooperative with interview.    Patient's current presentation is consistent with decompensation of her known bipolar disorder. Patient endorses medication compliance, no clear indication that she has been noncompliant. Will explore possible reasons for decompensation during this admission. No SI/HI/AVH elicited on interview. Will resume home medications for now.  3/30: Patient's decompensation likely related to going off of her oral medications due to daytime fatigue as well as sleep issues d/t youngest daughter's nocturnal lifestyle. Unable to reach outpt psychiatrist to discuss, left message w/ callback number. Patient does not appear oversedated at this time.  3/31: Patient still overinclusive/tangential, complaining of PTSD nightmares, will add prazosin. Appears oversedated, was not taking any oral meds, will d/c AM oral prolixin    PLAN    #Bipolar Disorder, current episode mixed  - carBAMazepine 200 milliGRAM(s) Oral two times a day  - carBAMazepine ER Capsule. 100 milliGRAM(s) Oral at bedtime  - fluPHENAZine 10 milliGRAM(s) Oral at bedtime  - traZODone 50 milliGRAM(s) Oral at bedtime  - d/c fluPHENAZine 5 milliGRAM(s) Oral daily  - utox negative    #PTSD  #nightmares  - prazosin 1 mg qhs    #HTN  - amLODIPine   Tablet 5 milliGRAM(s) Oral daily    #HLD  - atorvastatin 20 milliGRAM(s) Oral at bedtime  - gemfibrozil 600 milliGRAM(s) Oral two times a day    #DM  - metFORMIN 1000 milliGRAM(s) Oral two times a day  - increase Lantus to 15 mg qhs  - Sliding Scale    #dental infection/prophylaxis(?)   - doxycycline monohydrate Suspension 20 milliGRAM(s) Oral every 12 hours (90 days from January 6th)         Pt is a 52 year old woman, , living with her two daughters and dog, on disability, past medical hx of breast cancer in remission, past medical hx of bipolar disorder in outpatient treatment on PATHAK at [facility], presented to ED after calling mobile crisis at the encouragement of family due to disorganized behavior at home, admitted to [facility] for treatment of manic episode. On approach, patient is alert, oriented, and cooperative with interview.    Patient's current presentation is consistent with decompensation of her known bipolar disorder. Patient endorses medication compliance, no clear indication that she has been noncompliant. Will explore possible reasons for decompensation during this admission. No SI/HI/AVH elicited on interview. Will resume home medications for now.  3/30: Patient's decompensation likely related to going off of her oral medications due to daytime fatigue as well as sleep issues d/t youngest daughter's nocturnal lifestyle. Unable to reach outpt psychiatrist to discuss, left message w/ callback number. Patient does not appear oversedated at this time.  3/31: Patient still overinclusive/tangential, complaining of PTSD nightmares, will add prazosin. Appears oversedated, was not taking any oral meds, will d/c AM oral prolixin  4/1:   In good spirits, lucid and logical with some minor confusion but seeming to be resolving    PLAN    #Bipolar Disorder, current episode mixed  - carBAMazepine 200 milliGRAM(s) Oral two times a day  - carBAMazepine ER Capsule. 100 milliGRAM(s) Oral at bedtime  - fluPHENAZine 10 milliGRAM(s) Oral at bedtime  - traZODone 50 milliGRAM(s) Oral at bedtime  - d/c fluPHENAZine 5 milliGRAM(s) Oral daily  - utox negative    #PTSD  #nightmares  - prazosin 1 mg qhs    #HTN  - amLODIPine   Tablet 5 milliGRAM(s) Oral daily    #HLD  - atorvastatin 20 milliGRAM(s) Oral at bedtime  - gemfibrozil 600 milliGRAM(s) Oral two times a day    #DM  - metFORMIN 1000 milliGRAM(s) Oral two times a day  - increase Lantus to 15 mg qhs  - Sliding Scale    #dental infection/prophylaxis(?)   - doxycycline monohydrate Suspension 20 milliGRAM(s) Oral every 12 hours (90 days from January 6th)

## 2023-04-01 NOTE — BH INPATIENT PSYCHIATRY PROGRESS NOTE - NSBHMETABOLIC_PSY_ALL_CORE_FT
A1C with Estimated Average Glucose (03.29.23 @ 07:15)   A1C with Estimated Average Glucose Result: 6.9: Method: Immunoassay Cholesterol, Serum: 180 mg/dL  Triglycerides, Serum: 162 mg/dL  HDL Cholesterol, Serum: 55 mg/dL  Non HDL Cholesterol: 125: Patients Atherosclerotic Cardiovascular Disease (ASCVD) Risk   Optimal Level (mg/dL)   LDL Cholesterol (LDL-C)    BMI: BMI (kg/m2): 30.1 (03-28-23 @ 22:20)  HbA1c: A1C with Estimated Average Glucose Result: 6.9 % (03-29-23 @ 07:15)    Glucose: POCT Blood Glucose.: 248 mg/dL (04-02-23 @ 11:01)    BP: 147/71 (04-02-23 @ 11:09) (113/77 - 148/81)  Lipid Panel: Date/Time: 03-29-23 @ 07:15  Cholesterol, Serum: 180  Direct LDL: --  HDL Cholesterol, Serum: 55  Total Cholesterol/HDL Ration Measurement: --  Triglycerides, Serum: 162

## 2023-04-02 LAB
GLUCOSE BLDC GLUCOMTR-MCNC: 146 MG/DL — HIGH (ref 70–99)
GLUCOSE BLDC GLUCOMTR-MCNC: 177 MG/DL — HIGH (ref 70–99)
GLUCOSE BLDC GLUCOMTR-MCNC: 228 MG/DL — HIGH (ref 70–99)
GLUCOSE BLDC GLUCOMTR-MCNC: 248 MG/DL — HIGH (ref 70–99)

## 2023-04-02 RX ADMIN — Medication 4: at 07:35

## 2023-04-02 RX ADMIN — Medication 600 MILLIGRAM(S): at 08:36

## 2023-04-02 RX ADMIN — ATORVASTATIN CALCIUM 20 MILLIGRAM(S): 80 TABLET, FILM COATED ORAL at 20:16

## 2023-04-02 RX ADMIN — AMLODIPINE BESYLATE 5 MILLIGRAM(S): 2.5 TABLET ORAL at 08:35

## 2023-04-02 RX ADMIN — Medication 20 MILLIGRAM(S): at 08:37

## 2023-04-02 RX ADMIN — Medication 650 MILLIGRAM(S): at 21:58

## 2023-04-02 RX ADMIN — Medication 200 MILLIGRAM(S): at 20:13

## 2023-04-02 RX ADMIN — INSULIN GLARGINE 15 UNIT(S): 100 INJECTION, SOLUTION SUBCUTANEOUS at 20:12

## 2023-04-02 RX ADMIN — Medication 1 APPLICATION(S): at 12:05

## 2023-04-02 RX ADMIN — Medication 200 MILLIGRAM(S): at 08:36

## 2023-04-02 RX ADMIN — Medication 100 MILLIGRAM(S): at 21:58

## 2023-04-02 RX ADMIN — METFORMIN HYDROCHLORIDE 1000 MILLIGRAM(S): 850 TABLET ORAL at 08:36

## 2023-04-02 RX ADMIN — Medication 50 MILLIGRAM(S): at 21:59

## 2023-04-02 RX ADMIN — FLUPHENAZINE HYDROCHLORIDE 10 MILLIGRAM(S): 1 TABLET, FILM COATED ORAL at 20:13

## 2023-04-02 RX ADMIN — Medication 20 MILLIGRAM(S): at 21:57

## 2023-04-02 RX ADMIN — Medication 600 MILLIGRAM(S): at 20:13

## 2023-04-02 RX ADMIN — LORATADINE 10 MILLIGRAM(S): 10 TABLET ORAL at 20:13

## 2023-04-02 RX ADMIN — Medication 4: at 11:10

## 2023-04-02 RX ADMIN — METFORMIN HYDROCHLORIDE 1000 MILLIGRAM(S): 850 TABLET ORAL at 20:13

## 2023-04-02 RX ADMIN — Medication 1 MILLIGRAM(S): at 20:15

## 2023-04-02 RX ADMIN — Medication 650 MILLIGRAM(S): at 20:15

## 2023-04-02 RX ADMIN — Medication 1 APPLICATION(S): at 22:12

## 2023-04-03 LAB
GLUCOSE BLDC GLUCOMTR-MCNC: 197 MG/DL — HIGH (ref 70–99)
GLUCOSE BLDC GLUCOMTR-MCNC: 203 MG/DL — HIGH (ref 70–99)
GLUCOSE BLDC GLUCOMTR-MCNC: 301 MG/DL — HIGH (ref 70–99)

## 2023-04-03 PROCEDURE — 99231 SBSQ HOSP IP/OBS SF/LOW 25: CPT

## 2023-04-03 RX ADMIN — Medication 2: at 16:32

## 2023-04-03 RX ADMIN — METFORMIN HYDROCHLORIDE 1000 MILLIGRAM(S): 850 TABLET ORAL at 20:13

## 2023-04-03 RX ADMIN — Medication 600 MILLIGRAM(S): at 20:12

## 2023-04-03 RX ADMIN — Medication 650 MILLIGRAM(S): at 22:06

## 2023-04-03 RX ADMIN — Medication 650 MILLIGRAM(S): at 22:36

## 2023-04-03 RX ADMIN — Medication 600 MILLIGRAM(S): at 08:50

## 2023-04-03 RX ADMIN — Medication 100 MILLIGRAM(S): at 20:15

## 2023-04-03 RX ADMIN — Medication 20 MILLIGRAM(S): at 20:12

## 2023-04-03 RX ADMIN — FLUPHENAZINE HYDROCHLORIDE 10 MILLIGRAM(S): 1 TABLET, FILM COATED ORAL at 20:12

## 2023-04-03 RX ADMIN — METFORMIN HYDROCHLORIDE 1000 MILLIGRAM(S): 850 TABLET ORAL at 08:49

## 2023-04-03 RX ADMIN — Medication 200 MILLIGRAM(S): at 08:49

## 2023-04-03 RX ADMIN — Medication 1 APPLICATION(S): at 20:14

## 2023-04-03 RX ADMIN — AMLODIPINE BESYLATE 5 MILLIGRAM(S): 2.5 TABLET ORAL at 08:49

## 2023-04-03 RX ADMIN — LORATADINE 10 MILLIGRAM(S): 10 TABLET ORAL at 20:13

## 2023-04-03 RX ADMIN — ATORVASTATIN CALCIUM 20 MILLIGRAM(S): 80 TABLET, FILM COATED ORAL at 20:11

## 2023-04-03 RX ADMIN — Medication 1 APPLICATION(S): at 08:50

## 2023-04-03 RX ADMIN — Medication 50 MILLIGRAM(S): at 20:14

## 2023-04-03 RX ADMIN — INSULIN GLARGINE 15 UNIT(S): 100 INJECTION, SOLUTION SUBCUTANEOUS at 20:12

## 2023-04-03 RX ADMIN — Medication 20 MILLIGRAM(S): at 08:50

## 2023-04-03 RX ADMIN — Medication 1 MILLIGRAM(S): at 20:13

## 2023-04-03 RX ADMIN — Medication 200 MILLIGRAM(S): at 20:12

## 2023-04-03 NOTE — BH INPATIENT PSYCHIATRY PROGRESS NOTE - PRN MEDS
MEDICATIONS  (PRN):  acetaminophen     Tablet .. 650 milliGRAM(s) Oral every 8 hours PRN Mild Pain (1 - 3)  albuterol    90 MICROgram(s) HFA Inhaler 2 Puff(s) Inhalation every 6 hours PRN SOB/home med  dextrose Oral Gel 15 Gram(s) Oral once PRN Blood Glucose LESS THAN 70 milliGRAM(s)/deciliter  haloperidol     Tablet 5 milliGRAM(s) Oral every 8 hours PRN agitation  LORazepam     Tablet 0.5 milliGRAM(s) Oral two times a day PRN anxiety/home med  LORazepam     Tablet 2 milliGRAM(s) Oral every 8 hours PRN severe agitation   MEDICATIONS  (PRN):  acetaminophen     Tablet .. 650 milliGRAM(s) Oral every 8 hours PRN Mild Pain (1 - 3)  albuterol    90 MICROgram(s) HFA Inhaler 2 Puff(s) Inhalation every 6 hours PRN SOB/home med  dextrose Oral Gel 15 Gram(s) Oral once PRN Blood Glucose LESS THAN 70 milliGRAM(s)/deciliter  haloperidol     Tablet 5 milliGRAM(s) Oral every 8 hours PRN agitation  LORazepam     Tablet 2 milliGRAM(s) Oral every 8 hours PRN severe agitation  LORazepam     Tablet 0.5 milliGRAM(s) Oral two times a day PRN anxiety/home med

## 2023-04-03 NOTE — BH INPATIENT PSYCHIATRY PROGRESS NOTE - NSBHFUPINTERVALHXFT_PSY_A_CORE
Nursing reports no acute events overnight. Per chart review the patient has not required any behavioral PRNS since the last assessment.    Chart reviewed, patient seen and evaluated in AM. On approach, patient was very amicable, and a bit disinhibited, remarking to the writer "your skin is so beautiful!". She states that she has been "sleeping like a baby", approximately 12 hours a night, while she's been here. She is intent on going home soon in order to celebrate the holidays.    Patient reports no suicidal or homicidal ideation, intent or plan.  Nursing reports no acute events overnight. Per chart review the patient has not required any behavioral PRNS since the last assessment.    Chart reviewed, patient seen and evaluated in AM. On approach, patient was very amicable, and a bit disinhibited, remarking to the writer "your skin is so beautiful!". She states that she has been "sleeping like a baby", approximately 12 hours a night, while she's been here. She is intent on going home soon in order to celebrate the holidays.    Patient reports no suicidal or homicidal ideation, intent or plan.     Writer attempted to call mother at [phone], left voicemail.

## 2023-04-03 NOTE — BH INPATIENT PSYCHIATRY PROGRESS NOTE - CURRENT MEDICATION
MEDICATIONS  (STANDING):  amLODIPine   Tablet 5 milliGRAM(s) Oral daily  atorvastatin 20 milliGRAM(s) Oral at bedtime  carBAMazepine 200 milliGRAM(s) Oral two times a day  carBAMazepine ER Capsule. 100 milliGRAM(s) Oral at bedtime  dextrose 5%. 1000 milliLiter(s) (50 mL/Hr) IV Continuous <Continuous>  dextrose 5%. 1000 milliLiter(s) (100 mL/Hr) IV Continuous <Continuous>  dextrose 50% Injectable 25 Gram(s) IV Push once  dextrose 50% Injectable 12.5 Gram(s) IV Push once  dextrose 50% Injectable 25 Gram(s) IV Push once  doxycycline monohydrate Suspension 20 milliGRAM(s) Oral every 12 hours  fluPHENAZine 10 milliGRAM(s) Oral at bedtime  gemfibrozil 600 milliGRAM(s) Oral two times a day  glucagon  Injectable 1 milliGRAM(s) IntraMuscular once  insulin glargine Injectable (LANTUS) 15 Unit(s) SubCutaneous at bedtime  insulin lispro (ADMELOG) corrective regimen sliding scale   SubCutaneous three times a day before meals  loratadine 10 milliGRAM(s) Oral daily  metFORMIN 1000 milliGRAM(s) Oral two times a day  prazosin. 1 milliGRAM(s) Oral at bedtime  traZODone 50 milliGRAM(s) Oral at bedtime  vitamin A &amp; D Ointment 1 Application(s) Topical two times a day    MEDICATIONS  (PRN):  acetaminophen     Tablet .. 650 milliGRAM(s) Oral every 8 hours PRN Mild Pain (1 - 3)  albuterol    90 MICROgram(s) HFA Inhaler 2 Puff(s) Inhalation every 6 hours PRN SOB/home med  dextrose Oral Gel 15 Gram(s) Oral once PRN Blood Glucose LESS THAN 70 milliGRAM(s)/deciliter  haloperidol     Tablet 5 milliGRAM(s) Oral every 8 hours PRN agitation  LORazepam     Tablet 0.5 milliGRAM(s) Oral two times a day PRN anxiety/home med  LORazepam     Tablet 2 milliGRAM(s) Oral every 8 hours PRN severe agitation   MEDICATIONS  (STANDING):  amLODIPine   Tablet 5 milliGRAM(s) Oral daily  atorvastatin 20 milliGRAM(s) Oral at bedtime  carBAMazepine 200 milliGRAM(s) Oral two times a day  carBAMazepine ER Capsule. 100 milliGRAM(s) Oral at bedtime  dextrose 5%. 1000 milliLiter(s) (100 mL/Hr) IV Continuous <Continuous>  dextrose 5%. 1000 milliLiter(s) (50 mL/Hr) IV Continuous <Continuous>  dextrose 50% Injectable 25 Gram(s) IV Push once  dextrose 50% Injectable 12.5 Gram(s) IV Push once  dextrose 50% Injectable 25 Gram(s) IV Push once  doxycycline monohydrate Suspension 20 milliGRAM(s) Oral every 12 hours  fluPHENAZine 10 milliGRAM(s) Oral at bedtime  gemfibrozil 600 milliGRAM(s) Oral two times a day  glucagon  Injectable 1 milliGRAM(s) IntraMuscular once  insulin glargine Injectable (LANTUS) 15 Unit(s) SubCutaneous at bedtime  insulin lispro (ADMELOG) corrective regimen sliding scale   SubCutaneous three times a day before meals  loratadine 10 milliGRAM(s) Oral daily  metFORMIN 1000 milliGRAM(s) Oral two times a day  prazosin. 1 milliGRAM(s) Oral at bedtime  traZODone 50 milliGRAM(s) Oral at bedtime  vitamin A &amp; D Ointment 1 Application(s) Topical two times a day    MEDICATIONS  (PRN):  acetaminophen     Tablet .. 650 milliGRAM(s) Oral every 8 hours PRN Mild Pain (1 - 3)  albuterol    90 MICROgram(s) HFA Inhaler 2 Puff(s) Inhalation every 6 hours PRN SOB/home med  dextrose Oral Gel 15 Gram(s) Oral once PRN Blood Glucose LESS THAN 70 milliGRAM(s)/deciliter  haloperidol     Tablet 5 milliGRAM(s) Oral every 8 hours PRN agitation  LORazepam     Tablet 2 milliGRAM(s) Oral every 8 hours PRN severe agitation  LORazepam     Tablet 0.5 milliGRAM(s) Oral two times a day PRN anxiety/home med

## 2023-04-03 NOTE — BH INPATIENT PSYCHIATRY PROGRESS NOTE - NSBHASSESSSUMMFT_PSY_ALL_CORE
Assessment Summary	Pt is a 52 year old woman, , living with her two daughters and dog, on disability, past medical hx of breast cancer in remission, past medical hx of bipolar disorder in outpatient treatment on PATHAK at [facility], presented to ED after calling mobile crisis at the encouragement of family due to disorganized behavior at home, admitted to [facility] for treatment of manic episode. On approach, patient is alert, oriented, and cooperative with interview.    Patient's current presentation is consistent with decompensation of her known bipolar disorder. Patient endorses medication compliance, no clear indication that she has been noncompliant. Will explore possible reasons for decompensation during this admission. No SI/HI/AVH elicited on interview. Will resume home medications for now.  3/30: Patient's decompensation likely related to going off of her oral medications due to daytime fatigue as well as sleep issues d/t youngest daughter's nocturnal lifestyle. Unable to reach outpt psychiatrist to discuss, left message w/ callback number. Patient does not appear oversedated at this time.  3/31: Patient still overinclusive/tangential, complaining of PTSD nightmares, will add prazosin. Appears oversedated, was not taking any oral meds, will d/c AM oral prolixin  4/1:   In good spirits, lucid and logical with some minor confusion but seeming to be resolving  04/03: Euthymic, bright, and cheery. Goal oriented (wants to be discharged home prior to Passover) and can clearly communicate this goal    PLAN    #Bipolar Disorder, current episode mixed  - carBAMazepine 200 milliGRAM(s) Oral two times a day  - carBAMazepine ER Capsule. 100 milliGRAM(s) Oral at bedtime  - fluPHENAZine 10 milliGRAM(s) Oral at bedtime  - traZODone 50 milliGRAM(s) Oral at bedtime  - d/c fluPHENAZine 5 milliGRAM(s) Oral daily  - utox negative    #PTSD  #nightmares  - prazosin 1 mg qhs    #HTN  - amLODIPine   Tablet 5 milliGRAM(s) Oral daily    #HLD  - atorvastatin 20 milliGRAM(s) Oral at bedtime  - gemfibrozil 600 milliGRAM(s) Oral two times a day    #DM  - metFORMIN 1000 milliGRAM(s) Oral two times a day  - increase Lantus to 15 mg qhs  - Sliding Scale    #dental infection/prophylaxis(?)   - doxycycline monohydrate Suspension 20 milliGRAM(s) Oral every 12 hours (90 days from January 6th)

## 2023-04-03 NOTE — BH INPATIENT PSYCHIATRY PROGRESS NOTE - NSBHCHARTREVIEWVS_PSY_A_CORE FT
Vital Signs Last 24 Hrs  T(C): 36.4 (04-03-23 @ 08:22), Max: 37.2 (04-02-23 @ 15:58)  T(F): 97.5 (04-03-23 @ 08:22), Max: 98.9 (04-02-23 @ 15:58)  HR: 103 (04-03-23 @ 08:22) (84 - 103)  BP: 113/79 (04-03-23 @ 08:22) (113/79 - 148/75)  BP(mean): --  RR: 17 (04-03-23 @ 08:22) (16 - 17)  SpO2: --     Vital Signs Last 24 Hrs  T(C): 36.8 (04-03-23 @ 16:20), Max: 36.8 (04-03-23 @ 16:20)  T(F): 98.2 (04-03-23 @ 16:20), Max: 98.2 (04-03-23 @ 16:20)  HR: 88 (04-03-23 @ 16:20) (88 - 103)  BP: 151/80 (04-03-23 @ 16:20) (113/79 - 151/80)  BP(mean): --  RR: 16 (04-03-23 @ 16:20) (16 - 17)  SpO2: --

## 2023-04-03 NOTE — BH INPATIENT PSYCHIATRY PROGRESS NOTE - ADDITIONAL DETAILS / COMMENTS
Patient called mobile crisis at urging of family that brought her to unit, has been under good behavioral control and identifies the fact that coming to the hospital was a good idea.

## 2023-04-03 NOTE — BH INPATIENT PSYCHIATRY PROGRESS NOTE - NSBHMETABOLIC_PSY_ALL_CORE_FT
BMI: BMI (kg/m2): 30.1 (03-28-23 @ 22:20)  HbA1c: A1C with Estimated Average Glucose Result: 6.9 % (03-29-23 @ 07:15)    Glucose: POCT Blood Glucose.: 203 mg/dL (04-03-23 @ 06:35)    BP: 113/79 (04-03-23 @ 08:22) (113/77 - 148/81)  Lipid Panel: Date/Time: 03-29-23 @ 07:15  Cholesterol, Serum: 180  Direct LDL: --  HDL Cholesterol, Serum: 55  Total Cholesterol/HDL Ration Measurement: --  Triglycerides, Serum: 162   BMI: BMI (kg/m2): 30.1 (03-28-23 @ 22:20)  HbA1c: A1C with Estimated Average Glucose Result: 6.9 % (03-29-23 @ 07:15)    Glucose: POCT Blood Glucose.: 197 mg/dL (04-03-23 @ 16:15)    BP: 151/80 (04-03-23 @ 16:20) (113/77 - 151/80)  Lipid Panel: Date/Time: 03-29-23 @ 07:15  Cholesterol, Serum: 180  Direct LDL: --  HDL Cholesterol, Serum: 55  Total Cholesterol/HDL Ration Measurement: --  Triglycerides, Serum: 162

## 2023-04-04 LAB
GLUCOSE BLDC GLUCOMTR-MCNC: 158 MG/DL — HIGH (ref 70–99)
GLUCOSE BLDC GLUCOMTR-MCNC: 162 MG/DL — HIGH (ref 70–99)
GLUCOSE BLDC GLUCOMTR-MCNC: 190 MG/DL — HIGH (ref 70–99)
GLUCOSE BLDC GLUCOMTR-MCNC: 202 MG/DL — HIGH (ref 70–99)

## 2023-04-04 PROCEDURE — 99231 SBSQ HOSP IP/OBS SF/LOW 25: CPT

## 2023-04-04 RX ORDER — SALICYLIC ACID 0.5 %
1 CLEANSER (GRAM) TOPICAL
Qty: 0 | Refills: 0 | DISCHARGE
Start: 2023-04-04

## 2023-04-04 RX ORDER — CARBAMAZEPINE 200 MG
1 TABLET ORAL
Qty: 28 | Refills: 0
Start: 2023-04-04 | End: 2023-04-17

## 2023-04-04 RX ORDER — METFORMIN HYDROCHLORIDE 850 MG/1
1 TABLET ORAL
Qty: 0 | Refills: 0 | DISCHARGE

## 2023-04-04 RX ORDER — CARBAMAZEPINE 200 MG
1 TABLET ORAL
Qty: 14 | Refills: 0
Start: 2023-04-04 | End: 2023-04-17

## 2023-04-04 RX ORDER — FLUPHENAZINE HYDROCHLORIDE 1 MG/1
1 TABLET, FILM COATED ORAL
Qty: 14 | Refills: 0
Start: 2023-04-04 | End: 2023-04-17

## 2023-04-04 RX ORDER — LORATADINE 10 MG/1
1 TABLET ORAL
Qty: 14 | Refills: 0
Start: 2023-04-04 | End: 2023-04-17

## 2023-04-04 RX ORDER — METFORMIN HYDROCHLORIDE 850 MG/1
1 TABLET ORAL
Qty: 28 | Refills: 0
Start: 2023-04-04 | End: 2023-04-17

## 2023-04-04 RX ORDER — AMLODIPINE BESYLATE 2.5 MG/1
1 TABLET ORAL
Qty: 0 | Refills: 0 | DISCHARGE

## 2023-04-04 RX ORDER — PRAZOSIN HCL 2 MG
1 CAPSULE ORAL
Qty: 14 | Refills: 0
Start: 2023-04-04 | End: 2023-04-17

## 2023-04-04 RX ORDER — ATORVASTATIN CALCIUM 80 MG/1
1 TABLET, FILM COATED ORAL
Qty: 14 | Refills: 0
Start: 2023-04-04 | End: 2023-04-17

## 2023-04-04 RX ORDER — FLUPHENAZINE HYDROCHLORIDE 1 MG/1
50 TABLET, FILM COATED ORAL
Qty: 0 | Refills: 0 | DISCHARGE

## 2023-04-04 RX ORDER — ATORVASTATIN CALCIUM 80 MG/1
0 TABLET, FILM COATED ORAL
Qty: 0 | Refills: 0 | DISCHARGE

## 2023-04-04 RX ORDER — TRAZODONE HCL 50 MG
1 TABLET ORAL
Qty: 14 | Refills: 0
Start: 2023-04-04 | End: 2023-04-17

## 2023-04-04 RX ORDER — GEMFIBROZIL 600 MG
1 TABLET ORAL
Qty: 28 | Refills: 0
Start: 2023-04-04 | End: 2023-04-17

## 2023-04-04 RX ORDER — ALBUTEROL 90 UG/1
2 AEROSOL, METERED ORAL
Qty: 0 | Refills: 0 | DISCHARGE
Start: 2023-04-04

## 2023-04-04 RX ORDER — AMLODIPINE BESYLATE 2.5 MG/1
1 TABLET ORAL
Qty: 14 | Refills: 0
Start: 2023-04-04 | End: 2023-04-17

## 2023-04-04 RX ADMIN — Medication 4: at 08:08

## 2023-04-04 RX ADMIN — Medication 50 MILLIGRAM(S): at 21:05

## 2023-04-04 RX ADMIN — Medication 650 MILLIGRAM(S): at 16:57

## 2023-04-04 RX ADMIN — INSULIN GLARGINE 15 UNIT(S): 100 INJECTION, SOLUTION SUBCUTANEOUS at 21:04

## 2023-04-04 RX ADMIN — Medication 200 MILLIGRAM(S): at 08:07

## 2023-04-04 RX ADMIN — METFORMIN HYDROCHLORIDE 1000 MILLIGRAM(S): 850 TABLET ORAL at 08:07

## 2023-04-04 RX ADMIN — LORATADINE 10 MILLIGRAM(S): 10 TABLET ORAL at 21:05

## 2023-04-04 RX ADMIN — Medication 100 MILLIGRAM(S): at 21:06

## 2023-04-04 RX ADMIN — Medication 200 MILLIGRAM(S): at 21:04

## 2023-04-04 RX ADMIN — Medication 2: at 11:39

## 2023-04-04 RX ADMIN — Medication 20 MILLIGRAM(S): at 21:04

## 2023-04-04 RX ADMIN — AMLODIPINE BESYLATE 5 MILLIGRAM(S): 2.5 TABLET ORAL at 21:05

## 2023-04-04 RX ADMIN — Medication 600 MILLIGRAM(S): at 08:07

## 2023-04-04 RX ADMIN — METFORMIN HYDROCHLORIDE 1000 MILLIGRAM(S): 850 TABLET ORAL at 21:04

## 2023-04-04 RX ADMIN — FLUPHENAZINE HYDROCHLORIDE 10 MILLIGRAM(S): 1 TABLET, FILM COATED ORAL at 21:05

## 2023-04-04 RX ADMIN — Medication 1 MILLIGRAM(S): at 21:05

## 2023-04-04 RX ADMIN — Medication 2: at 16:30

## 2023-04-04 RX ADMIN — ATORVASTATIN CALCIUM 20 MILLIGRAM(S): 80 TABLET, FILM COATED ORAL at 21:04

## 2023-04-04 RX ADMIN — Medication 20 MILLIGRAM(S): at 08:08

## 2023-04-04 RX ADMIN — Medication 600 MILLIGRAM(S): at 21:05

## 2023-04-04 RX ADMIN — Medication 1 APPLICATION(S): at 21:06

## 2023-04-04 NOTE — BH DISCHARGE NOTE NURSING/SOCIAL WORK/PSYCH REHAB - PATIENT PORTAL LINK FT
You can access the FollowMyHealth Patient Portal offered by [facility] by registering at the following website: [email or web address]. By joining [employer]’s FollowMyHealth portal, you will also be able to view your health information using other applications (apps) compatible with our system.

## 2023-04-04 NOTE — BH INPATIENT PSYCHIATRY DISCHARGE NOTE - HPI (INCLUDE ILLNESS QUALITY, SEVERITY, DURATION, TIMING, CONTEXT, MODIFYING FACTORS, ASSOCIATED SIGNS AND SYMPTOMS)
Pt is a 52 year old woman, , living with her two daughters and dog, on disability, past medical hx of breast cancer in remission, past medical hx of bipolar disorder in outpatient treatment on PATHAK at [facility], [facility] behavior at [facility], admitted to [facility] for treatment of manic episode. On approach, patient is alert, oriented, and cooperative with interview.    Ms. Quinn reports that she is upset that her older daughter Jessica doesn't want to live with her anymore, gives permission for team to talk to her ([phone]). She also gives permission for team to speak with her younger daughter Ritika ([phone]). Patient is intermittently tearful during interview, reports that she has been compliant with her medications including her long acting injectable which she gets at [facility] from [facility], and that the clinic is headed by Dr. Caal.     Patient's mother visits during visiting hour, reports privately to writer that patient has decompensated over the past 2 weeks for unknown reasons. Mother is not sure whether patient has been compliant with her medications, does believe that she has been getting her PATHAK every 2 weeks. She reports she has been driving around at 6 in the morning looking for psychiatrist Dr. Caal, overshopping, saying incoherent things, and not sleeping. She reports that patient's younger daughter Ritika who is 19 is "out of control", vaping, being sexually active without protection, and coming home at odd hours of the night which has made patient very upset and disrupted her sleep and general routine (i.e. sleeping in a chair in the living room waiting for her daughter to come home.) Mother not aware of any drug or alcohol use, reports that patient is also grieving the loss of her aunt 1.5 years ago. She does not know of any SI/HI.

## 2023-04-04 NOTE — BH INPATIENT PSYCHIATRY PROGRESS NOTE - NSDCCRITERIA_PSY_ALL_CORE
Improvement in mood lability and disorganization

## 2023-04-04 NOTE — BH INPATIENT PSYCHIATRY PROGRESS NOTE - NSBHFUPINTERVALCCFT_PSY_A_CORE
"I'd really like to go home!"
"My doctor told me to stop taking the oral meds"
"Can I leave soon?"
"I stopped taking the carbamazepine because they were changing the doses around"
"I feel better today I miss my kids"
"I'm looking forward to going home!"

## 2023-04-04 NOTE — BH INPATIENT PSYCHIATRY PROGRESS NOTE - NSBHATTESTBILLING_PSY_A_CORE
22677-Npfjckzflm OBS or IP - low complexity OR 25-34 mins
40285-Cxplmmxlei OBS or IP - low complexity OR 25-34 mins
04910-Axwvbywlyi OBS or IP - low complexity OR 25-34 mins
54760-Arrumyichw OBS or IP - low complexity OR 25-34 mins
55918-Gensuqmdxm OBS or IP - low complexity OR 25-34 mins
00336-Aowzibgope OBS or IP - low complexity OR 25-34 mins

## 2023-04-04 NOTE — BH INPATIENT PSYCHIATRY PROGRESS NOTE - NSBHCONSBHPROVDETAILS_PSY_A_CORE  FT
message left with Dr. Hunt at [facility], see note from 3/30
message left with Dr. Hunt at [facility], see note from 3/30
message left with Dr. Hunt at [facility], see note from 3/30
message left with Dr. Hunt at [facility], see note from 3/30

## 2023-04-04 NOTE — BH INPATIENT PSYCHIATRY PROGRESS NOTE - NSBHASSESSSUMMFT_PSY_ALL_CORE
Assessment Summary	Pt is a 52 year old woman, , living with her two daughters and dog, on disability, past medical hx of breast cancer in remission, past medical hx of bipolar disorder in outpatient treatment on PATHAK at [facility], presented to ED after calling mobile crisis at the encouragement of family due to disorganized behavior at home, admitted to [facility] for treatment of manic episode. On approach, patient is alert, oriented, and cooperative with interview.    Patient's current presentation is consistent with decompensation of her known bipolar disorder. Patient endorses medication compliance, no clear indication that she has been noncompliant. Will explore possible reasons for decompensation during this admission. No SI/HI/AVH elicited on interview. Will resume home medications for now.  3/30: Patient's decompensation likely related to going off of her oral medications due to daytime fatigue as well as sleep issues d/t youngest daughter's nocturnal lifestyle. Unable to reach outpt psychiatrist to discuss, left message w/ callback number. Patient does not appear oversedated at this time.  3/31: Patient still overinclusive/tangential, complaining of PTSD nightmares, will add prazosin. Appears oversedated, was not taking any oral meds, will d/c AM oral prolixin  4/1:   In good spirits, lucid and logical with some minor confusion but seeming to be resolving  04/03: Euthymic, bright, and cheery. Goal oriented (wants to be discharged home prior to Passover) and can clearly communicate this goal    PLAN    #Bipolar Disorder, current episode mixed  - carBAMazepine 200 milliGRAM(s) Oral two times a day  - carBAMazepine ER Capsule. 100 milliGRAM(s) Oral at bedtime  - fluPHENAZine 10 milliGRAM(s) Oral at bedtime  - traZODone 50 milliGRAM(s) Oral at bedtime  - d/c fluPHENAZine 5 milliGRAM(s) Oral daily  - utox negative    #PTSD  #nightmares  - prazosin 1 mg qhs    #HTN  - amLODIPine   Tablet 5 milliGRAM(s) Oral daily    #HLD  - atorvastatin 20 milliGRAM(s) Oral at bedtime  - gemfibrozil 600 milliGRAM(s) Oral two times a day    #DM  - metFORMIN 1000 milliGRAM(s) Oral two times a day  - increase Lantus to 15 mg qhs  - Sliding Scale    #dental infection/prophylaxis(?)   - doxycycline monohydrate Suspension 20 milliGRAM(s) Oral every 12 hours (90 days from January 6th)   Pt is a 52 year old woman, , living with her two daughters and dog, on disability, past medical hx of breast cancer in remission, past medical hx of bipolar disorder in outpatient treatment on PATHAK at [facility], presented to ED after calling mobile crisis at the encouragement of family due to disorganized behavior at home, admitted to [facility] for treatment of manic episode. On approach, patient is alert, oriented, and cooperative with interview.    Patient's current presentation is consistent with decompensation of her known bipolar disorder. Patient endorses medication compliance, no clear indication that she has been noncompliant. Will explore possible reasons for decompensation during this admission. No SI/HI/AVH elicited on interview. Will resume home medications for now.  3/30: Patient's decompensation likely related to going off of her oral medications due to daytime fatigue as well as sleep issues d/t youngest daughter's nocturnal lifestyle. Unable to reach outpt psychiatrist to discuss, left message w/ callback number. Patient does not appear oversedated at this time.  3/31: Patient still overinclusive/tangential, complaining of PTSD nightmares, will add prazosin. Appears oversedated, was not taking any oral meds, will d/c AM oral prolixin  4/1:   In good spirits, lucid and logical with some minor confusion but seeming to be resolving  04/03: Euthymic, bright, and cheery. Goal oriented (wants to be discharged home prior to Passover) and can clearly communicate this goal  04/04: Continues to be bright and cheery, mildly tangential but redirectable. Looks forward to getting discharged home tomorrow, is able to safety plan    PLAN    #Bipolar Disorder, current episode mixed  - carBAMazepine 200 milliGRAM(s) Oral two times a day  - carBAMazepine ER Capsule. 100 milliGRAM(s) Oral at bedtime  - fluPHENAZine 10 milliGRAM(s) Oral at bedtime  - traZODone 50 milliGRAM(s) Oral at bedtime  - d/c fluPHENAZine 5 milliGRAM(s) Oral daily  - utox negative    #PTSD  #nightmares  - prazosin 1 mg qhs    #HTN  - amLODIPine   Tablet 5 milliGRAM(s) Oral daily    #HLD  - atorvastatin 20 milliGRAM(s) Oral at bedtime  - gemfibrozil 600 milliGRAM(s) Oral two times a day    #DM  - metFORMIN 1000 milliGRAM(s) Oral two times a day  - increase Lantus to 15 mg qhs  - Sliding Scale    #dental infection/prophylaxis(?)   - doxycycline monohydrate Suspension 20 milliGRAM(s) Oral every 12 hours (90 days from January 6th)

## 2023-04-04 NOTE — BH INPATIENT PSYCHIATRY PROGRESS NOTE - NSBHATTESTCOMMENTATTENDFT_PSY_A_CORE
Discussed case with resident. I concur with findings and plan.
   Case discussed with resident.  I concur with the findings and plan.
I have discussed case with resident and I concur with findings and plan.
I have discussed case with resident and I concur with findings and plan.
   Case discussed with resident.  I concur with the findings and plan.
Case discussed with resident.  I concur with the findings and plan.

## 2023-04-04 NOTE — BH INPATIENT PSYCHIATRY DISCHARGE NOTE - NSBHDCMEDICALFT_PSY_A_CORE
#HTN  - amLODIPine   Tablet 5 milliGRAM(s) Oral daily    #HLD  - atorvastatin 20 milliGRAM(s) Oral at bedtime  - gemfibrozil 600 milliGRAM(s) Oral two times a day    #DM  - metFORMIN 1000 milliGRAM(s) Oral two times a day  - increase Lantus to 15 mg qhs  - Sliding Scale    #dental infection/prophylaxis(?)   - doxycycline monohydrate Suspension 20 milliGRAM(s) Oral every 12 hours (90 days from January 6th)

## 2023-04-04 NOTE — BH INPATIENT PSYCHIATRY DISCHARGE NOTE - NSDCCPCAREPLAN_GEN_ALL_CORE_FT
PRINCIPAL DISCHARGE DIAGNOSIS  Diagnosis: Bipolar disorder  Assessment and Plan of Treatment: Bipolar disorder is an illness that causes extreme mood changes, from times of very high energy (manic episodes) to times of depression. But many people with bipolar disorder show only the symptoms of depression. These moods may cause problems with your work, school, family life, friendships, and how well you function.  This disease is also called manic-depression.  There is no cure for bipolar disorder, but it can be helped with medicines. Counselling may also help. It is important to take your medicines exactly as prescribed, even when you feel well. You may need lifelong treatment.  Follow-up care is a key part of your treatment and safety. Be sure to make and go to all appointments, and call your doctor or nurse advice line if you are having problems. It's also a good idea to know your test results and keep a list of the medicines you take.  Watch closely for changes in your health, and be sure to contact your doctor or nurse advice line if:  You hear voices.  Someone you know has bipolar disorder and talks about suicide. If a suicide threat seems real, with a specific plan and a way to carry it out, stay with the person, or ask someone you trust to stay with the person, until you can get help.  Someone you know has bipolar disorder and:  Starts to give away possessions.  Is using drugs or drinking alcohol heavily.  Talks or writes about death, including writing suicide notes or talking about guns, knives, or pills.  Talks or writes about hurting someone else.  Starts to spend a lot of time alone.  Acts very aggressively or suddenly appears calm.  Talks about beliefs that are not based in reality (delusions).

## 2023-04-04 NOTE — BH INPATIENT PSYCHIATRY PROGRESS NOTE - NSBHMSEGAIT_PSY_A_CORE
Normal gait / station
Other

## 2023-04-04 NOTE — BH INPATIENT PSYCHIATRY PROGRESS NOTE - NSBHCHARTREVIEWVS_PSY_A_CORE FT
Vital Signs Last 24 Hrs  T(C): 36.8 (04-03-23 @ 16:20), Max: 36.8 (04-03-23 @ 16:20)  T(F): 98.2 (04-03-23 @ 16:20), Max: 98.2 (04-03-23 @ 16:20)  HR: 112 (04-03-23 @ 20:09) (88 - 112)  BP: 131/85 (04-03-23 @ 20:09) (113/79 - 151/80)  BP(mean): --  RR: 16 (04-03-23 @ 16:20) (16 - 17)  SpO2: --     Vital Signs Last 24 Hrs  T(C): 36.8 (04-03-23 @ 16:20), Max: 36.8 (04-03-23 @ 16:20)  T(F): 98.2 (04-03-23 @ 16:20), Max: 98.2 (04-03-23 @ 16:20)  HR: 112 (04-03-23 @ 20:09) (88 - 112)  BP: 131/85 (04-03-23 @ 20:09) (131/85 - 151/80)  BP(mean): --  RR: 16 (04-03-23 @ 16:20) (16 - 16)  SpO2: --     Vital Signs Last 24 Hrs  T(C): 36.1 (04-04-23 @ 09:58), Max: 36.8 (04-03-23 @ 16:20)  T(F): 97 (04-04-23 @ 09:58), Max: 98.2 (04-03-23 @ 16:20)  HR: 103 (04-04-23 @ 09:58) (88 - 112)  BP: 124/66 (04-04-23 @ 09:58) (124/66 - 151/80)  BP(mean): --  RR: 16 (04-04-23 @ 09:58) (16 - 16)  SpO2: --     Vital Signs Last 24 Hrs  T(C): 36.7 (04-05-23 @ 08:31), Max: 36.7 (04-05-23 @ 08:31)  T(F): 98.1 (04-05-23 @ 08:31), Max: 98.1 (04-05-23 @ 08:31)  HR: 85 (04-05-23 @ 08:31) (85 - 103)  BP: 131/65 (04-05-23 @ 08:31) (124/66 - 148/87)  BP(mean): --  RR: 17 (04-05-23 @ 08:31) (16 - 18)  SpO2: --

## 2023-04-04 NOTE — BH INPATIENT PSYCHIATRY DISCHARGE NOTE - DESCRIPTION
Patient lives in private residence with her two adult daughters. 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

## 2023-04-04 NOTE — BH INPATIENT PSYCHIATRY PROGRESS NOTE - NSBHROSSYSTEMS_PSY_ALL_CORE
Panel Management Review      Patient has the following on her problem list: None      Composite cancer screening  Chart review shows that this patient is due/due soon for the following Pap Smear and Mammogram  Summary:    Patient is due/failing the following:   LDL, MAMMOGRAM, PAP and PHYSICAL    Action needed:   Patient needs office visit for pap, mammo & physical.     Type of outreach:    Phone, spoke to patient.  Patient states PCP is [facility] in [place] - last pap & mammo with them about June 2016.    Questions for provider review:    None                                                                                                                                    Agnes Graham CMA    Chart closed .  
Psychiatric

## 2023-04-04 NOTE — BH INPATIENT PSYCHIATRY PROGRESS NOTE - NSICDXBHPRIMARYDX_PSY_ALL_CORE
Patient made an appointment online to see Dr. De Los Santos on 10.05.2020 for My throat has been dry and I noticed some small white bumps in the back of my throat, it doesn’t hurt but it feels dry. I emailed patient and called and left message stating appointment is cancelled. I also told her I would put a message in for the doctor/nurse and they will return call when back in office tomorrow.   
Bipolar disorder   F31.9  

## 2023-04-04 NOTE — BH INPATIENT PSYCHIATRY DISCHARGE NOTE - HOSPITAL COURSE
Pt is a 52 year old woman, , living with her two daughters and dog, on disability, past medical hx of breast cancer in remission, past medical hx of bipolar disorder in outpatient treatment on PATHAK at [facility], presented to ED after calling mobile crisis at the encouragement of family due to disorganized behavior at home, admitted to [facility] for treatment of manic episode. On approach, patient is alert, oriented, and cooperative with interview.    Patient's presentation was consistent with decompensation of her known bipolar disorder, she was most likely experiencing a mixed episode. Patient states that she had stopped taking her oral fluphenazine under the guidance of her psychiatrist (she was to remain solely on her PATHAK prolixin), and that she had frequent sleep disruptions because of staying up to look out for her daughter who goes out at night. Most likely her casi was triggered by this decrease in her medications as well as her erratic sleep schedule (she has a history of becoming manic after just a few days of suboptimal sleep). Patient was at first elevated, tangential and overinclusive. She endorsed nightmares and prazosin was started, her previous oral fluphenazine was restarted, and her carbamazepine was continued. Over the course of the patient's hospitalization her sleep improved, her mood became more euthymic and her affect stabilized. She continued to be mildly tangential but was redirectable. At this time the patient is safe for discharge, she denies any suicidal or homicidal thoughts and is able to safety plan.

## 2023-04-04 NOTE — BH INPATIENT PSYCHIATRY PROGRESS NOTE - NSBHMETABOLIC_PSY_ALL_CORE_FT
BMI: BMI (kg/m2): 30.1 (03-28-23 @ 22:20)  HbA1c: A1C with Estimated Average Glucose Result: 6.9 % (03-29-23 @ 07:15)    Glucose: POCT Blood Glucose.: 202 mg/dL (04-04-23 @ 07:33)    BP: 131/85 (04-03-23 @ 20:09) (113/77 - 151/80)  Lipid Panel: Date/Time: 03-29-23 @ 07:15  Cholesterol, Serum: 180  Direct LDL: --  HDL Cholesterol, Serum: 55  Total Cholesterol/HDL Ration Measurement: --  Triglycerides, Serum: 162   BMI: BMI (kg/m2): 30.1 (03-28-23 @ 22:20)  HbA1c: A1C with Estimated Average Glucose Result: 6.9 % (03-29-23 @ 07:15)    Glucose: POCT Blood Glucose.: 158 mg/dL (04-04-23 @ 11:15)    BP: 124/66 (04-04-23 @ 09:58) (113/77 - 151/80)  Lipid Panel: Date/Time: 03-29-23 @ 07:15  Cholesterol, Serum: 180  Direct LDL: --  HDL Cholesterol, Serum: 55  Total Cholesterol/HDL Ration Measurement: --  Triglycerides, Serum: 162   BMI: BMI (kg/m2): 30.1 (03-28-23 @ 22:20)  HbA1c: A1C with Estimated Average Glucose Result: 6.9 % (03-29-23 @ 07:15)    Glucose: POCT Blood Glucose.: 300 mg/dL (04-05-23 @ 06:56)    BP: 131/65 (04-05-23 @ 08:31) (113/79 - 151/80)  Lipid Panel: Date/Time: 03-29-23 @ 07:15  Cholesterol, Serum: 180  Direct LDL: --  HDL Cholesterol, Serum: 55  Total Cholesterol/HDL Ration Measurement: --  Triglycerides, Serum: 162

## 2023-04-04 NOTE — BH DISCHARGE NOTE NURSING/SOCIAL WORK/PSYCH REHAB - NSCDUDCCRISIS_PSY_A_CORE
[facility] Well  1 (490) [facility]-WELL ([phone])  Text "WELL" to 35137  Website: www.TAPP/.[employer] 1 (841) 621-HOPE (7973) Website: www.safehorizon.org/.National Suicide Prevention Lifeline 2 (577) 306-5070/.  [employer]  1 (253) LIFENET (909-3299)/988 Suicide and Crisis Lifeline

## 2023-04-04 NOTE — BH INPATIENT PSYCHIATRY DISCHARGE NOTE - NSBHMETABOLIC_PSY_ALL_CORE_FT
BMI: BMI (kg/m2): 30.1 (03-28-23 @ 22:20)  HbA1c: A1C with Estimated Average Glucose Result: 6.9 % (03-29-23 @ 07:15)    Glucose: POCT Blood Glucose.: 158 mg/dL (04-04-23 @ 11:15)    BP: 124/66 (04-04-23 @ 09:58) (113/77 - 151/80)  Lipid Panel: Date/Time: 03-29-23 @ 07:15  Cholesterol, Serum: 180  Direct LDL: --  HDL Cholesterol, Serum: 55  Total Cholesterol/HDL Ration Measurement: --  Triglycerides, Serum: 162

## 2023-04-04 NOTE — BH INPATIENT PSYCHIATRY PROGRESS NOTE - NSBHFUPINTERVALHXFT_PSY_A_CORE
Nursing reports no acute events overnight. Per chart review the patient has not required any behavioral PRNS since the last assessment.        Patient reports no suicidal or homicidal ideation, intent or plan.     Writer attempted to call mother at [phone], left voicemail. Nursing reports no acute events overnight. Per chart review the patient has been taking her medications as prescribed, and has not required any behavioral PRNS since the last assessment.        Patient reports no suicidal or homicidal ideation, intent or plan.    Nursing reports no acute events overnight. Per chart review the patient has been taking her medications as prescribed, and has not required any behavioral PRNS since the last assessment.    The patient was interviewed this morning. She appeared cheery and bright, and states that her mood is "good!", she states that she is looking forward to getting discharged tomorrow. She continues to endorse that she is sleeping well.  She remains somewhat tangential but can be redirected. She emphasized that she knew that her sleep is very important to her mental health and stated that despite her worries about her daughter going out at night, she will try to prioritize it. She also stated that she would regularly take her medications. She is aware of her appointments next week for the prolixin injection and with her psychiatrist Dr. Hunt, and states that she plans on going to them.    The patient denies any suicidal or homicidal ideation. She denies any auditory hallucinations.        Patient reports no suicidal or homicidal ideation, intent or plan.

## 2023-04-04 NOTE — BH INPATIENT PSYCHIATRY DISCHARGE NOTE - NSDCMRMEDTOKEN_GEN_ALL_CORE_FT
albuterol 90 mcg/inh inhalation aerosol: 2 puff(s) inhaled every 6 hours As needed SOB/home med  amLODIPine 5 mg oral tablet: 1 tab(s) orally once a day  atorvastatin 20 mg oral tablet: 1 tab(s) orally once a day (at bedtime)  carBAMazepine 100 mg oral capsule, extended release: 1 cap(s) orally once a day (at bedtime)  carBAMazepine 200 mg oral tablet: 1 tab(s) orally 2 times a day  doxycycline 25 mg/5 mL oral liquid: 4 milliliter(s) orally every 12 hours  fluPHENAZine 10 mg oral tablet: 1 tab(s) orally once a day (at bedtime) Take 1 tablet at bedtime  gemfibrozil 600 mg oral tablet: 1 tab(s) orally 2 times a day  loratadine 10 mg oral tablet: 1 tab(s) orally once a day  metFORMIN 1000 mg oral tablet: 1 tab(s) orally 2 times a day  methocarbamol 750 mg oral tablet: 1 tab(s) orally 3 times a day   prazosin 1 mg oral capsule: 1 cap(s) orally once a day (at bedtime)  traZODone 50 mg oral tablet: 1 tab(s) orally once a day (at bedtime)  vitamin A and D topical ointment: 1 Apply topically to affected area 2 times a day   albuterol 90 mcg/inh inhalation aerosol: 2 puff(s) inhaled every 6 hours As needed SOB/home med  amLODIPine 5 mg oral tablet: 1 tab(s) orally once a day  atorvastatin 20 mg oral tablet: 1 tab(s) orally once a day (at bedtime)  carBAMazepine 100 mg oral capsule, extended release: 1 cap(s) orally once a day (at bedtime)  carBAMazepine 200 mg oral tablet: 1 tab(s) orally 2 times a day  doxycycline 25 mg/5 mL oral liquid: 4 milliliter(s) orally every 12 hours  fluPHENAZine 10 mg oral tablet: 1 tab(s) orally once a day (at bedtime) Take 1 tablet at bedtime  gemfibrozil 600 mg oral tablet: 1 tab(s) orally 2 times a day  loratadine 10 mg oral tablet: 1 tab(s) orally once a day  metFORMIN 1000 mg oral tablet: 1 tab(s) orally 2 times a day  prazosin 1 mg oral capsule: 1 cap(s) orally once a day (at bedtime)  traZODone 50 mg oral tablet: 1 tab(s) orally once a day (at bedtime)  vitamin A and D topical ointment: 1 Apply topically to affected area 2 times a day

## 2023-04-04 NOTE — BH INPATIENT PSYCHIATRY DISCHARGE NOTE - NSICDXPASTSURGICALHX_GEN_ALL_CORE_FT
PAST SURGICAL HISTORY:  H/O breast surgery     History of surgery urethral sling    
Feeding and  care were discussed today and parent questions were answered

## 2023-04-04 NOTE — BH INPATIENT PSYCHIATRY PROGRESS NOTE - NSBHMSEMOVE_PSY_A_CORE
Other
No abnormal movements

## 2023-04-05 VITALS
TEMPERATURE: 98 F | RESPIRATION RATE: 17 BRPM | SYSTOLIC BLOOD PRESSURE: 131 MMHG | HEART RATE: 85 BPM | DIASTOLIC BLOOD PRESSURE: 65 MMHG

## 2023-04-05 LAB
GLUCOSE BLDC GLUCOMTR-MCNC: 167 MG/DL — HIGH (ref 70–99)
GLUCOSE BLDC GLUCOMTR-MCNC: 191 MG/DL — HIGH (ref 70–99)
GLUCOSE BLDC GLUCOMTR-MCNC: 197 MG/DL — HIGH (ref 70–99)
GLUCOSE BLDC GLUCOMTR-MCNC: 203 MG/DL — HIGH (ref 70–99)
GLUCOSE BLDC GLUCOMTR-MCNC: 219 MG/DL — HIGH (ref 70–99)
GLUCOSE BLDC GLUCOMTR-MCNC: 223 MG/DL — HIGH (ref 70–99)
GLUCOSE BLDC GLUCOMTR-MCNC: 265 MG/DL — HIGH (ref 70–99)
GLUCOSE BLDC GLUCOMTR-MCNC: 300 MG/DL — HIGH (ref 70–99)

## 2023-04-05 PROCEDURE — 99238 HOSP IP/OBS DSCHRG MGMT 30/<: CPT

## 2023-04-05 RX ADMIN — Medication 6: at 07:44

## 2023-04-05 RX ADMIN — METFORMIN HYDROCHLORIDE 1000 MILLIGRAM(S): 850 TABLET ORAL at 08:50

## 2023-04-05 RX ADMIN — AMLODIPINE BESYLATE 5 MILLIGRAM(S): 2.5 TABLET ORAL at 08:51

## 2023-04-05 RX ADMIN — Medication 600 MILLIGRAM(S): at 08:50

## 2023-04-05 RX ADMIN — Medication 20 MILLIGRAM(S): at 08:50

## 2023-04-05 RX ADMIN — Medication 200 MILLIGRAM(S): at 08:50

## 2023-04-08 DIAGNOSIS — E78.5 HYPERLIPIDEMIA, UNSPECIFIED: ICD-10-CM

## 2023-04-08 DIAGNOSIS — I10 ESSENTIAL (PRIMARY) HYPERTENSION: ICD-10-CM

## 2023-04-08 DIAGNOSIS — E11.9 TYPE 2 DIABETES MELLITUS WITHOUT COMPLICATIONS: ICD-10-CM

## 2023-04-08 DIAGNOSIS — F43.10 POST-TRAUMATIC STRESS DISORDER, UNSPECIFIED: ICD-10-CM

## 2023-04-08 DIAGNOSIS — G47.33 OBSTRUCTIVE SLEEP APNEA (ADULT) (PEDIATRIC): ICD-10-CM

## 2023-04-08 DIAGNOSIS — J45.909 UNSPECIFIED ASTHMA, UNCOMPLICATED: ICD-10-CM

## 2023-04-08 DIAGNOSIS — K04.7 PERIAPICAL ABSCESS WITHOUT SINUS: ICD-10-CM

## 2023-04-08 DIAGNOSIS — F31.9 BIPOLAR DISORDER, UNSPECIFIED: ICD-10-CM

## 2023-04-08 DIAGNOSIS — Z85.3 PERSONAL HISTORY OF MALIGNANT NEOPLASM OF BREAST: ICD-10-CM

## 2023-04-12 NOTE — BH INPATIENT PSYCHIATRY PROGRESS NOTE - CURRENT MEDICATION
MEDICATIONS  (STANDING):  amLODIPine   Tablet 5 milliGRAM(s) Oral daily  atorvastatin 20 milliGRAM(s) Oral at bedtime  carBAMazepine 200 milliGRAM(s) Oral two times a day  carBAMazepine ER Capsule. 100 milliGRAM(s) Oral at bedtime  dextrose 5%. 1000 milliLiter(s) (50 mL/Hr) IV Continuous <Continuous>  dextrose 5%. 1000 milliLiter(s) (100 mL/Hr) IV Continuous <Continuous>  dextrose 50% Injectable 25 Gram(s) IV Push once  dextrose 50% Injectable 12.5 Gram(s) IV Push once  dextrose 50% Injectable 25 Gram(s) IV Push once  doxycycline monohydrate Suspension 20 milliGRAM(s) Oral every 12 hours  fluPHENAZine 10 milliGRAM(s) Oral at bedtime  gemfibrozil 600 milliGRAM(s) Oral two times a day  glucagon  Injectable 1 milliGRAM(s) IntraMuscular once  insulin glargine Injectable (LANTUS) 15 Unit(s) SubCutaneous at bedtime  insulin lispro (ADMELOG) corrective regimen sliding scale   SubCutaneous three times a day before meals  loratadine 10 milliGRAM(s) Oral daily  metFORMIN 1000 milliGRAM(s) Oral two times a day  prazosin. 1 milliGRAM(s) Oral at bedtime  traZODone 50 milliGRAM(s) Oral at bedtime  vitamin A &amp; D Ointment 1 Application(s) Topical two times a day    MEDICATIONS  (PRN):  acetaminophen     Tablet .. 650 milliGRAM(s) Oral every 8 hours PRN Mild Pain (1 - 3)  albuterol    90 MICROgram(s) HFA Inhaler 2 Puff(s) Inhalation every 6 hours PRN SOB/home med  dextrose Oral Gel 15 Gram(s) Oral once PRN Blood Glucose LESS THAN 70 milliGRAM(s)/deciliter  haloperidol     Tablet 5 milliGRAM(s) Oral every 8 hours PRN agitation  LORazepam     Tablet 2 milliGRAM(s) Oral every 8 hours PRN severe agitation  LORazepam     Tablet 0.5 milliGRAM(s) Oral two times a day PRN anxiety/home med   MEDICATIONS  (STANDING):  amLODIPine   Tablet 5 milliGRAM(s) Oral daily  atorvastatin 20 milliGRAM(s) Oral at bedtime  carBAMazepine 200 milliGRAM(s) Oral two times a day  carBAMazepine ER Capsule. 100 milliGRAM(s) Oral at bedtime  dextrose 5%. 1000 milliLiter(s) (100 mL/Hr) IV Continuous <Continuous>  dextrose 5%. 1000 milliLiter(s) (50 mL/Hr) IV Continuous <Continuous>  dextrose 50% Injectable 25 Gram(s) IV Push once  dextrose 50% Injectable 12.5 Gram(s) IV Push once  dextrose 50% Injectable 25 Gram(s) IV Push once  doxycycline monohydrate Suspension 20 milliGRAM(s) Oral every 12 hours  fluPHENAZine 10 milliGRAM(s) Oral at bedtime  gemfibrozil 600 milliGRAM(s) Oral two times a day  glucagon  Injectable 1 milliGRAM(s) IntraMuscular once  insulin glargine Injectable (LANTUS) 15 Unit(s) SubCutaneous at bedtime  insulin lispro (ADMELOG) corrective regimen sliding scale   SubCutaneous three times a day before meals  loratadine 10 milliGRAM(s) Oral daily  metFORMIN 1000 milliGRAM(s) Oral two times a day  prazosin. 1 milliGRAM(s) Oral at bedtime  traZODone 50 milliGRAM(s) Oral at bedtime  vitamin A &amp; D Ointment 1 Application(s) Topical two times a day    MEDICATIONS  (PRN):  acetaminophen     Tablet .. 650 milliGRAM(s) Oral every 8 hours PRN Mild Pain (1 - 3)  albuterol    90 MICROgram(s) HFA Inhaler 2 Puff(s) Inhalation every 6 hours PRN SOB/home med  dextrose Oral Gel 15 Gram(s) Oral once PRN Blood Glucose LESS THAN 70 milliGRAM(s)/deciliter  haloperidol     Tablet 5 milliGRAM(s) Oral every 8 hours PRN agitation  LORazepam     Tablet 2 milliGRAM(s) Oral every 8 hours PRN severe agitation  LORazepam     Tablet 0.5 milliGRAM(s) Oral two times a day PRN anxiety/home med   Yes

## 2023-04-28 ENCOUNTER — APPOINTMENT (OUTPATIENT)
Dept: OBGYN | Facility: CLINIC | Age: 53
End: 2023-04-28
Payer: MEDICARE

## 2023-04-28 VITALS
SYSTOLIC BLOOD PRESSURE: 112 MMHG | DIASTOLIC BLOOD PRESSURE: 79 MMHG | HEIGHT: 64 IN | BODY MASS INDEX: 30.56 KG/M2 | HEART RATE: 92 BPM | TEMPERATURE: 98.6 F | WEIGHT: 179 LBS

## 2023-04-28 DIAGNOSIS — Z12.4 ENCOUNTER FOR SCREENING FOR MALIGNANT NEOPLASM OF CERVIX: ICD-10-CM

## 2023-04-28 DIAGNOSIS — Z01.419 ENCOUNTER FOR GYNECOLOGICAL EXAMINATION (GENERAL) (ROUTINE) W/OUT ABNORMAL FINDINGS: ICD-10-CM

## 2023-04-28 PROCEDURE — G0101: CPT

## 2023-04-28 PROCEDURE — 99213 OFFICE O/P EST LOW 20 MIN: CPT | Mod: 25

## 2023-04-28 RX ORDER — LITHIUM CARBONATE 300 MG/1
TABLET ORAL
Refills: 0 | Status: DISCONTINUED | COMMUNITY
End: 2023-04-28

## 2023-04-28 RX ORDER — GLIMEPIRIDE 4 MG/1
TABLET ORAL
Refills: 0 | Status: DISCONTINUED | COMMUNITY
End: 2023-04-28

## 2023-04-28 RX ORDER — MIRABEGRON 50 MG/1
TABLET, FILM COATED, EXTENDED RELEASE ORAL
Refills: 0 | Status: DISCONTINUED | COMMUNITY
End: 2023-04-28

## 2023-04-28 RX ORDER — MONTELUKAST SODIUM 10 MG/1
TABLET, FILM COATED ORAL
Refills: 0 | Status: DISCONTINUED | COMMUNITY
End: 2023-04-28

## 2023-04-28 NOTE — DISCUSSION/SUMMARY
[FreeTextEntry1] : A: 52-year-old for annual GYN exam, postmenopausal bleeding, vulvar atrophy, wit, dense breasts, osteopenia, BMI 30\par \par P: GYN exam done\par Pap smear done\par Hormone panel done\par Safe sex practices if active\par Pain and bleeding precautions\par Referral for pelvic ultrasound given\par Referral for breast surgeon given\par DEXA referral given\par Encourage healthy diet and exercise\par Follow-up after imaging for possible EMB or as needed\par

## 2023-04-28 NOTE — HISTORY OF PRESENT ILLNESS
[Menarche Age: ____] : age at menarche was [unfilled] [Experiencing Menopausal Sxs] : experiencing menopausal symptoms [Previously active] : previously active [Patient reported mammogram was normal] : Patient reported mammogram was normal [Patient reported PAP Smear was normal] : Patient reported PAP Smear was normal [Patient reported bone density results were abnormal] : Patient reported bone density results were abnormal [Patient reported colonoscopy was normal] : Patient reported colonoscopy was normal [perimenopausal] : perimenopausal [N] : Patient is not sexually active [Y] : Positive pregnancy history [Gonorrhea test offered] : Gonorrhea test offered [Chlamydia test offered] : Chlamydia test offered [Trichomonas test offered] : Trichomonas test offered [No] : No [TextBox_4] : 52-year-old para 2-0-0-2 in menopause came for annual GYN exam and Pap smear.  Patient also came for evaluation of postmenopausal bleeding.  She had gone almost 2 years without bleeding and was presumed to be in menopause but noticed some spotting for a few days a couple months ago.  She went to see her oncologist who did some blood work that showed her estradiol was 116 however previous to draws were below 5 in menopausal range.  FSH remains in menopausal range.  Patient counseled about concerns for evaluation for postmenopausal bleeding and ruling out precancer or cancer lesions.  Patient's oncologist stopped her anastrozole or any other medication until further work-up was done.  Patient states she did not come until now because she felt that there was no risk or harm because she was told that she is not in menopause which is not most likely the case.  Patient denies bleeding or current pelvic pain, discharge, dysuria or other GYN complaints.  She does have a history of abnormal Pap with HPV and colposcopy.  She denies history of other STDs, fibroids or ovarian cysts.  She is not currently sexually active. [PapSmeardate] : 3/11/21 [Mammogramdate] : 12/2022 [BoneDensityDate] : 9/2021 [TextBox_37] : osteopenia  [TextBox_43] : f/u 5 yrs [ColonoscopyDate] : 2017 [LMPDate] : 8/2022 [PGHxTotal] : 2 [PGHxPremature] : 0 [[facility] : 2 [PGHxAbortions] : 0 [[facility]] : 2 [FreeTextEntry1] : 8/2022

## 2023-04-28 NOTE — PHYSICAL EXAM
[Appropriately responsive] : appropriately responsive [Alert] : alert [No Lymphadenopathy] : no lymphadenopathy [No Acute Distress] : no acute distress [Regular Rate Rhythm] : regular rate rhythm [Soft] : soft [Non-tender] : non-tender [Non-distended] : non-distended [Oriented x3] : oriented x3 [No Mass] : no mass [Examination Of The Breasts] : a normal appearance [No Masses] : no breast masses were palpable [No Discharge] : no discharge [Vulvar Atrophy] : vulvar atrophy [No Lesions] : no lesions  [Labia Majora] : normal [Labia Minora] : normal [Normal] : normal [Atrophy] : atrophy [Tenderness] : nontender [No Bleeding] : There was no active vaginal bleeding [Enlarged ___ wks] : not enlarged [Mass ___ cm] : no uterine mass was palpated [Uterine Adnexae] : normal [FreeTextEntry6] : Exam limited to habitus [FreeTextEntry5] : Pap smear done

## 2023-04-29 LAB
ESTRADIOL SERPL-MCNC: 14 PG/ML
FSH SERPL-MCNC: 35.5 IU/L
LH SERPL-ACNC: 21.9 IU/L
PROGEST SERPL-MCNC: 0.2 NG/ML
TESTOST SERPL-MCNC: 6.5 NG/DL
TSH SERPL-ACNC: 0.71 UIU/ML

## 2023-05-05 LAB — CYTOLOGY CVX/VAG DOC THIN PREP: ABNORMAL

## 2023-05-07 LAB — ESTROGEN SERPL-MCNC: 121 PG/ML

## 2023-05-10 ENCOUNTER — OUTPATIENT (OUTPATIENT)
Dept: OUTPATIENT SERVICES | Facility: HOSPITAL | Age: 53
LOS: 1 days | End: 2023-05-10
Payer: MEDICARE

## 2023-05-10 ENCOUNTER — APPOINTMENT (OUTPATIENT)
Dept: RADIATION ONCOLOGY | Facility: HOSPITAL | Age: 53
End: 2023-05-10
Payer: MEDICARE

## 2023-05-10 ENCOUNTER — APPOINTMENT (OUTPATIENT)
Dept: RADIATION ONCOLOGY | Facility: HOSPITAL | Age: 53
End: 2023-05-10

## 2023-05-10 VITALS
BODY MASS INDEX: 31.48 KG/M2 | TEMPERATURE: 97.7 F | HEART RATE: 93 BPM | DIASTOLIC BLOOD PRESSURE: 62 MMHG | RESPIRATION RATE: 16 BRPM | WEIGHT: 183.38 LBS | SYSTOLIC BLOOD PRESSURE: 132 MMHG | OXYGEN SATURATION: 95 %

## 2023-05-10 DIAGNOSIS — Z98.890 OTHER SPECIFIED POSTPROCEDURAL STATES: Chronic | ICD-10-CM

## 2023-05-10 DIAGNOSIS — Z92.3 PERSONAL HISTORY OF IRRADIATION: ICD-10-CM

## 2023-05-10 DIAGNOSIS — C50.411 MALIGNANT NEOPLASM OF UPPER-OUTER QUADRANT OF RIGHT FEMALE BREAST: ICD-10-CM

## 2023-05-10 PROCEDURE — 99213 OFFICE O/P EST LOW 20 MIN: CPT

## 2023-05-10 RX ORDER — NABUMETONE 500 MG/1
500 TABLET, FILM COATED ORAL
Qty: 60 | Refills: 0 | Status: DISCONTINUED | COMMUNITY
Start: 2023-01-17 | End: 2023-05-10

## 2023-05-10 RX ORDER — ANASTROZOLE TABLETS 1 MG/1
1 TABLET ORAL
Qty: 90 | Refills: 2 | Status: DISCONTINUED | COMMUNITY
Start: 2021-03-18 | End: 2023-05-10

## 2023-05-10 RX ORDER — ANASTROZOLE TABLETS 1 MG/1
1 TABLET ORAL DAILY
Qty: 90 | Refills: 1 | Status: DISCONTINUED | COMMUNITY
Start: 2022-01-12 | End: 2023-05-10

## 2023-05-10 NOTE — DISEASE MANAGEMENT
[Pathological] : TNM Stage: p [IA] : IA [FreeTextEntry4] : invasive lobular carcinoma of the right breast, G2, ER/DE positive / HER 2 negative, upper outer quadrant [TTNM] : 1a [NTNM] : 0 [MTNM] : 0 [de-identified] : Right Breast

## 2023-05-10 NOTE — REVIEW OF SYSTEMS
[Joint Pain] : joint pain [Muscle Pain] : muscle pain [Negative] : Psychiatric [Fever] : no fever [Chills] : no chills [Fatigue] : no fatigue [Chest Pain] : no chest pain [Palpitations] : no palpitations [Shortness Of Breath] : no shortness of breath [Wheezing] : no wheezing [Cough] : no cough [FreeTextEntry9] : left shoulder 2/2 injury s/p fall

## 2023-05-10 NOTE — HISTORY OF PRESENT ILLNESS
[FreeTextEntry1] : SKYLAR VUONG returns in follow up visit.  As you know, SKYLAR VUONG is a 52 year old female with invasive lobular carcinoma of the right breast, ER/NM positive, HER 2 negative, Stage IA. She is s/p breast conserving surgery. She received 4,240 cGy to the right breast from 3/11/21 to 4/1/2021. The course of radiation therapy was completed without any complications. In the interim, patient  has done well.  She denies breast pain.  She has been off of Anastrazole since 8/2022 by Dr. Morgan 2/2 irregular mensurating.  She is scheduled for TVUS on 5/15/2023 requested by her GYN.  Otherwise, upcoming mammo in June, 2023.  \par \par 12/28/2022, b/l mammo & US shows, status post a right lumpectomy with stable architectural distortion most consistent with postsurgical change. Incidentally noted subcentimeter cyst in the right breast at the 11:00 location 10 cm from the nipple as above.\par No evidence of malignancy. Recommendation: Unless otherwise indicated by clinical findings, annual screening mammography recommended.  BI-RADS Category 2: Benign\par \par Dr. Morgan 8/16/2023\par Upcoming

## 2023-05-10 NOTE — LETTER CLOSING
[Consult Closing:] : Thank you for allowing me to participate in the care of this patient.  If you have any questions, please do not hesitate to contact me. [Sincerely yours,] : Sincerely yours, [FreeTextEntry3] : Judie Posada M.D. \par \par Electronically proofread and signed by:  Judie Posada MD\par Attending, Department of Radiation Medicine\par [facility]\par \par CC: Dr. Hawk\par

## 2023-05-10 NOTE — PHYSICAL EXAM
[Sclera] : the sclera and conjunctiva were normal [Hearing Threshold Finger Rub Not [place]] : hearing was normal [Heart Rate And Rhythm] : heart rate and rhythm were normal [Heart Sounds] : normal S1 and S2 [Murmurs] : no murmurs present [Edema] : no peripheral edema present [No Discharge] : no discharge [No Masses] : no breast masses were palpable [Abdomen Soft] : soft [Nondistended] : nondistended [Abdomen Tenderness] : non-tender [Cervical Lymph Nodes Enlarged Posterior Bilaterally] : posterior cervical [Cervical Lymph Nodes Enlarged Anterior Bilaterally] : anterior cervical [Supraclavicular Lymph Nodes Enlarged Bilaterally] : supraclavicular [Nail Clubbing] : no clubbing  or cyanosis of the fingernails [Skin Color & Pigmentation] : normal skin color and pigmentation [No Focal Deficits] : no focal deficits [Motor Exam] : the motor exam was normal [Enlargement Of The Right Breast] : no swelling [Tenderness Of The Right Breast] : no tenderness [___] :  no erythema [Enlargement Of The Left Breast] : no swelling [Tenderness Of The Left Breast] : no tenderness [Normal] : no palpable adenopathy [Axillary Lymph Nodes Enlarged Bilaterally] : no enlarged nodes

## 2023-05-17 DIAGNOSIS — C50.411 MALIGNANT NEOPLASM OF UPPER-OUTER QUADRANT OF RIGHT FEMALE BREAST: ICD-10-CM

## 2023-07-11 ENCOUNTER — APPOINTMENT (OUTPATIENT)
Dept: BREAST CENTER | Facility: CLINIC | Age: 53
End: 2023-07-11

## 2023-07-24 ENCOUNTER — OUTPATIENT (OUTPATIENT)
Dept: INPATIENT UNIT | Facility: HOSPITAL | Age: 53
LOS: 1 days | Discharge: HOME | End: 2023-07-24
Payer: MEDICARE

## 2023-07-24 ENCOUNTER — RESULT REVIEW (OUTPATIENT)
Age: 53
End: 2023-07-24

## 2023-07-24 ENCOUNTER — TRANSCRIPTION ENCOUNTER (OUTPATIENT)
Age: 53
End: 2023-07-24

## 2023-07-24 VITALS
HEIGHT: 64 IN | DIASTOLIC BLOOD PRESSURE: 77 MMHG | WEIGHT: 177.03 LBS | SYSTOLIC BLOOD PRESSURE: 124 MMHG | RESPIRATION RATE: 18 BRPM | HEART RATE: 86 BPM | TEMPERATURE: 98 F

## 2023-07-24 VITALS
TEMPERATURE: 98 F | RESPIRATION RATE: 18 BRPM | HEART RATE: 85 BPM | SYSTOLIC BLOOD PRESSURE: 114 MMHG | DIASTOLIC BLOOD PRESSURE: 63 MMHG | OXYGEN SATURATION: 98 %

## 2023-07-24 DIAGNOSIS — Z98.890 OTHER SPECIFIED POSTPROCEDURAL STATES: Chronic | ICD-10-CM

## 2023-07-24 DIAGNOSIS — Z12.11 ENCOUNTER FOR SCREENING FOR MALIGNANT NEOPLASM OF COLON: ICD-10-CM

## 2023-07-24 LAB — GLUCOSE BLDC GLUCOMTR-MCNC: 181 MG/DL — HIGH (ref 70–99)

## 2023-07-24 PROCEDURE — 82962 GLUCOSE BLOOD TEST: CPT

## 2023-07-24 PROCEDURE — 88305 TISSUE EXAM BY PATHOLOGIST: CPT

## 2023-07-24 PROCEDURE — 88305 TISSUE EXAM BY PATHOLOGIST: CPT | Mod: 26

## 2023-07-24 NOTE — CHART NOTE - NSCHARTNOTEFT_GEN_A_CORE
ANESTHESIA to PACU NOTE      ____ Intubated  TV:______       Rate: ______      FiO2: ______    _x___ Patent Airway    __x__ Full return of protective reflexes    ____ Full recovery from anesthesia / sedation to baseline status    Vitals:  HR 87  /55  RR 15  O2sat. 98%  Temp: 36.4C      Mental Status:  __x__ Awake   __x___ Alert   _____ Drowsy   _____ Sedated    Nausea/Vomiting: ____ Yes, See Post - Op Orders      ___x_ No    Pain Scale (0-10): _____    Treatment: __x__ None    ____ See Post - Op/PCA Orders    Post - Operative Fluids:   ____ Oral   __xx__ See Post - Op Orders    Plan:  Discharge to:   __x__Home       _____Floor      _____Critical Care    _____ Other:_________________    Comments: s/p IV sedation. No anesthesia complications. Pt's condition is stable in PACU. Full report is given to PACU RN.

## 2023-07-24 NOTE — ASU DISCHARGE PLAN (ADULT/PEDIATRIC) - CALL YOUR DOCTOR IF YOU HAVE ANY OF THE FOLLOWING:
Bleeding that does not stop/Swelling that gets worse/Pain not relieved by Medications/Fever greater than (need to indicate Fahrenheit or Celsius)/Numbness, tingling, color or temperature change to extremity/Nausea and vomiting that does not stop/Excessive diarrhea

## 2023-07-25 LAB — SURGICAL PATHOLOGY STUDY: SIGNIFICANT CHANGE UP

## 2023-07-26 DIAGNOSIS — I10 ESSENTIAL (PRIMARY) HYPERTENSION: ICD-10-CM

## 2023-07-26 DIAGNOSIS — F31.9 BIPOLAR DISORDER, UNSPECIFIED: ICD-10-CM

## 2023-07-26 DIAGNOSIS — Z85.3 PERSONAL HISTORY OF MALIGNANT NEOPLASM OF BREAST: ICD-10-CM

## 2023-07-26 DIAGNOSIS — Z88.8 ALLERGY STATUS TO OTHER DRUGS, MEDICAMENTS AND BIOLOGICAL SUBSTANCES: ICD-10-CM

## 2023-07-26 DIAGNOSIS — K64.8 OTHER HEMORRHOIDS: ICD-10-CM

## 2023-07-26 DIAGNOSIS — Z12.11 ENCOUNTER FOR SCREENING FOR MALIGNANT NEOPLASM OF COLON: ICD-10-CM

## 2023-07-26 DIAGNOSIS — Z79.84 LONG TERM (CURRENT) USE OF ORAL HYPOGLYCEMIC DRUGS: ICD-10-CM

## 2023-07-26 DIAGNOSIS — D12.2 BENIGN NEOPLASM OF ASCENDING COLON: ICD-10-CM

## 2023-07-26 DIAGNOSIS — E78.00 PURE HYPERCHOLESTEROLEMIA, UNSPECIFIED: ICD-10-CM

## 2023-07-26 DIAGNOSIS — D12.0 BENIGN NEOPLASM OF CECUM: ICD-10-CM

## 2023-07-26 DIAGNOSIS — G47.33 OBSTRUCTIVE SLEEP APNEA (ADULT) (PEDIATRIC): ICD-10-CM

## 2023-07-26 DIAGNOSIS — Z88.0 ALLERGY STATUS TO PENICILLIN: ICD-10-CM

## 2023-07-26 DIAGNOSIS — J45.909 UNSPECIFIED ASTHMA, UNCOMPLICATED: ICD-10-CM

## 2023-07-26 DIAGNOSIS — K57.30 DIVERTICULOSIS OF LARGE INTESTINE WITHOUT PERFORATION OR ABSCESS WITHOUT BLEEDING: ICD-10-CM

## 2023-07-26 DIAGNOSIS — E11.9 TYPE 2 DIABETES MELLITUS WITHOUT COMPLICATIONS: ICD-10-CM

## 2023-08-17 ENCOUNTER — OUTPATIENT (OUTPATIENT)
Dept: OUTPATIENT SERVICES | Facility: HOSPITAL | Age: 53
LOS: 1 days | End: 2023-08-17
Payer: MEDICARE

## 2023-08-17 ENCOUNTER — APPOINTMENT (OUTPATIENT)
Dept: HEMATOLOGY ONCOLOGY | Facility: CLINIC | Age: 53
End: 2023-08-17
Payer: MEDICARE

## 2023-08-17 VITALS
TEMPERATURE: 98.2 F | HEIGHT: 64 IN | BODY MASS INDEX: 29.88 KG/M2 | SYSTOLIC BLOOD PRESSURE: 137 MMHG | WEIGHT: 175 LBS | DIASTOLIC BLOOD PRESSURE: 77 MMHG | HEART RATE: 84 BPM

## 2023-08-17 DIAGNOSIS — Z98.890 OTHER SPECIFIED POSTPROCEDURAL STATES: Chronic | ICD-10-CM

## 2023-08-17 DIAGNOSIS — C50.411 MALIGNANT NEOPLASM OF UPPER-OUTER QUADRANT OF RIGHT FEMALE BREAST: ICD-10-CM

## 2023-08-17 PROCEDURE — 83002 ASSAY OF GONADOTROPIN (LH): CPT

## 2023-08-17 PROCEDURE — 83001 ASSAY OF GONADOTROPIN (FSH): CPT

## 2023-08-17 PROCEDURE — 82670 ASSAY OF TOTAL ESTRADIOL: CPT

## 2023-08-17 PROCEDURE — 99214 OFFICE O/P EST MOD 30 MIN: CPT

## 2023-08-20 LAB
ESTRADIOL SERPL-MCNC: <5 PG/ML
FSH SERPL-MCNC: 15.9 IU/L
LH SERPL-ACNC: 8.5 IU/L

## 2023-08-20 NOTE — ASSESSMENT
[FreeTextEntry1] : 52 year old perimenopausal female has Stage IA ([id number]) classical type lobular carcinoma of the right breast, G2, ER/OK positive, Her-2 negative, s/p right breast lumpectomy and SLNB  with negative margins.   H/o iron deficiency anemia - off Iron tabs for 2 months now  Assessment and Plan: -- Will resume Anastrozole. Her estradiol level was 116 on 2/9/23 but previously was < 5. She had vaginal spotting after she was amenorrhea for 2 years.  She saw GYN Dr. Slater on 4/28/23. Repeat Estradiol was 14. She was considered in menopause. She has not had bleeding since then. Will order repeat estradiol, FSH and LH levels.  -- Breast exam today did not reveal palpable abnormality. b/l dx mammo in 12/2022 did not show suspicious finding. She will resume annual screening mammo in 12/2023.  -- Osteopenia. Continue calcium and vitamin D supplement, regular exercise.  -- Followup with Breast surgery and Dr. Posada as scheduled.   RTO for followup in 6 months.   Addendum: Blood work reviewed. Estradiol level is < 5. She will resume Anastrozole.

## 2023-08-20 NOTE — HISTORY OF PRESENT ILLNESS
[de-identified] :  50 year old female with PMH of Bipolar disorder, HTN, DM , asthma is referred for consultation of adjuvant systemic treatment for new diagnosis of breast cancer. She is here accompanied by her mother. \par  \par  She had a screening mammogram (10/26/2020) which showed a 4 mm spiculated lesion in the right breast in the upper outer quadrant. Diagnostic mammogram and ultrasound on 2020 showed a new 4 mm spiculated density in the 11 o'clock, 12 cm FN right breast. BI-RADS 4 Suspicious. Recommend biopsy. On 2020, she had a biopsy of the right breast abnormality at 10 o'clock, 12 cm FN and pathology that showed invasive mammary carcinoma with lobular features, moderately differentiated. \par  \par  On 12/10/2020, outside slides reviewed at [facility] from [facility] shows, invasive well differentiated lobular carcinoma, classical type and lobular carcinoma in-situ. It was ER/CA positive %, HER 2 negative. Ki-67 index was 25%.  She also had an MRI of bilateral breasts on 12/15/2020, which showed in right breast, there is a 0.4 cm mass with associated biopsy clip in the upper outer quadrant of the right breast consistent with the biopsy-proven carcinoma. Surrounding postbiopsy enhancement is noted. Benign intramammary lymph nodes are seen in the upper outer quadrant of the right breast. No additional suspicious abnormal enhancement is seen in the right breast. There is no axillary adenopathy. In the left breast, benign intramammary lymph node in the upper outer quadrant of the left breast. No suspicious abnormal enhancement is seen in the left breast. There is no axillary adenopathy. BI-RADS 6: Known biopsy-proven malignancy.\par  \par  On 2021, She underwent a lumpectomy and sentinel node biopsy. She was found to have a 4.5 mm invasive lobular carcinoma, G2, ER/CA positive % / HER 2 negative, Ki 67 index is 25 %. Surgical margins were negative as well as 0/1 negative sentinel lymph node. There was no LVSI/PNI.  She recovered well since surgery. However, has some breast discomfort/lump at the lumpectomy site but getting better. She also saw Dr Swetha Vega onc and is going to be offered radiation therapy. \par  \par  She is . The age at menarche was 12.  Age at live birth was 32  She received fertility Treatments with Clomid.  She used Birth Control Pill for 14 years. She is perimenopause. She does not remember when her last periods was but was less than a year ago. Also to note she has h/o bipolar disorder and is controlled with medications and receives Prolixin shot every 2 weeks and is on Lithium and carbamazepine. \par  \par  She has no family h/o of breast or ovarian cancer. \par  \par  She was also on oral ferrous sulfate for h/o NESTOR in the past and was started by PMD which she stopped taking in Dec 2020.  [de-identified] : 6/9/21: The patient is here today for f/u visit. She has Stage IA ([id number]) classical type lobular carcinoma of the right breast, G2, ER/UT positive, Her-2 negative, s/p right breast lumpectomy and SLNB on 1/29/2021 with negative margins. She saw Dr. ray and received adjuvant WBI between 3/11 to 4/1/21. She has started on adjuvant endocrine therapy with Anastrozole and tolerated well so far. She is feeling well and dose not have new complains.  9/8/21: The patient is here today for f/u visit. She has Stage IA ([id number]) classical type lobular carcinoma of the right breast, G2, ER/UT positive, Her-2 negative, s/p right breast lumpectomy and SLNB on 1/29/2021 with negative margins. She saw Dr. Ray and received adjuvant WBI between 3/11 to 4/1/21. She has been taking adjuvant endocrine therapy with Anastrozole and tolerated well. She is feeling well and dose not have new complains. On 6/28/21, she had right breast dx mammo and US which showed Expected postoperative changes of the right lumpectomy site. There was no suspicious finding. Bone density on 6/28/21 showed osteopenia in the femoral neck with T score -1.7.  3/24/22 The patient is here today for f/u visit. She has Stage IA ([id number]) classical type lobular carcinoma of the right breast, G2, ER/UT positive, Her-2 negative, s/p right breast lumpectomy and SLNB on 1/29/2021 with negative margins. She saw Dr. Ray and received adjuvant WBI between 3/11 to 4/1/21. She has been taking adjuvant endocrine therapy with Anastrozole since 3/2021 and tolerated well. On 12/17/21, she had b/l breast dx mammo and US which showed Expected postoperative changes of the right lumpectomy site. There was no suspicious finding. Bone density on 6/28/21 showed osteopenia in the femoral neck with T score -1.7. She complains pain in the right lumpectomy site sometimes.   9/12/22 The patient is here today for f/u visit, accompanied by her mother. She has Stage IA ([id number]) classical type lobular carcinoma of the right breast, G2, ER/UT positive, Her-2 negative, s/p right breast lumpectomy and SLNB on 1/29/2021 with negative margins. She saw Dr. Ray and received adjuvant WBI between 3/11 to 4/1/21. She has been taking adjuvant endocrine therapy with Anastrozole since 3/2021 and tolerated well. On 12/17/21, she had b/l breast dx mammo and US which showed Expected postoperative changes of the right lumpectomy site. There was no suspicious finding. Bone density on 6/28/21 showed osteopenia in the femoral neck with T score -1.7. She had R diagnostic mammogram done in 6/22 which was negative for malignancy, and R US showed post surgical changes in the upper outer quadrant with no suspicious masses BIRADS2. She has had 2  hospitalizations this summer for psychiatric issues, one of the admissions for lithium overdose requiring dialysis in the hospital. She is currently on partial hospitalization program. Per pt, she had her LMP in 8/2022, with spotting for 4-5 days, which occurred after 1-2 years of no menstrual periods.   2/9/23: Shama is here today for f/u visit. She has Stage IA ([id number]) classical type lobular carcinoma of the right breast, G2, ER/UT positive, Her-2 negative, s/p right breast lumpectomy and SLNB on 1/29/2021 with negative margins. She saw Dr. Ray and received adjuvant WBI between 3/11 to 4/1/21. She has been taking adjuvant endocrine therapy with Anastrozole since 3/2021. She did have no periods for 1-2 years and her estradiol levels in 3/2021 were  6. However, in 8/2022, she had spotting for 4-5 days. Anastrozole was held since then. On 12/28/22, she had b/l breast dx mammo and US which did not show suspicious finding.  Bone density on 6/28/21 showed osteopenia in the femoral neck with T score -1.7. She had her LMP in 8/2022, with spotting for 4-5 days, which occurred after 1-2 years of no menstrual periods.   8/17/23: Shama is here today for f/u visit. She has Stage IA ([id number]) classical type lobular carcinoma of the right breast, G2, ER/UT positive, Her-2 negative, s/p right breast lumpectomy and SLNB on 1/29/2021 with negative margins. She saw Dr. Ray and received adjuvant WBI between 3/11 to 4/1/21. She has been taking adjuvant endocrine therapy with Anastrozole since 3/2021. She did have no periods for 1-2 years and her estradiol levels in 3/2021 were  6. However, in 8/2022, she had spotting for 4-5 days. Her estradiol level was 116 on 2/9/23 but previously was < 5. She had vaginal spotting after she was amenorrhea for 2 years.  She saw GYN Dr. Slater on 4/28/23. Repeat Estradiol in 4/2023 was 14. She was considered in menopause. She has not had bleeding since then. The patient was told to call me back after she saw GYN and to decide if she should switch to Tamoxifen or resume Anastrozole. However, she did not call. She has been off endocrine therapy.

## 2023-08-20 NOTE — PHYSICAL EXAM
[Restricted in physically strenuous activity but ambulatory and able to carry out work of a light or sedentary nature] : Status 1- Restricted in physically strenuous activity but ambulatory and able to carry out work of a light or sedentary nature, e.g., light house work, office work [Normal] : grossly intact [de-identified] : Rt lumpectomy scar is healing well. No palpable masses in b/l breast and no axillary LNs

## 2023-08-27 ENCOUNTER — INPATIENT (INPATIENT)
Facility: HOSPITAL | Age: 53
LOS: 8 days | Discharge: HOME | DRG: 885 | End: 2023-09-05
Attending: PSYCHIATRY & NEUROLOGY | Admitting: PSYCHIATRY & NEUROLOGY
Payer: MEDICARE

## 2023-08-27 VITALS
HEART RATE: 107 BPM | HEIGHT: 64 IN | WEIGHT: 154.98 LBS | TEMPERATURE: 97 F | SYSTOLIC BLOOD PRESSURE: 143 MMHG | RESPIRATION RATE: 18 BRPM | DIASTOLIC BLOOD PRESSURE: 80 MMHG | OXYGEN SATURATION: 99 %

## 2023-08-27 DIAGNOSIS — Z98.890 OTHER SPECIFIED POSTPROCEDURAL STATES: Chronic | ICD-10-CM

## 2023-08-27 DIAGNOSIS — E11.9 TYPE 2 DIABETES MELLITUS WITHOUT COMPLICATIONS: ICD-10-CM

## 2023-08-27 DIAGNOSIS — L60.3 NAIL DYSTROPHY: ICD-10-CM

## 2023-08-27 DIAGNOSIS — F31.9 BIPOLAR DISORDER, UNSPECIFIED: ICD-10-CM

## 2023-08-27 DIAGNOSIS — L84 CORNS AND CALLOSITIES: ICD-10-CM

## 2023-08-27 LAB
ANION GAP SERPL CALC-SCNC: 14 MMOL/L — SIGNIFICANT CHANGE UP (ref 7–14)
APAP SERPL-MCNC: <5 UG/ML — LOW (ref 10–30)
APPEARANCE UR: CLEAR — SIGNIFICANT CHANGE UP
BACTERIA # UR AUTO: ABNORMAL /HPF
BASOPHILS # BLD AUTO: 0.03 K/UL — SIGNIFICANT CHANGE UP (ref 0–0.2)
BASOPHILS NFR BLD AUTO: 0.3 % — SIGNIFICANT CHANGE UP (ref 0–1)
BILIRUB UR-MCNC: NEGATIVE — SIGNIFICANT CHANGE UP
BUN SERPL-MCNC: 16 MG/DL — SIGNIFICANT CHANGE UP (ref 10–20)
CALCIUM SERPL-MCNC: 9.4 MG/DL — SIGNIFICANT CHANGE UP (ref 8.4–10.5)
CHLORIDE SERPL-SCNC: 103 MMOL/L — SIGNIFICANT CHANGE UP (ref 98–110)
CO2 SERPL-SCNC: 22 MMOL/L — SIGNIFICANT CHANGE UP (ref 17–32)
COLOR SPEC: YELLOW — SIGNIFICANT CHANGE UP
CREAT SERPL-MCNC: 0.8 MG/DL — SIGNIFICANT CHANGE UP (ref 0.7–1.5)
DIFF PNL FLD: NEGATIVE — SIGNIFICANT CHANGE UP
EGFR: 87 ML/MIN/1.73M2 — SIGNIFICANT CHANGE UP
EOSINOPHIL # BLD AUTO: 0.3 K/UL — SIGNIFICANT CHANGE UP (ref 0–0.7)
EOSINOPHIL NFR BLD AUTO: 3.4 % — SIGNIFICANT CHANGE UP (ref 0–8)
EPI CELLS # UR: PRESENT
ETHANOL SERPL-MCNC: <10 MG/DL — SIGNIFICANT CHANGE UP
GLUCOSE BLDC GLUCOMTR-MCNC: 179 MG/DL — HIGH (ref 70–99)
GLUCOSE BLDC GLUCOMTR-MCNC: 182 MG/DL — HIGH (ref 70–99)
GLUCOSE BLDC GLUCOMTR-MCNC: 225 MG/DL — HIGH (ref 70–99)
GLUCOSE SERPL-MCNC: 186 MG/DL — HIGH (ref 70–99)
GLUCOSE UR QL: 100 MG/DL
HCT VFR BLD CALC: 35.8 % — LOW (ref 37–47)
HGB BLD-MCNC: 12.3 G/DL — SIGNIFICANT CHANGE UP (ref 12–16)
IMM GRANULOCYTES NFR BLD AUTO: 0.3 % — SIGNIFICANT CHANGE UP (ref 0.1–0.3)
KETONES UR-MCNC: ABNORMAL
LEUKOCYTE ESTERASE UR-ACNC: NEGATIVE — SIGNIFICANT CHANGE UP
LYMPHOCYTES # BLD AUTO: 1.87 K/UL — SIGNIFICANT CHANGE UP (ref 1.2–3.4)
LYMPHOCYTES # BLD AUTO: 20.9 % — SIGNIFICANT CHANGE UP (ref 20.5–51.1)
MCHC RBC-ENTMCNC: 28.8 PG — SIGNIFICANT CHANGE UP (ref 27–31)
MCHC RBC-ENTMCNC: 34.4 G/DL — SIGNIFICANT CHANGE UP (ref 32–37)
MCV RBC AUTO: 83.8 FL — SIGNIFICANT CHANGE UP (ref 81–99)
MONOCYTES # BLD AUTO: 0.49 K/UL — SIGNIFICANT CHANGE UP (ref 0.1–0.6)
MONOCYTES NFR BLD AUTO: 5.5 % — SIGNIFICANT CHANGE UP (ref 1.7–9.3)
MUCOUS THREADS # UR AUTO: PRESENT
NEUTROPHILS # BLD AUTO: 6.22 K/UL — SIGNIFICANT CHANGE UP (ref 1.4–6.5)
NEUTROPHILS NFR BLD AUTO: 69.6 % — SIGNIFICANT CHANGE UP (ref 42.2–75.2)
NITRITE UR-MCNC: NEGATIVE — SIGNIFICANT CHANGE UP
NRBC # BLD: 0 /100 WBCS — SIGNIFICANT CHANGE UP (ref 0–0)
PH UR: 6 — SIGNIFICANT CHANGE UP (ref 5–8)
PLATELET # BLD AUTO: 132 K/UL — SIGNIFICANT CHANGE UP (ref 130–400)
PMV BLD: 11.9 FL — HIGH (ref 7.4–10.4)
POTASSIUM SERPL-MCNC: 4.5 MMOL/L — SIGNIFICANT CHANGE UP (ref 3.5–5)
POTASSIUM SERPL-SCNC: 4.5 MMOL/L — SIGNIFICANT CHANGE UP (ref 3.5–5)
PROT UR-MCNC: 100 MG/DL
RBC # BLD: 4.27 M/UL — SIGNIFICANT CHANGE UP (ref 4.2–5.4)
RBC # FLD: 14.1 % — SIGNIFICANT CHANGE UP (ref 11.5–14.5)
RBC CASTS # UR COMP ASSIST: 1 /HPF — SIGNIFICANT CHANGE UP (ref 0–4)
SALICYLATES SERPL-MCNC: <0.3 MG/DL — LOW (ref 4–30)
SARS-COV-2 RNA SPEC QL NAA+PROBE: SIGNIFICANT CHANGE UP
SODIUM SERPL-SCNC: 139 MMOL/L — SIGNIFICANT CHANGE UP (ref 135–146)
SP GR SPEC: >=1.03 (ref 1.01–1.03)
SQUAMOUS # UR AUTO: 4 /HPF — SIGNIFICANT CHANGE UP (ref 0–5)
UROBILINOGEN FLD QL: 0.2 MG/DL — SIGNIFICANT CHANGE UP
WBC # BLD: 8.94 K/UL — SIGNIFICANT CHANGE UP (ref 4.8–10.8)
WBC # FLD AUTO: 8.94 K/UL — SIGNIFICANT CHANGE UP (ref 4.8–10.8)
WBC UR QL: 3 /HPF — SIGNIFICANT CHANGE UP (ref 0–5)

## 2023-08-27 PROCEDURE — 84443 ASSAY THYROID STIM HORMONE: CPT

## 2023-08-27 PROCEDURE — 97165 OT EVAL LOW COMPLEX 30 MIN: CPT | Mod: GO

## 2023-08-27 PROCEDURE — 99285 EMERGENCY DEPT VISIT HI MDM: CPT | Mod: FS

## 2023-08-27 PROCEDURE — 85025 COMPLETE CBC W/AUTO DIFF WBC: CPT

## 2023-08-27 PROCEDURE — 83735 ASSAY OF MAGNESIUM: CPT

## 2023-08-27 PROCEDURE — 80156 ASSAY CARBAMAZEPINE TOTAL: CPT

## 2023-08-27 PROCEDURE — 82962 GLUCOSE BLOOD TEST: CPT

## 2023-08-27 PROCEDURE — 97110 THERAPEUTIC EXERCISES: CPT | Mod: GO

## 2023-08-27 PROCEDURE — 80053 COMPREHEN METABOLIC PANEL: CPT

## 2023-08-27 PROCEDURE — 97162 PT EVAL MOD COMPLEX 30 MIN: CPT | Mod: GP

## 2023-08-27 PROCEDURE — 83036 HEMOGLOBIN GLYCOSYLATED A1C: CPT

## 2023-08-27 PROCEDURE — 80061 LIPID PANEL: CPT

## 2023-08-27 PROCEDURE — 97535 SELF CARE MNGMENT TRAINING: CPT | Mod: GO

## 2023-08-27 PROCEDURE — 36415 COLL VENOUS BLD VENIPUNCTURE: CPT

## 2023-08-27 RX ORDER — CARBAMAZEPINE 200 MG
200 TABLET ORAL
Refills: 0 | Status: DISCONTINUED | OUTPATIENT
Start: 2023-08-27 | End: 2023-09-05

## 2023-08-27 RX ORDER — ANASTROZOLE 1 MG/1
1 TABLET ORAL DAILY
Refills: 0 | Status: DISCONTINUED | OUTPATIENT
Start: 2023-08-27 | End: 2023-09-05

## 2023-08-27 RX ORDER — AMLODIPINE BESYLATE 2.5 MG/1
5 TABLET ORAL DAILY
Refills: 0 | Status: DISCONTINUED | OUTPATIENT
Start: 2023-08-27 | End: 2023-09-05

## 2023-08-27 RX ORDER — ALBUTEROL 90 UG/1
2 AEROSOL, METERED ORAL EVERY 6 HOURS
Refills: 0 | Status: DISCONTINUED | OUTPATIENT
Start: 2023-08-27 | End: 2023-09-05

## 2023-08-27 RX ORDER — SODIUM CHLORIDE 9 MG/ML
1000 INJECTION, SOLUTION INTRAVENOUS
Refills: 0 | Status: DISCONTINUED | OUTPATIENT
Start: 2023-08-27 | End: 2023-09-05

## 2023-08-27 RX ORDER — GLUCAGON INJECTION, SOLUTION 0.5 MG/.1ML
1 INJECTION, SOLUTION SUBCUTANEOUS ONCE
Refills: 0 | Status: DISCONTINUED | OUTPATIENT
Start: 2023-08-27 | End: 2023-09-05

## 2023-08-27 RX ORDER — DEXTROSE 50 % IN WATER 50 %
25 SYRINGE (ML) INTRAVENOUS ONCE
Refills: 0 | Status: DISCONTINUED | OUTPATIENT
Start: 2023-08-27 | End: 2023-09-05

## 2023-08-27 RX ORDER — ACETAMINOPHEN 500 MG
650 TABLET ORAL ONCE
Refills: 0 | Status: COMPLETED | OUTPATIENT
Start: 2023-08-27 | End: 2023-08-27

## 2023-08-27 RX ORDER — BENZTROPINE MESYLATE 1 MG
1 TABLET ORAL
Refills: 0 | Status: DISCONTINUED | OUTPATIENT
Start: 2023-08-27 | End: 2023-09-05

## 2023-08-27 RX ORDER — CARBAMAZEPINE 200 MG
100 TABLET ORAL AT BEDTIME
Refills: 0 | Status: DISCONTINUED | OUTPATIENT
Start: 2023-08-27 | End: 2023-09-05

## 2023-08-27 RX ORDER — HALOPERIDOL DECANOATE 100 MG/ML
5 INJECTION INTRAMUSCULAR EVERY 6 HOURS
Refills: 0 | Status: DISCONTINUED | OUTPATIENT
Start: 2023-08-27 | End: 2023-09-05

## 2023-08-27 RX ORDER — METFORMIN HYDROCHLORIDE 850 MG/1
1000 TABLET ORAL ONCE
Refills: 0 | Status: COMPLETED | OUTPATIENT
Start: 2023-08-27 | End: 2023-08-27

## 2023-08-27 RX ORDER — GEMFIBROZIL 600 MG
600 TABLET ORAL
Refills: 0 | Status: DISCONTINUED | OUTPATIENT
Start: 2023-08-27 | End: 2023-09-05

## 2023-08-27 RX ORDER — INSULIN LISPRO 100/ML
VIAL (ML) SUBCUTANEOUS
Refills: 0 | Status: DISCONTINUED | OUTPATIENT
Start: 2023-08-27 | End: 2023-09-05

## 2023-08-27 RX ORDER — ATORVASTATIN CALCIUM 80 MG/1
40 TABLET, FILM COATED ORAL AT BEDTIME
Refills: 0 | Status: DISCONTINUED | OUTPATIENT
Start: 2023-08-27 | End: 2023-09-05

## 2023-08-27 RX ORDER — DEXTROSE 50 % IN WATER 50 %
15 SYRINGE (ML) INTRAVENOUS ONCE
Refills: 0 | Status: DISCONTINUED | OUTPATIENT
Start: 2023-08-27 | End: 2023-09-05

## 2023-08-27 RX ORDER — DEXTROSE 50 % IN WATER 50 %
12.5 SYRINGE (ML) INTRAVENOUS ONCE
Refills: 0 | Status: DISCONTINUED | OUTPATIENT
Start: 2023-08-27 | End: 2023-09-05

## 2023-08-27 RX ORDER — ACETAMINOPHEN 500 MG
650 TABLET ORAL EVERY 6 HOURS
Refills: 0 | Status: DISCONTINUED | OUTPATIENT
Start: 2023-08-27 | End: 2023-09-05

## 2023-08-27 RX ORDER — DIPHENHYDRAMINE HCL 50 MG
50 CAPSULE ORAL EVERY 6 HOURS
Refills: 0 | Status: DISCONTINUED | OUTPATIENT
Start: 2023-08-27 | End: 2023-09-05

## 2023-08-27 RX ORDER — PRAZOSIN HCL 2 MG
1 CAPSULE ORAL AT BEDTIME
Refills: 0 | Status: DISCONTINUED | OUTPATIENT
Start: 2023-08-27 | End: 2023-09-05

## 2023-08-27 RX ADMIN — ATORVASTATIN CALCIUM 40 MILLIGRAM(S): 80 TABLET, FILM COATED ORAL at 20:38

## 2023-08-27 RX ADMIN — Medication 200 MILLIGRAM(S): at 20:36

## 2023-08-27 RX ADMIN — AMLODIPINE BESYLATE 5 MILLIGRAM(S): 2.5 TABLET ORAL at 20:38

## 2023-08-27 RX ADMIN — Medication 1 MILLIGRAM(S): at 20:37

## 2023-08-27 RX ADMIN — Medication 650 MILLIGRAM(S): at 20:25

## 2023-08-27 RX ADMIN — ANASTROZOLE 1 MILLIGRAM(S): 1 TABLET ORAL at 20:36

## 2023-08-27 RX ADMIN — Medication 1 MILLIGRAM(S): at 20:38

## 2023-08-27 RX ADMIN — Medication 100 MILLIGRAM(S): at 20:36

## 2023-08-27 RX ADMIN — Medication 650 MILLIGRAM(S): at 13:07

## 2023-08-27 RX ADMIN — METFORMIN HYDROCHLORIDE 1000 MILLIGRAM(S): 850 TABLET ORAL at 20:37

## 2023-08-27 NOTE — ED PROVIDER NOTE - PHYSICAL EXAMINATION
Physical Exam    Vital Signs: I have reviewed the initial vital signs.  Constitutional:  no acute distress  Eyes: Conjunctiva pink, Sclera clear, PERRLA, EOMI.  Cardiovascular: S1 and S2, regular rate, regular rhythm, well-perfused extremities, radial pulses equal and 2+  Respiratory: unlabored respiratory effort, clear to auscultation bilaterally no wheezing, rales and rhonchi  Gastrointestinal: soft, non-tender abdomen, no pulsatile mass, normal bowl sounds  Musculoskeletal: supple neck, no lower extremity edema, no midline tenderness  Integumentary: warm, dry, no rash  Neurologic: awake, alert, cranial nerves II-XII grossly intact, extremities’ motor and sensory functions grossly intact  Psychiatric: crying and pressured speech. Denies SI/HI.

## 2023-08-27 NOTE — ED ADULT TRIAGE NOTE - BMI (KG/M2)
Attempted to contact patient to schedule her ultrasound. No answer, left VM to contact office to schedule.  
26.6

## 2023-08-27 NOTE — ED PROVIDER NOTE - ATTENDING APP SHARED VISIT CONTRIBUTION OF CARE
53-year-old female, , living with her two daughters and dog, on disability, past medical hx of breast cancer in remission, past medical hx of bipolar disorder in outpatient treatment on PATHAK at [facility], presented to ED after struggling to sleep. Telepsychiatry consulted to assess for casi. Pt states that she suffers from bipolar and her mother was going to call 911 on her but she did on herself. patient explains she has been stressed due to her daughter 21st birthday which the party is tonight agree with above exam

## 2023-08-27 NOTE — ED BEHAVIORAL HEALTH ASSESSMENT NOTE - HPI (INCLUDE ILLNESS QUALITY, SEVERITY, DURATION, TIMING, CONTEXT, MODIFYING FACTORS, ASSOCIATED SIGNS AND SYMPTOMS)
Ms. Quinn is a 53-year-old woman, , living with her two daughters and dog, on disability, past medical hx of breast cancer in remission, past medical hx of bipolar disorder in outpatient treatment on PATHAK at [facility], presented to ED after [facility] after struggling to sleep. Telepsychiatry consulted to assess for casi.     On approach, the patient is cooperative, somewhat tangential with hyperverbal and overinclusive speech. Reports coming to the hospital because family told her she was "out of control," and she didn't want her mother to call 911. She reports having a hard time sleeping for the past two weeks, has been feeling increased guilt regarding how her children were raised (stating she was hospitalized when her older daughter turned 1 and she was pregnant with her younger daughter), leading her to buy hundreds of dollars of gifts for her daughter's upcoming birthday. Reports mood "cycling," having days she feels she can "conquer the world, do everything I want" and then other days "crying and missing my aunt." Reports receiving a small prescription for Klonopin (confirmed on istop, 1 mg PO BID x3 days) for sleep, but that it didn't help much.   Endorses calling family members during the night, at times leaving the house in the night to drive, but then having a hard time staying awake, "swerving to wake myself up" despite needing sleep. Reports this morning feeling upset that her daughter ordered food at 5am and fell asleep, and would not open the door to her room; when the patient called her parents to wake them up, she realized "things were getting out of control" and that she "would have to be evaluated" so she contacted [facility] who activated police to drive her to the hospital. Reports extreme anxiety has contributed to inability to sleep, but also that she "just doesn't feel tired" and "wants to get everything perfect so she (pt's daughter) will forgive me." Reports trying to restrict caloric intake after her mother told her she "looks disgusting" and "that she should feel horrible about herself."     Denies change in concentration, seeing or hearing anything unusual, substance use, flashbacks to prior events. Denies missing doses of medication. Gives verbal consent to speak to father and cousin. Ms. Quinn is a 53-year-old woman, , living with her two daughters and dog, on disability, past medical hx of breast cancer in remission, past medical hx of bipolar disorder in outpatient treatment on PATHAK at [facility], presented to ED after [facility] after struggling to sleep. Telepsychiatry consulted to assess for casi.     On approach, the patient is cooperative, somewhat tangential with hyperverbal and overinclusive speech. Reports coming to the hospital because family told her she was "out of control," and she didn't want her mother to call 911. She reports having a hard time sleeping for the past two weeks, has been feeling increased guilt regarding how her children were raised (stating she was hospitalized when her older daughter turned 1 and she was pregnant with her younger daughter), leading her to buy hundreds of dollars of gifts for her daughter's upcoming birthday. Reports mood "cycling," having days she feels she can "conquer the world, do everything I want" and then other days "crying and missing my aunt." Reports receiving a small prescription for Klonopin (confirmed on istop, 1 mg PO BID x3 days) for sleep, but that it didn't help much.   Endorses calling family members during the night, at times leaving the house in the night to drive, but then having a hard time staying awake, "swerving to wake myself up" despite needing sleep. Reports this morning feeling upset that her daughter ordered food at 5am and fell asleep, and would not open the door to her room; when the patient called her parents to wake them up, she realized "things were getting out of control" and that she "would have to be evaluated" so she contacted [facility] who activated police to drive her to the hospital. Reports extreme anxiety has contributed to inability to sleep, but also that she "just doesn't feel tired" and "wants to get everything perfect so she (pt's daughter) will forgive me." Reports trying to restrict caloric intake after her mother told her she "looks disgusting" and "that she should feel horrible about herself."     Denies change in concentration, seeing or hearing anything unusual, substance use, flashbacks to prior events. Denies missing doses of medication. Gives verbal consent to speak to father and cousin.    Per father, patient has had a "sharp downturn" in the past two weeks, calling them in the middle of the night stating she is on the side of the road because she was falling asleep while driving despite reporting she cannot sleep, overspending on gifts she cannot afford with limited benefits, having mood swings where she is "overly happy and talkative" one day and tearful the next. Reports patient's daughters have called because the patient has been trying to get into their rooms at night. Believes patient has been adherent to medication but he feels she is putting herself at risk given erratic behavior and history of further decompensation after a period similar to this one. Advocates for admission. Ms. Quinn is a 53-year-old woman, , living with her two daughters and dog, on disability, past medical hx of breast cancer in remission, past medical hx of bipolar disorder in outpatient treatment on PATHAK at [facility], presented to ED after struggling to sleep. Telepsychiatry consulted to assess for casi.     On approach, the patient is cooperative, somewhat tangential with hyperverbal and overinclusive speech. Reports coming to the hospital because family told her she was "out of control," and she didn't want her mother to call 911. She reports having a hard time sleeping for the past two weeks, has been feeling increased guilt regarding how her children were raised (stating she was hospitalized when her older daughter turned 1 and she was pregnant with her younger daughter), leading her to buy hundreds of dollars of gifts for her daughter's upcoming birthday. Reports mood "cycling," having days she feels she can "conquer the world, do everything I want" and then other days "crying and missing my aunt." Reports receiving a small prescription for Klonopin (confirmed on istop, 1 mg PO BID x3 days) for sleep, but that it didn't help much.   Endorses calling family members during the night, at times leaving the house in the night to drive, but then having a hard time staying awake, "swerving to wake myself up" despite needing sleep, then going home and repeatedly watching videos from her wedding and her daughters' sweet 16 parties. Reports this morning feeling upset that her daughter ordered food at 5am and fell asleep, and would not open the door to her room; when the patient called her parents to wake them up, she realized "things were getting out of control" and that she "would have to be evaluated" so she contacted [facility] who activated police to drive her to the hospital. Reports extreme anxiety has contributed to inability to sleep, but also that she "just doesn't feel tired" and "wants to get everything perfect so she (pt's daughter) will forgive me." Reports trying to restrict caloric intake after her mother told her she "looks disgusting" and "that she should feel horrible about herself."     Denies change in concentration, seeing or hearing anything unusual, substance use, flashbacks to prior events. Denies missing doses of medication. Gives verbal consent to speak to father and cousin.    Per father, patient has had a "sharp downturn" in the past two weeks, calling them in the middle of the night stating she is on the side of the road because she was falling asleep while driving despite reporting she cannot sleep, overspending on gifts she cannot afford with limited benefits, having mood swings where she is "overly happy and talkative" one day and tearful the next. Reports patient's daughters have called because the patient has been trying to get into their rooms at night. Believes patient has been adherent to medication but he feels she is putting herself at risk given erratic behavior and history of further decompensation after a period similar to this one. Advocates for admission.

## 2023-08-27 NOTE — BH PATIENT PROFILE - NSBHSNSOFSAFETY_PSY_A_CORE
helping with arts and craft/calling significant other/family member/counting to 10/fresh air breaks/listening to music/meditating/taking some deep breaths/talking to someone/using a calming object/other

## 2023-08-27 NOTE — ED ADULT NURSE NOTE - HIV OFFER
Previously Declined (within the last year) - s/p DCCVx 3, now back in AF  - no role for Amio as is not able to be maintained in SR  - digoxin 125 mcg QOD  - on argatroban for AC (HIT +, ROBIN -). - s/p DCCVx 3, now back in rate co ntrol AF  - no role for Amio as is not able to be maintained in SR  - digoxin 125 mcg Mon/Thursday (last level 9/12)  - on argatroban for AC (HIT +, ROBIN -).

## 2023-08-27 NOTE — BH PATIENT PROFILE - FALL HARM RISK - UNIVERSAL INTERVENTIONS
Bed in lowest position, wheels locked, appropriate side rails in place/Call bell, personal items and telephone in reach/Instruct patient to call for assistance before getting out of bed or chair/Non-slip footwear when patient is out of bed/[place] to call system/Physically safe environment - no spills, clutter or unnecessary equipment/Purposeful Proactive Rounding/Room/bathroom lighting operational, light cord in reach

## 2023-08-27 NOTE — BH PATIENT PROFILE - NSINDCRISISTRIGGER_PSY_ALL_CORE
Being ignored/Needs not being met/Pain/Rejection/Someone in personal space/Temperature/Heat/Boredom/Crowded spaces/Guilt/Loud noises/Scared

## 2023-08-27 NOTE — BH PATIENT PROFILE - HOME MEDICATIONS
doxycycline 25 mg/5 mL oral liquid , 4 milliliter(s) orally every 12 hours  vitamin A and D topical ointment , 1 Apply topically to affected area 2 times a day  amLODIPine 5 mg oral tablet , 1 tab(s) orally once a day  albuterol 90 mcg/inh inhalation aerosol , 2 puff(s) inhaled every 6 hours As needed SOB/home med  fluPHENAZine 10 mg oral tablet , 1 tab(s) orally once a day (at bedtime) Take 1 tablet at bedtime  atorvastatin 20 mg oral tablet , 1 tab(s) orally once a day (at bedtime)  gemfibrozil 600 mg oral tablet , 1 tab(s) orally 2 times a day  loratadine 10 mg oral tablet , 1 tab(s) orally once a day  metFORMIN 1000 mg oral tablet , 1 tab(s) orally 2 times a day  traZODone 50 mg oral tablet , 1 tab(s) orally once a day (at bedtime)  carBAMazepine 100 mg oral capsule, extended release , 1 cap(s) orally once a day (at bedtime)  carBAMazepine 200 mg oral tablet , 1 tab(s) orally 2 times a day  prazosin 1 mg oral capsule , 1 cap(s) orally once a day (at bedtime)

## 2023-08-27 NOTE — BH PATIENT PROFILE - STATED REASON FOR ADMISSION
[employer] rang the doorbell at 5AM, and I didn't know, and the dog kept on barking, so I called 911, and my  told them I was crazy

## 2023-08-27 NOTE — ED PROVIDER NOTE - CLINICAL SUMMARY MEDICAL DECISION MAKING FREE TEXT BOX
pt her with uncontrolled underlying psych disorder, seen by psych will admit. pt is medically cleared

## 2023-08-27 NOTE — ED BEHAVIORAL HEALTH ASSESSMENT NOTE - SUMMARY
Ms. Quinn is a 53-year-old woman, , living with her two daughters and dog, on disability, past medical hx of breast cancer in remission, past medical hx of bipolar disorder in outpatient treatment on PATHAK at [facility], presented to ED after struggling to sleep. Telepsychiatry consulted to assess for casi. Ms. Quinn is a 53-year-old woman, , living with her two daughters and dog, on disability, past medical hx of breast cancer in remission, past medical hx of bipolar disorder in outpatient treatment on PATHAK at [facility], presented to ED after struggling to sleep. Telepsychiatry consulted to assess for casi.    On evaluation, Ms. Quinn's presentation appears possibly multifactorial, though consistent with a known pattern of decompensation into casi, including increased need for sleep, increased and unsustainable spending patterns, and increased risky behavior concerning for hypomania progressing to casi. Given her history of precipitous decline following dysregulated sleep patterns and current symptomatology, inpatient psychiatric hospitalization was offered to the patient and accepted for stabilization of mood symptoms. Telepsychiatry recommends:    #Bipolar 1 Disorder  - Admit to psychiatry under 9.13 legal status  - Continue Carbamazepine 200 mg PO qAM, 300 mg PO qHS  - Benztropine 1 mg PO BID   - pt reports last Prolixin PATHAK 8/23, however unable to confirm     #PTSD  - continue prazosin 1 mg qhs    #Breast cancer in remission  - s/w Anastrozole 1 mg PO qdaily    #HTN  - c/w amlodipine 5 mg PO qdaily    #DM2  - metformin 1000 mg PO BID   - pt on insulin at home, sliding scale     #asthma   - albuterol inhaler prn     #HLD  - atorvastatin 40 mg PO daily  - Gemfibrozil 600 mg PO BID    #agitation  For severe agitation not responding to behavioral intervention, may give haldol 5 mg po q6h prn, ativan 2 mg po q6h prn, Benadryl 50 mg po q6h prn, with escalation to IM if patient refusing PO and remains an imminent danger to self or others. If IM antipsychotic is administered, please perform follow-up ECG for QTc monitoring.

## 2023-08-27 NOTE — ED ADULT NURSE NOTE - NSFALLOOBATTEMPT_ED_ALL_ED
Roshni Cortez MD  [facility]  Please schedule virtual follow up with me in one month       Copied from CC chart.    SALO Holland  [facility]     No

## 2023-08-27 NOTE — ED PROVIDER NOTE - OBJECTIVE STATEMENT
53 year old past medical history of Diabetes, Hypertension, Bipolar comes to emergency room for "I was out of control." Pt states that she suffers from bipolar and her mother was going to call 911 on her but she did on herself. patient explains she has been stressed due to her daughter 21st birthday which the party is tonight. patient states that unless she sleeps she goes into a manic episode. patient states tonight her daughter came home and was drunk and ordered [employer] eats and when the food arrived at 5am the door bell didn't stop ringing and dog was barking. Pt than went to the door and was trying to wake up daughter and the dog was barking and she started to yell and her mother woke up. Than EMS arrived and now she is here.

## 2023-08-27 NOTE — ED ADULT NURSE NOTE - NSFALLUNIVINTERV_ED_ALL_ED
Bed/Stretcher in lowest position, wheels locked, appropriate side rails in place/Call bell, personal items and telephone in reach/Instruct patient to call for assistance before getting out of bed/chair/stretcher/Non-slip footwear applied when patient is off stretcher/[place] to call system/Physically safe environment - no spills, clutter or unnecessary equipment/Purposeful proactive rounding/Room/bathroom lighting operational, light cord in reach

## 2023-08-27 NOTE — ED BEHAVIORAL HEALTH ASSESSMENT NOTE - NSBHATTESTCOMMENTATTENDFT_PSY_A_CORE
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 She agrees to voluntary inpt admission.

## 2023-08-27 NOTE — ED ADULT TRIAGE NOTE - CHIEF COMPLAINT QUOTE
BIBA patients family sent in for a Psych eval. Patient states someone rang her doorbell @ 0500 and she got anxious. Patient denies SHIRLEY

## 2023-08-27 NOTE — ED BEHAVIORAL HEALTH ASSESSMENT NOTE - CURRENT MEDICATION
Prolixin PATHAK 50 mg q2w last 8/23  Carbamazepine 200 mg PO qAM, 300 mg PO qHS  Cogentin 1 mg PO BID Prolixin PATHAK 50 mg q2w last 8/23    per pharmacy:    Anastrozole 1 mg qd aug 21  Fluphenazine 5 mg 3 dday aug 21  Klonopin 1 mg PO BID 8/21   amlodipine 5 mg qd   prazosin 1 mg qhs 8/18   albuterol inhaler 2 puffs qd   metformin 1000 mg BID   carbamazepine 100 mg PO   benztropine 1 mg PO BID   insulin subq daily   atorvastatin 40 mg   carbamazepine 200 mg PO BID (last picked up Jul 9)   Gemfibrozil 600 mg PO BID

## 2023-08-27 NOTE — ED BEHAVIORAL HEALTH ASSESSMENT NOTE - OTHER PAST PSYCHIATRIC HISTORY (INCLUDE DETAILS REGARDING ONSET, COURSE OF ILLNESS, INPATIENT/OUTPATIENT TREATMENT)
Reports first hospitalization 21 years ago during 2nd pregnancy; required 6 months of state hospitalization; 6 known inpatient psychiatric hospitalizations, last in March 2023 at [facility]; was recently in [facility] PHP Program

## 2023-08-27 NOTE — ED BEHAVIORAL HEALTH ASSESSMENT NOTE - RISK ASSESSMENT
Risk factors include history of suicide attempts, state psychiatric hospitalization, age, feelings of being a burden to family, global insomnia   Protective factors include help seeking, connection to care, use of PATHAK for management of symptoms, no access to firearms, no current substance use

## 2023-08-27 NOTE — ED BEHAVIORAL HEALTH ASSESSMENT NOTE - DESCRIPTION
See BT note    Vital Signs Last 24 Hrs  T(C): 35.8 (27 Aug 2023 08:00), Max: 36.1 (27 Aug 2023 06:11)  T(F): 96.4 (27 Aug 2023 08:00), Max: 97 (27 Aug 2023 06:11)  HR: 101 (27 Aug 2023 08:00) (101 - 107)  BP: 126/74 (27 Aug 2023 08:00) (126/74 - 143/80)  BP(mean): --  RR: 17 (27 Aug 2023 08:00) (17 - 18)  SpO2: 97% (27 Aug 2023 08:00) (97% - 99%)    Parameters below as of 27 Aug 2023 08:00  Patient On (Oxygen Delivery Method): room air Asthma, Breast CA Right: s/p lumpectomy x 2, + Radiation Rx, DM (diabetes mellitus) Type 2, High cholesterol, History of herpes zoster of eye Patient lives in private residence with her two adult daughters. 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

## 2023-08-28 LAB
A1C WITH ESTIMATED AVERAGE GLUCOSE RESULT: 7.7 % — HIGH (ref 4–5.6)
ALBUMIN SERPL ELPH-MCNC: 4.7 G/DL — SIGNIFICANT CHANGE UP (ref 3.5–5.2)
ALP SERPL-CCNC: 120 U/L — HIGH (ref 30–115)
ALT FLD-CCNC: 21 U/L — SIGNIFICANT CHANGE UP (ref 0–41)
AMPHET UR-MCNC: NEGATIVE — SIGNIFICANT CHANGE UP
ANION GAP SERPL CALC-SCNC: 13 MMOL/L — SIGNIFICANT CHANGE UP (ref 7–14)
AST SERPL-CCNC: 16 U/L — SIGNIFICANT CHANGE UP (ref 0–41)
BARBITURATES UR SCN-MCNC: NEGATIVE — SIGNIFICANT CHANGE UP
BASOPHILS # BLD AUTO: 0.03 K/UL — SIGNIFICANT CHANGE UP (ref 0–0.2)
BASOPHILS NFR BLD AUTO: 0.4 % — SIGNIFICANT CHANGE UP (ref 0–1)
BENZODIAZ UR-MCNC: NEGATIVE — SIGNIFICANT CHANGE UP
BILIRUB SERPL-MCNC: 0.2 MG/DL — SIGNIFICANT CHANGE UP (ref 0.2–1.2)
BUN SERPL-MCNC: 18 MG/DL — SIGNIFICANT CHANGE UP (ref 10–20)
CALCIUM SERPL-MCNC: 9.4 MG/DL — SIGNIFICANT CHANGE UP (ref 8.4–10.5)
CHLORIDE SERPL-SCNC: 99 MMOL/L — SIGNIFICANT CHANGE UP (ref 98–110)
CHOLEST SERPL-MCNC: 158 MG/DL — SIGNIFICANT CHANGE UP
CO2 SERPL-SCNC: 23 MMOL/L — SIGNIFICANT CHANGE UP (ref 17–32)
COCAINE METAB.OTHER UR-MCNC: NEGATIVE — SIGNIFICANT CHANGE UP
CREAT SERPL-MCNC: 0.6 MG/DL — LOW (ref 0.7–1.5)
DRUG SCREEN 1, URINE RESULT: SIGNIFICANT CHANGE UP
EGFR: 106 ML/MIN/1.73M2 — SIGNIFICANT CHANGE UP
EOSINOPHIL # BLD AUTO: 0.31 K/UL — SIGNIFICANT CHANGE UP (ref 0–0.7)
EOSINOPHIL NFR BLD AUTO: 4.6 % — SIGNIFICANT CHANGE UP (ref 0–8)
ESTIMATED AVERAGE GLUCOSE: 174 MG/DL — HIGH (ref 68–114)
FENTANYL UR QL: NEGATIVE — SIGNIFICANT CHANGE UP
GLUCOSE BLDC GLUCOMTR-MCNC: 185 MG/DL — HIGH (ref 70–99)
GLUCOSE BLDC GLUCOMTR-MCNC: 204 MG/DL — HIGH (ref 70–99)
GLUCOSE BLDC GLUCOMTR-MCNC: 240 MG/DL — HIGH (ref 70–99)
GLUCOSE BLDC GLUCOMTR-MCNC: 269 MG/DL — HIGH (ref 70–99)
GLUCOSE BLDC GLUCOMTR-MCNC: 303 MG/DL — HIGH (ref 70–99)
GLUCOSE SERPL-MCNC: 298 MG/DL — HIGH (ref 70–99)
HCT VFR BLD CALC: 34.4 % — LOW (ref 37–47)
HDLC SERPL-MCNC: 41 MG/DL — LOW
HGB BLD-MCNC: 11.8 G/DL — LOW (ref 12–16)
IMM GRANULOCYTES NFR BLD AUTO: 0.1 % — SIGNIFICANT CHANGE UP (ref 0.1–0.3)
LIPID PNL WITH DIRECT LDL SERPL: 52 MG/DL — SIGNIFICANT CHANGE UP
LYMPHOCYTES # BLD AUTO: 1.55 K/UL — SIGNIFICANT CHANGE UP (ref 1.2–3.4)
LYMPHOCYTES # BLD AUTO: 22.9 % — SIGNIFICANT CHANGE UP (ref 20.5–51.1)
MAGNESIUM SERPL-MCNC: 1.9 MG/DL — SIGNIFICANT CHANGE UP (ref 1.8–2.4)
MCHC RBC-ENTMCNC: 28.6 PG — SIGNIFICANT CHANGE UP (ref 27–31)
MCHC RBC-ENTMCNC: 34.3 G/DL — SIGNIFICANT CHANGE UP (ref 32–37)
MCV RBC AUTO: 83.3 FL — SIGNIFICANT CHANGE UP (ref 81–99)
METHADONE UR-MCNC: NEGATIVE — SIGNIFICANT CHANGE UP
MONOCYTES # BLD AUTO: 0.38 K/UL — SIGNIFICANT CHANGE UP (ref 0.1–0.6)
MONOCYTES NFR BLD AUTO: 5.6 % — SIGNIFICANT CHANGE UP (ref 1.7–9.3)
NEUTROPHILS # BLD AUTO: 4.49 K/UL — SIGNIFICANT CHANGE UP (ref 1.4–6.5)
NEUTROPHILS NFR BLD AUTO: 66.4 % — SIGNIFICANT CHANGE UP (ref 42.2–75.2)
NON HDL CHOLESTEROL: 117 MG/DL — SIGNIFICANT CHANGE UP
NRBC # BLD: 0 /100 WBCS — SIGNIFICANT CHANGE UP (ref 0–0)
OPIATES UR-MCNC: NEGATIVE — SIGNIFICANT CHANGE UP
OXYCODONE UR-MCNC: NEGATIVE — SIGNIFICANT CHANGE UP
PCP UR-MCNC: NEGATIVE — SIGNIFICANT CHANGE UP
PLATELET # BLD AUTO: 127 K/UL — LOW (ref 130–400)
PMV BLD: 12 FL — HIGH (ref 7.4–10.4)
POTASSIUM SERPL-MCNC: 4.6 MMOL/L — SIGNIFICANT CHANGE UP (ref 3.5–5)
POTASSIUM SERPL-SCNC: 4.6 MMOL/L — SIGNIFICANT CHANGE UP (ref 3.5–5)
PROPOXYPHENE QUALITATIVE URINE RESULT: NEGATIVE — SIGNIFICANT CHANGE UP
PROT SERPL-MCNC: 7 G/DL — SIGNIFICANT CHANGE UP (ref 6–8)
RBC # BLD: 4.13 M/UL — LOW (ref 4.2–5.4)
RBC # FLD: 14.1 % — SIGNIFICANT CHANGE UP (ref 11.5–14.5)
SODIUM SERPL-SCNC: 135 MMOL/L — SIGNIFICANT CHANGE UP (ref 135–146)
THC UR QL: NEGATIVE — SIGNIFICANT CHANGE UP
TRIGL SERPL-MCNC: 323 MG/DL — HIGH
TSH SERPL-MCNC: 1.4 UIU/ML — SIGNIFICANT CHANGE UP (ref 0.27–4.2)
WBC # BLD: 6.77 K/UL — SIGNIFICANT CHANGE UP (ref 4.8–10.8)
WBC # FLD AUTO: 6.77 K/UL — SIGNIFICANT CHANGE UP (ref 4.8–10.8)

## 2023-08-28 PROCEDURE — 99222 1ST HOSP IP/OBS MODERATE 55: CPT | Mod: GC

## 2023-08-28 RX ORDER — INSULIN LISPRO 100/ML
3 VIAL (ML) SUBCUTANEOUS ONCE
Refills: 0 | Status: COMPLETED | OUTPATIENT
Start: 2023-08-28 | End: 2023-08-28

## 2023-08-28 RX ORDER — METFORMIN HYDROCHLORIDE 850 MG/1
1000 TABLET ORAL
Refills: 0 | Status: DISCONTINUED | OUTPATIENT
Start: 2023-08-28 | End: 2023-09-05

## 2023-08-28 RX ORDER — TRAZODONE HCL 50 MG
50 TABLET ORAL AT BEDTIME
Refills: 0 | Status: DISCONTINUED | OUTPATIENT
Start: 2023-08-28 | End: 2023-08-29

## 2023-08-28 RX ADMIN — Medication 200 MILLIGRAM(S): at 20:11

## 2023-08-28 RX ADMIN — METFORMIN HYDROCHLORIDE 1000 MILLIGRAM(S): 850 TABLET ORAL at 20:09

## 2023-08-28 RX ADMIN — Medication 650 MILLIGRAM(S): at 01:21

## 2023-08-28 RX ADMIN — Medication 100 MILLIGRAM(S): at 20:10

## 2023-08-28 RX ADMIN — AMLODIPINE BESYLATE 5 MILLIGRAM(S): 2.5 TABLET ORAL at 07:30

## 2023-08-28 RX ADMIN — Medication 50 MILLIGRAM(S): at 20:09

## 2023-08-28 RX ADMIN — Medication 4: at 07:25

## 2023-08-28 RX ADMIN — Medication 200 MILLIGRAM(S): at 07:32

## 2023-08-28 RX ADMIN — Medication 650 MILLIGRAM(S): at 20:33

## 2023-08-28 RX ADMIN — Medication 1: at 11:57

## 2023-08-28 RX ADMIN — Medication 3 UNIT(S): at 19:55

## 2023-08-28 RX ADMIN — Medication 1 MILLIGRAM(S): at 07:31

## 2023-08-28 RX ADMIN — Medication 3: at 17:16

## 2023-08-28 RX ADMIN — Medication 1 MILLIGRAM(S): at 20:10

## 2023-08-28 RX ADMIN — Medication 600 MILLIGRAM(S): at 07:31

## 2023-08-28 RX ADMIN — ATORVASTATIN CALCIUM 40 MILLIGRAM(S): 80 TABLET, FILM COATED ORAL at 20:09

## 2023-08-28 RX ADMIN — Medication 650 MILLIGRAM(S): at 13:24

## 2023-08-28 RX ADMIN — Medication 650 MILLIGRAM(S): at 04:56

## 2023-08-28 RX ADMIN — ANASTROZOLE 1 MILLIGRAM(S): 1 TABLET ORAL at 07:31

## 2023-08-28 NOTE — BH INPATIENT PSYCHIATRY ASSESSMENT NOTE - NSBHMETABOLIC_PSY_ALL_CORE_FT
BMI: BMI (kg/m2): 31.8 (08-27-23 @ 17:38)  HbA1c: A1C with Estimated Average Glucose Result: 7.7 % (08-28-23 @ 07:08)    Glucose: POCT Blood Glucose.: 185 mg/dL (08-28-23 @ 11:55)    BP: 134/68 (08-28-23 @ 08:24) (126/74 - 143/80)  Lipid Panel: Date/Time: 08-28-23 @ 07:08  Cholesterol, Serum: 158  Direct LDL: --  HDL Cholesterol, Serum: 41  Total Cholesterol/HDL Ration Measurement: --  Triglycerides, Serum: 323   BMI: BMI (kg/m2): 31.8 (08-27-23 @ 17:38)  HbA1c: A1C with Estimated Average Glucose Result: 7.7 % (08-28-23 @ 07:08)    Glucose: POCT Blood Glucose.: 269 mg/dL (08-28-23 @ 16:14)    BP: 119/66 (08-28-23 @ 16:12) (119/66 - 143/80)  Lipid Panel: Date/Time: 08-28-23 @ 07:08  Cholesterol, Serum: 158  Direct LDL: --  HDL Cholesterol, Serum: 41  Total Cholesterol/HDL Ration Measurement: --  Triglycerides, Serum: 323

## 2023-08-28 NOTE — BH INPATIENT PSYCHIATRY ASSESSMENT NOTE - DESCRIPTION
Patient lives in private residence with her two adult daughters. 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

## 2023-08-28 NOTE — BH INPATIENT PSYCHIATRY ASSESSMENT NOTE - NSBHCHARTREVIEWVS_PSY_A_CORE FT
Vital Signs Last 24 Hrs  T(C): 36.2 (08-27-23 @ 17:38), Max: 36.2 (08-27-23 @ 17:38)  T(F): 97.1 (08-27-23 @ 17:38), Max: 97.1 (08-27-23 @ 17:38)  HR: 105 (08-28-23 @ 08:24) (89 - 105)  BP: 134/68 (08-28-23 @ 08:24) (134/68 - 138/98)  BP(mean): --  RR: 16 (08-28-23 @ 08:24) (16 - 16)  SpO2: --     Vital Signs Last 24 Hrs  T(C): 35.7 (08-28-23 @ 16:12), Max: 36.2 (08-27-23 @ 17:38)  T(F): 96.3 (08-28-23 @ 16:12), Max: 97.1 (08-27-23 @ 17:38)  HR: 81 (08-28-23 @ 16:12) (81 - 105)  BP: 119/66 (08-28-23 @ 16:12) (119/66 - 138/98)  BP(mean): --  RR: 16 (08-28-23 @ 16:12) (16 - 16)  SpO2: --
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
## 2023-08-28 NOTE — BH INPATIENT PSYCHIATRY ASSESSMENT NOTE - CURRENT MEDICATION
MEDICATIONS  (STANDING):  amLODIPine   Tablet 5 milliGRAM(s) Oral daily  anastrozole 1 milliGRAM(s) Oral daily  atorvastatin 40 milliGRAM(s) Oral at bedtime  benztropine 1 milliGRAM(s) Oral two times a day  carBAMazepine ER Capsule. 200 milliGRAM(s) Oral two times a day  carBAMazepine ER Tablet 100 milliGRAM(s) Oral at bedtime  dextrose 5%. 1000 milliLiter(s) (100 mL/Hr) IV Continuous <Continuous>  dextrose 5%. 1000 milliLiter(s) (50 mL/Hr) IV Continuous <Continuous>  dextrose 50% Injectable 25 Gram(s) IV Push once  dextrose 50% Injectable 12.5 Gram(s) IV Push once  dextrose 50% Injectable 25 Gram(s) IV Push once  gemfibrozil 600 milliGRAM(s) Oral two times a day  glucagon  Injectable 1 milliGRAM(s) IntraMuscular once  insulin lispro (ADMELOG) corrective regimen sliding scale   SubCutaneous three times a day before meals  prazosin. 1 milliGRAM(s) Oral at bedtime    MEDICATIONS  (PRN):  acetaminophen     Tablet .. 650 milliGRAM(s) Oral every 6 hours PRN Temp greater or equal to 38C (100.4F), Mild Pain (1 - 3)  albuterol    90 MICROgram(s) HFA Inhaler 2 Puff(s) Inhalation every 6 hours PRN Shortness of Breath  dextrose Oral Gel 15 Gram(s) Oral once PRN Blood Glucose LESS THAN 70 milliGRAM(s)/deciliter  diphenhydrAMINE 50 milliGRAM(s) Oral every 6 hours PRN Extrapyramidal prophylaxis  haloperidol     Tablet 5 milliGRAM(s) Oral every 6 hours PRN agitation  LORazepam     Tablet 1 milliGRAM(s) Oral every 6 hours PRN Anxiety   MEDICATIONS  (STANDING):  amLODIPine   Tablet 5 milliGRAM(s) Oral daily  anastrozole 1 milliGRAM(s) Oral daily  atorvastatin 40 milliGRAM(s) Oral at bedtime  benztropine 1 milliGRAM(s) Oral two times a day  carBAMazepine ER Capsule. 200 milliGRAM(s) Oral two times a day  carBAMazepine ER Tablet 100 milliGRAM(s) Oral at bedtime  dextrose 5%. 1000 milliLiter(s) (100 mL/Hr) IV Continuous <Continuous>  dextrose 5%. 1000 milliLiter(s) (50 mL/Hr) IV Continuous <Continuous>  dextrose 50% Injectable 25 Gram(s) IV Push once  dextrose 50% Injectable 12.5 Gram(s) IV Push once  dextrose 50% Injectable 25 Gram(s) IV Push once  gemfibrozil 600 milliGRAM(s) Oral two times a day  glucagon  Injectable 1 milliGRAM(s) IntraMuscular once  insulin lispro (ADMELOG) corrective regimen sliding scale   SubCutaneous three times a day before meals  prazosin. 1 milliGRAM(s) Oral at bedtime  traZODone 50 milliGRAM(s) Oral at bedtime    MEDICATIONS  (PRN):  acetaminophen     Tablet .. 650 milliGRAM(s) Oral every 6 hours PRN Temp greater or equal to 38C (100.4F), Mild Pain (1 - 3)  albuterol    90 MICROgram(s) HFA Inhaler 2 Puff(s) Inhalation every 6 hours PRN Shortness of Breath  dextrose Oral Gel 15 Gram(s) Oral once PRN Blood Glucose LESS THAN 70 milliGRAM(s)/deciliter  diphenhydrAMINE 50 milliGRAM(s) Oral every 6 hours PRN Extrapyramidal prophylaxis  haloperidol     Tablet 5 milliGRAM(s) Oral every 6 hours PRN agitation  LORazepam     Tablet 1 milliGRAM(s) Oral every 6 hours PRN Anxiety

## 2023-08-28 NOTE — BH INPATIENT PSYCHIATRY ASSESSMENT NOTE - OTHER PAST PSYCHIATRIC HISTORY (INCLUDE DETAILS REGARDING ONSET, COURSE OF ILLNESS, INPATIENT/OUTPATIENT TREATMENT)
Reports first hospitalization 21 years ago during 2nd pregnancy; required 6 months of state hospitalization; 6 known inpatient psychiatric hospitalizations, last in March 2023 at [facility]; was recently in [facility] PHP Program

## 2023-08-28 NOTE — BH INPATIENT PSYCHIATRY ASSESSMENT NOTE - NSBHATTESTCOMMENTATTENDFT_PSY_A_CORE
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 She agrees to voluntary inpt admission. Case discussed with resident.  I concur with findings and plan.

## 2023-08-28 NOTE — CHART NOTE - NSCHARTNOTEFT_GEN_A_CORE
RN informed me of FS of 240 and not due for any insulin until morning.  Patient to get Metformin this evening.  Will give Lispro 3 units now and RN to recheck FS in 1 hours after given.

## 2023-08-28 NOTE — BH INPATIENT PSYCHIATRY ASSESSMENT NOTE - NSBHMSETHTASSOC_PSY_A_CORE
"I personally performed the services described in the documentation  recorded by the scribe in my presence, and it accurately and completely records my words and action.” Other

## 2023-08-28 NOTE — CONSULT NOTE ADULT - SUBJECTIVE AND OBJECTIVE BOX
HOSPITALIST CONSULT for IPP History and Physical     SKYLAR QUINN  53y, Female  Allergy: cats, trees, pollen, grass dust (Rhinorrhea)  Lamictal (Hives)  penicillins (Rash)      CHIEF COMPLAINT:     HPI:    HPI:  Ms. Quinn is a 53-year-old woman, , living with her two daughters and dog, on disability, past medical hx of breast cancer in remission, past medical hx of bipolar disorder in outpatient treatment on PATHAK at [facility], presented to ED after struggling to sleep. Telepsychiatry consulted to assess for casi.     On approach, the patient is cooperative, somewhat tangential with hyperverbal and overinclusive speech. Reports coming to the hospital because family told her she was "out of control," and she didn't want her mother to call 911. She reports having a hard time sleeping for the past two weeks, has been feeling increased guilt regarding how her children were raised (stating she was hospitalized when her older daughter turned 1 and she was pregnant with her younger daughter), leading her to buy hundreds of dollars of gifts for her daughter's upcoming birthday. Reports mood "cycling," having days she feels she can "conquer the world, do everything I want" and then other days "crying and missing my aunt." Reports receiving a small prescription for Klonopin (confirmed on istop, 1 mg PO BID x3 days) for sleep, but that it didn't help much.   Endorses calling family members during the night, at times leaving the house in the night to drive, but then having a hard time staying awake, "swerving to wake myself up" despite needing sleep, then going home and repeatedly watching videos from her wedding and her daughters' sweet 16 parties. Reports this morning feeling upset that her daughter ordered food at 5am and fell asleep, and would not open the door to her room; when the patient called her parents to wake them up, she realized "things were getting out of control" and that she "would have to be evaluated" so she contacted [facility] who activated police to drive her to the hospital. Reports extreme anxiety has contributed to inability to sleep, but also that she "just doesn't feel tired" and "wants to get everything perfect so she (pt's daughter) will forgive me." Reports trying to restrict caloric intake after her mother told her she "looks disgusting" and "that she should feel horrible about herself."     Denies change in concentration, seeing or hearing anything unusual, substance use, flashbacks to prior events. Denies missing doses of medication. Gives verbal consent to speak to father and cousin.    Per father, patient has had a "sharp downturn" in the past two weeks, calling them in the middle of the night stating she is on the side of the road because she was falling asleep while driving despite reporting she cannot sleep, overspending on gifts she cannot afford with limited benefits, having mood swings where she is "overly happy and talkative" one day and tearful the next. Reports patient's daughters have called because the patient has been trying to get into their rooms at night. Believes patient has been adherent to medication but he feels she is putting herself at risk given erratic behavior and history of further decompensation after a period similar to this one. Advocates for admission. (27 Aug 2023 12:06)    FAMILY HISTORY:  Family history of diabetes mellitus (DM)  Dad      PAST MEDICAL & SURGICAL HISTORY:  Breast CA  Right: s/p lumpectomy x 2, + Radiation Rx      Bipolar disorder      Asthma      DM (diabetes mellitus)  Type 2      HTN (hypertension)      High cholesterol      Asthma      H/O suicide attempt  1986      History of herpes zoster of eye      ABI (obstructive sleep apnea)  doesn't use her CPAP at home      H/O breast surgery      History of surgery  urethral sling          SOCIAL HISTORY  Social History:  Tobacco use: No  EtOH use: rarely  Illicit drug use: No  Marital Status:  (22 Sep 2022 17:18)      Home Medications:  albuterol 90 mcg/inh inhalation aerosol: 2 puff(s) inhaled every 6 hours As needed SOB/home med (24 Jul 2023 12:44)  vitamin A and D topical ointment: 1 Apply topically to affected area 2 times a day (24 Jul 2023 12:44)      ROS  General: Denies fevers, chills, nightsweats, weight loss  HEENT: Denies headache, rhinorrhea, sore throat, eye pain  CV: Denies CP, palpitations  PULM: Denies SOB, cough  GI: Denies abdominal pain, diarrhea  : Denies dysuria, hematuria  MSK: Denies arthralgias  SKIN: Denies rash   NEURO: Denies paresthesias, weakness  PSYCH: Denies depression    VITALS:  T(F): 97.1, Max: 97.1 (08-27-23 @ 17:38)  HR: 105  BP: 134/68  RR: 16Vital Signs Last 24 Hrs  T(C): 36.2 (27 Aug 2023 17:38), Max: 36.2 (27 Aug 2023 17:38)  T(F): 97.1 (27 Aug 2023 17:38), Max: 97.1 (27 Aug 2023 17:38)  HR: 105 (28 Aug 2023 08:24) (89 - 105)  BP: 134/68 (28 Aug 2023 08:24) (134/68 - 138/98)  BP(mean): --  RR: 16 (28 Aug 2023 08:24) (16 - 16)  SpO2: --        PHYSICAL EXAM:  Gen: NAD, resting in bed  HEENT: Normocephalic, atraumatic  Neck: supple, no lymphadenopathy  CV: Regular rate & regular rhythm  Lungs: CTABL no wheeze  Abdomen: Soft, NTND+ BS present  Ext: Warm, well perfused no CCE  Neuro: non focal, awake, CN II-XII intact   Skin: no rash, no erythema  Psych: no SI, HI, Hallucination     TESTS & MEASUREMENTS:                        11.8   6.77  )-----------( 127      ( 28 Aug 2023 07:08 )             34.4     08-28    135  |  99  |  18  ----------------------------<  298<H>  4.6   |  23  |  0.6<L>    Ca    9.4      28 Aug 2023 07:08  Mg     1.9     08-28    TPro  7.0  /  Alb  4.7  /  TBili  0.2  /  DBili  x   /  AST  16  /  ALT  21  /  AlkPhos  120<H>  08-28      LIVER FUNCTIONS - ( 28 Aug 2023 07:08 )  Alb: 4.7 g/dL / Pro: 7.0 g/dL / ALK PHOS: 120 U/L / ALT: 21 U/L / AST: 16 U/L / GGT: x           Urinalysis Basic - ( 28 Aug 2023 07:08 )    Color: x / Appearance: x / SG: x / pH: x  Gluc: 298 mg/dL / Ketone: x  / Bili: x / Urobili: x   Blood: x / Protein: x / Nitrite: x   Leuk Esterase: x / RBC: x / WBC x   Sq Epi: x / Non Sq Epi: x / Bacteria: x        COVID-19 PCR: NotDetec (27 Aug 2023 08:00)  COVID-19 PCR: NotDetec (27 Mar 2023 21:26)      QRS axis to [] ° and NSR at a rate of [] BPM. There was no atrial enlargement. There was no ventricular hypertrophy. There were no ST-T changes and all intervals were normal.      INFECTIOUS DISEASES TESTING      RADIOLOGY & ADDITIONAL TESTS:  I have personally reviewed the last Chest xray  CXR      CT      CARDIOLOGY TESTING  12 Lead ECG:   Ventricular Rate 91 BPM    Atrial Rate 91 BPM    P-R Interval 156 ms    QRS Duration 90 ms    Q-T Interval 384 ms    QTC Calculation(Bazett) 472 ms    P Axis 42 degrees    R Axis -34 degrees    T Axis 14 degrees    Diagnosis Line Normal sinus rhythm  Left axis deviation  Moderate voltage criteria for LVH, may be normal variant  Abnormal ECG    Confirmed by Huang Nash ([id number]) on 8/27/2023 9:43:38 AM (08-27-23 @ 08:55)      MEDICATIONS  (STANDING):  amLODIPine   Tablet 5 milliGRAM(s) Oral daily  anastrozole 1 milliGRAM(s) Oral daily  atorvastatin 40 milliGRAM(s) Oral at bedtime  benztropine 1 milliGRAM(s) Oral two times a day  carBAMazepine ER Capsule. 200 milliGRAM(s) Oral two times a day  carBAMazepine ER Tablet 100 milliGRAM(s) Oral at bedtime  dextrose 5%. 1000 milliLiter(s) (100 mL/Hr) IV Continuous <Continuous>  dextrose 5%. 1000 milliLiter(s) (50 mL/Hr) IV Continuous <Continuous>  dextrose 50% Injectable 25 Gram(s) IV Push once  dextrose 50% Injectable 12.5 Gram(s) IV Push once  dextrose 50% Injectable 25 Gram(s) IV Push once  gemfibrozil 600 milliGRAM(s) Oral two times a day  glucagon  Injectable 1 milliGRAM(s) IntraMuscular once  insulin lispro (ADMELOG) corrective regimen sliding scale   SubCutaneous three times a day before meals  prazosin. 1 milliGRAM(s) Oral at bedtime    MEDICATIONS  (PRN):  acetaminophen     Tablet .. 650 milliGRAM(s) Oral every 6 hours PRN Temp greater or equal to 38C (100.4F), Mild Pain (1 - 3)  albuterol    90 MICROgram(s) HFA Inhaler 2 Puff(s) Inhalation every 6 hours PRN Shortness of Breath  dextrose Oral Gel 15 Gram(s) Oral once PRN Blood Glucose LESS THAN 70 milliGRAM(s)/deciliter  diphenhydrAMINE 50 milliGRAM(s) Oral every 6 hours PRN Extrapyramidal prophylaxis  haloperidol     Tablet 5 milliGRAM(s) Oral every 6 hours PRN agitation  LORazepam     Tablet 1 milliGRAM(s) Oral every 6 hours PRN Anxiety      ANTIBIOTICS:      All available historical data has been reviewed    ASSESSMENT  53y F admitted with Bipolar disorder        PROBLEMS   HOSPITALIST CONSULT for IPP History and Physical     SKYLAR QUINN  53y, Female  Allergy: cats, trees, pollen, grass dust (Rhinorrhea)  Lamictal (Hives)  penicillins (Rash)      CHIEF COMPLAINT:     HPI:    HPI:  Ms. Quinn is a 53-year-old woman, , living with her two daughters and dog, on disability, past medical hx of breast cancer in remission, past medical hx of bipolar disorder in outpatient treatment on PATHAK at [facility], presented to ED after struggling to sleep. Telepsychiatry consulted to assess for casi.     On approach, the patient is cooperative, somewhat tangential with hyperverbal and overinclusive speech. Reports coming to the hospital because family told her she was "out of control," and she didn't want her mother to call 911. She reports having a hard time sleeping for the past two weeks, has been feeling increased guilt regarding how her children were raised (stating she was hospitalized when her older daughter turned 1 and she was pregnant with her younger daughter), leading her to buy hundreds of dollars of gifts for her daughter's upcoming birthday. Reports mood "cycling," having days she feels she can "conquer the world, do everything I want" and then other days "crying and missing my aunt." Reports receiving a small prescription for Klonopin (confirmed on istop, 1 mg PO BID x3 days) for sleep, but that it didn't help much.   Endorses calling family members during the night, at times leaving the house in the night to drive, but then having a hard time staying awake, "swerving to wake myself up" despite needing sleep, then going home and repeatedly watching videos from her wedding and her daughters' sweet 16 parties. Reports this morning feeling upset that her daughter ordered food at 5am and fell asleep, and would not open the door to her room; when the patient called her parents to wake them up, she realized "things were getting out of control" and that she "would have to be evaluated" so she contacted [facility] who activated police to drive her to the hospital. Reports extreme anxiety has contributed to inability to sleep, but also that she "just doesn't feel tired" and "wants to get everything perfect so she (pt's daughter) will forgive me." Reports trying to restrict caloric intake after her mother told her she "looks disgusting" and "that she should feel horrible about herself."     Denies change in concentration, seeing or hearing anything unusual, substance use, flashbacks to prior events. Denies missing doses of medication. Gives verbal consent to speak to father and cousin.    Per father, patient has had a "sharp downturn" in the past two weeks, calling them in the middle of the night stating she is on the side of the road because she was falling asleep while driving despite reporting she cannot sleep, overspending on gifts she cannot afford with limited benefits, having mood swings where she is "overly happy and talkative" one day and tearful the next. Reports patient's daughters have called because the patient has been trying to get into their rooms at night. Believes patient has been adherent to medication but he feels she is putting herself at risk given erratic behavior and history of further decompensation after a period similar to this one. Advocates for admission. (27 Aug 2023 12:06)    FAMILY HISTORY:  Family history of diabetes mellitus (DM)  Dad      PAST MEDICAL & SURGICAL HISTORY:  Breast CA  Right: s/p lumpectomy x 2, + Radiation Rx      Bipolar disorder      Asthma      DM (diabetes mellitus)  Type 2      HTN (hypertension)      High cholesterol      Asthma      H/O suicide attempt  1986      History of herpes zoster of eye      ABI (obstructive sleep apnea)  doesn't use her CPAP at home      H/O breast surgery      History of surgery  urethral sling          SOCIAL HISTORY  Social History:  Tobacco use: No  EtOH use: rarely  Illicit drug use: No  Marital Status:  (22 Sep 2022 17:18)      Home Medications:  albuterol 90 mcg/inh inhalation aerosol: 2 puff(s) inhaled every 6 hours As needed SOB/home med (24 Jul 2023 12:44)  vitamin A and D topical ointment: 1 Apply topically to affected area 2 times a day (24 Jul 2023 12:44)      ROS  General: Denies fevers, chills, nightsweats, weight loss  HEENT: Denies headache, rhinorrhea, sore throat, eye pain  CV: Denies CP, palpitations  PULM: Denies SOB, cough  GI: Denies abdominal pain, diarrhea  : Denies dysuria, hematuria  MSK: Denies arthralgias  SKIN: Denies rash   NEURO: Denies paresthesias, weakness      VITALS:  T(F): 97.1, Max: 97.1 (08-27-23 @ 17:38)  HR: 105  BP: 134/68  RR: 16Vital Signs Last 24 Hrs  T(C): 36.2 (27 Aug 2023 17:38), Max: 36.2 (27 Aug 2023 17:38)  T(F): 97.1 (27 Aug 2023 17:38), Max: 97.1 (27 Aug 2023 17:38)  HR: 105 (28 Aug 2023 08:24) (89 - 105)  BP: 134/68 (28 Aug 2023 08:24) (134/68 - 138/98)  BP(mean): --  RR: 16 (28 Aug 2023 08:24) (16 - 16)  SpO2: --        PHYSICAL EXAM:  Gen: NAD, resting in bed  HEENT: Normocephalic, atraumatic  Neck: supple, no lymphadenopathy  CV: Regular rate & regular rhythm  Lungs: CTABL no wheeze  Abdomen: Soft, NTND+ BS present  Ext: Warm, well perfused no CCE  Neuro: non focal, awake, CN II-XII intact   Skin: no rash, no erythema      TESTS & MEASUREMENTS:                        11.8   6.77  )-----------( 127      ( 28 Aug 2023 07:08 )             34.4     08-28    135  |  99  |  18  ----------------------------<  298<H>  4.6   |  23  |  0.6<L>    Ca    9.4      28 Aug 2023 07:08  Mg     1.9     08-28    TPro  7.0  /  Alb  4.7  /  TBili  0.2  /  DBili  x   /  AST  16  /  ALT  21  /  AlkPhos  120<H>  08-28      LIVER FUNCTIONS - ( 28 Aug 2023 07:08 )  Alb: 4.7 g/dL / Pro: 7.0 g/dL / ALK PHOS: 120 U/L / ALT: 21 U/L / AST: 16 U/L / GGT: x           Urinalysis Basic - ( 28 Aug 2023 07:08 )    Color: x / Appearance: x / SG: x / pH: x  Gluc: 298 mg/dL / Ketone: x  / Bili: x / Urobili: x   Blood: x / Protein: x / Nitrite: x   Leuk Esterase: x / RBC: x / WBC x   Sq Epi: x / Non Sq Epi: x / Bacteria: x        COVID-19 PCR: NotDetec (27 Aug 2023 08:00)  COVID-19 PCR: NotDetec (27 Mar 2023 21:26)      QRS axis to [] ° and NSR at a rate of [] BPM. There was no atrial enlargement. There was no ventricular hypertrophy. There were no ST-T changes and all intervals were normal.      INFECTIOUS DISEASES TESTING      RADIOLOGY & ADDITIONAL TESTS:  I have personally reviewed the last Chest xray  CXR      CT      CARDIOLOGY TESTING  12 Lead ECG:   Ventricular Rate 91 BPM    Atrial Rate 91 BPM    P-R Interval 156 ms    QRS Duration 90 ms    Q-T Interval 384 ms    QTC Calculation(Bazett) 472 ms    P Axis 42 degrees    R Axis -34 degrees    T Axis 14 degrees    Diagnosis Line Normal sinus rhythm  Left axis deviation  Moderate voltage criteria for LVH, may be normal variant  Abnormal ECG    Confirmed by Huang Nash ([id number]) on 8/27/2023 9:43:38 AM (08-27-23 @ 08:55)      MEDICATIONS  (STANDING):  amLODIPine   Tablet 5 milliGRAM(s) Oral daily  anastrozole 1 milliGRAM(s) Oral daily  atorvastatin 40 milliGRAM(s) Oral at bedtime  benztropine 1 milliGRAM(s) Oral two times a day  carBAMazepine ER Capsule. 200 milliGRAM(s) Oral two times a day  carBAMazepine ER Tablet 100 milliGRAM(s) Oral at bedtime  dextrose 5%. 1000 milliLiter(s) (100 mL/Hr) IV Continuous <Continuous>  dextrose 5%. 1000 milliLiter(s) (50 mL/Hr) IV Continuous <Continuous>  dextrose 50% Injectable 25 Gram(s) IV Push once  dextrose 50% Injectable 12.5 Gram(s) IV Push once  dextrose 50% Injectable 25 Gram(s) IV Push once  gemfibrozil 600 milliGRAM(s) Oral two times a day  glucagon  Injectable 1 milliGRAM(s) IntraMuscular once  insulin lispro (ADMELOG) corrective regimen sliding scale   SubCutaneous three times a day before meals  prazosin. 1 milliGRAM(s) Oral at bedtime    MEDICATIONS  (PRN):  acetaminophen     Tablet .. 650 milliGRAM(s) Oral every 6 hours PRN Temp greater or equal to 38C (100.4F), Mild Pain (1 - 3)  albuterol    90 MICROgram(s) HFA Inhaler 2 Puff(s) Inhalation every 6 hours PRN Shortness of Breath  dextrose Oral Gel 15 Gram(s) Oral once PRN Blood Glucose LESS THAN 70 milliGRAM(s)/deciliter  diphenhydrAMINE 50 milliGRAM(s) Oral every 6 hours PRN Extrapyramidal prophylaxis  haloperidol     Tablet 5 milliGRAM(s) Oral every 6 hours PRN agitation  LORazepam     Tablet 1 milliGRAM(s) Oral every 6 hours PRN Anxiety      ANTIBIOTICS:      All available historical data has been reviewed    ASSESSMENT  53y F admitted with Bipolar disorder        PROBLEMS

## 2023-08-28 NOTE — CONSULT NOTE ADULT - ASSESSMENT
#bipolar casi - meds and mngmt per psych team , monitor drug levels  #hx of breast ca on anastrazole - outpt follow up  #ABI not on cpap  #DMII - on insulin ss here - can increase scale if remains uncontrolled   CAPILLARY BLOOD GLUCOSE  POCT Blood Glucose.: 303 mg/dL (28 Aug 2023 06:33)  POCT Blood Glucose.: 225 mg/dL (27 Aug 2023 19:10)  POCT Blood Glucose.: 182 mg/dL (27 Aug 2023 13:54)  POCT Blood Glucose.: 179 mg/dL (27 Aug 2023 10:16)    ensure carb consistent diet   a1c march 6.9    recall prn    #bipolar casi - meds and mngmt per psych team , monitor drug levels  #hx of breast ca on anastrazole - outpt follow up  #ABI not on cpap  #DMII - on insulin ss here - can increase scale if remains uncontrolled   CAPILLARY BLOOD GLUCOSE  POCT Blood Glucose.: 162 mg/dL (29 Aug 2023 11:04)  POCT Blood Glucose.: 282 mg/dL (29 Aug 2023 06:34)  POCT Blood Glucose.: 240 mg/dL (28 Aug 2023 19:28)  POCT Blood Glucose.: 269 mg/dL (28 Aug 2023 16:14)      ensure carb consistent diet   a1c march 6.9    recall prn

## 2023-08-28 NOTE — BH INPATIENT PSYCHIATRY ASSESSMENT NOTE - NSTXPROBCOPE_PSY_ALL_CORE
"Subjective:      Patient ID: Estefany Parker is a 38 y.o. female.    Vitals:  height is 5' 3" (1.6 m) and weight is 92.1 kg (203 lb). Her oral temperature is 97.9 °F (36.6 °C). Her blood pressure is 129/88 and her pulse is 92. Her respiration is 16 and oxygen saturation is 97%.     Chief Complaint: Back Pain    Back Pain  This is a new problem. Episode onset: 3 days. The problem occurs constantly. The problem has been gradually worsening since onset. The pain is present in the lumbar spine. Quality: twisting. Radiates to: right left. The pain is at a severity of 4/10. The pain is mild. The pain is The same all the time. Exacerbated by: movements. Stiffness is present All day. Associated symptoms include leg pain (right leg). Pertinent negatives include no abdominal pain, dysuria, headaches, numbness or tingling. Risk factors: none. Treatments tried: half a muscle relaxer, heat, ice. The treatment provided mild relief.     Gastrointestinal:  Negative for abdominal pain.   Genitourinary:  Negative for dysuria.   Musculoskeletal:  Positive for back pain.   Neurological:  Negative for headaches and numbness.    Objective:     Physical Exam   Constitutional: She is oriented to person, place, and time. She appears well-developed. She is cooperative.  Non-toxic appearance. No distress.   HENT:   Head: Normocephalic and atraumatic.   Ears:   Right Ear: External ear normal.   Left Ear: External ear normal.   Nose: Nose normal.   Mouth/Throat: Oropharynx is clear and moist and mucous membranes are normal. Mucous membranes are moist.   Eyes: Conjunctivae and lids are normal.   Neck: Trachea normal and phonation normal. Neck supple.   Cardiovascular: Normal rate, regular rhythm, normal heart sounds and normal pulses.   Pulmonary/Chest: Effort normal and breath sounds normal.   Abdominal: Normal appearance and bowel sounds are normal. She exhibits no mass. Soft.   Musculoskeletal:         General: No deformity or signs of injury. "      Lumbar back: She exhibits decreased range of motion. She exhibits no tenderness and no bony tenderness.        Back:       Right lower leg: No edema.      Left lower leg: No edema.      Comments: No midline tenderness or step-offs.   Neurological: She is alert and oriented to person, place, and time. She has normal strength and normal reflexes. No sensory deficit.   Skin: Skin is warm, dry, intact and not diaphoretic.   Psychiatric: Her speech is normal and behavior is normal. Judgment and thought content normal.   Nursing note and vitals reviewed.    Assessment:     1. Sciatica of right side        Plan:       Sciatica of right side  -     ketorolac injection 30 mg  -     predniSONE (DELTASONE) 20 MG tablet; Take 2 tablets by mouth x2 days, then 1 tablet by mouth x3 days  Dispense: 7 tablet; Refill: 0  -     meloxicam (MOBIC) 15 MG tablet; Take 1 tablet (15 mg total) by mouth once daily.  Dispense: 30 tablet; Refill: 0          Medical Decision Making:   History:   Old Records Summarized: records from clinic visits and other records.       <> Summary of Records: I have reviewed the patients previous visits, labs and images to look for any trends or previous treatments.  MRI 7/28/2020          COPING, INEFFECTIVE

## 2023-08-28 NOTE — BH INPATIENT PSYCHIATRY ASSESSMENT NOTE - NSBHASSESSSUMMFT_PSY_ALL_CORE
This is a 53 year old female with prolonged past psychiatric history, currently in treatment, who after a few days of mild racing thoughts and insomnia, presented to the hospital after a chaotic family turmoil, patient felt overwhelmed and voluntarily admitted herself to the psychiatric unit (patient resisted her mother's pressure to say that she is suicidal so she could be admitted to the hospital).     #Bipolar 1 Disorder  - Admit to psychiatry under 9.13 legal status  - Continue Carbamazepine 200 mg PO qAM, 300 mg PO qHS  - Benztropine 1 mg PO BID   - pt reports last Prolixin PATHAK 8/23, however unable to confirm     #Insomnia  - Consider Trazodone 50mg qhs     #PTSD  - continue prazosin 1 mg qhs    #Breast cancer in remission  - s/w Anastrozole 1 mg PO qdaily    #HTN  - c/w amlodipine 5 mg PO qdaily    #DM2  - metformin 1000 mg PO BID   - pt on insulin at home, sliding scale     #asthma   - albuterol inhaler prn     #HLD  - atorvastatin 40 mg PO daily  - Gemfibrozil 600 mg PO BID    #agitation  For severe agitation not responding to behavioral intervention, may give haldol 5 mg po q6h prn, ativan 2 mg po q6h prn, Benadryl 50 mg po q6h prn, with escalation to IM if patient refusing PO and remains an imminent danger to self or others. If IM antipsychotic is administered, please perform follow-up ECG for QTc monitoring.     This is a 53 year old female with prolonged past psychiatric history, currently in treatment, who after a few days of mild racing thoughts and insomnia, presented to the hospital after a chaotic family turmoil, patient felt overwhelmed and voluntarily admitted herself to the psychiatric unit (patient resisted her mother's pressure to say that she is suicidal so she could be admitted to the hospital).     #Bipolar 1 Disorder  - Admit to psychiatry under 9.13 legal status  - Continue Carbamazepine 200 mg PO qAM, 300 mg PO qHS  - Benztropine 1 mg PO BID   - pt reports last Prolixin PATHAK 8/23, however unable to confirm     #Insomnia  - Trazodone 50mg qhs     #PTSD  - continue prazosin 1 mg qhs    #Breast cancer in remission  - s/w Anastrozole 1 mg PO qdaily    #HTN  - c/w amlodipine 5 mg PO qdaily    #DM2  - metformin 1000 mg PO BID   - pt on insulin at home, sliding scale     #asthma   - albuterol inhaler prn     #HLD  - atorvastatin 40 mg PO daily  - Gemfibrozil 600 mg PO BID    #agitation  For severe agitation not responding to behavioral intervention, may give haldol 5 mg po q6h prn, ativan 2 mg po q6h prn, Benadryl 50 mg po q6h prn, with escalation to IM if patient refusing PO and remains an imminent danger to self or others. If IM antipsychotic is administered, please perform follow-up ECG for QTc monitoring.     This is a 53 year old female with prolonged past psychiatric history, currently in treatment, who after a few days of mild racing thoughts and insomnia, presented to the hospital after a chaotic family turmoil, patient felt overwhelmed and voluntarily admitted herself to the psychiatric unit (patient resisted her mother's pressure to say that she is suicidal so she could be admitted to the hospital).     #Bipolar 1 Disorder  - Continue Carbamazepine 200 mg PO qAM, 300 mg PO qHS  - Benztropine 1 mg PO BID   - pt reports last Prolixin PATHAK 8/23, however unable to confirm     #Insomnia  - Trazodone 50mg qhs     #PTSD  - continue prazosin 1 mg qhs    #Breast cancer in remission  - s/w Anastrozole 1 mg PO qdaily    #HTN  - c/w amlodipine 5 mg PO qdaily    #DM2  - metformin 1000 mg PO BID   - pt on insulin at home, sliding scale     #asthma   - albuterol inhaler prn     #HLD  - atorvastatin 40 mg PO daily  - Gemfibrozil 600 mg PO BID    #agitation  For severe agitation not responding to behavioral intervention, may give haldol 5 mg po q6h prn, ativan 2 mg po q6h prn, Benadryl 50 mg po q6h prn, with escalation to IM if patient refusing PO and remains an imminent danger to self or others. If IM antipsychotic is administered, please perform follow-up ECG for QTc monitoring.     This is a 53 year old female who after a few days of mild racing thoughts and insomnia, presented to the hospital after a chaotic family turmoil, patient felt overwhelmed and voluntarily admitted herself to the psychiatric unit (patient resisted her mother's pressure to say that she is suicidal so she could be admitted to the hospital). Supported on disability (used to work in [employer] education), psych hx of mult admissions, hx of overdose, medical hx of brca in resmission on anastrozole, DM on metformin and insulin, asthma, in treatment at [facility] outpt treated with carbamazepine, prazosin, prolixin (po and PATHAK), benztropine, current short term rx for  clonazepam, no known substance use hx, consult called to evaluate anxiety/mood sx. 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 She agrees to voluntary inpt admission.    #Bipolar 1 Disorder  - Continue Carbamazepine 200 mg PO qAM, 300 mg PO qHS  - Benztropine 1 mg PO BID   - pt reports last Prolixin PATHAK 8/23, however unable to confirm     #Insomnia  - Trazodone 50mg qhs     #PTSD  - continue prazosin 1 mg qhs    #Breast cancer in remission  - s/w Anastrozole 1 mg PO qdaily    #HTN  - c/w amlodipine 5 mg PO qdaily    #DM2  - metformin 1000 mg PO BID   - pt on insulin at home, sliding scale     #asthma   - albuterol inhaler prn     #HLD  - atorvastatin 40 mg PO daily  - Gemfibrozil 600 mg PO BID    #agitation  For severe agitation not responding to behavioral intervention, may give haldol 5 mg po q6h prn, ativan 2 mg po q6h prn, Benadryl 50 mg po q6h prn, with escalation to IM if patient refusing PO and remains an imminent danger to self or others. If IM antipsychotic is administered, please perform follow-up ECG for QTc monitoring.

## 2023-08-29 LAB
GLUCOSE BLDC GLUCOMTR-MCNC: 147 MG/DL — HIGH (ref 70–99)
GLUCOSE BLDC GLUCOMTR-MCNC: 162 MG/DL — HIGH (ref 70–99)
GLUCOSE BLDC GLUCOMTR-MCNC: 208 MG/DL — HIGH (ref 70–99)
GLUCOSE BLDC GLUCOMTR-MCNC: 282 MG/DL — HIGH (ref 70–99)

## 2023-08-29 PROCEDURE — 99232 SBSQ HOSP IP/OBS MODERATE 35: CPT

## 2023-08-29 RX ORDER — FLUPHENAZINE HYDROCHLORIDE 1 MG/1
5 TABLET, FILM COATED ORAL AT BEDTIME
Refills: 0 | Status: DISCONTINUED | OUTPATIENT
Start: 2023-08-29 | End: 2023-08-31

## 2023-08-29 RX ADMIN — Medication 3: at 08:06

## 2023-08-29 RX ADMIN — AMLODIPINE BESYLATE 5 MILLIGRAM(S): 2.5 TABLET ORAL at 08:07

## 2023-08-29 RX ADMIN — Medication 200 MILLIGRAM(S): at 08:08

## 2023-08-29 RX ADMIN — Medication 1 MILLIGRAM(S): at 20:22

## 2023-08-29 RX ADMIN — Medication 200 MILLIGRAM(S): at 20:33

## 2023-08-29 RX ADMIN — ANASTROZOLE 1 MILLIGRAM(S): 1 TABLET ORAL at 08:08

## 2023-08-29 RX ADMIN — Medication 1 MILLIGRAM(S): at 20:19

## 2023-08-29 RX ADMIN — ATORVASTATIN CALCIUM 40 MILLIGRAM(S): 80 TABLET, FILM COATED ORAL at 20:19

## 2023-08-29 RX ADMIN — Medication 2: at 15:58

## 2023-08-29 RX ADMIN — METFORMIN HYDROCHLORIDE 1000 MILLIGRAM(S): 850 TABLET ORAL at 08:08

## 2023-08-29 RX ADMIN — Medication 1: at 11:58

## 2023-08-29 RX ADMIN — Medication 650 MILLIGRAM(S): at 21:15

## 2023-08-29 RX ADMIN — METFORMIN HYDROCHLORIDE 1000 MILLIGRAM(S): 850 TABLET ORAL at 20:21

## 2023-08-29 RX ADMIN — Medication 650 MILLIGRAM(S): at 20:28

## 2023-08-29 RX ADMIN — Medication 100 MILLIGRAM(S): at 20:20

## 2023-08-29 RX ADMIN — Medication 650 MILLIGRAM(S): at 06:17

## 2023-08-29 RX ADMIN — Medication 1 MILLIGRAM(S): at 08:07

## 2023-08-29 RX ADMIN — FLUPHENAZINE HYDROCHLORIDE 5 MILLIGRAM(S): 1 TABLET, FILM COATED ORAL at 20:26
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
## 2023-08-29 NOTE — PHYSICAL THERAPY INITIAL EVALUATION ADULT - GENERAL OBSERVATIONS, REHAB EVAL
11:00 -11:30 Chart reviewed. Order received.  Patient is ok to be  seen for Pt, confirmed with RN. pt encountered   Sitting at Day room, C/o Lt Shoulder   pain, and agrees to participate in session, NAD.

## 2023-08-29 NOTE — BH INPATIENT PSYCHIATRY PROGRESS NOTE - NSBHASSESSSUMMFT_PSY_ALL_CORE
This is a 53 year old female who after a few days of mild racing thoughts and insomnia, presented to the hospital after a chaotic family turmoil, patient felt overwhelmed and voluntarily admitted herself to the psychiatric unit (patient resisted her mother's pressure to say that she is suicidal so she could be admitted to the hospital). 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 She agrees to voluntary inpt admission.    #Bipolar 1 Disorder  - Continue Carbamazepine 200 mg PO qAM, 300 mg PO qHS  - Benztropine 1 mg PO BID   - pt reports last Prolixin PATHAK 8/23, however unable to confirm   - Prolixin 5mg PO qHS starting 8/29    #Insomnia  - Trazodone 50mg qHS, discontinued on 8/29    #PTSD  - continue prazosin 1 mg qhs    #Breast cancer in remission  - s/w Anastrozole 1 mg PO qdaily    #HTN  - c/w amlodipine 5 mg PO qdaily    #DM2  - metformin 1000 mg PO BID   - pt on insulin at home, sliding scale     #asthma   - albuterol inhaler prn     #HLD  - atorvastatin 40 mg PO daily  - Gemfibrozil 600 mg PO BID    #agitation  For severe agitation not responding to behavioral intervention, may give haldol 5 mg po q6h prn, ativan 2 mg po q6h prn, Benadryl 50 mg po q6h prn, with escalation to IM if patient refusing PO and remains an imminent danger to self or others. If IM antipsychotic is administered, please perform follow-up ECG for QTc monitoring.     This is a 53 year old female who after a few days of mild racing thoughts and insomnia, presented to the hospital after a chaotic family turmoil, patient felt overwhelmed and voluntarily admitted herself to the psychiatric unit (patient resisted her mother's pressure to say that she is suicidal so she could be admitted to the hospital). 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 She agrees to voluntary inpt admission.    #Bipolar 1 Disorder  - Continue Carbamazepine 200 mg PO qAM, 300 mg PO qHS  - Benztropine 1 mg PO BID   - pt reports last Prolixin PATHAK 8/23, however unable to confirm   - Prolixin 5mg PO qHS starting 8/29    #Insomnia  - Trazodone 50mg qHS, discontinued on 8/29    #PTSD  - continue prazosin 1 mg qhs    #Breast cancer in remission  - s/w Anastrozole 1 mg PO qdaily    #HTN  - c/w amlodipine 5 mg PO qdaily    #DM2  - metformin 1000 mg PO BID   - pt on insulin at home, sliding scale     #asthma   - albuterol inhaler prn     #HLD  - atorvastatin 40 mg PO daily  - Gemfibrozil 600 mg PO BID    #Rotator Cuff Injury  - PT/OT consult    #agitation  For severe agitation not responding to behavioral intervention, may give haldol 5 mg po q6h prn, ativan 2 mg po q6h prn, Benadryl 50 mg po q6h prn, with escalation to IM if patient refusing PO and remains an imminent danger to self or others. If IM antipsychotic is administered, please perform follow-up ECG for QTc monitoring.

## 2023-08-29 NOTE — PHYSICAL THERAPY INITIAL EVALUATION ADULT - NSACTIVITYREC_GEN_A_PT
Patient  Independent W/ Bed mobility , Supervision W/ transfer , ambulation W/o AD , Skill PT not recommended at this time. Reconsult Skill PT in future as appropriated  Am Pac Mobility Score : 21

## 2023-08-29 NOTE — PHYSICAL THERAPY INITIAL EVALUATION ADULT - DISCHARGE PLANNER MADE AWARE
Chief Complaint:  Follow up acute heart failure     History of Present Illness:  The patient is a 72 year old male here to follow-up acute heart failure the patient went to the ER because he was severely lightheaded and dizzy with chest tightness the patient ruled out any heart attack but he was dehydrated he was given albumin injection and saline and he felt better and discharged home  The patient atrial natruretic peptide in the hospital was 1200  The patient went 3 days later to urgent care because leg swelling is getting worse and his atrial natruretic peptide was 1500 the patient denies any chest pain or shortness of breath but his lower extremity is swollen  No abdominal pain no nausea or vomiting and he is not dizzy or lightheaded  Patient was started on Aldactone 25 mg in addition to the Lasix and echocardiogram was ordered  Patient swelling is getting better he denies any chest pain or shortness of breath currently no dizziness or vertigo    Past Medical History:  History - past medical        Past Medical History:   Diagnosis Date   • Abnormal echocardiogram 6/6/2014 1/31/2014 Echocardiogram  Indication: pre-operative evaluation for abdominal surgery History of CAD; S/P CABG TDS: contrast used - post op paradoxical septal motion Normal LV size and systolic function LVEF = 60% Grade 2/4 diastolic dysfunction Grossly valve function os OK No pericardial effusion Unable to estimate PASP    • Anxiety     • Anxiety disorder     • Hooks's esophagus 8/24/2009   • Basal cell carcinoma 04/03/2018     nose   • Blood transfusion 2000   • CAD (coronary artery disease) 1/16/2012   • Chronic back pain     • Chronic kidney disease 08/12/2014     stage 3   • Chronic pain 08/02/2013     back   • Claustrophobia     • Coronary Artery Disease     • Degenerative joint disease       gouty   • Diabetes     • Diabetes mellitus (CMS/HCC) 1/16/2012   • Discoid lupus     • Diverticulosis 10/08/2003   • Diverticulosis     •  Dyslipidemia 5/7/2012   • Eczema     • Erectile dysfunction     • Esophageal reflux 8/24/2009   • Gastritis 11/27/2018     Dr. Thurman   • Hematoma complicating a procedure 5/3/2012     Right groin, post cath of 4/18/2012    • History of cardiac catheterization 6/6/2014 4/18/2012 SUMMARY:  1. Severe 3-vessel coronary artery disease.  2. Patent bypass grafts.  3. Severe disease and bypass graft, aorta to posterior descending artery after anastomosis distally.     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 repeat 3-5 yrs   • Esophagogastroduodenoscopy transoral flex w/bx single or mult   08/24/2009     short seg Barretts , gastritis, Dr Gibson  1yr   • Esophagogastroduodenoscopy transoral flex w/bx single or mult   06/02/2008     H pylori results not in chart-Esophageal mucosal changes consistent with short segment barretts esophagus, normal stomach, normal duodenum   • Esophagogastroduodenoscopy transoral flex w/bx single or mult   11/12/2007     H pylori negative-Distal esophagus bx-chronic esophagitis with intestinal metaplasia, focal glandular atypia, indefinite for low grade dysplasia   • Esophagogastroduodenoscopy transoral flex w/bx single or mult   06/05/2006     H pylori negative-Esophagus, lower/distal-barretts esophagus, no dysplasia is seen chronic inflammation of gastric type mucosa, esophageal mucosa with mild nonspecific reactive changes   • Esophagogastroduodenoscopy transoral flex w/bx single or mult   10-5-2010     barretts - sht segment   • Esophagogastroduodenoscopy transoral flex w/bx single or mult   10-     SS Barretts, no dysplasia   • Esophagogastroduodenoscopy transoral flex w/bx single or mult   4-2-2013     grossly nml esoph , gastritis   • Esophagogastroduodenoscopy transoral flex w/bx single or mult   11/27/2018     Dr. Thurman/ EGD/ Schatzki's Ring dilated and gastritis Recall 2 years   • Esophagoscopy transoral flex w/bx   3-     grossly nml.  Bxs at eg jx for barretts, 1 yr recall   • Esophagoscopy,ablation tumor   1-     SS Barretts - 30 ablations   • Eye surgery         SONIA CATARACT SURGERY   • Paravertebral facet inj jnt lumbar sacral 1   11/07/2018     Lumbar Facet / MBB #1   • Past surgical history   01/01/2001     retinal peal, left eye   • Removal of tonsils,<9 y/o   01/01/1952     Tonsillectomy Alone   • Remv cataract extracap insert lens   01/01/2001     Cataract Removal Lens Implant, left -one month after prior eye surgery   • Rev upper eyelid w  excess skin   2006     Blepharoplasty, bilateral with an eyebrow tuck   • Rev upper eyelid w excess skin   2007     Blepharoplasty, right eye   • Spine surgery procedure unlisted   2003     micro diskectomy of S2; knicked S1 nerve root   • Thoracentesis      • Tonsillectomy and adenoidectomy   AGE 4         Social History:    History - social   Social History            Socioeconomic History   • Marital status: /Civil Union       Spouse name: Not on file   • Number of children: Not on file   • Years of education: Not on file   • Highest education level: Not on file   Social Needs   • Financial resource strain: Not on file   • Food insecurity - worry: Not on file   • Food insecurity - inability: Not on file   • Transportation needs - medical: Not on file   • Transportation needs - non-medical: Not on file   Occupational History   • Occupation: retired-   Tobacco Use   • Smoking status: Former Smoker       Packs/day: 2.00       Years: 22.00       Pack years: 44.00       Types: Cigarettes       Start date:        Last attempt to quit:        Years since quittin.0   • Smokeless tobacco: Never Used   Substance and Sexual Activity   • Alcohol use: Yes       Comment: 3 low alcohol beers  per day   • Drug use: No   • Sexual activity: Not on file   Other Topics Concern   •  Service Not Asked   • Blood Transfusions Not Asked   • Caffeine Concern Not Asked   • Occupational Exposure Not Asked   • Hobby Hazards Not Asked   • Sleep Concern Not Asked   • Stress Concern Not Asked   • Weight Concern Not Asked   • Special Diet Not Asked   • Back Care Not Asked   • Exercise Not Asked   • Bike Helmet Not Asked   • Seat Belt Yes   • Self-Exams Not Asked   Social History Narrative     Non smoker now, quit in                      Family History:    History - family          Family History   Problem Relation Age of Onset   • Stroke Father     • Heart disease Mother      • Heart Mother     • Diabetes Sister     • Heart disease Sister     • Cancer Sister           Breast cancer   • Diabetes Other     • Cancer Other 40          of melanoma            Medication and Allergies:  Reviewed and updated per Epic.     REVIEW OF SYSTEMS:   GENERAL:  Patient denies fever, chills, tiredness, malaise, weight loss, weight gain.  EYES:  Patient denies blurred vision, double vision, pain, burning and itching.   NEUROLOGIC:  Patient denies tremors, dizzy spells, numbness, tingling, vision change, loss of balance.  ENDOCRINE:  Patient denies excessive thirst, heat intolerance, lymph node enlargement, tired/sluggishness.  GASTROINTESTINAL:  Patient denies abdominal pain, nausea/vomiting, indigestion/heartburn, diarrhea, constipation.  CARDIOVASCULAR:  Patient denies chest pain, shortness of breath, palpitations.  SKIN:  Patient denies skin rashes, boils, persistent itching, acne.  MUSCULOSKELETAL:  Patient denies joint pain, neck pain, back pain, leg swelling.  ENT/MOUTH:  Patient denies ear infections, sore throats, sinus problems, hearing loss.  GENITOURINARY:  Patient denies urine retention, painful urination, urinary frequency, blood in urine, nocturia.  RESPIRATORY:  Patient denies wheezing, frequent cough, shortness of breath.         Physical Examination:  VITALS:  Blood pressure 120/66, pulse 56, temperature 97.9 °F (36.6 °C), temperature source Oral, resp. rate 16, height 6' 1\" (1.854 m), weight 92.1 kg.   GENERAL:  The patient is alert and oriented x3, and in no acute distress.  HEENT:  Normocephalic, atraumatic.  Normal conjunctivae and lids.  No  periorbital edema.  Extraocular movements intact bilaterally.  Pupils and  irises equal and react to light and accommodation.  Normal inspection of  ears and nose.  Tympanic membranes visualized bilaterally and normal in  appearance.  Nasal mucosa moist and pink.  Throat is nonerythematous, no  exudates or postnasal drip.  Tongue midline.   Uvula elevates in the midline.  NECK:  No neck or supraclavicular lymphadenopathy.  No thyromegaly.  No  jugular venous distention or bruits.  No stiffness noted.  LUNGS:  Clear to auscultation.  No wheezes or rhonchi.  Normal respiratory effort.  HEART:  Regular rate and rhythm.  No murmurs, rubs or gallops.  Normal S1 and S2.  ABDOMEN:  Soft, nontender to palpation.  Bowel sounds present.  No  hepatosplenomegaly.  No hernias present.  BACK:  No spinal or costovertebral angle tenderness.  Normal range of  motion.  EXTREMITIES:  No clubbing, cyanosis 1+ edema bilaterally.  Bilateral upper and lower  extremities have normal range of motion.  NEUROLOGIC:  Speech clear, memory intact.  Cranial nerves 2-12 grossly  intact.  Deep tendon reflexes positive throughout the body.  Babinski  downgoing.  Gait normal.  Exam of sensation is positive.  SKIN:  No rash, ecchymosis or jaundice.        Assessment and Plan:  The patient is a 72 year old male    With acute heart failure the patient atrial natruretic peptide is 1549 I ordered echocardiogram to be done as soon as possible and told the patient to take Lasix 40 mg and continue Aldactone 25 mg  And the patient instructed to see the cardiology in 2 days  Also I discussed with the patient's everything in details and if he developed any shortness of breath or chest tightness or chest pain or even worsening of his leg swelling he has to go immediately to the ER the patient verbalized understanding  Hypertension well controlled continue with the same medication  Acute renal insufficiency the patient creatine in the hospital increased to 1.2 and 2 days ago repeated creatine came up 0.91 patient reassured        yes

## 2023-08-29 NOTE — BH INPATIENT PSYCHIATRY PROGRESS NOTE - NSBHMETABOLIC_PSY_ALL_CORE_FT
BMI: BMI (kg/m2): 31.8 (08-27-23 @ 17:38)  HbA1c: A1C with Estimated Average Glucose Result: 7.7 % (08-28-23 @ 07:08)    Glucose: POCT Blood Glucose.: 162 mg/dL (08-29-23 @ 11:04)    BP: 111/78 (08-29-23 @ 09:23) (111/78 - 146/83)  Lipid Panel: Date/Time: 08-28-23 @ 07:08  Cholesterol, Serum: 158  Direct LDL: --  HDL Cholesterol, Serum: 41  Total Cholesterol/HDL Ration Measurement: --  Triglycerides, Serum: 323   BMI: BMI (kg/m2): 31.8 (08-27-23 @ 17:38)  HbA1c: A1C with Estimated Average Glucose Result: 7.7 % (08-28-23 @ 07:08)    Glucose: POCT Blood Glucose.: 208 mg/dL (08-29-23 @ 15:54)    BP: 136/89 (08-29-23 @ 15:44) (111/78 - 146/83)  Lipid Panel: Date/Time: 08-28-23 @ 07:08  Cholesterol, Serum: 158  Direct LDL: --  HDL Cholesterol, Serum: 41  Total Cholesterol/HDL Ration Measurement: --  Triglycerides, Serum: 323

## 2023-08-29 NOTE — BH INPATIENT PSYCHIATRY PROGRESS NOTE - NSBHCHARTREVIEWVS_PSY_A_CORE FT
Vital Signs Last 24 Hrs  T(C): 35.9 (08-29-23 @ 09:23), Max: 36.4 (08-29-23 @ 05:47)  T(F): 96.7 (08-29-23 @ 09:23), Max: 97.6 (08-29-23 @ 05:47)  HR: 96 (08-29-23 @ 09:23) (81 - 105)  BP: 111/78 (08-29-23 @ 09:23) (111/78 - 146/83)  BP(mean): --  RR: 18 (08-29-23 @ 09:23) (16 - 18)  SpO2: --     Vital Signs Last 24 Hrs  T(C): 35.9 (08-29-23 @ 15:44), Max: 36.4 (08-29-23 @ 05:47)  T(F): 96.7 (08-29-23 @ 15:44), Max: 97.6 (08-29-23 @ 05:47)  HR: 86 (08-29-23 @ 15:44) (86 - 105)  BP: 136/89 (08-29-23 @ 15:44) (111/78 - 146/83)  BP(mean): --  RR: 18 (08-29-23 @ 15:44) (16 - 18)  SpO2: --

## 2023-08-29 NOTE — BH INPATIENT PSYCHIATRY PROGRESS NOTE - NSBHFUPINTERVALHXFT_PSY_A_CORE
Chart reviewed. No acute events overnight per nursing staff. Patient did not require any PRNs for agitation. She remains compliant with all standing medications and reports no adverse effects.    Upon approach by the treatment team this morning, patient was socializing in the day room. Patient seen and interviewed in her room. She states, "I feel great," and reports that she thinks she is ready for discharge. She slept well overnight after taking Trazadone. Patient becomes tearful while discussing worries of her parents thinking that she should remain hospitalized and that they are encouraging her daughters not to visit during this admission. She demonstrates increased productivity without pressured rate of speech, mood lability and tangential thought process this morning.  Chart reviewed. No acute events overnight per nursing staff. Patient did not require any PRNs for agitation. She remains compliant with all standing medications and reports no adverse effects.    Upon approach by the treatment team this morning, patient was socializing in the day room. Patient seen and interviewed in her room. She states, "I feel great," and reports that she thinks she is ready for discharge. She slept well overnight after taking Trazadone. Patient becomes tearful while discussing worries of her parents thinking that she should remain hospitalized and that they are encouraging her daughters not to visit during this admission. She demonstrates increased productivity without pressured rate of speech, mood lability and tangential thought process this morning. Patient informed the team that she has an appointment with Dr. SHEIKH on 8/31 but was unsure of the time.

## 2023-08-29 NOTE — PHYSICAL THERAPY INITIAL EVALUATION ADULT - ADDITIONAL COMMENTS
pt is 52 y/o F  , lives with Daughters  in  , information obtain from the pt herself , has 3  steps to enter with  BL HR and 12 steps to the  Bedroom W/ RT HR  , pt was independent using  with bed mobility , transfer , ambulation ,stair negotiation and basic ADLS.

## 2023-08-30 LAB
GLUCOSE BLDC GLUCOMTR-MCNC: 139 MG/DL — HIGH (ref 70–99)
GLUCOSE BLDC GLUCOMTR-MCNC: 158 MG/DL — HIGH (ref 70–99)
GLUCOSE BLDC GLUCOMTR-MCNC: 169 MG/DL — HIGH (ref 70–99)
GLUCOSE BLDC GLUCOMTR-MCNC: 252 MG/DL — HIGH (ref 70–99)

## 2023-08-30 PROCEDURE — 99232 SBSQ HOSP IP/OBS MODERATE 35: CPT

## 2023-08-30 RX ADMIN — Medication 1 MILLIGRAM(S): at 08:26

## 2023-08-30 RX ADMIN — METFORMIN HYDROCHLORIDE 1000 MILLIGRAM(S): 850 TABLET ORAL at 20:31

## 2023-08-30 RX ADMIN — AMLODIPINE BESYLATE 5 MILLIGRAM(S): 2.5 TABLET ORAL at 08:26

## 2023-08-30 RX ADMIN — ANASTROZOLE 1 MILLIGRAM(S): 1 TABLET ORAL at 08:25

## 2023-08-30 RX ADMIN — Medication 1 MILLIGRAM(S): at 20:33

## 2023-08-30 RX ADMIN — Medication 1 MILLIGRAM(S): at 20:32

## 2023-08-30 RX ADMIN — METFORMIN HYDROCHLORIDE 1000 MILLIGRAM(S): 850 TABLET ORAL at 08:26

## 2023-08-30 RX ADMIN — Medication 100 MILLIGRAM(S): at 20:16

## 2023-08-30 RX ADMIN — Medication 200 MILLIGRAM(S): at 20:16

## 2023-08-30 RX ADMIN — Medication 200 MILLIGRAM(S): at 13:16

## 2023-08-30 RX ADMIN — Medication 3: at 07:27

## 2023-08-30 RX ADMIN — FLUPHENAZINE HYDROCHLORIDE 5 MILLIGRAM(S): 1 TABLET, FILM COATED ORAL at 23:14

## 2023-08-30 RX ADMIN — ATORVASTATIN CALCIUM 40 MILLIGRAM(S): 80 TABLET, FILM COATED ORAL at 20:32

## 2023-08-30 RX ADMIN — Medication 600 MILLIGRAM(S): at 08:25

## 2023-08-30 NOTE — BH INPATIENT PSYCHIATRY PROGRESS NOTE - NSBHASSESSSUMMFT_PSY_ALL_CORE
This is a 53 year old female who after a few days of mild racing thoughts and insomnia, presented to the hospital after a chaotic family turmoil, patient felt overwhelmed and voluntarily admitted herself to the psychiatric unit (patient resisted her mother's pressure to say that she is suicidal so she could be admitted to the hospital). 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 She agrees to voluntary inpt admission.      8-  patient is calmer and less pressured but still tangential and worried about her parent's reaction to her discharge.  We agree to attempt another family meeting and tentatively look at a discharge date of 9 5, her daughter's birthday in the a.m.    #Bipolar 1 Disorder  - Continue Carbamazepine 200 mg PO qAM, 300 mg PO qHS  - Benztropine 1 mg PO BID   - pt reports last Prolixin PATHAK 8/23, however unable to confirm   - Prolixin 5mg PO qHS starting 8/29    #Insomnia  - Trazodone 50mg qHS, discontinued on 8/29    #PTSD  - continue prazosin 1 mg qhs    #Breast cancer in remission  - s/w Anastrozole 1 mg PO qdaily    #HTN  - c/w amlodipine 5 mg PO qdaily    #DM2  - metformin 1000 mg PO BID   - pt on insulin at home, sliding scale     #asthma   - albuterol inhaler prn     #HLD  - atorvastatin 40 mg PO daily  - Gemfibrozil 600 mg PO BID    #Rotator Cuff Injury  - PT/OT consult    #agitation  For severe agitation not responding to behavioral intervention, may give haldol 5 mg po q6h prn, ativan 2 mg po q6h prn, Benadryl 50 mg po q6h prn, with escalation to IM if patient refusing PO and remains an imminent danger to self or others. If IM antipsychotic is administered, please perform follow-up ECG for QTc monitoring.

## 2023-08-30 NOTE — BH INPATIENT PSYCHIATRY PROGRESS NOTE - NSBHCHARTREVIEWVS_PSY_A_CORE FT
Vital Signs Last 24 Hrs  T(C): 35.6 (08-30-23 @ 10:21), Max: 35.9 (08-29-23 @ 15:44)  T(F): 96.1 (08-30-23 @ 10:21), Max: 96.7 (08-29-23 @ 15:44)  HR: 91 (08-30-23 @ 10:21) (86 - 91)  BP: 164/94 (08-30-23 @ 10:21) (134/79 - 164/94)  BP(mean): --  RR: 18 (08-30-23 @ 10:21) (16 - 18)  SpO2: 100% (08-29-23 @ 20:17) (100% - 100%)    Orthostatic VS  08-30-23 @ 05:23  Lying BP: --/-- HR: --  Sitting BP: --/-- HR: --  Standing BP: --/-- HR: 90  Site: --  Mode: electronic

## 2023-08-30 NOTE — BH INPATIENT PSYCHIATRY PROGRESS NOTE - NSBHMETABOLIC_PSY_ALL_CORE_FT
BMI: BMI (kg/m2): 31.8 (08-27-23 @ 17:38)  HbA1c: A1C with Estimated Average Glucose Result: 7.7 % (08-28-23 @ 07:08)    Glucose: POCT Blood Glucose.: 158 mg/dL (08-30-23 @ 11:43)    BP: 164/94 (08-30-23 @ 10:21) (111/78 - 164/94)  Lipid Panel: Date/Time: 08-28-23 @ 07:08  Cholesterol, Serum: 158  Direct LDL: --  HDL Cholesterol, Serum: 41  Total Cholesterol/HDL Ration Measurement: --  Triglycerides, Serum: 323

## 2023-08-30 NOTE — BH SAFETY PLAN - WARNING SIGN 3
Another warning sign is that some of my behaviors may change and differ from my normal baseline.  Another warning sign is that I start to spend a lot of money.

## 2023-08-30 NOTE — BH INPATIENT PSYCHIATRY PROGRESS NOTE - NSBHFUPINTERVALHXFT_PSY_A_CORE
Staff interviewed, chart reviewed and patient interviewed.      Patient continues to be friendly, approachable and sociable.  She can be tangential and slightly pressured but is not showing flight of ideas or word salad.  She is concerned about her  returning home and wishes to do so on a before her daughter's birthday this coming Tuesday.  She reports she has had no trouble sleeping and thinks that that has been very helpful.  (25 mg of oral Prolixin was added to her regimen at at bedtime).   She is generally free of complaints and would like us to meet again with her family in the hopes of having them be more accepting of her leaving.

## 2023-08-31 LAB
GLUCOSE BLDC GLUCOMTR-MCNC: 129 MG/DL — HIGH (ref 70–99)
GLUCOSE BLDC GLUCOMTR-MCNC: 140 MG/DL — HIGH (ref 70–99)
GLUCOSE BLDC GLUCOMTR-MCNC: 210 MG/DL — HIGH (ref 70–99)
GLUCOSE BLDC GLUCOMTR-MCNC: 213 MG/DL — HIGH (ref 70–99)

## 2023-08-31 PROCEDURE — 99232 SBSQ HOSP IP/OBS MODERATE 35: CPT

## 2023-08-31 RX ORDER — LIDOCAINE 4 G/100G
1 CREAM TOPICAL DAILY
Refills: 0 | Status: DISCONTINUED | OUTPATIENT
Start: 2023-08-31 | End: 2023-09-05

## 2023-08-31 RX ORDER — FLUPHENAZINE HYDROCHLORIDE 1 MG/1
10 TABLET, FILM COATED ORAL AT BEDTIME
Refills: 0 | Status: DISCONTINUED | OUTPATIENT
Start: 2023-08-31 | End: 2023-09-05

## 2023-08-31 RX ADMIN — METFORMIN HYDROCHLORIDE 1000 MILLIGRAM(S): 850 TABLET ORAL at 20:11

## 2023-08-31 RX ADMIN — Medication 650 MILLIGRAM(S): at 23:17

## 2023-08-31 RX ADMIN — Medication 200 MILLIGRAM(S): at 08:25

## 2023-08-31 RX ADMIN — Medication 200 MILLIGRAM(S): at 20:16

## 2023-08-31 RX ADMIN — Medication 100 MILLIGRAM(S): at 20:11

## 2023-08-31 RX ADMIN — AMLODIPINE BESYLATE 5 MILLIGRAM(S): 2.5 TABLET ORAL at 08:22

## 2023-08-31 RX ADMIN — ANASTROZOLE 1 MILLIGRAM(S): 1 TABLET ORAL at 08:21

## 2023-08-31 RX ADMIN — Medication 1 MILLIGRAM(S): at 20:12

## 2023-08-31 RX ADMIN — METFORMIN HYDROCHLORIDE 1000 MILLIGRAM(S): 850 TABLET ORAL at 08:21

## 2023-08-31 RX ADMIN — Medication 650 MILLIGRAM(S): at 00:42

## 2023-08-31 RX ADMIN — ATORVASTATIN CALCIUM 40 MILLIGRAM(S): 80 TABLET, FILM COATED ORAL at 20:10

## 2023-08-31 RX ADMIN — FLUPHENAZINE HYDROCHLORIDE 10 MILLIGRAM(S): 1 TABLET, FILM COATED ORAL at 20:11

## 2023-08-31 RX ADMIN — Medication 1 MILLIGRAM(S): at 20:11

## 2023-08-31 RX ADMIN — Medication 1 MILLIGRAM(S): at 08:22

## 2023-08-31 RX ADMIN — Medication 2: at 07:46

## 2023-08-31 NOTE — BH DISCHARGE NOTE NURSING/SOCIAL WORK/PSYCH REHAB - NSCDUDCCRISIS_PSY_A_CORE
[facility]  1 (539) [facility] ([phone])  Text "WELL" to 98373  Website: www.[employer].Soricimed/.National Suicide Prevention Lifeline 6 (422) 035-5740/.  [employer]  1 (123) LIFENET (301-9437)/988 Suicide and Crisis Lifeline

## 2023-08-31 NOTE — BH INPATIENT PSYCHIATRY PROGRESS NOTE - NSBHASSESSSUMMFT_PSY_ALL_CORE
This is a 53 year old female who after a few days of mild racing thoughts and insomnia, presented to the hospital after a chaotic family turmoil, patient felt overwhelmed and voluntarily admitted herself to the psychiatric unit (patient resisted her mother's pressure to say that she is suicidal so she could be admitted to the hospital). 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 She agrees to voluntary inpt admission.      8-  patient is calmer and less pressured but still tangential and worried about her parent's reaction to her discharge.  We agree to attempt another family meeting and tentatively look at a discharge date of 9 5, her daughter's birthday in the a.m.  8-31-23 patient with no SI/HI/AVH, thought process tangential, overinclusive, but interruptible. Stable, pleasant.     #Bipolar 1 Disorder  - Continue Carbamazepine 200 mg PO qAM, 300 mg PO qHS  - Benztropine 1 mg PO BID   - pt reports last Prolixin PATHAK 8/23, however unable to confirm   - Prolixin 5mg PO qHS starting 8/29, increased to 10mg PO qHS 8/31    #Insomnia  - Trazodone 50mg qHS, discontinued on 8/29    #PTSD  - continue prazosin 1 mg qhs    #Breast cancer in remission  - s/w Anastrozole 1 mg PO qdaily    #HTN  - c/w amlodipine 5 mg PO qdaily    #DM2  - metformin 1000 mg PO BID   - pt on insulin at home, sliding scale     #asthma   - albuterol inhaler prn     #HLD  - atorvastatin 40 mg PO daily  - Gemfibrozil 600 mg PO BID    #Rotator Cuff Injury  - PT/OT consult  - Lidocaine patch 4%    #agitation  For severe agitation not responding to behavioral intervention, may give haldol 5 mg po q6h prn, ativan 2 mg po q6h prn, Benadryl 50 mg po q6h prn, with escalation to IM if patient refusing PO and remains an imminent danger to self or others. If IM antipsychotic is administered, please perform follow-up ECG for QTc monitoring.

## 2023-08-31 NOTE — BH INPATIENT PSYCHIATRY PROGRESS NOTE - NSBHATTESTCOMMENTATTENDFT_PSY_A_CORE
Case discussed withr esident and student.  I concur with findings and plan.  Case discussed with resident and student.  I concur with findings and plan.

## 2023-08-31 NOTE — BH INPATIENT PSYCHIATRY PROGRESS NOTE - NSBHCHARTREVIEWVS_PSY_A_CORE FT
Vital Signs Last 24 Hrs  T(C): 35.7 (08-31-23 @ 08:50), Max: 35.7 (08-31-23 @ 08:50)  T(F): 96.2 (08-31-23 @ 08:50), Max: 96.2 (08-31-23 @ 08:50)  HR: 93 (08-31-23 @ 08:50) (93 - 93)  BP: 136/78 (08-31-23 @ 08:50) (136/78 - 136/78)  BP(mean): --  RR: 18 (08-31-23 @ 08:50) (18 - 18)  SpO2: --    Orthostatic VS  08-30-23 @ 05:23  Lying BP: --/-- HR: --  Sitting BP: --/-- HR: --  Standing BP: --/-- HR: 90  Site: --  Mode: electronic

## 2023-08-31 NOTE — BH DISCHARGE NOTE NURSING/SOCIAL WORK/PSYCH REHAB - PATIENT PORTAL LINK FT
You can access the FollowMyHealth Patient Portal offered by [facility] by registering at the following website: [email or web address]. By joining [facility]’s FollowMyHealth portal, you will also be able to view your health information using other applications (apps) compatible with our system.

## 2023-08-31 NOTE — BH INPATIENT PSYCHIATRY PROGRESS NOTE - NSBHFUPINTERVALHXFT_PSY_A_CORE
Staff interviewed, chart reviewed and patient interviewed.    Patient continues to be friendly, approachable and sociable.  Patient states she was able to sleep but had shoulder pain, which she describes as chronic rotator cuff pain.   Otherwise, denies SI/HI/AVH.

## 2023-08-31 NOTE — BH INPATIENT PSYCHIATRY PROGRESS NOTE - NSBHMETABOLIC_PSY_ALL_CORE_FT
BMI: BMI (kg/m2): 31.8 (08-27-23 @ 17:38)  HbA1c: A1C with Estimated Average Glucose Result: 7.7 % (08-28-23 @ 07:08)    Glucose: POCT Blood Glucose.: 129 mg/dL (08-31-23 @ 11:23)    BP: 136/78 (08-31-23 @ 08:50) (111/78 - 164/94)  Lipid Panel: Date/Time: 08-28-23 @ 07:08  Cholesterol, Serum: 158  Direct LDL: --  HDL Cholesterol, Serum: 41  Total Cholesterol/HDL Ration Measurement: --  Triglycerides, Serum: 323

## 2023-09-01 LAB
GLUCOSE BLDC GLUCOMTR-MCNC: 126 MG/DL — HIGH (ref 70–99)
GLUCOSE BLDC GLUCOMTR-MCNC: 190 MG/DL — HIGH (ref 70–99)
GLUCOSE BLDC GLUCOMTR-MCNC: 201 MG/DL — HIGH (ref 70–99)
GLUCOSE BLDC GLUCOMTR-MCNC: 257 MG/DL — HIGH (ref 70–99)

## 2023-09-01 RX ADMIN — ANASTROZOLE 1 MILLIGRAM(S): 1 TABLET ORAL at 08:34

## 2023-09-01 RX ADMIN — Medication 1 MILLIGRAM(S): at 20:30

## 2023-09-01 RX ADMIN — Medication 1 MILLIGRAM(S): at 08:35

## 2023-09-01 RX ADMIN — Medication 1: at 16:18

## 2023-09-01 RX ADMIN — LIDOCAINE 1 PATCH: 4 CREAM TOPICAL at 08:36

## 2023-09-01 RX ADMIN — AMLODIPINE BESYLATE 5 MILLIGRAM(S): 2.5 TABLET ORAL at 08:34

## 2023-09-01 RX ADMIN — LIDOCAINE 1 PATCH: 4 CREAM TOPICAL at 20:41

## 2023-09-01 RX ADMIN — METFORMIN HYDROCHLORIDE 1000 MILLIGRAM(S): 850 TABLET ORAL at 08:37

## 2023-09-01 RX ADMIN — Medication 200 MILLIGRAM(S): at 08:35

## 2023-09-01 RX ADMIN — Medication 1 MILLIGRAM(S): at 20:31

## 2023-09-01 RX ADMIN — Medication 100 MILLIGRAM(S): at 20:30

## 2023-09-01 RX ADMIN — FLUPHENAZINE HYDROCHLORIDE 10 MILLIGRAM(S): 1 TABLET, FILM COATED ORAL at 21:48

## 2023-09-01 RX ADMIN — Medication 3: at 07:41

## 2023-09-01 RX ADMIN — Medication 200 MILLIGRAM(S): at 21:49

## 2023-09-01 RX ADMIN — ATORVASTATIN CALCIUM 40 MILLIGRAM(S): 80 TABLET, FILM COATED ORAL at 20:30

## 2023-09-01 RX ADMIN — METFORMIN HYDROCHLORIDE 1000 MILLIGRAM(S): 850 TABLET ORAL at 20:31

## 2023-09-01 NOTE — BH INPATIENT PSYCHIATRY PROGRESS NOTE - NSBHMETABOLIC_PSY_ALL_CORE_FT
BMI: BMI (kg/m2): 31.8 (08-27-23 @ 17:38)  HbA1c: A1C with Estimated Average Glucose Result: 7.7 % (08-28-23 @ 07:08)    Glucose: POCT Blood Glucose.: 126 mg/dL (09-01-23 @ 11:13)    BP: 122/72 (09-01-23 @ 05:26) (122/64 - 164/94)  Lipid Panel: Date/Time: 08-28-23 @ 07:08  Cholesterol, Serum: 158  Direct LDL: --  HDL Cholesterol, Serum: 41  Total Cholesterol/HDL Ration Measurement: --  Triglycerides, Serum: 323
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
## 2023-09-01 NOTE — BH TREATMENT PLAN - NSTXCAREGIVERPARTICIPATE_PSY_P_CORE
Family/Caregiver participated in defining interventions
Family/Caregiver participated in identification of needs/problems/goals for treatment/Family/Caregiver participated in defining interventions/Family/Caregiver participated in development of after care plan

## 2023-09-01 NOTE — BH INPATIENT PSYCHIATRY PROGRESS NOTE - NSTXMANICGOAL_PSY_ALL_CORE
Exhibit a substantial reduction in elated/angry acting out, and pressured speech that prevents mutual conversation
Be able to identify the early signs of casi (e.g. sleep and mood changes) and to employ coping strategies to minimize acting out
Demonstrate a substantial reduction in both hyperactive pacing on the unit and social intrusiveness
Exhibit a substantial reduction in elated/angry acting out, and pressured speech that prevents mutual conversation

## 2023-09-01 NOTE — BH TREATMENT PLAN - NSPTSTATEDGOAL_PSY_ALL_CORE
Patient stated that she would like to be evaluated because things were getting out of control 
Patient stated that she would like to be evaluated because things were getting out of control

## 2023-09-01 NOTE — BH TREATMENT PLAN - NSCMSPTSTRENGTHS_PSY_ALL_CORE
Expressive of emotions/Motivated/Sense of Humor/Supportive family
Expressive of emotions/Motivated/Sense of Humor/Supportive family

## 2023-09-01 NOTE — BH INPATIENT PSYCHIATRY PROGRESS NOTE - NSBHCHARTREVIEWVS_PSY_A_CORE FT
Vital Signs Last 24 Hrs  T(C): 35.8 (09-01-23 @ 05:26), Max: 37 (08-31-23 @ 15:55)  T(F): 96.4 (09-01-23 @ 05:26), Max: 98.6 (08-31-23 @ 15:55)  HR: 114 (09-01-23 @ 05:26) (89 - 114)  BP: 122/72 (09-01-23 @ 05:26) (122/64 - 122/72)  BP(mean): --  RR: 16 (09-01-23 @ 05:26) (16 - 16)  SpO2: 98% (09-01-23 @ 05:26) (98% - 98%)
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
## 2023-09-01 NOTE — BH INPATIENT PSYCHIATRY PROGRESS NOTE - TELEPSYCHIATRY?
Subjective  Chief Complaint   Patient presents with    Pharyngitis     cough for a week, woke up this AM and hard to swollow     Depression     when off effexor        URI    This is a new problem. The current episode started yesterday. The problem has been rapidly worsening. There has been no fever. Associated symptoms include congestion (chest), coughing and a sore throat. Pertinent negatives include no headaches or wheezing. Treatments tried: cough drops. Pt has had a sore throat, congestion and cough going on for the last week. Getting worse. No fevers. 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 Stopped taking effexor because she doesn't want to be taking a pill  It makes her have night sweats and decreased libido. Started retaking it because abruptly stopping it was bad.     Patient Active Problem List    Diagnosis Date Noted    Anemia 2013    ADD (attention deficit disorder) 2013    Anxiety 2013     Past Medical History:   Diagnosis Date    ADHD (attention deficit hyperactivity disorder)     ADD, diagnosed when 13 yrs old    Anemia     Anxiety     Vitamin D deficiency      Past Surgical History:   Procedure Laterality Date     SECTION      3    DILATION AND CURETTAGE OF UTERUS  2012    LEEP  2005    TUBAL LIGATION       Family History   Problem Relation Age of Onset    Diabetes Maternal Grandmother      Social History     Social History    Marital status:      Spouse name: N/A    Number of children: N/A    Years of education: N/A     Social History Main Topics    Smoking status: Current Every Day Smoker     Packs/day: 0.25    Smokeless tobacco: Never Used    Alcohol use Yes      Comment: socially    Drug use: Unknown    Sexual activity: Not Currently     Other Topics Concern    None     Social History Narrative    None     Current Outpatient Prescriptions on File Prior to Visit   Medication Sig Dispense Refill   
amphetamine-dextroamphetamine (ADDERALL XR) 20 MG extended release capsule TAKE 1 CAPSULE BY MOUTH TWICE DAILY. 60 capsule 0    ALPRAZolam (XANAX) 0.5 MG tablet Take 1 tablet by mouth 3 times daily as needed for Sleep for up to 60 days. 70 tablet 0    venlafaxine (EFFEXOR XR) 37.5 MG extended release capsule Take 1 capsule by mouth daily 30 capsule 1     No current facility-administered medications on file prior to visit. Allergies   Allergen Reactions    Hydrocodone-Acetaminophen Hives    Pcn [Penicillins] Nausea Only       Review of Systems   HENT: Positive for congestion (chest) and sore throat. Respiratory: Positive for cough. Negative for shortness of breath and wheezing. Neurological: Negative for headaches. Objective  Vitals:    03/13/18 1630   BP: 104/78   Pulse: 119   Temp: 98.9 °F (37.2 °C)   SpO2: 97%   Weight: 122 lb 6.4 oz (55.5 kg)   Height: 5' 5\" (1.651 m)     Physical Exam   Constitutional: She is oriented to person, place, and time. She appears well-developed and well-nourished. HENT:   Head: Normocephalic. Right Ear: External ear normal.   Left Ear: External ear normal.   Mouth/Throat: Oropharynx is clear and moist.   Eyes: Conjunctivae and EOM are normal. Pupils are equal, round, and reactive to light. Neck: Normal range of motion. Neck supple. No thyromegaly present. Cardiovascular: Normal rate, regular rhythm and normal heart sounds. Pulmonary/Chest: Effort normal and breath sounds normal.   Lymphadenopathy:     She has no cervical adenopathy. Neurological: She is alert and oriented to person, place, and time. Skin: Skin is warm and dry. Assessment & Plan    1. Bronchitis  azithromycin (ZITHROMAX) 250 MG tablet   2.  Acute viral pharyngitis  POCT rapid strep A       Orders Placed This Encounter   Procedures    POCT rapid strep A       Orders Placed This Encounter   Medications    azithromycin (ZITHROMAX) 250 MG tablet     Sig: Take 2 tabs (500 mg) on Day
No

## 2023-09-01 NOTE — BH INPATIENT PSYCHIATRY PROGRESS NOTE - NSBHATTESTBILLING_PSY_A_CORE
76711-Ofcjcmhzla OBS or IP - low complexity OR 25-34 mins
73006-Jnsymgyaaj OBS or IP - low complexity OR 25-34 mins
81878-Catrvrmpfr OBS or IP - low complexity OR 25-34 mins
03041-Swsaxtjtcz OBS or IP - low complexity OR 25-34 mins

## 2023-09-01 NOTE — BH INPATIENT PSYCHIATRY PROGRESS NOTE - NSBHFUPINTERVALHXFT_PSY_A_CORE
No overnight events. Patient is evaluated and seen at bedside.   Patient states she has been feeling well but has trouble sleeping. States it is not a psychiatric problem, rather, is due to her shoulder pain due to rotator cuff injury. Patient also mentions that her cousin, Dr. Bruce Horton ([phone]) , is her medical proxy and asked if the writer could speak to him and update him.   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 Leo Lyman ). 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 Agrees with the plan of tentative discharge date of 9/5/23.   Regarding rotator cuff pain, patient was told by her PCP not to take Motrin as patient has hx of lithium toxicity and dialysis.     Dr. Bruce Horton ([phone]):   States at baseline patient is overinclusive and needs frequent redirection. Asking about consistent outpatient follow up, asked Ms. Sanjuana EVERETT, to update him regarding the outpatient follow up.     Dr. Leo Lyman :  Office closed until 9/5/23

## 2023-09-01 NOTE — BH INPATIENT PSYCHIATRY PROGRESS NOTE - NSTXNEGATGOAL_PSY_ALL_CORE
Will be able to attend group sessions to promote skill development, despite negative self-talk

## 2023-09-01 NOTE — OCCUPATIONAL THERAPY INITIAL EVALUATION ADULT - RANGE OF MOTION EXAMINATION
left shoulder AROM 0/50 flexion 0/45 Abduction PROM 0/90; elbow wrist and hand WFLs/deficits as listed below

## 2023-09-01 NOTE — BH TREATMENT PLAN - NSTXNEGATINTERSW_PSY_ALL_CORE
SW will meet with patient daily for education and support. SW will encourage patients to attend unit groups to learn new coping skills.
SW will meet with patients daily for education and support. SW will encourage patients to attend unit groups to learn new coping skills.

## 2023-09-01 NOTE — OCCUPATIONAL THERAPY INITIAL EVALUATION ADULT - ADAPTIVE EQUIPMENT NEEDED
Recommendations:  Change to Pediasure Peptide 1.5 (3.5 cans/day) + an additional 480mL water/day.  Okay to run the water through the night.    Follow up for weight check in 1 month.    Follow up in GI clinic in 4 months.    Thank you for coming to today's visit.  Please call if you have any questions or concerns related to nutrition.  Dr. Clare Walters RD  
no

## 2023-09-01 NOTE — BH TREATMENT PLAN - NSTXMANICINTERSW_PSY_ALL_CORE
Sw will continue to provide support contacts education and referrals and be able identify warning signs of casi.
Sw will continue to provide support contacts education and referrals and be able identify warning signs of casi.

## 2023-09-01 NOTE — BH TREATMENT PLAN - NSTXNEGATGOAL_PSY_ALL_CORE
Will be able to attend group sessions to promote skill development, despite negative self-talk
Will be able to attend group sessions to promote skill development, despite negative self-talk
no

## 2023-09-01 NOTE — BH TREATMENT PLAN - NSTXMANICINTERRN_PSY_ALL_CORE
RN to encourage verbalization of feelings.  RN to encourage medication compliance and provide support and education as needed on Dx, coping skills, medication, and safety planning.  RN to encourage daily ADL's  RN to encourage group attendance  RN to assess and intervene for any Manic symptoms
RN to encourage verbalization of feelings.  RN to encourage medication compliance and provide support and education as needed on Dx, coping skills, medication, and safety planning.  RN to encourage daily ADL's  RN to encourage group attendance  RN to assess and intervene for any Manic symptoms

## 2023-09-01 NOTE — BH INPATIENT PSYCHIATRY PROGRESS NOTE - PRN MEDS
MEDICATIONS  (PRN):  acetaminophen     Tablet .. 650 milliGRAM(s) Oral every 6 hours PRN Temp greater or equal to 38C (100.4F), Mild Pain (1 - 3)  albuterol    90 MICROgram(s) HFA Inhaler 2 Puff(s) Inhalation every 6 hours PRN Shortness of Breath  dextrose Oral Gel 15 Gram(s) Oral once PRN Blood Glucose LESS THAN 70 milliGRAM(s)/deciliter  diphenhydrAMINE 50 milliGRAM(s) Oral every 6 hours PRN Extrapyramidal prophylaxis  haloperidol     Tablet 5 milliGRAM(s) Oral every 6 hours PRN agitation  LORazepam     Tablet 1 milliGRAM(s) Oral every 6 hours PRN Anxiety  

## 2023-09-01 NOTE — BH INPATIENT PSYCHIATRY PROGRESS NOTE - NSTXCOPEINTERMD_PSY_ALL_CORE
Mood stabilizers, antipsychotic meds

## 2023-09-01 NOTE — BH INPATIENT PSYCHIATRY PROGRESS NOTE - CURRENT MEDICATION
MEDICATIONS  (STANDING):  amLODIPine   Tablet 5 milliGRAM(s) Oral daily  anastrozole 1 milliGRAM(s) Oral daily  atorvastatin 40 milliGRAM(s) Oral at bedtime  benztropine 1 milliGRAM(s) Oral two times a day  carBAMazepine ER Capsule. 200 milliGRAM(s) Oral two times a day  carBAMazepine ER Tablet 100 milliGRAM(s) Oral at bedtime  dextrose 5%. 1000 milliLiter(s) (50 mL/Hr) IV Continuous <Continuous>  dextrose 5%. 1000 milliLiter(s) (100 mL/Hr) IV Continuous <Continuous>  dextrose 50% Injectable 25 Gram(s) IV Push once  dextrose 50% Injectable 12.5 Gram(s) IV Push once  dextrose 50% Injectable 25 Gram(s) IV Push once  doxycycline monohydrate Capsule 20 milliGRAM(s) Oral every 12 hours  fluPHENAZine 10 milliGRAM(s) Oral at bedtime  gemfibrozil 600 milliGRAM(s) Oral two times a day  glucagon  Injectable 1 milliGRAM(s) IntraMuscular once  insulin lispro (ADMELOG) corrective regimen sliding scale   SubCutaneous three times a day before meals  lidocaine   4% Patch 1 Patch Transdermal daily  metFORMIN 1000 milliGRAM(s) Oral two times a day  prazosin. 1 milliGRAM(s) Oral at bedtime    MEDICATIONS  (PRN):  acetaminophen     Tablet .. 650 milliGRAM(s) Oral every 6 hours PRN Temp greater or equal to 38C (100.4F), Mild Pain (1 - 3)  albuterol    90 MICROgram(s) HFA Inhaler 2 Puff(s) Inhalation every 6 hours PRN Shortness of Breath  dextrose Oral Gel 15 Gram(s) Oral once PRN Blood Glucose LESS THAN 70 milliGRAM(s)/deciliter  diphenhydrAMINE 50 milliGRAM(s) Oral every 6 hours PRN Extrapyramidal prophylaxis  haloperidol     Tablet 5 milliGRAM(s) Oral every 6 hours PRN agitation  LORazepam     Tablet 1 milliGRAM(s) Oral every 6 hours PRN Anxiety  
MEDICATIONS  (STANDING):  amLODIPine   Tablet 5 milliGRAM(s) Oral daily  anastrozole 1 milliGRAM(s) Oral daily  atorvastatin 40 milliGRAM(s) Oral at bedtime  benztropine 1 milliGRAM(s) Oral two times a day  carBAMazepine ER Capsule. 200 milliGRAM(s) Oral two times a day  carBAMazepine ER Tablet 100 milliGRAM(s) Oral at bedtime  dextrose 5%. 1000 milliLiter(s) (100 mL/Hr) IV Continuous <Continuous>  dextrose 5%. 1000 milliLiter(s) (50 mL/Hr) IV Continuous <Continuous>  dextrose 50% Injectable 25 Gram(s) IV Push once  dextrose 50% Injectable 12.5 Gram(s) IV Push once  dextrose 50% Injectable 25 Gram(s) IV Push once  fluPHENAZine 5 milliGRAM(s) Oral at bedtime  gemfibrozil 600 milliGRAM(s) Oral two times a day  glucagon  Injectable 1 milliGRAM(s) IntraMuscular once  insulin lispro (ADMELOG) corrective regimen sliding scale   SubCutaneous three times a day before meals  metFORMIN 1000 milliGRAM(s) Oral two times a day  prazosin. 1 milliGRAM(s) Oral at bedtime    MEDICATIONS  (PRN):  acetaminophen     Tablet .. 650 milliGRAM(s) Oral every 6 hours PRN Temp greater or equal to 38C (100.4F), Mild Pain (1 - 3)  albuterol    90 MICROgram(s) HFA Inhaler 2 Puff(s) Inhalation every 6 hours PRN Shortness of Breath  dextrose Oral Gel 15 Gram(s) Oral once PRN Blood Glucose LESS THAN 70 milliGRAM(s)/deciliter  diphenhydrAMINE 50 milliGRAM(s) Oral every 6 hours PRN Extrapyramidal prophylaxis  haloperidol     Tablet 5 milliGRAM(s) Oral every 6 hours PRN agitation  LORazepam     Tablet 1 milliGRAM(s) Oral every 6 hours PRN Anxiety  
MEDICATIONS  (STANDING):  amLODIPine   Tablet 5 milliGRAM(s) Oral daily  anastrozole 1 milliGRAM(s) Oral daily  atorvastatin 40 milliGRAM(s) Oral at bedtime  benztropine 1 milliGRAM(s) Oral two times a day  carBAMazepine ER Capsule. 200 milliGRAM(s) Oral two times a day  carBAMazepine ER Tablet 100 milliGRAM(s) Oral at bedtime  dextrose 5%. 1000 milliLiter(s) (100 mL/Hr) IV Continuous <Continuous>  dextrose 5%. 1000 milliLiter(s) (50 mL/Hr) IV Continuous <Continuous>  dextrose 50% Injectable 25 Gram(s) IV Push once  dextrose 50% Injectable 12.5 Gram(s) IV Push once  dextrose 50% Injectable 25 Gram(s) IV Push once  fluPHENAZine 5 milliGRAM(s) Oral at bedtime  gemfibrozil 600 milliGRAM(s) Oral two times a day  glucagon  Injectable 1 milliGRAM(s) IntraMuscular once  insulin lispro (ADMELOG) corrective regimen sliding scale   SubCutaneous three times a day before meals  metFORMIN 1000 milliGRAM(s) Oral two times a day  prazosin. 1 milliGRAM(s) Oral at bedtime    MEDICATIONS  (PRN):  acetaminophen     Tablet .. 650 milliGRAM(s) Oral every 6 hours PRN Temp greater or equal to 38C (100.4F), Mild Pain (1 - 3)  albuterol    90 MICROgram(s) HFA Inhaler 2 Puff(s) Inhalation every 6 hours PRN Shortness of Breath  dextrose Oral Gel 15 Gram(s) Oral once PRN Blood Glucose LESS THAN 70 milliGRAM(s)/deciliter  diphenhydrAMINE 50 milliGRAM(s) Oral every 6 hours PRN Extrapyramidal prophylaxis  haloperidol     Tablet 5 milliGRAM(s) Oral every 6 hours PRN agitation  LORazepam     Tablet 1 milliGRAM(s) Oral every 6 hours PRN Anxiety  
MEDICATIONS  (STANDING):  amLODIPine   Tablet 5 milliGRAM(s) Oral daily  anastrozole 1 milliGRAM(s) Oral daily  atorvastatin 40 milliGRAM(s) Oral at bedtime  benztropine 1 milliGRAM(s) Oral two times a day  carBAMazepine ER Capsule. 200 milliGRAM(s) Oral two times a day  carBAMazepine ER Tablet 100 milliGRAM(s) Oral at bedtime  dextrose 5%. 1000 milliLiter(s) (100 mL/Hr) IV Continuous <Continuous>  dextrose 5%. 1000 milliLiter(s) (50 mL/Hr) IV Continuous <Continuous>  dextrose 50% Injectable 12.5 Gram(s) IV Push once  dextrose 50% Injectable 25 Gram(s) IV Push once  dextrose 50% Injectable 25 Gram(s) IV Push once  fluPHENAZine 10 milliGRAM(s) Oral at bedtime  gemfibrozil 600 milliGRAM(s) Oral two times a day  glucagon  Injectable 1 milliGRAM(s) IntraMuscular once  insulin lispro (ADMELOG) corrective regimen sliding scale   SubCutaneous three times a day before meals  lidocaine   4% Patch 1 Patch Transdermal daily  metFORMIN 1000 milliGRAM(s) Oral two times a day  prazosin. 1 milliGRAM(s) Oral at bedtime    MEDICATIONS  (PRN):  acetaminophen     Tablet .. 650 milliGRAM(s) Oral every 6 hours PRN Temp greater or equal to 38C (100.4F), Mild Pain (1 - 3)  albuterol    90 MICROgram(s) HFA Inhaler 2 Puff(s) Inhalation every 6 hours PRN Shortness of Breath  dextrose Oral Gel 15 Gram(s) Oral once PRN Blood Glucose LESS THAN 70 milliGRAM(s)/deciliter  diphenhydrAMINE 50 milliGRAM(s) Oral every 6 hours PRN Extrapyramidal prophylaxis  haloperidol     Tablet 5 milliGRAM(s) Oral every 6 hours PRN agitation  LORazepam     Tablet 1 milliGRAM(s) Oral every 6 hours PRN Anxiety

## 2023-09-01 NOTE — OCCUPATIONAL THERAPY INITIAL EVALUATION ADULT - GENERAL OBSERVATIONS, REHAB EVAL
13:00-13:30 Chart reviewed, ok to treat by Occupational Therapist as confirmed by RN Erika, Pt received in dayroom in [facility]. Pt in agreement with OT IE.

## 2023-09-01 NOTE — OCCUPATIONAL THERAPY INITIAL EVALUATION ADULT - PERSONAL SAFETY AND JUDGMENT, REHAB EVAL
----- Message from Lyubov Forrester PA-C sent at 2023  2:26 PM CDT -----  Regardin week follow-up  HI-    Patient was walking dog and fell, resulted in a R SDH. She has been stable throughout. No AC/AP/NSAID use. Will need a 2 week follow-up ini clinic with repeat CTH!    Thanks!     at risk behaviors demonstrated

## 2023-09-01 NOTE — BH INPATIENT PSYCHIATRY PROGRESS NOTE - NSBHASSESSSUMMFT_PSY_ALL_CORE
This is a 53 year old female who after a few days of mild racing thoughts and insomnia, presented to the hospital after a chaotic family turmoil, patient felt overwhelmed and voluntarily admitted herself to the psychiatric unit (patient resisted her mother's pressure to say that she is suicidal so she could be admitted to the hospital). 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 She agrees to voluntary inpt admission.    8-  patient is calmer and less pressured but still tangential and worried about her parent's reaction to her discharge.  We agree to attempt another family meeting and tentatively look at a discharge date of 9 5, her daughter's birthday in the a.m.    8-31-23 patient with no SI/HI/AVH, thought process tangential, overinclusive, but interruptible. Stable, pleasant.     9-1-23 largely unchanged from prior assessment; no SI/HI/AVH, thought process tangential, overinclusive, but interruptible. Stable, pleasant. Spoke with patient's cousin, Dr. Horton, updated him.     #Bipolar 1 Disorder  - Continue Carbamazepine 200 mg PO qAM, 300 mg PO qHS  - Benztropine 1 mg PO BID   - pt reports last Prolixin PATHAK 8/23, however unable to confirm   - Prolixin 5mg PO qHS starting 8/29, increased to 10mg PO qHS 8/31; consider increasing to 15mg PO qHS 9/1/23     #Insomnia  - Trazodone 50mg qHS, discontinued on 8/29    #PTSD  - continue prazosin 1 mg qhs    #Breast cancer in remission  - s/w Anastrozole 1 mg PO qdaily    #HTN  - c/w amlodipine 5 mg PO qdaily    #DM2  - metformin 1000 mg PO BID   - pt on insulin at home, sliding scale     #asthma   - albuterol inhaler prn     #HLD  - atorvastatin 40 mg PO daily  - Gemfibrozil 600 mg PO BID    #Rotator Cuff Injury  - PT/OT consult  - Lidocaine patch 4%    #Dental problem  - Reached out to patient's dentist (Dr.Marc Lyman) but office closed until 9/5/23. As patient takes Doxycycline 25mg/5ml 4ml q12h at home, and 20mg BID for 3-9 month is the dosage for treatment of periodontitis, will continue this medication until patient's discharge on 9/5/23 (tentative).     #agitation  For severe agitation not responding to behavioral intervention, may give haldol 5 mg po q6h prn, ativan 2 mg po q6h prn, Benadryl 50 mg po q6h prn, with escalation to IM if patient refusing PO and remains an imminent danger to self or others. If IM antipsychotic is administered, please perform follow-up ECG for

## 2023-09-01 NOTE — BH TREATMENT PLAN - NSTXPLANTHERAPYSESSIONSFT_PSY_ALL_CORE
08-30-23  Type of therapy: Dialectical behavior therapy, Coping skills  Type of session: Group  Level of patient participation: Participates  Duration of participation: 60 minutes  Therapy conducted by: Social work  Therapy Summary: Patient attended the Crisis Skills Group with  and peers. The STOP Skill was reviewed which is a distress tolerance DBT Skill that helps one respond to stressors by staying in control of emotions and not acting solely on impulse. The following steps were reviewed: “Stop, Take a step back, Observe, Proceed effectively”. Patients offered support and feedback while  facilitated the group.     Shama was an active participant. She appeared open to the therapeutic intervention and was able to identify benefits of using the STOP skill. She required reminders, at times, from  to remain focused on the topic at hand. She is encouraged to attend future sessions.    08-30-23  Type of therapy: Coping skills  Type of session: Group  Level of patient participation: Engaged, Participates  Duration of participation: 60 minutes  Therapy conducted by: Social work  Therapy Summary: Patient attended the Patient Support group with  and peers. The “Positive Steps To Wellbeing” worksheet was reviewed which identifies exercises to increase well-being. The tips that were described include being kind to yourself, exercising regularly, taking up a hobby, being creative, helping others, relaxing, eating healthy, balancing sleep, connecting with others, avoiding drugs, seeing the bigger picture, and learning to accept things as they are. Patients were asked to consider which tips/ steps they would like to personally work on to improve their own well-being.  facilitated the group and patients provided feedback and support.     Shama was an active participant. She discussed the importance of "being kind to oneself" to increase well being. She engaged well with peers.    08-31-23  Type of therapy: Inspiration and motiviation  Type of session: Group  Level of patient participation: Attentive  Duration of participation: 30 minutes  Therapy conducted by: Nursing  --    08-31-23  Type of therapy: Coping skills, Creative arts therapy, Inspiration and motiviation, Leisure development, Social skills training, Stress management  Type of session: Group  Level of patient participation: Engaged, Participates  Duration of participation: 45 minutes  Therapy conducted by: Psych rehab  Therapy Summary: Pt has verbalized the positive benifits of RT , pt enjoys ce reative arts , music and socializing .    09-01-23  Type of therapy: Coping skills, Creative arts therapy, Inspiration and motiviation, Leisure development, Self esteem, Social skills training, Stress management, Symptom management  Type of session: Group  Level of patient participation: Engaged, Participates  Duration of participation: 60 minutes  Therapy conducted by: Psych rehab  Therapy Summary: Pt is fully participation in RT sessions , pt continues to enjoys creative arts.

## 2023-09-01 NOTE — BH INPATIENT PSYCHIATRY PROGRESS NOTE - NSDCCRITERIA_PSY_ALL_CORE
Reduction in the marked lability of mood 

## 2023-09-01 NOTE — BH INPATIENT PSYCHIATRY PROGRESS NOTE - NSTXNEGATINTERMD_PSY_ALL_CORE
interventive  counselling

## 2023-09-01 NOTE — OCCUPATIONAL THERAPY INITIAL EVALUATION ADULT - LIVES WITH, PROFILE
Daughters  in [place] , information obtain from the pt herself , has 3  steps to enter with  BL HR and 12 steps to the  Bedroom W/ RT HR/children

## 2023-09-02 LAB
GLUCOSE BLDC GLUCOMTR-MCNC: 132 MG/DL — HIGH (ref 70–99)
GLUCOSE BLDC GLUCOMTR-MCNC: 167 MG/DL — HIGH (ref 70–99)
GLUCOSE BLDC GLUCOMTR-MCNC: 174 MG/DL — HIGH (ref 70–99)
GLUCOSE BLDC GLUCOMTR-MCNC: 251 MG/DL — HIGH (ref 70–99)

## 2023-09-02 PROCEDURE — 99221 1ST HOSP IP/OBS SF/LOW 40: CPT | Mod: GC,25

## 2023-09-02 PROCEDURE — 11056 PARNG/CUTG B9 HYPRKR LES 2-4: CPT | Mod: GC

## 2023-09-02 PROCEDURE — 11721 DEBRIDE NAIL 6 OR MORE: CPT | Mod: 59,GC

## 2023-09-02 RX ADMIN — Medication 100 MILLIGRAM(S): at 22:35

## 2023-09-02 RX ADMIN — ATORVASTATIN CALCIUM 40 MILLIGRAM(S): 80 TABLET, FILM COATED ORAL at 20:08

## 2023-09-02 RX ADMIN — Medication 200 MILLIGRAM(S): at 08:44

## 2023-09-02 RX ADMIN — Medication 1 MILLIGRAM(S): at 20:09

## 2023-09-02 RX ADMIN — Medication 1 MILLIGRAM(S): at 08:27

## 2023-09-02 RX ADMIN — FLUPHENAZINE HYDROCHLORIDE 10 MILLIGRAM(S): 1 TABLET, FILM COATED ORAL at 20:08

## 2023-09-02 RX ADMIN — Medication 200 MILLIGRAM(S): at 20:11

## 2023-09-02 RX ADMIN — Medication 1 MILLIGRAM(S): at 20:08

## 2023-09-02 RX ADMIN — ANASTROZOLE 1 MILLIGRAM(S): 1 TABLET ORAL at 08:26

## 2023-09-02 RX ADMIN — AMLODIPINE BESYLATE 5 MILLIGRAM(S): 2.5 TABLET ORAL at 08:27

## 2023-09-02 RX ADMIN — METFORMIN HYDROCHLORIDE 1000 MILLIGRAM(S): 850 TABLET ORAL at 20:08

## 2023-09-02 RX ADMIN — Medication 3: at 07:12

## 2023-09-02 RX ADMIN — Medication 20 MILLIGRAM(S): at 22:34

## 2023-09-02 RX ADMIN — LIDOCAINE 1 PATCH: 4 CREAM TOPICAL at 08:30

## 2023-09-02 RX ADMIN — Medication 1: at 11:37

## 2023-09-02 RX ADMIN — Medication 20 MILLIGRAM(S): at 08:41

## 2023-09-02 RX ADMIN — METFORMIN HYDROCHLORIDE 1000 MILLIGRAM(S): 850 TABLET ORAL at 08:27

## 2023-09-02 NOTE — CONSULT NOTE ADULT - SUBJECTIVE AND OBJECTIVE BOX
Podiatry Consult Note    Subjective:    SKYLAR VUONG is a 53y year old Female seen at bedside with a chief complaint of  painful thickened, dystrophic, ingrowing and long toenails digits 1-5 bilaterally  and preventative foot examination. Patient is medically managed  by Medicine/Hospitalists.  Patient denies any history of trauma to both feet. Patient has no other pedal complaints.  Patient is experiencing pain while standing, walking and in shoe gear.     PMH: Breast CA    Bipolar disorder    Asthma    DM (diabetes mellitus)    HTN (hypertension)    High cholesterol    Asthma    H/O suicide attempt    History of herpes zoster of eye    ABI (obstructive sleep apnea)     PAST MEDICAL & SURGICAL HISTORY:  Breast CA  Right: s/p lumpectomy x 2, + Radiation Rx      Bipolar disorder      Asthma      DM (diabetes mellitus)  Type 2      HTN (hypertension)      High cholesterol      Asthma      H/O suicide attempt  1986      History of herpes zoster of eye      ABI (obstructive sleep apnea)  doesn't use her CPAP at home      H/O breast surgery      History of surgery  urethral sling        PSH:H/O breast surgery    History of surgery      Allergies:cats, trees, pollen, grass dust (Rhinorrhea)  Lamictal (Hives)  penicillins (Rash)      Labs:      WBC Trend  6.77 Date (08-28 @ 07:08)  8.94 Date (08-27 @ 08:00)      Chem          T(F): 96.1 (09-01-23 @ 17:06), Max: 96.1 (09-01-23 @ 17:06)  HR: 86 (09-01-23 @ 17:06) (86 - 86)  BP: 121/67 (09-01-23 @ 17:06) (121/67 - 121/67)  RR: 18 (09-01-23 @ 17:06) (18 - 18)  SpO2: --  Wt(kg): --    Objective:   DERM:  Skin warm, dry and supple bilateral.  No open lesions or inter-digital macerations noted bilateral.   Toenails 1-5 Right and Left feet thickened, elongated, discolored, and dystrophic with subungual debris. There is pain upon palpation of all ingrowing nails 1-5 bilaterally.   Bilateral callus at level of plantar medial 1st met head;   VASC: Dorsalis Pedis and Posterior Tibial pulses palpable.  Capillary re-fill time less than 3 seconds digits 1-5 bilateral.   NEURO: Protective sensation intact to the level of the digits bilateral to light touch;   MSK: Muscle strength 5/5 all major muscle groups bilateral. No structural abnormality, bilaterally    Assessment:   Pain from Elongated nails, ingrowing, incurvated,  and dystrophic nails upon palpation of nails.  Xerosis of bilateral plantar feet.     Plan:   Discussed diagnosis and treatment with patient  Aseptic debridement and curettage of all ingrowing nails 1-5 bilateral with sterile nail nipper.  Discussed importance of daily foot examinations, drying of feet after bathing and proper shoe gear.  Recommended to patient OTC; Urea 40% cream for dry skin/callus to be applied daily prn;   Patient Would Benefit From Periodic Debridements of nails and callus;  Can Follow Up as Outpatient w/ Dr. Walden prn Post Discharge   Discussed Plan w/ Attending; Dr. Walden    Spectra;     Podiatry Consult Note    Subjective:    SKYLAR VUONG is a 53y year old Female seen at bedside with a chief complaint of  painful thickened, dystrophic, ingrowing and long toenails digits 1-5 bilaterally  and preventative foot examination. Patient is medically managed  by Medicine/Hospitalists.  Patient denies any history of trauma to both feet. Patient has no other pedal complaints.  Patient is experiencing pain while standing, walking and in shoe gear.     PMH: Breast CA    Bipolar disorder    Asthma    DM (diabetes mellitus)    HTN (hypertension)    High cholesterol    Asthma    H/O suicide attempt    History of herpes zoster of eye    ABI (obstructive sleep apnea)     PAST MEDICAL & SURGICAL HISTORY:  Breast CA  Right: s/p lumpectomy x 2, + Radiation Rx      Bipolar disorder      Asthma      DM (diabetes mellitus)  Type 2      HTN (hypertension)      High cholesterol      Asthma      H/O suicide attempt  1986      History of herpes zoster of eye      ABI (obstructive sleep apnea)  doesn't use her CPAP at home      H/O breast surgery      History of surgery  urethral sling        PSH:H/O breast surgery    History of surgery      Allergies:cats, trees, pollen, grass dust (Rhinorrhea)  Lamictal (Hives)  penicillins (Rash)      Labs:      WBC Trend  6.77 Date (08-28 @ 07:08)  8.94 Date (08-27 @ 08:00)      Chem          T(F): 96.1 (09-01-23 @ 17:06), Max: 96.1 (09-01-23 @ 17:06)  HR: 86 (09-01-23 @ 17:06) (86 - 86)  BP: 121/67 (09-01-23 @ 17:06) (121/67 - 121/67)  RR: 18 (09-01-23 @ 17:06) (18 - 18)  SpO2: --  Wt(kg): --    Objective:   DERM:  Skin warm, dry and supple bilateral.  No open lesions or inter-digital macerations noted bilateral.   Toenails 1-5 Right and Left feet thickened, elongated, discolored, and dystrophic with subungual debris. There is pain upon palpation of all ingrowing nails 1-5 bilaterally.   Bilateral callus at level of plantar medial 1st met head B/L    VASC: Dorsalis Pedis and Posterior Tibial pulses palpable.  Capillary re-fill time less than 3 seconds digits 1-5 bilateral.   NEURO: Protective sensation intact to the level of the digits bilateral to light touch;   MSK: Muscle strength 5/5 all major muscle groups bilateral. No structural abnormality, bilaterally    Assessment:   Pain from Elongated nails, ingrowing, incurvated,  and dystrophic nails upon palpation of nails.  Calluses B/L feet x2   Xerosis of bilateral plantar feet.     Plan:   Discussed diagnosis and treatment with patient  Aseptic debridement and curettage of all ingrowing nails 1-5 bilateral with sterile nail nipper.  Dbx of calluses x2   Educated on proper DM Foot Care   Discussed importance of daily foot examinations, drying of feet after bathing and proper shoe gear.  Recommended to patient OTC; Urea 40% cream for dry skin/callus to be applied daily prn;   Patient Would Benefit From Periodic Debridements of nails and callus;  Can Follow Up as Outpatient w/ Dr. Walden prn Post Discharge   Discussed Plan w/ Attending; Dr. Walden    Spectra;

## 2023-09-03 LAB
GLUCOSE BLDC GLUCOMTR-MCNC: 150 MG/DL — HIGH (ref 70–99)
GLUCOSE BLDC GLUCOMTR-MCNC: 169 MG/DL — HIGH (ref 70–99)
GLUCOSE BLDC GLUCOMTR-MCNC: 213 MG/DL — HIGH (ref 70–99)
GLUCOSE BLDC GLUCOMTR-MCNC: 249 MG/DL — HIGH (ref 70–99)

## 2023-09-03 RX ORDER — FAMOTIDINE 10 MG/ML
20 INJECTION INTRAVENOUS
Refills: 0 | Status: DISCONTINUED | OUTPATIENT
Start: 2023-09-03 | End: 2023-09-05

## 2023-09-03 RX ORDER — FAMOTIDINE 10 MG/ML
20 INJECTION INTRAVENOUS DAILY
Refills: 0 | Status: DISCONTINUED | OUTPATIENT
Start: 2023-09-03 | End: 2023-09-03

## 2023-09-03 RX ADMIN — Medication 20 MILLIGRAM(S): at 10:35

## 2023-09-03 RX ADMIN — ANASTROZOLE 1 MILLIGRAM(S): 1 TABLET ORAL at 10:34

## 2023-09-03 RX ADMIN — Medication 2: at 11:52

## 2023-09-03 RX ADMIN — Medication 100 MILLIGRAM(S): at 21:34

## 2023-09-03 RX ADMIN — Medication 1 MILLIGRAM(S): at 21:33

## 2023-09-03 RX ADMIN — FAMOTIDINE 20 MILLIGRAM(S): 10 INJECTION INTRAVENOUS at 10:34

## 2023-09-03 RX ADMIN — Medication 1: at 16:11

## 2023-09-03 RX ADMIN — LIDOCAINE 1 PATCH: 4 CREAM TOPICAL at 10:35

## 2023-09-03 RX ADMIN — Medication 600 MILLIGRAM(S): at 10:36

## 2023-09-03 RX ADMIN — AMLODIPINE BESYLATE 5 MILLIGRAM(S): 2.5 TABLET ORAL at 10:34

## 2023-09-03 RX ADMIN — ATORVASTATIN CALCIUM 40 MILLIGRAM(S): 80 TABLET, FILM COATED ORAL at 21:33

## 2023-09-03 RX ADMIN — FLUPHENAZINE HYDROCHLORIDE 10 MILLIGRAM(S): 1 TABLET, FILM COATED ORAL at 21:34

## 2023-09-03 RX ADMIN — Medication 2: at 07:14

## 2023-09-03 RX ADMIN — Medication 200 MILLIGRAM(S): at 11:51

## 2023-09-03 RX ADMIN — Medication 1 MILLIGRAM(S): at 10:33

## 2023-09-03 RX ADMIN — METFORMIN HYDROCHLORIDE 1000 MILLIGRAM(S): 850 TABLET ORAL at 10:33

## 2023-09-03 RX ADMIN — Medication 20 MILLIGRAM(S): at 21:35

## 2023-09-03 RX ADMIN — METFORMIN HYDROCHLORIDE 1000 MILLIGRAM(S): 850 TABLET ORAL at 21:35

## 2023-09-03 RX ADMIN — Medication 600 MILLIGRAM(S): at 21:35

## 2023-09-04 LAB
GLUCOSE BLDC GLUCOMTR-MCNC: 176 MG/DL — HIGH (ref 70–99)
GLUCOSE BLDC GLUCOMTR-MCNC: 187 MG/DL — HIGH (ref 70–99)
GLUCOSE BLDC GLUCOMTR-MCNC: 204 MG/DL — HIGH (ref 70–99)
GLUCOSE BLDC GLUCOMTR-MCNC: 274 MG/DL — HIGH (ref 70–99)

## 2023-09-04 RX ADMIN — FAMOTIDINE 20 MILLIGRAM(S): 10 INJECTION INTRAVENOUS at 11:21

## 2023-09-04 RX ADMIN — Medication 2: at 16:13

## 2023-09-04 RX ADMIN — ANASTROZOLE 1 MILLIGRAM(S): 1 TABLET ORAL at 08:04

## 2023-09-04 RX ADMIN — Medication 200 MILLIGRAM(S): at 22:12

## 2023-09-04 RX ADMIN — AMLODIPINE BESYLATE 5 MILLIGRAM(S): 2.5 TABLET ORAL at 08:04

## 2023-09-04 RX ADMIN — Medication 1 MILLIGRAM(S): at 22:08

## 2023-09-04 RX ADMIN — Medication 20 MILLIGRAM(S): at 08:05

## 2023-09-04 RX ADMIN — FAMOTIDINE 20 MILLIGRAM(S): 10 INJECTION INTRAVENOUS at 22:08

## 2023-09-04 RX ADMIN — LIDOCAINE 1 PATCH: 4 CREAM TOPICAL at 08:05

## 2023-09-04 RX ADMIN — FAMOTIDINE 20 MILLIGRAM(S): 10 INJECTION INTRAVENOUS at 02:35

## 2023-09-04 RX ADMIN — METFORMIN HYDROCHLORIDE 1000 MILLIGRAM(S): 850 TABLET ORAL at 22:07

## 2023-09-04 RX ADMIN — Medication 600 MILLIGRAM(S): at 22:11

## 2023-09-04 RX ADMIN — Medication 1: at 11:21

## 2023-09-04 RX ADMIN — FLUPHENAZINE HYDROCHLORIDE 10 MILLIGRAM(S): 1 TABLET, FILM COATED ORAL at 22:08

## 2023-09-04 RX ADMIN — METFORMIN HYDROCHLORIDE 1000 MILLIGRAM(S): 850 TABLET ORAL at 08:04

## 2023-09-04 RX ADMIN — Medication 200 MILLIGRAM(S): at 08:06

## 2023-09-04 RX ADMIN — ATORVASTATIN CALCIUM 40 MILLIGRAM(S): 80 TABLET, FILM COATED ORAL at 22:08

## 2023-09-04 RX ADMIN — Medication 20 MILLIGRAM(S): at 22:09

## 2023-09-04 RX ADMIN — Medication 1 MILLIGRAM(S): at 08:04

## 2023-09-04 RX ADMIN — Medication 100 MILLIGRAM(S): at 22:11

## 2023-09-04 RX ADMIN — Medication 1: at 07:21

## 2023-09-04 RX ADMIN — Medication 200 MILLIGRAM(S): at 00:05

## 2023-09-05 VITALS
RESPIRATION RATE: 18 BRPM | SYSTOLIC BLOOD PRESSURE: 127 MMHG | HEART RATE: 94 BPM | DIASTOLIC BLOOD PRESSURE: 75 MMHG | TEMPERATURE: 97 F

## 2023-09-05 LAB
CARBAMAZEPINE SERPL-MCNC: 8 UG/ML — SIGNIFICANT CHANGE UP (ref 4–12)
GLUCOSE BLDC GLUCOMTR-MCNC: 223 MG/DL — HIGH (ref 70–99)

## 2023-09-05 RX ORDER — ANASTROZOLE 1 MG/1
1 TABLET ORAL
Qty: 14 | Refills: 0
Start: 2023-09-05 | End: 2023-09-18

## 2023-09-05 RX ORDER — FLUPHENAZINE HYDROCHLORIDE 1 MG/1
1 TABLET, FILM COATED ORAL
Qty: 28 | Refills: 0
Start: 2023-09-05 | End: 2023-10-02

## 2023-09-05 RX ORDER — BENZTROPINE MESYLATE 1 MG
1 TABLET ORAL
Qty: 28 | Refills: 0
Start: 2023-09-05 | End: 2023-09-18

## 2023-09-05 RX ADMIN — Medication 1 MILLIGRAM(S): at 09:05

## 2023-09-05 RX ADMIN — Medication 650 MILLIGRAM(S): at 06:35

## 2023-09-05 RX ADMIN — AMLODIPINE BESYLATE 5 MILLIGRAM(S): 2.5 TABLET ORAL at 09:05

## 2023-09-05 RX ADMIN — Medication 200 MILLIGRAM(S): at 09:14

## 2023-09-05 RX ADMIN — Medication 20 MILLIGRAM(S): at 09:14

## 2023-09-05 RX ADMIN — METFORMIN HYDROCHLORIDE 1000 MILLIGRAM(S): 850 TABLET ORAL at 09:05

## 2023-09-05 RX ADMIN — Medication 650 MILLIGRAM(S): at 06:13

## 2023-09-05 RX ADMIN — ANASTROZOLE 1 MILLIGRAM(S): 1 TABLET ORAL at 09:06

## 2023-09-05 RX ADMIN — FAMOTIDINE 20 MILLIGRAM(S): 10 INJECTION INTRAVENOUS at 09:05

## 2023-09-05 RX ADMIN — LIDOCAINE 1 PATCH: 4 CREAM TOPICAL at 09:08

## 2023-09-05 RX ADMIN — Medication 2: at 07:31

## 2023-09-05 NOTE — BH INPATIENT PSYCHIATRY DISCHARGE NOTE - NSBHASSESSSUMMFT_PSY_ALL_CORE
This is a 53 year old female who after a few days of mild racing thoughts and insomnia, presented to the hospital after a chaotic family turmoil, patient felt overwhelmed and voluntarily admitted herself to the psychiatric unit (patient resisted her mother's pressure to say that she is suicidal so she could be admitted to the hospital). 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 outpt treated with carbamazepine, prazosin, prolixin (po and PATHAK), benztropine, current short term rx for  clonazepam, no known substance use hx, consult called to evaluate anxiety/mood sx. 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 She agrees to voluntary inpt admission. This is a 53 year old female who after a few days of mild racing thoughts and insomnia, presented to the hospital after a chaotic family turmoil, patient felt overwhelmed and voluntarily admitted herself to the psychiatric unit (patient resisted her mother's pressure to say that she is suicidal so she could be admitted to the hospital). 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 outpt treated with carbamazepine, prazosin, prolixin (po and PATHAK), benztropine, current short term rx for  clonazepam, no known substance use hx, consult called to evaluate anxiety/mood sx. 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 She agrees to voluntary inpt admission.    Throughout the hospital stay, patient has remained calm, cooperative, and pleasant at all times, consistently denying SI/HI/AVH. Patient has been having sleep problem, which she attributes to her shoulder pain and nocturia; however, after increasing the fluphenazine 5mg to 10mg at night, patient reports sleeping without waking up at night. Patient remains overinclusive and verbose, although this may be 2/2 personality component vs constant hypomania. Patient remains safe for self, others, and property, responded well to psycho-pharmacotherapy and milieu therapy and no longer necessitates inpatient level of care.

## 2023-09-05 NOTE — BH INPATIENT PSYCHIATRY DISCHARGE NOTE - NSDCMRMEDTOKEN_GEN_ALL_CORE_FT
albuterol 90 mcg/inh inhalation aerosol: 2 puff(s) inhaled every 6 hours As needed SOB/home med  amLODIPine 5 mg oral tablet: 1 tab(s) orally once a day  atorvastatin 20 mg oral tablet: 1 tab(s) orally once a day (at bedtime)  carBAMazepine 100 mg oral capsule, extended release: 1 cap(s) orally once a day (at bedtime)  carBAMazepine 200 mg oral tablet: 1 tab(s) orally 2 times a day  doxycycline 25 mg/5 mL oral liquid: 4 milliliter(s) orally every 12 hours  fluPHENAZine 10 mg oral tablet: 1 tab(s) orally once a day (at bedtime) Take 1 tablet at bedtime  gemfibrozil 600 mg oral tablet: 1 tab(s) orally 2 times a day  loratadine 10 mg oral tablet: 1 tab(s) orally once a day  metFORMIN 1000 mg oral tablet: 1 tab(s) orally 2 times a day  prazosin 1 mg oral capsule: 1 cap(s) orally once a day (at bedtime)  traZODone 50 mg oral tablet: 1 tab(s) orally once a day (at bedtime)  vitamin A and D topical ointment: 1 Apply topically to affected area 2 times a day   albuterol 90 mcg/inh inhalation aerosol: 2 puff(s) inhaled every 6 hours As needed SOB/home med  amLODIPine 5 mg oral tablet: 1 tab(s) orally once a day  atorvastatin 20 mg oral tablet: 1 tab(s) orally once a day (at bedtime)  carBAMazepine 100 mg oral capsule, extended release: 1 cap(s) orally once a day (at bedtime)  carBAMazepine 200 mg oral tablet: 1 tab(s) orally 2 times a day  doxycycline 25 mg/5 mL oral liquid: 4 milliliter(s) orally every 12 hours  fluPHENAZine 10 mg oral tablet: 1 tab(s) orally once a day (at bedtime) Take 1 tablet at bedtime  gemfibrozil 600 mg oral tablet: 1 tab(s) orally 2 times a day  loratadine 10 mg oral tablet: 1 tab(s) orally once a day  metFORMIN 1000 mg oral tablet: 1 tab(s) orally 2 times a day  prazosin 1 mg oral capsule: 1 cap(s) orally once a day (at bedtime)  vitamin A and D topical ointment: 1 Apply topically to affected area 2 times a day   albuterol 90 mcg/inh inhalation aerosol: 2 puff(s) inhaled every 6 hours As needed SOB/home med  amLODIPine 5 mg oral tablet: 1 tab(s) orally once a day  anastrozole 1 mg oral tablet: 1 tab(s) orally once a day  atorvastatin 20 mg oral tablet: 1 tab(s) orally once a day (at bedtime)  benztropine 1 mg oral tablet: 1 tab(s) orally 2 times a day  carBAMazepine 100 mg oral capsule, extended release: 1 cap(s) orally once a day (at bedtime)  carBAMazepine 200 mg oral tablet: 1 tab(s) orally 2 times a day  doxycycline 25 mg/5 mL oral liquid: 4 milliliter(s) orally every 12 hours  fluPHENAZine 5 mg oral tablet: 1 tab(s) orally once a day (at bedtime)  gemfibrozil 600 mg oral tablet: 1 tab(s) orally 2 times a day  loratadine 10 mg oral tablet: 1 tab(s) orally once a day  metFORMIN 1000 mg oral tablet: 1 tab(s) orally 2 times a day  prazosin 1 mg oral capsule: 1 cap(s) orally once a day (at bedtime)  vitamin A and D topical ointment: 1 Apply topically to affected area 2 times a day

## 2023-09-05 NOTE — BH INPATIENT PSYCHIATRY DISCHARGE NOTE - HOSPITAL COURSE
This is a 53 year old female who after a few days of mild racing thoughts and insomnia, presented to the hospital after a chaotic family turmoil, patient felt overwhelmed and voluntarily admitted herself to the psychiatric unit (patient resisted her mother's pressure to say that she is suicidal so she could be admitted to the hospital). 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 She agrees to voluntary inpt admission.    8-  patient is calmer and less pressured but still tangential and worried about her parent's reaction to her discharge.  We agree to attempt another family meeting and tentatively look at a discharge date of 9 5, her daughter's birthday in the a.m.    8-31-23 patient with no SI/HI/AVH, thought process tangential, overinclusive, but interruptible. Stable, pleasant.     9-1-23 largely unchanged from prior assessment; no SI/HI/AVH, thought process tangential, overinclusive, but interruptible. Stable, pleasant. Spoke with patient's cousin, Dr. Horton, updated him.     9-5-23     #Bipolar 1 Disorder  - Continue Carbamazepine 200 mg PO qAM, 300 mg PO qHS  - Benztropine 1 mg PO BID   - pt reports last Prolixin PATHAK 8/23, however unable to confirm   - Prolixin 5mg PO qHS starting 8/29, increased to 10mg PO qHS 8/31; consider increasing to 15mg PO qHS 9/1/23     #Insomnia  - Trazodone 50mg qHS, discontinued on 8/29    #PTSD  - continue prazosin 1 mg qhs    #Breast cancer in remission  - s/w Anastrozole 1 mg PO qdaily    #HTN  - c/w amlodipine 5 mg PO qdaily    #DM2  - metformin 1000 mg PO BID   - pt on insulin at home, sliding scale     #asthma   - albuterol inhaler prn     #HLD  - atorvastatin 40 mg PO daily  - Gemfibrozil 600 mg PO BID    #Rotator Cuff Injury  - PT/OT consult  - Lidocaine patch 4%    #Dental problem  - Reached out to patient's dentist (Dr.Marc Lyman) but office closed until 9/5/23. As patient takes Doxycycline 25mg/5ml 4ml q12h at home, and 20mg BID for 3-9 month is the dosage for treatment of periodontitis, will continue this medication until patient's discharge on 9/5/23 (tentative).     #agitation  For severe agitation not responding to behavioral intervention, may give haldol 5 mg po q6h prn, ativan 2 mg po q6h prn, Benadryl 50 mg po q6h prn, with escalation to IM if patient refusing PO and remains an imminent danger to self or others. If IM antipsychotic is administered, please perform follow-up ECG This is a 53 year old female who after a few days of mild racing thoughts and insomnia, presented to the hospital after a chaotic family turmoil, patient felt overwhelmed and voluntarily admitted herself to the psychiatric unit (patient resisted her mother's pressure to say that she is suicidal so she could be admitted to the hospital). 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 She agrees to voluntary inpt admission.    8-  patient is calmer and less pressured but still tangential and worried about her parent's reaction to her discharge.  We agree to attempt another family meeting and tentatively look at a discharge date of 9 5, her daughter's birthday in the a.m.    8-31-23 patient with no SI/HI/AVH, thought process tangential, overinclusive, but interruptible. Stable, pleasant.     9-1-23 largely unchanged from prior assessment; denies SI/HI/AVH, thought process tangential, overinclusive, but interruptible. Stable, pleasant. Spoke with patient's cousin, Dr. Horton, updated him.     9-5-23 Calm, cooperative, pleasant; denies SI/HI/AVH    #Bipolar 1 Disorder  - Continue Carbamazepine 200 mg PO qAM, 300 mg PO qHS  - Benztropine 1 mg PO BID   - pt reports last Prolixin PATHAK 8/23, however unable to confirm; plan to receive injection outpatient today   - Prolixin 5mg PO qHS starting 8/29, increased to 10mg PO qHS 8/31    #Insomnia  - Trazodone 50mg qHS, discontinued on 8/29    #PTSD  - continue prazosin 1 mg qhs    #Breast cancer in remission  - s/w Anastrozole 1 mg PO qdaily    #HTN  - c/w amlodipine 5 mg PO qdaily    #DM2  - metformin 1000 mg PO BID   - pt on insulin at home, sliding scale     #asthma   - albuterol inhaler prn     #HLD  - atorvastatin 40 mg PO daily  - Gemfibrozil 600 mg PO BID    #Rotator Cuff Injury  - PT/OT consult  - Lidocaine patch 4%    #Dental problem  - Reached out to patient's dentist (Dr.Marc Lyman) but office closed until 9/5/23. As patient takes Doxycycline 25mg/5ml 4ml q12h at home, and 20mg BID for 3-9 month is the dosage for treatment of periodontitis, will continue this medication until patient's discharge on 9/5/23 (tentative).     #agitation  For severe agitation not responding to behavioral intervention, may give haldol 5 mg po q6h prn, ativan 2 mg po q6h prn, Benadryl 50 mg po q6h prn, with escalation to IM if patient refusing PO and remains an imminent danger to self or others. If IM antipsychotic is administered, please perform follow-up ECG

## 2023-09-05 NOTE — BH INPATIENT PSYCHIATRY DISCHARGE NOTE - NSBHDCMEDICALFT_PSY_A_CORE
Breast cancer in remission - anastrozole 1mg oral daily   Hypertension - amlodipine 5mg oral daily   Asthma - albuterol q6h PRN   Diabetes mellitus -  Breast cancer in remission - anastrozole 1mg oral daily   Hypertension - amlodipine 5mg oral daily   Asthma - albuterol q6h PRN   Diabetes mellitus - Atorvastatin 40mg at bedtime, Metformin 1g BID daily   HLD - gemfibrozil 600mg BID daily   Periodontitis- Doxycycline 20mg q12h
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
## 2023-09-05 NOTE — BH INPATIENT PSYCHIATRY DISCHARGE NOTE - NSDCCPCAREPLAN_GEN_ALL_CORE_FT
PRINCIPAL DISCHARGE DIAGNOSIS  Diagnosis: Bipolar 1 disorder  Assessment and Plan of Treatment:

## 2023-09-05 NOTE — BH INPATIENT PSYCHIATRY DISCHARGE NOTE - NSBHMETABOLIC_PSY_ALL_CORE_FT
BMI: BMI (kg/m2): 31.8 (08-27-23 @ 17:38)  HbA1c: A1C with Estimated Average Glucose Result: 7.7 % (08-28-23 @ 07:08)    Glucose: POCT Blood Glucose.: 223 mg/dL (09-05-23 @ 06:47)    BP: 137/66 (09-05-23 @ 06:23) (115/67 - 158/85)  Lipid Panel: Date/Time: 08-28-23 @ 07:08  Cholesterol, Serum: 158  Direct LDL: --  HDL Cholesterol, Serum: 41  Total Cholesterol/HDL Ration Measurement: --  Triglycerides, Serum: 323

## 2023-09-05 NOTE — BH INPATIENT PSYCHIATRY DISCHARGE NOTE - NSBHFUPINTERVALHXFT_PSY_A_CORE
No significant overnight or weekend events.  Upon approach, patient is sitting in the bed comfortably, calm, and cooperative.   Patient states she feels great as it is her daughter's birthday. States she will go to the [facility] clinic for her shot after she gets discharged and will be able to go shopping with her daughter to celebrate her 18th birthday.   Patient slept well and reports normal appetite.   Denies SI/HI/AVH and states she will follow up outpatient well.

## 2023-09-05 NOTE — BH INPATIENT PSYCHIATRY DISCHARGE NOTE - OTHER PAST PSYCHIATRIC HISTORY (INCLUDE DETAILS REGARDING ONSET, COURSE OF ILLNESS, INPATIENT/OUTPATIENT TREATMENT)
Reports first hospitalization 21 years ago during 2nd pregnancy; required 6 months of state hospitalization; 6 known inpatient psychiatric hospitalizations, last in March 2023 at [facility]; was recently in [facility]

## 2023-09-05 NOTE — BH INPATIENT PSYCHIATRY DISCHARGE NOTE - DESCRIPTION
Patient lives in private residence with her two adult daughters. 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

## 2023-09-05 NOTE — BH INPATIENT PSYCHIATRY DISCHARGE NOTE - NSBHPSYCHMEDOTHERFT_PSY_A_CORE
Patient stable with current regimen of Carbamazepine and fluphenazine, which were started prior to admission to [facility].

## 2023-09-06 NOTE — BH SAFETY PLAN - INTERNAL COPING STRATEGY 1
I like to read to take my mind off my problems. Manual Repair Warning Statement: We plan on removing the manually selected variable below in favor of our much easier automatic structured text blocks found in the previous tab. We decided to do this to help make the flow better and give you the full power of structured data. Manual selection is never going to be ideal in our platform and I would encourage you to avoid using manual selection from this point on, especially since I will be sunsetting this feature. It is important that you do one of two things with the customized text below. First, you can save all of the text in a word file so you can have it for future reference. Second, transfer the text to the appropriate area in the Library tab. Lastly, if there is a flap or graft type which we do not have you need to let us know right away so I can add it in before the variable is hidden. No need to panic, we plan to give you roughly 6 months to make the change.

## 2023-09-13 DIAGNOSIS — F31.9 BIPOLAR DISORDER, UNSPECIFIED: ICD-10-CM

## 2023-09-13 DIAGNOSIS — F43.10 POST-TRAUMATIC STRESS DISORDER, UNSPECIFIED: ICD-10-CM

## 2023-09-13 DIAGNOSIS — I10 ESSENTIAL (PRIMARY) HYPERTENSION: ICD-10-CM

## 2023-09-13 DIAGNOSIS — Z85.3 PERSONAL HISTORY OF MALIGNANT NEOPLASM OF BREAST: ICD-10-CM

## 2023-09-13 DIAGNOSIS — J45.909 UNSPECIFIED ASTHMA, UNCOMPLICATED: ICD-10-CM

## 2023-09-13 DIAGNOSIS — G47.00 INSOMNIA, UNSPECIFIED: ICD-10-CM

## 2023-09-13 DIAGNOSIS — Z79.84 LONG TERM (CURRENT) USE OF ORAL HYPOGLYCEMIC DRUGS: ICD-10-CM

## 2023-09-13 DIAGNOSIS — E78.5 HYPERLIPIDEMIA, UNSPECIFIED: ICD-10-CM

## 2023-09-13 DIAGNOSIS — E11.65 TYPE 2 DIABETES MELLITUS WITH HYPERGLYCEMIA: ICD-10-CM

## 2023-09-13 DIAGNOSIS — R45.1 RESTLESSNESS AND AGITATION: ICD-10-CM

## 2023-09-13 DIAGNOSIS — Z79.4 LONG TERM (CURRENT) USE OF INSULIN: ICD-10-CM

## 2023-11-13 ENCOUNTER — APPOINTMENT (OUTPATIENT)
Dept: OBGYN | Facility: CLINIC | Age: 53
End: 2023-11-13
Payer: MEDICARE

## 2023-11-13 VITALS
SYSTOLIC BLOOD PRESSURE: 121 MMHG | HEIGHT: 64 IN | BODY MASS INDEX: 30.73 KG/M2 | DIASTOLIC BLOOD PRESSURE: 87 MMHG | TEMPERATURE: 98.6 F | WEIGHT: 180 LBS | HEART RATE: 101 BPM

## 2023-11-13 DIAGNOSIS — E66.9 OBESITY, UNSPECIFIED: ICD-10-CM

## 2023-11-13 DIAGNOSIS — Z87.42 PERSONAL HISTORY OF OTHER DISEASES OF THE FEMALE GENITAL TRACT: ICD-10-CM

## 2023-11-13 DIAGNOSIS — N83.209 UNSPECIFIED OVARIAN CYST, UNSPECIFIED SIDE: ICD-10-CM

## 2023-11-13 DIAGNOSIS — Z71.89 OTHER SPECIFIED COUNSELING: ICD-10-CM

## 2023-11-13 DIAGNOSIS — N64.4 MASTODYNIA: ICD-10-CM

## 2023-11-13 DIAGNOSIS — R92.30 DENSE BREASTS, UNSPECIFIED: ICD-10-CM

## 2023-11-13 PROCEDURE — 99213 OFFICE O/P EST LOW 20 MIN: CPT

## 2023-12-07 PROBLEM — Z71.89 OTHER SPECIFIED COUNSELING: Status: ACTIVE | Noted: 2021-03-30

## 2023-12-07 PROBLEM — Z87.42 HISTORY OF POSTMENOPAUSAL BLEEDING: Status: RESOLVED | Noted: 2023-04-28 | Resolved: 2023-12-07

## 2023-12-07 PROBLEM — E66.9 OBESITY (BMI 30.0-34.9): Status: ACTIVE | Noted: 2021-03-30

## 2023-12-07 PROBLEM — R92.30 DENSE BREAST TISSUE: Status: ACTIVE | Noted: 2022-12-06

## 2023-12-29 ENCOUNTER — RESULT REVIEW (OUTPATIENT)
Age: 53
End: 2023-12-29

## 2023-12-29 ENCOUNTER — OUTPATIENT (OUTPATIENT)
Dept: OUTPATIENT SERVICES | Facility: HOSPITAL | Age: 53
LOS: 1 days | End: 2023-12-29
Payer: MEDICARE

## 2023-12-29 DIAGNOSIS — Z12.31 ENCOUNTER FOR SCREENING MAMMOGRAM FOR MALIGNANT NEOPLASM OF BREAST: ICD-10-CM

## 2023-12-29 DIAGNOSIS — R92.8 OTHER ABNORMAL AND INCONCLUSIVE FINDINGS ON DIAGNOSTIC IMAGING OF BREAST: ICD-10-CM

## 2023-12-29 DIAGNOSIS — Z98.890 OTHER SPECIFIED POSTPROCEDURAL STATES: Chronic | ICD-10-CM

## 2023-12-29 PROCEDURE — 77066 DX MAMMO INCL CAD BI: CPT | Mod: 26

## 2023-12-29 PROCEDURE — 76641 ULTRASOUND BREAST COMPLETE: CPT | Mod: 26,50

## 2023-12-29 PROCEDURE — 77066 DX MAMMO INCL CAD BI: CPT

## 2023-12-29 PROCEDURE — G0279: CPT

## 2023-12-29 PROCEDURE — G0279: CPT | Mod: 26

## 2023-12-29 PROCEDURE — 76641 ULTRASOUND BREAST COMPLETE: CPT | Mod: 50

## 2023-12-30 DIAGNOSIS — R92.8 OTHER ABNORMAL AND INCONCLUSIVE FINDINGS ON DIAGNOSTIC IMAGING OF BREAST: ICD-10-CM

## 2024-01-02 NOTE — H&P PST ADULT - DENTITION
[employer]   no loose teeth/normal

## 2024-02-15 ENCOUNTER — EMERGENCY (EMERGENCY)
Facility: HOSPITAL | Age: 54
LOS: 0 days | Discharge: HOME | End: 2024-02-15
Attending: EMERGENCY MEDICINE
Payer: MEDICARE

## 2024-02-15 VITALS
SYSTOLIC BLOOD PRESSURE: 157 MMHG | TEMPERATURE: 98 F | HEART RATE: 79 BPM | WEIGHT: 175.05 LBS | DIASTOLIC BLOOD PRESSURE: 79 MMHG | OXYGEN SATURATION: 100 % | RESPIRATION RATE: 20 BRPM

## 2024-02-15 DIAGNOSIS — Z98.890 OTHER SPECIFIED POSTPROCEDURAL STATES: Chronic | ICD-10-CM

## 2024-02-15 DIAGNOSIS — F31.9 BIPOLAR DISORDER, UNSPECIFIED: ICD-10-CM

## 2024-02-15 DIAGNOSIS — J45.909 UNSPECIFIED ASTHMA, UNCOMPLICATED: ICD-10-CM

## 2024-02-15 DIAGNOSIS — Z88.8 ALLERGY STATUS TO OTHER DRUGS, MEDICAMENTS AND BIOLOGICAL SUBSTANCES: ICD-10-CM

## 2024-02-15 DIAGNOSIS — I10 ESSENTIAL (PRIMARY) HYPERTENSION: ICD-10-CM

## 2024-02-15 DIAGNOSIS — E11.9 TYPE 2 DIABETES MELLITUS WITHOUT COMPLICATIONS: ICD-10-CM

## 2024-02-15 DIAGNOSIS — Z91.048 OTHER NONMEDICINAL SUBSTANCE ALLERGY STATUS: ICD-10-CM

## 2024-02-15 DIAGNOSIS — M25.531 PAIN IN RIGHT WRIST: ICD-10-CM

## 2024-02-15 DIAGNOSIS — Z88.0 ALLERGY STATUS TO PENICILLIN: ICD-10-CM

## 2024-02-15 DIAGNOSIS — Z87.828 PERSONAL HISTORY OF OTHER (HEALED) PHYSICAL INJURY AND TRAUMA: ICD-10-CM

## 2024-02-15 DIAGNOSIS — E78.00 PURE HYPERCHOLESTEROLEMIA, UNSPECIFIED: ICD-10-CM

## 2024-02-15 PROCEDURE — 73030 X-RAY EXAM OF SHOULDER: CPT | Mod: LT

## 2024-02-15 PROCEDURE — 73030 X-RAY EXAM OF SHOULDER: CPT | Mod: 26,LT

## 2024-02-15 PROCEDURE — 29125 APPL SHORT ARM SPLINT STATIC: CPT | Mod: RT

## 2024-02-15 PROCEDURE — 73110 X-RAY EXAM OF WRIST: CPT | Mod: RT

## 2024-02-15 PROCEDURE — 73562 X-RAY EXAM OF KNEE 3: CPT | Mod: 26,LT

## 2024-02-15 PROCEDURE — 99284 EMERGENCY DEPT VISIT MOD MDM: CPT | Mod: 25

## 2024-02-15 PROCEDURE — 73562 X-RAY EXAM OF KNEE 3: CPT | Mod: LT

## 2024-02-15 PROCEDURE — 73110 X-RAY EXAM OF WRIST: CPT | Mod: 26,RT

## 2024-02-15 RX ORDER — ACETAMINOPHEN 500 MG
975 TABLET ORAL ONCE
Refills: 0 | Status: COMPLETED | OUTPATIENT
Start: 2024-02-15 | End: 2024-02-15

## 2024-02-15 RX ADMIN — Medication 975 MILLIGRAM(S): at 09:46

## 2024-02-15 NOTE — ED PROVIDER NOTE - CARE PROVIDERS DIRECT ADDRESSES
"Please see \"Imaging\" tab under \"Chart Review\" for details of today's US.    Rosario Santillna, DO    " ,[email or web address]

## 2024-02-15 NOTE — ED PROVIDER NOTE - PATIENT PORTAL LINK FT
You can access the FollowMyHealth Patient Portal offered by [facility] by registering at the following website: [email or web address]. By joining [employer]’s FollowMyHealth portal, you will also be able to view your health information using other applications (apps) compatible with our system.

## 2024-02-15 NOTE — ED ADULT NURSE NOTE - NSFALLUNIVINTERV_ED_ALL_ED
Bed/Stretcher in lowest position, wheels locked, appropriate side rails in place/Call bell, personal items and telephone in reach/Instruct patient to call for assistance before getting out of bed/chair/stretcher/Non-slip footwear applied when patient is off stretcher/[place] to call system/Physically safe environment - no spills, clutter or unnecessary equipment/Purposeful proactive rounding/Room/bathroom lighting operational, light cord in reach

## 2024-02-15 NOTE — ED PROVIDER NOTE - CLINICAL SUMMARY MEDICAL DECISION MAKING FREE TEXT BOX
patient is normocephalic atraumatic pupils equal round react light accommodation extraocular muscles intact oropharynx clear no tenderness to palpation midline CT or L-spine chest clear to auscultation bilaterally abdomen soft nontender nondistended bowel sounds positive no guarding or rebound extremities patient has no tenderness to palpation in the anatomic snuffbox bilaterally full extension and flexion of fingers and wrist radial pulse 2+ capillary refill 2+ normal in addition patient able to ambulate without pain pedal pulses 2+ no edema noted we obtain x-rays per my independent evaluation no fracture of the knee x-ray of the shoulder x-ray of the wrist provide pending evaluation not consistent with fractures given the history and exam on wrist patient placed in splint I will discharge at this time with follow up with ortho

## 2024-02-15 NOTE — ED PROVIDER NOTE - ATTENDING CONTRIBUTION TO CARE
I have personally performed a history and physical exam on this patient and personally directed the management of the patient. Patient is a 53-year-old female status post fall states that she was getting out of the truck slipped and fell on ice prior to arrival today landing on her right wrist and right gluteal region pain is moderate throbbing worse with movement no LOC no vomiting denies any headache visual changes neck pain chest pain shortness of breath patient is able ambulate into the emergency department asymptomatic at this time    On physical exam patient is normocephalic atraumatic pupils equal round react light accommodation extraocular muscles intact oropharynx clear no tenderness to palpation midline CT or L-spine chest clear to auscultation bilaterally abdomen soft nontender nondistended bowel sounds positive no guarding or rebound extremities patient has no tenderness to palpation in the anatomic snuffbox bilaterally full extension and flexion of fingers and wrist radial pulse 2+ capillary refill 2+ normal in addition patient able to ambulate without pain pedal pulses 2+ no edema noted we obtain x-rays per my independent evaluation no fracture of the knee x-ray of the shoulder x-ray of the wrist provide pending evaluation not consistent with fractures given the history and exam on wrist patient placed in splint I will discharge at this time.

## 2024-02-15 NOTE — ED PROVIDER NOTE - CARE PROVIDER_API CALL
Taye Jordan  Orthopaedic Surgery  [address] [place] [address]  Phone: [phone]  Fax: [phone]  Follow Up Time: 1-3 Days

## 2024-02-15 NOTE — ED PROVIDER NOTE - PROGRESS NOTE DETAILS
pk: X-rays reviewed, and results discussed with patient will place right wrist in volar splint and have patient follow-up with outpatient orthopedic clinic.  Referral given patient demonstrates complete understanding and agrees with plan.

## 2024-02-15 NOTE — ED PROVIDER NOTE - OBJECTIVE STATEMENT
Patient is a 53-year-old female past medical history high blood pressure high cholesterol diabetes bipolar disorder asthma breast cancer in remission presenting to the ED for evaluation status post mechanical trip and fall.  Patient states when she was getting out of her truck she slipped on the wet ground causing her to fall backwards landed on her buttocks and injured her right wrist.  Patient also states she has a history of rotator cuff issues on the left shoulder but states that she has had difficulty fully lifting of her left arm after the fall.  Patient was able to ambulate without difficulty denies any head trauma loss of consciousness or any other complaints.

## 2024-02-15 NOTE — ED PROVIDER NOTE - ATTENDING APP SHARED VISIT CONTRIBUTION OF CARE
I have personally performed a history and physical exam on this patient and personally directed the management of the patient. Patient is a 53-year-old female status post fall states that she was getting out of the truck slipped and fell on ice prior to arrival today landing on her right wrist and right gluteal region pain is moderate throbbing worse with movement no LOC no vomiting denies any headache visual changes neck pain chest pain shortness of breath patient is able ambulate into the emergency department asymptomatic at this time    On physical exam patient is normocephalic atraumatic pupils equal round react light accommodation extraocular muscles intact oropharynx clear no tenderness to palpation midline CT or L-spine chest clear to auscultation bilaterally abdomen soft nontender nondistended bowel sounds positive no guarding or rebound extremities patient has no tenderness to palpation in the anatomic snuffbox bilaterally full extension and flexion of fingers and wrist radial pulse 2+ capillary refill 2+ normal in addition patient able to ambulate without pain pedal pulses 2+ no edema noted we obtain x-rays per my independent evaluation no fracture of the knee x-ray of the shoulder x-ray of the wrist provide pending evaluation not consistent with fractures given the history and exam on wrist patient placed in splint I will discharge at this time

## 2024-02-15 NOTE — ED PROVIDER NOTE - PHYSICAL EXAMINATION
CONSTITUTIONAL: well-appearing, in NAD  SKIN: Warm dry, normal skin turgor  HEAD: NCAT  EYES: EOMI, PERRLA, no scleral icterus, conjunctiva pink  ENT: normal pharynx with no erythema or exudates  NECK: Supple; non tender. Full ROM.  CARD: RRR, no murmurs.  RESP: clear to ausculation b/l. No crackles or wheezing.  ABD: soft, non-tender, non-distended, no rebound or guarding.  EXT: dec rom of left shoulder, no left shoulder TTP, right distal radial TTP at the dorsal aspect, full rom of all unaffected extremities with no TTP o/w, no pedal edema, no calf tenderness, distal sensation and pulses intact in all 4 extremities   NEURO: normal motor. normal sensory. CN II-XII intact. Cerebellar testing normal. Normal gait.  PSYCH: Cooperative, appropriate.

## 2024-02-15 NOTE — ED ADULT NURSE NOTE - PAIN RATING/NUMBER SCALE (0-10): ACTIVITY
14.2   14.57 )-----------( 307      ( 23 May 2019 09:28 )             44.9       05-23    137  |  97<L>  |  11  ----------------------------<  149<H>  4.7   |  28  |  0.7    Ca    9.7      23 May 2019 09:28    TPro  7.1  /  Alb  4.4  /  TBili  0.5  /  DBili  x   /  AST  14  /  ALT  14  /  AlkPhos  98  05-23          ABG - ( 23 May 2019 11:45 )  pH, Arterial: 7.32  pH, Blood: x     /  pCO2: 64    /  pO2: 103   / HCO3: 33    / Base Excess: 4.1   /  SaO2: 98        PT/INR - ( 23 May 2019 09:28 )   PT: 11.00 sec;   INR: 0.96 ratio         PTT - ( 23 May 2019 09:28 )  PTT:29.2 sec    Lactate Trend      CARDIAC MARKERS ( 23 May 2019 09:28 )  x     / <0.01 ng/mL / x     / x     / x
0 (no pain/absence of nonverbal indicators of pain)

## 2024-02-17 ENCOUNTER — APPOINTMENT (OUTPATIENT)
Dept: ORTHOPEDIC SURGERY | Facility: CLINIC | Age: 54
End: 2024-02-17
Payer: MEDICARE

## 2024-02-17 PROCEDURE — 99213 OFFICE O/P EST LOW 20 MIN: CPT | Mod: 25

## 2024-02-17 PROCEDURE — L3908: CPT | Mod: RT

## 2024-02-17 NOTE — DISCUSSION/SUMMARY
[de-identified] : Treatment plan as discussed:   My clinical suspicion is high for sprain of the wrist given the patient's history, physical examination findings, and x-ray findings.   She will take Tylenol over-the-counter as needed for pain as she has a contraindication to NSAIDs.   Patient was placed in a cock-up wrist brace today in the office.  Patient will wear the brace at all times especially when active.  She will utilize the brace at least until repeat evaluation.   I recommended patient rest, ice, compress, and elevate the wrist regularly. Encouraged activity modification as tolerable. Encouraged gentle range of motion to avoid stiffness.   All questions and concerns addressed to patient's satisfaction. Patient expresses full understanding of treatment plan. Patient will follow up with me in 1 month for further evaluation and treatment.

## 2024-02-17 NOTE — IMAGING
[de-identified] : Physical examination of the right wrist: Mild swelling appreciated throughout.  No ecchymosis erythema appreciated.  Skin is intact.  Tense palpation of the right distal radius and extensive ligaments.  No tenderness of the scaphoid or anatomic snuffbox.  No tenderness of the DRUJ or distal ulna.  Can make a full fist.  Has good range of motion despite pain and swelling.  Sensorimotor intact distally.  Neuro vas intact.  X-rays of right wrist reviewed from the hospital system with a scaphoid view:  No acute fractures, subluxations, or dislocations.

## 2024-02-17 NOTE — HISTORY OF PRESENT ILLNESS
[de-identified] : 53-year-old female here for evaluation of right wrist pain.  Patient reports she fell on outstretched right wrist 2 days ago.  She went to the ER x-rays taken to confirm no acute fractures.  She reports her pain is improving and is well-controlled.  Denies any numbness or tingling.

## 2024-03-06 NOTE — BH TREATMENT PLAN - NSTXCOPEINTERMD_PSY_ALL_CORE
Patient returned our call and was given normal test results.   
Mood stabilizers, antipsychotic meds
Mood stabilizers, antipsychotic meds

## 2024-03-07 ENCOUNTER — APPOINTMENT (OUTPATIENT)
Dept: ORTHOPEDIC SURGERY | Facility: CLINIC | Age: 54
End: 2024-03-07
Payer: MEDICARE

## 2024-03-07 DIAGNOSIS — S63.501A UNSPECIFIED SPRAIN OF RIGHT WRIST, INITIAL ENCOUNTER: ICD-10-CM

## 2024-03-07 PROCEDURE — 99213 OFFICE O/P EST LOW 20 MIN: CPT

## 2024-03-07 NOTE — HISTORY OF PRESENT ILLNESS
[de-identified] : 53-year-old female here for evaluation of right wrist pain.  Patient reports she fell on outstretched right wrist 2 days ago.  She went to the ER x-rays taken to confirm no acute fractures.  She reports her pain is improving and is well-controlled.  Denies any numbness or tingling.

## 2024-03-07 NOTE — IMAGING
[de-identified] : Physical examination of the right wrist: Mild swelling appreciated throughout.  No ecchymosis erythema appreciated.  Skin is intact.  Tense palpation of the right distal radius and extensive ligaments.  No tenderness of the scaphoid or anatomic snuffbox.  No tenderness of the DRUJ or distal ulna.  Can make a full fist.  Has good range of motion despite pain and swelling.  Sensorimotor intact distally.  Neuro vas intact.  X-rays of right wrist reviewed from the hospital system with a scaphoid view:  No acute fractures, subluxations, or dislocations.

## 2024-03-07 NOTE — DISCUSSION/SUMMARY
[de-identified] : Treatment plan as discussed:   My clinical suspicion is high for sprain of the wrist given the patient's history, physical examination findings, and x-ray findings.   She will take Tylenol over-the-counter as needed for pain as she has a contraindication to NSAIDs.   Patient was placed in a cock-up wrist brace today in the office.  Patient will wear the brace at all times especially when active.  She will utilize the brace at least until repeat evaluation.   I recommended patient rest, ice, compress, and elevate the wrist regularly. Encouraged activity modification as tolerable. Encouraged gentle range of motion to avoid stiffness.   All questions and concerns addressed to patient's satisfaction. Patient expresses full understanding of treatment plan. Patient will follow up with me in 1 month for further evaluation and treatment.

## 2024-03-13 ENCOUNTER — OUTPATIENT (OUTPATIENT)
Dept: OUTPATIENT SERVICES | Facility: HOSPITAL | Age: 54
LOS: 1 days | End: 2024-03-13
Payer: MEDICARE

## 2024-03-13 ENCOUNTER — APPOINTMENT (OUTPATIENT)
Dept: HEMATOLOGY ONCOLOGY | Facility: CLINIC | Age: 54
End: 2024-03-13
Payer: MEDICARE

## 2024-03-13 VITALS
BODY MASS INDEX: 30.73 KG/M2 | DIASTOLIC BLOOD PRESSURE: 64 MMHG | HEART RATE: 111 BPM | TEMPERATURE: 98.6 F | RESPIRATION RATE: 18 BRPM | OXYGEN SATURATION: 99 % | WEIGHT: 180 LBS | HEIGHT: 64 IN | SYSTOLIC BLOOD PRESSURE: 134 MMHG

## 2024-03-13 DIAGNOSIS — Z79.811 ENCOUNTER FOR THERAPEUTIC DRUG LVL MONITORING: ICD-10-CM

## 2024-03-13 DIAGNOSIS — Z98.890 OTHER SPECIFIED POSTPROCEDURAL STATES: Chronic | ICD-10-CM

## 2024-03-13 DIAGNOSIS — M85.80 OTHER SPECIFIED DISORDERS OF BONE DENSITY AND STRUCTURE, UNSPECIFIED SITE: ICD-10-CM

## 2024-03-13 DIAGNOSIS — C50.411 MALIGNANT NEOPLASM OF UPPER-OUTER QUADRANT OF RIGHT FEMALE BREAST: ICD-10-CM

## 2024-03-13 DIAGNOSIS — Z51.81 ENCOUNTER FOR THERAPEUTIC DRUG LVL MONITORING: ICD-10-CM

## 2024-03-13 PROCEDURE — 99214 OFFICE O/P EST MOD 30 MIN: CPT

## 2024-03-13 NOTE — ASSESSMENT
[FreeTextEntry1] : 53 year old perimenopausal female has Stage IA ([id number]) classical type lobular carcinoma of the right breast, G2, ER/OR positive, Her-2 negative, s/p right breast lumpectomy and SLNB with negative margins.   Assessment and Plan: -- Continue Anastrozole. Repeat Estradiol was < 5 in 8/203. She did not have vaginal bleeding.  -- Breast exam today revealed stable scar tissue at the lumpectomy site in the right breast. b/l dx mammo in 12/2023 did not show suspicious finding. She will resume annual screening mammo in 12/2024.  -- Osteopenia. Continue calcium and vitamin D supplement, regular exercise. A referral is given for repeat bone density.  -- Followup with Breast surgery and Dr. Posada as scheduled.  -- Followup with PCP for health Maintenace.   RTO for followup in 6 months.

## 2024-03-13 NOTE — PROGRESS NOTE BEHAVIORAL HEALTH - SECONDARY DX4
Primary hypertension
Primary hypertension
Mild intermittent asthma, unspecified whether complicated
No
Primary hypertension
Primary hypertension

## 2024-03-13 NOTE — CONSULT LETTER
[Dear  ___] : Dear  [unfilled], [Please see my note below.] : Please see my note below. [Courtesy Letter:] : I had the pleasure of seeing your patient, [unfilled], in my office today. [Sincerely,] : Sincerely, [DrLaila  ___] : Dr. LEWIS [FreeTextEntry3] : Angel Morgan MD

## 2024-03-13 NOTE — HISTORY OF PRESENT ILLNESS
[de-identified] :  50 year old female with PMH of Bipolar disorder, HTN, DM , asthma is referred for consultation of adjuvant systemic treatment for new diagnosis of breast cancer. She is here accompanied by her mother. \par  \par  She had a screening mammogram (10/26/2020) which showed a 4 mm spiculated lesion in the right breast in the upper outer quadrant. Diagnostic mammogram and ultrasound on 2020 showed a new 4 mm spiculated density in the 11 o'clock, 12 cm FN right breast. BI-RADS 4 Suspicious. Recommend biopsy. On 2020, she had a biopsy of the right breast abnormality at 10 o'clock, 12 cm FN and pathology that showed invasive mammary carcinoma with lobular features, moderately differentiated. \par  \par  On 12/10/2020, outside slides reviewed at [facility] from [facility] shows, invasive well differentiated lobular carcinoma, classical type and lobular carcinoma in-situ. It was ER/IN positive %, HER 2 negative. Ki-67 index was 25%.  She also had an MRI of bilateral breasts on 12/15/2020, which showed in right breast, there is a 0.4 cm mass with associated biopsy clip in the upper outer quadrant of the right breast consistent with the biopsy-proven carcinoma. Surrounding postbiopsy enhancement is noted. Benign intramammary lymph nodes are seen in the upper outer quadrant of the right breast. No additional suspicious abnormal enhancement is seen in the right breast. There is no axillary adenopathy. In the left breast, benign intramammary lymph node in the upper outer quadrant of the left breast. No suspicious abnormal enhancement is seen in the left breast. There is no axillary adenopathy. BI-RADS 6: Known biopsy-proven malignancy.\par  \par  On 2021, She underwent a lumpectomy and sentinel node biopsy. She was found to have a 4.5 mm invasive lobular carcinoma, G2, ER/IN positive % / HER 2 negative, Ki 67 index is 25 %. Surgical margins were negative as well as 0/1 negative sentinel lymph node. There was no LVSI/PNI.  She recovered well since surgery. However, has some breast discomfort/lump at the lumpectomy site but getting better. She also saw Dr Swetha Vega onc and is going to be offered radiation therapy. \par  \par  She is . The age at menarche was 12.  Age at live birth was 32  She received fertility Treatments with Clomid.  She used Birth Control Pill for 14 years. She is perimenopause. She does not remember when her last periods was but was less than a year ago. Also to note she has h/o bipolar disorder and is controlled with medications and receives Prolixin shot every 2 weeks and is on Lithium and carbamazepine. \par  \par  She has no family h/o of breast or ovarian cancer. \par  \par  She was also on oral ferrous sulfate for h/o NESTOR in the past and was started by PMD which she stopped taking in Dec 2020.  [de-identified] : 6/9/21: The patient is here today for f/u visit. She has Stage IA ([id number]) classical type lobular carcinoma of the right breast, G2, ER/MS positive, Her-2 negative, s/p right breast lumpectomy and SLNB on 1/29/2021 with negative margins. She saw Dr. ray and received adjuvant WBI between 3/11 to 4/1/21. She has started on adjuvant endocrine therapy with Anastrozole and tolerated well so far. She is feeling well and dose not have new complains.  9/8/21: The patient is here today for f/u visit. She has Stage IA ([id number]) classical type lobular carcinoma of the right breast, G2, ER/MS positive, Her-2 negative, s/p right breast lumpectomy and SLNB on 1/29/2021 with negative margins. She saw Dr. Ray and received adjuvant WBI between 3/11 to 4/1/21. She has been taking adjuvant endocrine therapy with Anastrozole and tolerated well. She is feeling well and dose not have new complains. On 6/28/21, she had right breast dx mammo and US which showed Expected postoperative changes of the right lumpectomy site. There was no suspicious finding. Bone density on 6/28/21 showed osteopenia in the femoral neck with T score -1.7.  3/24/22 The patient is here today for f/u visit. She has Stage IA ([id number]) classical type lobular carcinoma of the right breast, G2, ER/MS positive, Her-2 negative, s/p right breast lumpectomy and SLNB on 1/29/2021 with negative margins. She saw Dr. Ray and received adjuvant WBI between 3/11 to 4/1/21. She has been taking adjuvant endocrine therapy with Anastrozole since 3/2021 and tolerated well. On 12/17/21, she had b/l breast dx mammo and US which showed Expected postoperative changes of the right lumpectomy site. There was no suspicious finding. Bone density on 6/28/21 showed osteopenia in the femoral neck with T score -1.7. She complains pain in the right lumpectomy site sometimes.   9/12/22 The patient is here today for f/u visit, accompanied by her mother. She has Stage IA (gZ1tT3N3) classical type lobular carcinoma of the right breast, G2, ER/MS positive, Her-2 negative, s/p right breast lumpectomy and SLNB on 1/29/2021 with negative margins. She saw Dr. Ray and received adjuvant WBI between 3/11 to 4/1/21. She has been taking adjuvant endocrine therapy with Anastrozole since 3/2021 and tolerated well. On 12/17/21, she had b/l breast dx mammo and US which showed Expected postoperative changes of the right lumpectomy site. There was no suspicious finding. Bone density on 6/28/21 showed osteopenia in the femoral neck with T score -1.7. She had R diagnostic mammogram done in 6/22 which was negative for malignancy, and R US showed post surgical changes in the upper outer quadrant with no suspicious masses BIRADS2. She has had 2  hospitalizations this summer for psychiatric issues, one of the admissions for lithium overdose requiring dialysis in the hospital. She is currently on partial hospitalization program. Per pt, she had her LMP in 8/2022, with spotting for 4-5 days, which occurred after 1-2 years of no menstrual periods.   2/9/23: Shama is here today for f/u visit. She has Stage IA ([id number]) classical type lobular carcinoma of the right breast, G2, ER/MS positive, Her-2 negative, s/p right breast lumpectomy and SLNB on 1/29/2021 with negative margins. She saw Dr. Ray and received adjuvant WBI between 3/11 to 4/1/21. She has been taking adjuvant endocrine therapy with Anastrozole since 3/2021. She did have no periods for 1-2 years and her estradiol levels in 3/2021 were  6. However, in 8/2022, she had spotting for 4-5 days. Anastrozole was held since then. On 12/28/22, she had b/l breast dx mammo and US which did not show suspicious finding.  Bone density on 6/28/21 showed osteopenia in the femoral neck with T score -1.7. She had her LMP in 8/2022, with spotting for 4-5 days, which occurred after 1-2 years of no menstrual periods.   8/17/23: Shama is here today for f/u visit. She has Stage IA ([id number]) classical type lobular carcinoma of the right breast, G2, ER/MS positive, Her-2 negative, s/p right breast lumpectomy and SLNB on 1/29/2021 with negative margins. She saw Dr. Ray and received adjuvant WBI between 3/11 to 4/1/21. She has been taking adjuvant endocrine therapy with Anastrozole since 3/2021. She did have no periods for 1-2 years and her estradiol levels in 3/2021 were  6. However, in 8/2022, she had spotting for 4-5 days. Her estradiol level was 116 on 2/9/23 but previously was < 5. She had vaginal spotting after she was amenorrhea for 2 years.  She saw GYN Dr. Slater on 4/28/23. Repeat Estradiol in 4/2023 was 14. She was considered in menopause. She has not had bleeding since then. The patient was told to call me back after she saw GYN and to decide if she should switch to Tamoxifen or resume Anastrozole. However, she did not call. She has been off endocrine therapy.    3/13/24: Shama is here today for f/u visit. She has Stage IA ([id number]) classical type lobular carcinoma of the right breast, G2, ER/MS positive, Her-2 negative, s/p right breast lumpectomy and SLNB on 1/29/2021 with negative margins. She saw Dr. Ray and received adjuvant WBI between 3/11 to 4/1/21. She has been taking adjuvant endocrine therapy with Anastrozole since 3/2021. She did have no periods for 1-2 years and her estradiol levels in 3/2021 were  6. However, in 8/2022, she had spotting for 4-5 days. Her estradiol level was 116 on 2/9/23 but previously was < 5. She had vaginal spotting after she was amenorrhea for 2 years.  She saw GYN Dr. Slater on 4/28/23. Repeat Estradiol in 4/2023 was 14. She was considered in menopause. She has not had bleeding since then. She resumed Anastrozole since 8/2023 and tolerated well.

## 2024-03-13 NOTE — PHYSICAL EXAM
[Restricted in physically strenuous activity but ambulatory and able to carry out work of a light or sedentary nature] : Status 1- Restricted in physically strenuous activity but ambulatory and able to carry out work of a light or sedentary nature, e.g., light house work, office work [Normal] : grossly intact [de-identified] : Rt lumpectomy scar is healing well. No palpable masses in b/l breast and no axillary LNs

## 2024-03-14 DIAGNOSIS — C50.411 MALIGNANT NEOPLASM OF UPPER-OUTER QUADRANT OF RIGHT FEMALE BREAST: ICD-10-CM

## 2024-03-27 ENCOUNTER — RESULT REVIEW (OUTPATIENT)
Age: 54
End: 2024-03-27

## 2024-03-27 ENCOUNTER — OUTPATIENT (OUTPATIENT)
Dept: OUTPATIENT SERVICES | Facility: HOSPITAL | Age: 54
LOS: 1 days | End: 2024-03-27
Payer: MEDICARE

## 2024-03-27 DIAGNOSIS — Z98.890 OTHER SPECIFIED POSTPROCEDURAL STATES: Chronic | ICD-10-CM

## 2024-03-27 DIAGNOSIS — Z00.8 ENCOUNTER FOR OTHER GENERAL EXAMINATION: ICD-10-CM

## 2024-03-27 DIAGNOSIS — Z13.820 ENCOUNTER FOR SCREENING FOR OSTEOPOROSIS: ICD-10-CM

## 2024-03-27 PROCEDURE — 77080 DXA BONE DENSITY AXIAL: CPT | Mod: 26

## 2024-03-27 PROCEDURE — 77080 DXA BONE DENSITY AXIAL: CPT

## 2024-03-28 ENCOUNTER — APPOINTMENT (OUTPATIENT)
Dept: ORTHOPEDIC SURGERY | Facility: CLINIC | Age: 54
End: 2024-03-28

## 2024-03-28 DIAGNOSIS — Z13.820 ENCOUNTER FOR SCREENING FOR OSTEOPOROSIS: ICD-10-CM

## 2024-04-04 DIAGNOSIS — M81.0 AGE-RELATED OSTEOPOROSIS WITHOUT CURRENT PATHOLOGICAL FRACTURE: ICD-10-CM

## 2024-04-16 ENCOUNTER — EMERGENCY (EMERGENCY)
Facility: HOSPITAL | Age: 54
LOS: 0 days | Discharge: HOME | End: 2024-04-17
Attending: EMERGENCY MEDICINE
Payer: MEDICARE

## 2024-04-16 VITALS
HEIGHT: 64 IN | DIASTOLIC BLOOD PRESSURE: 79 MMHG | WEIGHT: 175.05 LBS | RESPIRATION RATE: 20 BRPM | OXYGEN SATURATION: 99 % | HEART RATE: 73 BPM | SYSTOLIC BLOOD PRESSURE: 189 MMHG | TEMPERATURE: 98 F

## 2024-04-16 DIAGNOSIS — M79.89 OTHER SPECIFIED SOFT TISSUE DISORDERS: ICD-10-CM

## 2024-04-16 DIAGNOSIS — F31.9 BIPOLAR DISORDER, UNSPECIFIED: ICD-10-CM

## 2024-04-16 DIAGNOSIS — Z91.09 OTHER ALLERGY STATUS, OTHER THAN TO DRUGS AND BIOLOGICAL SUBSTANCES: ICD-10-CM

## 2024-04-16 DIAGNOSIS — Z88.8 ALLERGY STATUS TO OTHER DRUGS, MEDICAMENTS AND BIOLOGICAL SUBSTANCES: ICD-10-CM

## 2024-04-16 DIAGNOSIS — Z98.890 OTHER SPECIFIED POSTPROCEDURAL STATES: Chronic | ICD-10-CM

## 2024-04-16 DIAGNOSIS — Z88.0 ALLERGY STATUS TO PENICILLIN: ICD-10-CM

## 2024-04-16 DIAGNOSIS — L85.3 XEROSIS CUTIS: ICD-10-CM

## 2024-04-16 DIAGNOSIS — E83.42 HYPOMAGNESEMIA: ICD-10-CM

## 2024-04-16 LAB
ALBUMIN SERPL ELPH-MCNC: 4.7 G/DL — SIGNIFICANT CHANGE UP (ref 3.5–5.2)
ALP SERPL-CCNC: 121 U/L — HIGH (ref 30–115)
ALT FLD-CCNC: 38 U/L — SIGNIFICANT CHANGE UP (ref 0–41)
ANION GAP SERPL CALC-SCNC: 12 MMOL/L — SIGNIFICANT CHANGE UP (ref 7–14)
AST SERPL-CCNC: 21 U/L — SIGNIFICANT CHANGE UP (ref 0–41)
BASOPHILS # BLD AUTO: 0.03 K/UL — SIGNIFICANT CHANGE UP (ref 0–0.2)
BASOPHILS NFR BLD AUTO: 0.4 % — SIGNIFICANT CHANGE UP (ref 0–1)
BILIRUB SERPL-MCNC: 0.3 MG/DL — SIGNIFICANT CHANGE UP (ref 0.2–1.2)
BUN SERPL-MCNC: 15 MG/DL — SIGNIFICANT CHANGE UP (ref 10–20)
CALCIUM SERPL-MCNC: 9.3 MG/DL — SIGNIFICANT CHANGE UP (ref 8.4–10.5)
CHLORIDE SERPL-SCNC: 103 MMOL/L — SIGNIFICANT CHANGE UP (ref 98–110)
CO2 SERPL-SCNC: 25 MMOL/L — SIGNIFICANT CHANGE UP (ref 17–32)
CREAT SERPL-MCNC: 0.7 MG/DL — SIGNIFICANT CHANGE UP (ref 0.7–1.5)
EGFR: 102 ML/MIN/1.73M2 — SIGNIFICANT CHANGE UP
EOSINOPHIL # BLD AUTO: 0.33 K/UL — SIGNIFICANT CHANGE UP (ref 0–0.7)
EOSINOPHIL NFR BLD AUTO: 4.3 % — SIGNIFICANT CHANGE UP (ref 0–8)
GLUCOSE SERPL-MCNC: 135 MG/DL — HIGH (ref 70–99)
HCT VFR BLD CALC: 32.4 % — LOW (ref 37–47)
HGB BLD-MCNC: 11.4 G/DL — LOW (ref 12–16)
IMM GRANULOCYTES NFR BLD AUTO: 0.3 % — SIGNIFICANT CHANGE UP (ref 0.1–0.3)
LACTATE SERPL-SCNC: 2.2 MMOL/L — HIGH (ref 0.7–2)
LYMPHOCYTES # BLD AUTO: 2.14 K/UL — SIGNIFICANT CHANGE UP (ref 1.2–3.4)
LYMPHOCYTES # BLD AUTO: 27.8 % — SIGNIFICANT CHANGE UP (ref 20.5–51.1)
MAGNESIUM SERPL-MCNC: 1.6 MG/DL — LOW (ref 1.8–2.4)
MCHC RBC-ENTMCNC: 29.2 PG — SIGNIFICANT CHANGE UP (ref 27–31)
MCHC RBC-ENTMCNC: 35.2 G/DL — SIGNIFICANT CHANGE UP (ref 32–37)
MCV RBC AUTO: 82.9 FL — SIGNIFICANT CHANGE UP (ref 81–99)
MONOCYTES # BLD AUTO: 0.47 K/UL — SIGNIFICANT CHANGE UP (ref 0.1–0.6)
MONOCYTES NFR BLD AUTO: 6.1 % — SIGNIFICANT CHANGE UP (ref 1.7–9.3)
NEUTROPHILS # BLD AUTO: 4.7 K/UL — SIGNIFICANT CHANGE UP (ref 1.4–6.5)
NEUTROPHILS NFR BLD AUTO: 61.1 % — SIGNIFICANT CHANGE UP (ref 42.2–75.2)
NRBC # BLD: 0 /100 WBCS — SIGNIFICANT CHANGE UP (ref 0–0)
PLATELET # BLD AUTO: 125 K/UL — LOW (ref 130–400)
PMV BLD: 11.5 FL — HIGH (ref 7.4–10.4)
POTASSIUM SERPL-MCNC: 3.9 MMOL/L — SIGNIFICANT CHANGE UP (ref 3.5–5)
POTASSIUM SERPL-SCNC: 3.9 MMOL/L — SIGNIFICANT CHANGE UP (ref 3.5–5)
PROT SERPL-MCNC: 6.9 G/DL — SIGNIFICANT CHANGE UP (ref 6–8)
RBC # BLD: 3.91 M/UL — LOW (ref 4.2–5.4)
RBC # FLD: 13.9 % — SIGNIFICANT CHANGE UP (ref 11.5–14.5)
SODIUM SERPL-SCNC: 140 MMOL/L — SIGNIFICANT CHANGE UP (ref 135–146)
WBC # BLD: 7.69 K/UL — SIGNIFICANT CHANGE UP (ref 4.8–10.8)
WBC # FLD AUTO: 7.69 K/UL — SIGNIFICANT CHANGE UP (ref 4.8–10.8)

## 2024-04-16 PROCEDURE — 99285 EMERGENCY DEPT VISIT HI MDM: CPT | Mod: 25

## 2024-04-16 PROCEDURE — 99285 EMERGENCY DEPT VISIT HI MDM: CPT | Mod: FS

## 2024-04-16 PROCEDURE — 93970 EXTREMITY STUDY: CPT

## 2024-04-16 PROCEDURE — 83605 ASSAY OF LACTIC ACID: CPT

## 2024-04-16 PROCEDURE — 85025 COMPLETE CBC W/AUTO DIFF WBC: CPT

## 2024-04-16 PROCEDURE — 93970 EXTREMITY STUDY: CPT | Mod: 26

## 2024-04-16 PROCEDURE — 73630 X-RAY EXAM OF FOOT: CPT | Mod: 26,LT

## 2024-04-16 PROCEDURE — 36415 COLL VENOUS BLD VENIPUNCTURE: CPT

## 2024-04-16 PROCEDURE — 73630 X-RAY EXAM OF FOOT: CPT | Mod: LT

## 2024-04-16 PROCEDURE — 83735 ASSAY OF MAGNESIUM: CPT

## 2024-04-16 PROCEDURE — 96374 THER/PROPH/DIAG INJ IV PUSH: CPT

## 2024-04-16 PROCEDURE — 80053 COMPREHEN METABOLIC PANEL: CPT

## 2024-04-16 RX ORDER — DIPHENHYDRAMINE HCL 50 MG
25 CAPSULE ORAL ONCE
Refills: 0 | Status: COMPLETED | OUTPATIENT
Start: 2024-04-16 | End: 2024-04-16

## 2024-04-16 RX ORDER — SODIUM CHLORIDE 9 MG/ML
1000 INJECTION INTRAMUSCULAR; INTRAVENOUS; SUBCUTANEOUS ONCE
Refills: 0 | Status: COMPLETED | OUTPATIENT
Start: 2024-04-16 | End: 2024-04-16

## 2024-04-16 RX ORDER — MAGNESIUM SULFATE 500 MG/ML
2 VIAL (ML) INJECTION ONCE
Refills: 0 | Status: COMPLETED | OUTPATIENT
Start: 2024-04-16 | End: 2024-04-16

## 2024-04-16 RX ORDER — HYDROCORTISONE 1 %
1 OINTMENT (GRAM) TOPICAL ONCE
Refills: 0 | Status: COMPLETED | OUTPATIENT
Start: 2024-04-16 | End: 2024-04-17

## 2024-04-16 RX ADMIN — SODIUM CHLORIDE 2000 MILLILITER(S): 9 INJECTION INTRAMUSCULAR; INTRAVENOUS; SUBCUTANEOUS at 21:32

## 2024-04-16 RX ADMIN — Medication 25 GRAM(S): at 22:43

## 2024-04-16 NOTE — ED PROVIDER NOTE - PHYSICAL EXAMINATION
Physical Exam    Vital Signs: I have reviewed the initial vital signs.  Constitutional: well-nourished, appears stated age, no acute distress  Eyes: Conjunctiva pink, Sclera clear, PERRLA, EOMI.  Cardiovascular: S1 and S2, regular rate, regular rhythm, well-perfused extremities, radial pulses equal and 2+  Respiratory: unlabored respiratory effort, clear to auscultation bilaterally no wheezing, rales and rhonchi  Gastrointestinal: soft, non-tender abdomen, no pulsatile mass, normal bowl sounds  Musculoskeletal: supple neck, no lower extremity edema, no midline tenderness  Integumentary: warm, dry, no rash, + left foot mild swelling, no ulcers no abrasions, + dry skin noted. no redness DP pulses are equal with cap refill <2seconds to all toes.   Neurologic: awake, alert, cranial nerves II-XII grossly intact, extremities’ motor and sensory functions grossly intact  Psychiatric: appropriate mood, appropriate affect

## 2024-04-16 NOTE — ED PROVIDER NOTE - PATIENT PORTAL LINK FT
You can access the FollowMyHealth Patient Portal offered by [facility] by registering at the following website: [email or web address]. By joining [facility]’s FollowMyHealth portal, you will also be able to view your health information using other applications (apps) compatible with our system.

## 2024-04-16 NOTE — ED PROVIDER NOTE - OBJECTIVE STATEMENT
53 year old Female past medical history of bipolar d/o comes to emergency room sent  by podiatrist for left foot swelling noticed over the past 2 days by patient.  No chest pain shortness of breath no fever chills

## 2024-04-16 NOTE — ED PROVIDER NOTE - NSFOLLOWUPINSTRUCTIONS_ED_ALL_ED_FT
Follow up with your podiatrist   LEG EDEMA - AfterCare(R) Instructions(ER/ED)           Leg Edema    WHAT YOU NEED TO KNOW:    Leg edema is swelling caused by fluid buildup. Your legs may swell if you sit or stand for long periods of time, are pregnant, or are injured. Swelling may also occur if you have heart failure or circulation problems. This means that your heart does not pump blood through your body as it should.  Lower Leg Edema         DISCHARGE INSTRUCTIONS:    Call your local emergency number (911 in the [place]) for any of the following:   •You cannot walk.      •You have chest pain or trouble breathing that is worse when you lie down.      •You suddenly feel lightheaded and have trouble breathing.      •You have new and sudden chest pain. You may have more pain when you take deep breaths or cough.      •You cough up blood.      Return to the emergency department if:   •You feel faint or confused.      •Your skin turns blue or gray.      •Your leg feels warm, tender, and painful. It may be swollen and red.      Call your doctor if:   •You have a fever or feel more tired than usual.      •The veins in your legs look larger than usual. They may look full or bulging.      •Your legs itch or feel heavy.      •You have red or white areas or sores on your legs. The skin may also appear dimpled or have indentations.      •You are gaining weight.      •You have trouble moving your ankles.      •The swelling does not go away, or other parts of your body swell.      •You have questions or concerns about your condition or care.      Self-care:   •Elevate your legs. Raise your legs above the level of your heart as often as you can. This will help decrease swelling and pain. Prop your legs on pillows or blankets to keep them elevated comfortably.  Elevate Leg           •Wear pressure stockings, if directed. These tight stockings put pressure on your legs to promote blood flow and prevent blood clots. Put them on before you get out of bed. Wear the stockings during the day. Do not wear them while you sleep.  Pressure Stockings            •Stay active. Do not stand or sit for long periods of time. Ask your healthcare provider about the best exercise plan for you.  Walking for Exercise           •Eat healthy foods. Healthy foods include fruits, vegetables, whole-grain breads, low-fat dairy products, beans, lean meats, and fish. Ask if you need to be on a special diet.  Healthy Foods           •Limit sodium (salt). Salt will make your body hold even more fluid. Your healthcare provider will tell you how many milligrams (mg) of salt you can have each day.             Follow up with your doctor as directed: Write down your questions so you remember to ask them during your visits

## 2024-04-16 NOTE — ED PROVIDER NOTE - CLINICAL SUMMARY MEDICAL DECISION MAKING FREE TEXT BOX
53yF 53yF sent  by podiatrist for left foot swelling noticed over the past 2 days by patient.  No chest pain shortness of breath no fever chills skin no erythema no ulcers to feet NVI mild swellingX-ray no gas venous duplex negative for DVT  Labs resulted at the time of my review were noted to be within acceptable parameters (  except Magnesium repleted 1.6)  Patient to be discharged from ED in well appearing condition. Any available test results were discussed with and printed  for patient.  Verbal instructions given, including instructions to return to ED immediately for any new, worsening, or concerning symptoms. Limitations of ED work up discussed.  Patient reports understanding of above with capacity and insight. Written discharge instructions additionally given, including follow-up plan with podiatry

## 2024-04-17 RX ADMIN — Medication 25 MILLIGRAM(S): at 00:24

## 2024-04-17 RX ADMIN — Medication 1 APPLICATION(S): at 00:24

## 2024-04-17 NOTE — ED ADULT NURSE NOTE - NSFALLUNIVINTERV_ED_ALL_ED
Bed/Stretcher in lowest position, wheels locked, appropriate side rails in place/Call bell, personal items and telephone in reach/Instruct patient to call for assistance before getting out of bed/chair/stretcher/Non-slip footwear applied when patient is off stretcher/[place] to call system/Physically safe environment - no spills, clutter or unnecessary equipment/Purposeful proactive rounding/Room/bathroom lighting operational, light cord in reach

## 2024-04-29 ENCOUNTER — NON-APPOINTMENT (OUTPATIENT)
Age: 54
End: 2024-04-29

## 2024-05-01 ENCOUNTER — APPOINTMENT (OUTPATIENT)
Dept: CARDIOLOGY | Facility: CLINIC | Age: 54
End: 2024-05-01
Payer: MEDICARE

## 2024-05-01 VITALS
BODY MASS INDEX: 30.73 KG/M2 | HEART RATE: 109 BPM | SYSTOLIC BLOOD PRESSURE: 120 MMHG | DIASTOLIC BLOOD PRESSURE: 70 MMHG | WEIGHT: 180 LBS | HEIGHT: 64 IN

## 2024-05-01 DIAGNOSIS — I10 ESSENTIAL (PRIMARY) HYPERTENSION: ICD-10-CM

## 2024-05-01 DIAGNOSIS — R60.0 LOCALIZED EDEMA: ICD-10-CM

## 2024-05-01 DIAGNOSIS — E11.628 TYPE 2 DIABETES MELLITUS WITH OTHER SKIN COMPLICATIONS: ICD-10-CM

## 2024-05-01 PROCEDURE — 93000 ELECTROCARDIOGRAM COMPLETE: CPT

## 2024-05-01 PROCEDURE — 99204 OFFICE O/P NEW MOD 45 MIN: CPT

## 2024-05-01 NOTE — ASSESSMENT
[FreeTextEntry1] : Essential Hypertension DM type II LE edema mild on LLE - patient was treated with furosemide 40 mg QD prescribed by her PCP R/O LYDIA

## 2024-05-01 NOTE — REASON FOR VISIT
[FreeTextEntry1] : 53 year old WF with PMH of hypertension, DM, breast cancer s/p RT presents for initial Cardiology evaluation due to LE edema. She was advised to see one of our Vascular Cardiologists but was given an appointment to see me in their absence. She was recently in [facility] ER where she had a recent venous duplex doppler performed on 4/16/24 and was read by Dr. Batista as showing no evidence of DVT of either lower extremity.

## 2024-05-01 NOTE — DISCUSSION/SUMMARY
[EKG obtained to assist in diagnosis and management of assessed problem(s)] : EKG obtained to assist in diagnosis and management of assessed problem(s) [FreeTextEntry1] : TTE Lexiscan nuclear stress test given her history of risk factors with HTH, and DM. Patient cannot walk on the treadmill. Continue furosemide as ordered. Jobst compression stockings to LE, patient has them ordered and at home but does not wear them. The patient was advised to see Dr. Julio originally. She will be scheduled to see him on initial evaluation. I did speak to her about amlodipine and that one of it's side effects may be LE edema. She will contact Dr. Moyer to see if switching to an ARB or ACEI in the setting of DM may be a better option for her. EKG: NSR rate of 109 bpm PRWP moderate voltage criteria for LVH Lab testing and venous duplex doppler results were reviewed with the patient from her Hospitalization at [facility]. She will provide me with a copy of her recent lipid levels. RV in 2-3 weeks.

## 2024-05-01 NOTE — HISTORY OF PRESENT ILLNESS
[FreeTextEntry1] : Hypertension DM Bipolar disorder Breast cancer, invasive lobular carcinoma,of the right breast, s/p RT followed by Oncology Patient denies a history of MI, angina, CHF, arrhythmia, TIA, CVA, syncope, familial history of heart disease. Her parents are both alive and are 81 yrs of age, her father was recently diagnosed with lone atrial fibrillation without any concurrent other cardiac/valvular disease and is on eliquis and still is active. Patient aside from her breast cancer stated that she had either basal cell cancer or melanoma removed from her neck but could not recall. Aside from EKG's she did not have any other cardiac testing. She does not exercise regularly and ambulates with a cane. She had seen Vascular, Dr. Gonzalez in the past.

## 2024-05-01 NOTE — PHYSICAL EXAM
[Well Developed] : well developed [Well Nourished] : well nourished [No Acute Distress] : no acute distress [Normal Conjunctiva] : normal conjunctiva [Normal Venous Pressure] : normal venous pressure [No Carotid Bruit] : no carotid bruit [Normal S1, S2] : normal S1, S2 [No Murmur] : no murmur [No Rub] : no rub [No Gallop] : no gallop [Clear Lung Fields] : clear lung fields [Good Air Entry] : good air entry [No Respiratory Distress] : no respiratory distress  [Soft] : abdomen soft [Non Tender] : non-tender [No Masses/organomegaly] : no masses/organomegaly [Normal Bowel Sounds] : normal bowel sounds [No Cyanosis] : no cyanosis [No Clubbing] : no clubbing [Venous varicosities] : venous varicosities [No Rash] : no rash [No Skin Lesions] : no skin lesions [Moves all extremities] : moves all extremities [No Focal Deficits] : no focal deficits [Normal Speech] : normal speech [Alert and Oriented] : alert and oriented [Normal memory] : normal memory [Abnormal Gait] : abnormal gait [de-identified] : Patient ambulates with the assistance of a cane [de-identified] : +1 LE edema, left greater than right

## 2024-05-05 ENCOUNTER — EMERGENCY (EMERGENCY)
Facility: HOSPITAL | Age: 54
LOS: 0 days | Discharge: HOME | End: 2024-05-05
Attending: EMERGENCY MEDICINE
Payer: MEDICARE

## 2024-05-05 VITALS
HEIGHT: 64 IN | SYSTOLIC BLOOD PRESSURE: 158 MMHG | WEIGHT: 175.05 LBS | TEMPERATURE: 97 F | HEART RATE: 100 BPM | OXYGEN SATURATION: 97 % | RESPIRATION RATE: 20 BRPM | DIASTOLIC BLOOD PRESSURE: 70 MMHG

## 2024-05-05 DIAGNOSIS — M79.89 OTHER SPECIFIED SOFT TISSUE DISORDERS: ICD-10-CM

## 2024-05-05 DIAGNOSIS — G89.29 OTHER CHRONIC PAIN: ICD-10-CM

## 2024-05-05 DIAGNOSIS — Z98.890 OTHER SPECIFIED POSTPROCEDURAL STATES: Chronic | ICD-10-CM

## 2024-05-05 DIAGNOSIS — M54.50 LOW BACK PAIN, UNSPECIFIED: ICD-10-CM

## 2024-05-05 DIAGNOSIS — F31.9 BIPOLAR DISORDER, UNSPECIFIED: ICD-10-CM

## 2024-05-05 DIAGNOSIS — M79.672 PAIN IN LEFT FOOT: ICD-10-CM

## 2024-05-05 DIAGNOSIS — Z88.8 ALLERGY STATUS TO OTHER DRUGS, MEDICAMENTS AND BIOLOGICAL SUBSTANCES: ICD-10-CM

## 2024-05-05 DIAGNOSIS — Z88.0 ALLERGY STATUS TO PENICILLIN: ICD-10-CM

## 2024-05-05 DIAGNOSIS — Z91.048 OTHER NONMEDICINAL SUBSTANCE ALLERGY STATUS: ICD-10-CM

## 2024-05-05 PROCEDURE — 99284 EMERGENCY DEPT VISIT MOD MDM: CPT | Mod: FS

## 2024-05-05 PROCEDURE — 99283 EMERGENCY DEPT VISIT LOW MDM: CPT

## 2024-05-05 RX ORDER — LIDOCAINE 4 G/100G
1 CREAM TOPICAL
Qty: 14 | Refills: 0
Start: 2024-05-05 | End: 2024-05-11

## 2024-05-05 RX ORDER — LIDOCAINE 4 G/100G
1 CREAM TOPICAL ONCE
Refills: 0 | Status: COMPLETED | OUTPATIENT
Start: 2024-05-05 | End: 2024-05-05

## 2024-05-05 RX ADMIN — LIDOCAINE 1 PATCH: 4 CREAM TOPICAL at 07:57

## 2024-05-05 NOTE — ED PROVIDER NOTE - PHYSICAL EXAMINATION
VITAL SIGNS: I have reviewed nursing notes and confirm.  CONSTITUTIONAL: Well-developed; well-nourished; in no acute distress.   SKIN: skin exam is warm and dry, no acute rash.    HEAD: Normocephalic; atraumatic.  EYES:  conjunctiva and sclera clear.  ENT: No nasal discharge; airway clear.  CARD: S1, S2 normal; no murmurs, gallops, or rubs. Regular rate and rhythm.   RESP: No wheezes, rales or rhonchi.  EXT: Palpable dorsalis pedis, posterior tibialis pulses to affected extremity Normal ROM.  No clubbing, cyanosis or edema.   NEURO: Alert, oriented, grossly unremarkable no

## 2024-05-05 NOTE — ED ADULT NURSE NOTE - NSICDXPASTSURGICALHX_GEN_ALL_CORE_FT
[facility] Hospitalist Group  Progress Note    Patient: Chema Brothers Age: 64 y.o. : 1946 MR#: [id number] [facility]) ICD-10-CM: L34.Macho Gomes MD     August 12, 2022    Dragon medical dictation software was used for portions of this report. Unintended errors may occur. PAST SURGICAL HISTORY:  H/O breast surgery     History of surgery urethral sling

## 2024-05-05 NOTE — ED ADULT TRIAGE NOTE - CHIEF COMPLAINT QUOTE
Left ankle swollen and left 2nd and 3rd toes hurt.  Denies injury.  This is an ongoing problem per pt and arrived wearing a Darco shoe

## 2024-05-05 NOTE — ED PROVIDER NOTE - PATIENT PORTAL LINK FT
You can access the FollowMyHealth Patient Portal offered by [facility] by registering at the following website: [email or web address]. By joining [employer]’s FollowMyHealth portal, you will also be able to view your health information using other applications (apps) compatible with our system.

## 2024-05-05 NOTE — ED PROVIDER NOTE - CLINICAL SUMMARY MEDICAL DECISION MAKING FREE TEXT BOX
Agree with above history and exam.  Patient here with foot swelling that is resolved as well as chronic lower back pain.  Patient comfortable with lidocaine patch for lower back pain discharge outpatient follow-up.  Of note she has had extensive follow-up of the leg swelling.

## 2024-05-05 NOTE — ED ADULT NURSE NOTE - NSFALLUNIVINTERV_ED_ALL_ED
Bed/Stretcher in lowest position, wheels locked, appropriate side rails in place/Call bell, personal items and telephone in reach/Instruct patient to call for assistance before getting out of bed/chair/stretcher/Non-slip footwear applied when patient is off stretcher/[place] to call system/Physically safe environment - no spills, clutter or unnecessary equipment/Purposeful proactive rounding/Room/bathroom lighting operational, light cord in reach

## 2024-05-05 NOTE — ED PROVIDER NOTE - OBJECTIVE STATEMENT
Patient is a 53-year-old female history of bipolar disorder, chronic left foot pain and swelling followed by podiatry here for swelling to left foot around 4 AM this morning that has since resolved.  Patient has had extensive workup for this complaint recently.  Patient denies any acute or new symptoms.

## 2024-05-09 ENCOUNTER — APPOINTMENT (OUTPATIENT)
Dept: RADIATION ONCOLOGY | Facility: HOSPITAL | Age: 54
End: 2024-05-09
Payer: MEDICARE

## 2024-05-09 VITALS
HEART RATE: 85 BPM | RESPIRATION RATE: 16 BRPM | TEMPERATURE: 98.2 F | SYSTOLIC BLOOD PRESSURE: 155 MMHG | OXYGEN SATURATION: 98 % | DIASTOLIC BLOOD PRESSURE: 97 MMHG

## 2024-05-09 DIAGNOSIS — C50.411 MALIGNANT NEOPLASM OF UPPER-OUTER QUADRANT OF RIGHT FEMALE BREAST: ICD-10-CM

## 2024-05-09 DIAGNOSIS — Z17.0 MALIGNANT NEOPLASM OF UPPER-OUTER QUADRANT OF RIGHT FEMALE BREAST: ICD-10-CM

## 2024-05-09 PROCEDURE — 99213 OFFICE O/P EST LOW 20 MIN: CPT

## 2024-05-09 RX ORDER — DOXYCLYCLINE HYCLATE 150 MG/1
TABLET, COATED ORAL
Refills: 0 | Status: ACTIVE | COMMUNITY

## 2024-05-09 RX ORDER — CARBAMAZEPINE 200 MG/1
200 CAPSULE, EXTENDED RELEASE ORAL
Refills: 0 | Status: ACTIVE | COMMUNITY

## 2024-05-09 RX ORDER — INSULIN GLARGINE 300 U/ML
INJECTION, SOLUTION SUBCUTANEOUS
Refills: 0 | Status: ACTIVE | COMMUNITY

## 2024-05-09 RX ORDER — METFORMIN HYDROCHLORIDE 1000 MG/1
1000 TABLET, COATED ORAL
Refills: 0 | Status: ACTIVE | COMMUNITY

## 2024-05-09 RX ORDER — GABAPENTIN 100 MG
100 TABLET ORAL
Refills: 0 | Status: ACTIVE | COMMUNITY

## 2024-05-09 RX ORDER — MULTIVITAMIN
TABLET ORAL
Refills: 0 | Status: DISCONTINUED | COMMUNITY
End: 2024-05-09

## 2024-05-09 RX ORDER — ATORVASTATIN CALCIUM 40 MG/1
40 TABLET, FILM COATED ORAL DAILY
Refills: 0 | Status: ACTIVE | COMMUNITY

## 2024-05-09 RX ORDER — ALBUTEROL 90 MCG
AEROSOL (GRAM) INHALATION
Refills: 0 | Status: ACTIVE | COMMUNITY

## 2024-05-09 RX ORDER — FOLIC ACID/MULTIVIT,IRON,MINER .4-18-35
TABLET,CHEWABLE ORAL
Refills: 0 | Status: ACTIVE | COMMUNITY

## 2024-05-09 RX ORDER — LEVOCETIRIZINE DIHYDROCHLORIDE 5 MG/1
5 TABLET ORAL
Refills: 0 | Status: ACTIVE | COMMUNITY

## 2024-05-09 RX ORDER — PRAZOSIN HYDROCHLORIDE 1 MG/1
1 CAPSULE ORAL
Refills: 0 | Status: ACTIVE | COMMUNITY

## 2024-05-09 RX ORDER — ASCORBIC ACID 125 MG
TABLET,CHEWABLE ORAL
Refills: 0 | Status: DISCONTINUED | COMMUNITY
End: 2024-05-09

## 2024-05-09 RX ORDER — BENZTROPINE MESYLATE 1 MG/1
1 TABLET ORAL
Refills: 0 | Status: ACTIVE | COMMUNITY

## 2024-05-09 RX ORDER — INSULIN GLARGINE 100 [IU]/ML
100 INJECTION, SOLUTION SUBCUTANEOUS
Refills: 0 | Status: DISCONTINUED | COMMUNITY
End: 2024-05-09

## 2024-05-09 NOTE — DISEASE MANAGEMENT
[Pathological] : TNM Stage: p [IA] : IA [FreeTextEntry4] : invasive lobular carcinoma of the right breast, G2, ER/NC positive / HER 2 negative, upper outer quadrant [TTNM] : 1a [NTNM] : 0 [MTNM] : 0 [de-identified] : Right Breast

## 2024-05-09 NOTE — PHYSICAL EXAM
[Sclera] : the sclera and conjunctiva were normal [] : no respiratory distress [Exaggerated Use Of Accessory Muscles For Inspiration] : no accessory muscle use [Heart Rate And Rhythm] : heart rate and rhythm were normal [Heart Sounds] : normal S1 and S2 [Nondistended] : nondistended [Cervical Lymph Nodes Enlarged Posterior Bilaterally] : posterior cervical [Cervical Lymph Nodes Enlarged Anterior Bilaterally] : anterior cervical [Supraclavicular Lymph Nodes Enlarged Bilaterally] : supraclavicular [Axillary Lymph Nodes Enlarged Bilaterally] : axillary [Normal] : oriented to person, place and time, the affect was normal, the mood was normal and not anxious [de-identified] : She is examined in both the upright and supine positions.  The right breast has well healed scars.   The left breast is unremarkable.  No palpable mass in either breast.

## 2024-05-09 NOTE — HISTORY OF PRESENT ILLNESS
[FreeTextEntry1] : SKYLAR VUONG returns in follow up visit.  As you know, SKYLAR VUONG is a 53 year old female with invasive lobular carcinoma of the right breast, ER/HI positive, HER 2 negative, Stage IA. She is s/p breast conserving surgery. She received 4,240 cGy to the right breast from 3/11/21 to 4/1/2021. The course of radiation therapy was completed without any complications. I last saw her in May 2023.  In the interim, patient  has done well.  She denies breast pain.  She has been taking Anastrazole which was restarted by Dr. Morgan. She is currently tolerating it well.    12/29/2023 Bilateral diagnostic mammogram/ultrasound: Impression: No mammographic or sonographic evidence of malignancy. No mammographic or sonographic correlate to the areas of clinical concern in the right breast. Recommendation: Unless otherwise indicated by clinical findings, annual screening mammography recommended. BI-RADS Category 2: Benign  Upcoming appointments: Dr. Morgan 9/9/2024

## 2024-05-09 NOTE — LETTER GREETING
Quality 431: Preventive Care And Screening: Unhealthy Alcohol Use - Screening: Patient not identified as an unhealthy alcohol user when screened for unhealthy alcohol use using a systematic screening method Quality 110: Preventive Care And Screening: Influenza Immunization: Influenza Immunization previously received during influenza season Detail Level: Detailed Quality 226: Preventive Care And Screening: Tobacco Use: Screening And Cessation Intervention: Patient screened for tobacco use and is an ex/non-smoker Quality 111:Pneumonia Vaccination Status For Older Adults: Patient received any pneumococcal conjugate or polysaccharide vaccine on or after their 60th birthday and before the end of the measurement period [Dear  ___] : Dear  [unfilled], [Follow-Up] : Your patient, [unfilled] was seen in my office today for follow-up [Please see my note below.] : Please see my note below.

## 2024-05-09 NOTE — LETTER CLOSING
[Consult Closing:] : Thank you for allowing me to participate in the care of this patient.  If you have any questions, please do not hesitate to contact me. [Sincerely yours,] : Sincerely yours, [FreeTextEntry3] : Judie Posada M.D.   Electronically proofread and signed by:  Judie Posada MD Attending, Department of Radiation Medicine [facility]

## 2024-05-14 ENCOUNTER — APPOINTMENT (OUTPATIENT)
Dept: CARDIOLOGY | Facility: CLINIC | Age: 54
End: 2024-05-14
Payer: MEDICARE

## 2024-05-14 PROCEDURE — 93306 TTE W/DOPPLER COMPLETE: CPT

## 2024-05-23 ENCOUNTER — APPOINTMENT (OUTPATIENT)
Dept: CARDIOLOGY | Facility: CLINIC | Age: 54
End: 2024-05-23

## 2024-05-31 NOTE — PHYSICAL THERAPY INITIAL EVALUATION ADULT - REFERRING PHYSICIAN, REHAB EVAL
[Patient Optimized for Surgery] : Patient optimized for surgery [No Further Testing Recommended] : no further testing recommended [Continue anticoagulant treatment as is] : Continue current anticoagulant treatment [Continue anti-platelet treatment as is] : Continue current anti-platelet treatment [Continue medications as is] : Continue current medications [As per surgery] : as per surgery [FreeTextEntry4] : 80 yo f, pmhx of osteoporosis (with multiple vertebral fractures), here for pre op evaluation for cataract surgery. She is medically optimized for cataract surgery. However due to severe pain of her back and from a social perspective advised to postpone surgery. Patient lives alone and due to back fractures, pain it will be difficult for her to care for herself at this time post operatively.  [FreeTextEntry5] : on eliquis  Jagdish Mares

## 2024-06-03 RX ORDER — ANASTROZOLE TABLETS 1 MG/1
1 TABLET ORAL
Qty: 90 | Refills: 1 | Status: ACTIVE | COMMUNITY
Start: 2023-08-21 | End: 1900-01-01

## 2024-06-09 ENCOUNTER — EMERGENCY (EMERGENCY)
Facility: HOSPITAL | Age: 54
LOS: 0 days | Discharge: HOME | End: 2024-06-09
Attending: EMERGENCY MEDICINE
Payer: MEDICARE

## 2024-06-09 VITALS
OXYGEN SATURATION: 98 % | TEMPERATURE: 98 F | DIASTOLIC BLOOD PRESSURE: 86 MMHG | HEART RATE: 93 BPM | HEIGHT: 64 IN | RESPIRATION RATE: 18 BRPM | SYSTOLIC BLOOD PRESSURE: 142 MMHG

## 2024-06-09 DIAGNOSIS — Z91.048 OTHER NONMEDICINAL SUBSTANCE ALLERGY STATUS: ICD-10-CM

## 2024-06-09 DIAGNOSIS — Z98.890 OTHER SPECIFIED POSTPROCEDURAL STATES: Chronic | ICD-10-CM

## 2024-06-09 DIAGNOSIS — S00.521A BLISTER (NONTHERMAL) OF LIP, INITIAL ENCOUNTER: ICD-10-CM

## 2024-06-09 DIAGNOSIS — Z88.8 ALLERGY STATUS TO OTHER DRUGS, MEDICAMENTS AND BIOLOGICAL SUBSTANCES: ICD-10-CM

## 2024-06-09 DIAGNOSIS — Z88.0 ALLERGY STATUS TO PENICILLIN: ICD-10-CM

## 2024-06-09 DIAGNOSIS — K12.1 OTHER FORMS OF STOMATITIS: ICD-10-CM

## 2024-06-09 PROCEDURE — 99283 EMERGENCY DEPT VISIT LOW MDM: CPT | Mod: FS

## 2024-06-09 PROCEDURE — 99282 EMERGENCY DEPT VISIT SF MDM: CPT

## 2024-06-09 RX ORDER — ACETAMINOPHEN 500 MG
975 TABLET ORAL ONCE
Refills: 0 | Status: COMPLETED | OUTPATIENT
Start: 2024-06-09 | End: 2024-06-09

## 2024-06-09 RX ORDER — DIPHENHYDRAMINE HYDROCHLORIDE AND LIDOCAINE HYDROCHLORIDE AND ALUMINUM HYDROXIDE AND MAGNESIUM HYDRO
30 KIT ONCE
Refills: 0 | Status: COMPLETED | OUTPATIENT
Start: 2024-06-09 | End: 2024-06-09

## 2024-06-09 RX ADMIN — Medication 975 MILLIGRAM(S): at 12:52

## 2024-06-09 RX ADMIN — DIPHENHYDRAMINE HYDROCHLORIDE AND LIDOCAINE HYDROCHLORIDE AND ALUMINUM HYDROXIDE AND MAGNESIUM HYDRO 30 MILLILITER(S): KIT at 12:30

## 2024-06-09 NOTE — ED PROVIDER NOTE - PATIENT PORTAL LINK FT
You can access the FollowMyHealth Patient Portal offered by [facility] by registering at the following website: [email or web address]. By joining [employer]’s FollowMyHealth portal, you will also be able to view your health information using other applications (apps) compatible with our system.

## 2024-06-09 NOTE — ED PROVIDER NOTE - ATTENDING APP SHARED VISIT CONTRIBUTION OF CARE
I have personally performed a history and physical exam on this patient and personally directed the management of the patient. Patient is a 54-year-old female presents to the emergency department for evaluation of lower lip dryness cracking as well as evidence of burning to the lower lip onset after persistent sun exposure patient was not using SPF denies any other pain states that she is concerned for sunburn    On physical exam patient is normocephalic atraumatic pupils equal round reactive to light accommodation extraocular muscles intact oropharynx reveals evidence of blistering and dry cracked lower lip however between the buccal surface of the lower lip and teeth the patient has areas of blisters most consistent with stomatitis no other intraoral lesions no fevers no chills patient is tolerating p.o.     assessment plan patient presents for evaluation of sunburn to the lower lip with evidence of stomatitis although the patient does have stomatitis this is not consistent with shingles she is tolerating p.o. I will discharge follow-up to PMD in the next 24 to 48 hours we discussed indications to return patient is aware

## 2024-06-09 NOTE — ED PROVIDER NOTE - OBJECTIVE STATEMENT
. Patient is a 54-year-old female presents to the emergency department for evaluation of lower lip dryness cracking as well as evidence of burning to the lower lip onset after persistent sun exposure patient was not using SPF denies any other pain states that she is concerned for sunburn

## 2024-06-09 NOTE — ED PROVIDER NOTE - NSFOLLOWUPINSTRUCTIONS_ED_ALL_ED_FT
Gingivostomatitis    WHAT YOU NEED TO KNOW:    Gingivostomatitis (GS) is a condition that causes painful sores on the lips, tongue, gums, and inside the mouth. GS is caused by the herpes simplex virus. The virus spreads easily from person to person through saliva or shared objects. The sores usually heal within 2 weeks with treatment.    DISCHARGE INSTRUCTIONS:    Return to the emergency department if:    You have severe pain.    Contact your healthcare provider if:    Your fever or other symptoms return after treatment.    You are urinating less than usual.    Your mouth sores are draining pus or blood.    You have questions or concerns about your condition or care.  Medicines: You may need any of the following:    Acetaminophen decreases pain and fever. It is available without a doctor's order. Ask how much to take and how often to take it. Follow directions. Acetaminophen can cause liver damage if not taken correctly.    NSAIDs, such as ibuprofen, help decrease swelling, pain, and fever. This medicine is available with or without a doctor's order. NSAIDs can cause stomach bleeding or kidney problems in certain people. If you take blood thinner medicine, always ask your healthcare provider if NSAIDs are safe for you. Always read the medicine label and follow directions.    Numbing medicine helps decrease pain from your mouth sores. This medicine is usually a liquid that you swish in your mouth and then spit out.    Antiviral medicine helps treat a viral infection.    Take your medicine as directed. Contact your healthcare provider if you think your medicine is not helping or if you have side effects. Tell your provider if you are allergic to any medicine. Keep a list of the medicines, vitamins, and herbs you take. Include the amounts, and when and why you take them. Bring the list or the pill bottles to follow-up visits. Carry your medicine list with you in case of an emergency.  Manage your symptoms:    Brush your teeth at least 2 times each day. Floss at least 1 time each day. If you wear dentures, make sure they fit properly.    Drink liquids as directed to prevent dehydration. It is important to drink liquids even though your mouth is sore. Ask how much liquid to drink each day and which liquids are best for you.    Eat a variety of healthy foods. You may need to eat bland foods until your pain gets better. Healthy foods include fruits, vegetables, whole-grain breads, low-fat dairy products, beans, lean meats, and fish. Do not eat spicy, dry, hard, or acidic foods, such as oranges.    Do not smoke. Nicotine and other chemicals in cigarettes and cigars can cause mouth and lung damage. Ask your healthcare provider for information if you currently smoke and need help to quit. E-cigarettes or smokeless tobacco still contain nicotine. Talk to your healthcare provider before you use these products.  Follow up with your doctor as directed: Write down your questions so you remember to ask them during your visits.

## 2024-06-09 NOTE — ED PROVIDER NOTE - CLINICAL SUMMARY MEDICAL DECISION MAKING FREE TEXT BOX
Patient is a 54-year-old female presents to the emergency department for evaluation of lower lip dryness cracking as well as evidence of burning to the lower lip onset after persistent sun exposure patient was not using SPF denies any other pain states that she is concerned for sunburn    On physical exam patient is normocephalic atraumatic pupils equal round reactive to light accommodation extraocular muscles intact oropharynx reveals evidence of blistering and dry cracked lower lip however between the buccal surface of the lower lip and teeth the patient has areas of blisters most consistent with stomatitis no other intraoral lesions no fevers no chills patient is tolerating p.o.     assessment plan patient presents for evaluation of sunburn to the lower lip with evidence of stomatitis although the patient does have stomatitis this is not consistent with shingles she is tolerating p.o. I will discharge follow-up to PMD in the next 24 to 48 hours we discussed indications to return patient is aware

## 2024-06-09 NOTE — ED PROVIDER NOTE - PHYSICAL EXAMINATION
On physical exam patient is normocephalic atraumatic pupils equal round reactive to light accommodation extraocular muscles intact oropharynx reveals evidence of blistering and dry cracked lower lip however between the buccal surface of the lower lip and teeth the patient has areas of blisters most consistent with stomatitis no other intraoral lesions no fevers no chills patient is tolerating p.o.

## 2024-06-12 ENCOUNTER — EMERGENCY (EMERGENCY)
Facility: HOSPITAL | Age: 54
LOS: 0 days | Discharge: HOME | End: 2024-06-12
Attending: EMERGENCY MEDICINE
Payer: MEDICARE

## 2024-06-12 VITALS
HEART RATE: 94 BPM | OXYGEN SATURATION: 99 % | TEMPERATURE: 98 F | DIASTOLIC BLOOD PRESSURE: 75 MMHG | SYSTOLIC BLOOD PRESSURE: 126 MMHG

## 2024-06-12 VITALS
WEIGHT: 175.05 LBS | OXYGEN SATURATION: 98 % | DIASTOLIC BLOOD PRESSURE: 87 MMHG | HEIGHT: 64 IN | HEART RATE: 116 BPM | SYSTOLIC BLOOD PRESSURE: 128 MMHG | TEMPERATURE: 98 F | RESPIRATION RATE: 16 BRPM

## 2024-06-12 DIAGNOSIS — Z91.018 ALLERGY TO OTHER FOODS: ICD-10-CM

## 2024-06-12 DIAGNOSIS — F31.9 BIPOLAR DISORDER, UNSPECIFIED: ICD-10-CM

## 2024-06-12 DIAGNOSIS — Z85.3 PERSONAL HISTORY OF MALIGNANT NEOPLASM OF BREAST: ICD-10-CM

## 2024-06-12 DIAGNOSIS — Z98.890 OTHER SPECIFIED POSTPROCEDURAL STATES: Chronic | ICD-10-CM

## 2024-06-12 DIAGNOSIS — E11.9 TYPE 2 DIABETES MELLITUS WITHOUT COMPLICATIONS: ICD-10-CM

## 2024-06-12 DIAGNOSIS — Z88.0 ALLERGY STATUS TO PENICILLIN: ICD-10-CM

## 2024-06-12 DIAGNOSIS — E78.5 HYPERLIPIDEMIA, UNSPECIFIED: ICD-10-CM

## 2024-06-12 DIAGNOSIS — Z20.822 CONTACT WITH AND (SUSPECTED) EXPOSURE TO COVID-19: ICD-10-CM

## 2024-06-12 DIAGNOSIS — I10 ESSENTIAL (PRIMARY) HYPERTENSION: ICD-10-CM

## 2024-06-12 DIAGNOSIS — Z88.8 ALLERGY STATUS TO OTHER DRUGS, MEDICAMENTS AND BIOLOGICAL SUBSTANCES: ICD-10-CM

## 2024-06-12 LAB
ANION GAP SERPL CALC-SCNC: 13 MMOL/L — SIGNIFICANT CHANGE UP (ref 7–14)
APAP SERPL-MCNC: <5 UG/ML — LOW (ref 10–30)
APPEARANCE UR: CLEAR — SIGNIFICANT CHANGE UP
BACTERIA # UR AUTO: ABNORMAL /HPF
BASOPHILS # BLD AUTO: 0.02 K/UL — SIGNIFICANT CHANGE UP (ref 0–0.2)
BASOPHILS NFR BLD AUTO: 0.3 % — SIGNIFICANT CHANGE UP (ref 0–1)
BILIRUB UR-MCNC: NEGATIVE — SIGNIFICANT CHANGE UP
BUN SERPL-MCNC: 18 MG/DL — SIGNIFICANT CHANGE UP (ref 10–20)
CALCIUM SERPL-MCNC: 9.2 MG/DL — SIGNIFICANT CHANGE UP (ref 8.4–10.5)
CARBAMAZEPINE SERPL-MCNC: 4 UG/ML — SIGNIFICANT CHANGE UP (ref 4–12)
CHLORIDE SERPL-SCNC: 105 MMOL/L — SIGNIFICANT CHANGE UP (ref 98–110)
CO2 SERPL-SCNC: 24 MMOL/L — SIGNIFICANT CHANGE UP (ref 17–32)
COLOR SPEC: SIGNIFICANT CHANGE UP
CREAT SERPL-MCNC: 0.8 MG/DL — SIGNIFICANT CHANGE UP (ref 0.7–1.5)
DIFF PNL FLD: NEGATIVE — SIGNIFICANT CHANGE UP
EGFR: 86 ML/MIN/1.73M2 — SIGNIFICANT CHANGE UP
EOSINOPHIL # BLD AUTO: 0.27 K/UL — SIGNIFICANT CHANGE UP (ref 0–0.7)
EOSINOPHIL NFR BLD AUTO: 4.2 % — SIGNIFICANT CHANGE UP (ref 0–8)
EPI CELLS # UR: SIGNIFICANT CHANGE UP
ETHANOL SERPL-MCNC: <10 MG/DL — SIGNIFICANT CHANGE UP
GLUCOSE SERPL-MCNC: 175 MG/DL — HIGH (ref 70–99)
GLUCOSE UR QL: NEGATIVE MG/DL — SIGNIFICANT CHANGE UP
HCT VFR BLD CALC: 31.1 % — LOW (ref 37–47)
HGB BLD-MCNC: 11 G/DL — LOW (ref 12–16)
IMM GRANULOCYTES NFR BLD AUTO: 0.2 % — SIGNIFICANT CHANGE UP (ref 0.1–0.3)
KETONES UR-MCNC: ABNORMAL MG/DL
LEUKOCYTE ESTERASE UR-ACNC: ABNORMAL
LYMPHOCYTES # BLD AUTO: 1.56 K/UL — SIGNIFICANT CHANGE UP (ref 1.2–3.4)
LYMPHOCYTES # BLD AUTO: 24.2 % — SIGNIFICANT CHANGE UP (ref 20.5–51.1)
MCHC RBC-ENTMCNC: 29.4 PG — SIGNIFICANT CHANGE UP (ref 27–31)
MCHC RBC-ENTMCNC: 35.4 G/DL — SIGNIFICANT CHANGE UP (ref 32–37)
MCV RBC AUTO: 83.2 FL — SIGNIFICANT CHANGE UP (ref 81–99)
MONOCYTES # BLD AUTO: 0.35 K/UL — SIGNIFICANT CHANGE UP (ref 0.1–0.6)
MONOCYTES NFR BLD AUTO: 5.4 % — SIGNIFICANT CHANGE UP (ref 1.7–9.3)
NEUTROPHILS # BLD AUTO: 4.24 K/UL — SIGNIFICANT CHANGE UP (ref 1.4–6.5)
NEUTROPHILS NFR BLD AUTO: 65.7 % — SIGNIFICANT CHANGE UP (ref 42.2–75.2)
NITRITE UR-MCNC: NEGATIVE — SIGNIFICANT CHANGE UP
NRBC # BLD: 0 /100 WBCS — SIGNIFICANT CHANGE UP (ref 0–0)
PH UR: 5.5 — SIGNIFICANT CHANGE UP (ref 5–8)
PLATELET # BLD AUTO: 116 K/UL — LOW (ref 130–400)
PMV BLD: 11.7 FL — HIGH (ref 7.4–10.4)
POTASSIUM SERPL-MCNC: 3.7 MMOL/L — SIGNIFICANT CHANGE UP (ref 3.5–5)
POTASSIUM SERPL-SCNC: 3.7 MMOL/L — SIGNIFICANT CHANGE UP (ref 3.5–5)
PROT UR-MCNC: 30 MG/DL
RBC # BLD: 3.74 M/UL — LOW (ref 4.2–5.4)
RBC # FLD: 13.6 % — SIGNIFICANT CHANGE UP (ref 11.5–14.5)
RBC CASTS # UR COMP ASSIST: 0 /HPF — SIGNIFICANT CHANGE UP (ref 0–4)
SALICYLATES SERPL-MCNC: <0.3 MG/DL — LOW (ref 4–30)
SARS-COV-2 RNA SPEC QL NAA+PROBE: SIGNIFICANT CHANGE UP
SODIUM SERPL-SCNC: 142 MMOL/L — SIGNIFICANT CHANGE UP (ref 135–146)
SP GR SPEC: >1.03 — HIGH (ref 1–1.03)
UROBILINOGEN FLD QL: 1 MG/DL — SIGNIFICANT CHANGE UP (ref 0.2–1)
WBC # BLD: 6.45 K/UL — SIGNIFICANT CHANGE UP (ref 4.8–10.8)
WBC # FLD AUTO: 6.45 K/UL — SIGNIFICANT CHANGE UP (ref 4.8–10.8)
WBC UR QL: 5 /HPF — SIGNIFICANT CHANGE UP (ref 0–5)

## 2024-06-12 PROCEDURE — 99285 EMERGENCY DEPT VISIT HI MDM: CPT | Mod: 25

## 2024-06-12 PROCEDURE — 80307 DRUG TEST PRSMV CHEM ANLYZR: CPT

## 2024-06-12 PROCEDURE — 90792 PSYCH DIAG EVAL W/MED SRVCS: CPT | Mod: 2W

## 2024-06-12 PROCEDURE — 99285 EMERGENCY DEPT VISIT HI MDM: CPT | Mod: FS

## 2024-06-12 PROCEDURE — 81001 URINALYSIS AUTO W/SCOPE: CPT

## 2024-06-12 PROCEDURE — 85025 COMPLETE CBC W/AUTO DIFF WBC: CPT

## 2024-06-12 PROCEDURE — 93005 ELECTROCARDIOGRAM TRACING: CPT

## 2024-06-12 PROCEDURE — 36415 COLL VENOUS BLD VENIPUNCTURE: CPT

## 2024-06-12 PROCEDURE — 87635 SARS-COV-2 COVID-19 AMP PRB: CPT

## 2024-06-12 PROCEDURE — 93010 ELECTROCARDIOGRAM REPORT: CPT

## 2024-06-12 PROCEDURE — 80156 ASSAY CARBAMAZEPINE TOTAL: CPT

## 2024-06-12 PROCEDURE — 80048 BASIC METABOLIC PNL TOTAL CA: CPT

## 2024-06-12 RX ORDER — ACETAMINOPHEN 500 MG
975 TABLET ORAL ONCE
Refills: 0 | Status: COMPLETED | OUTPATIENT
Start: 2024-06-12 | End: 2024-06-12

## 2024-06-12 RX ADMIN — Medication 975 MILLIGRAM(S): at 19:54

## 2024-06-12 NOTE — ED BEHAVIORAL HEALTH ASSESSMENT NOTE - PRIOR MEDICATION SIDE EFFECTS OR ADVERSE REACTIONS
HISTORY OF PRESENT ILLNESS:   Ellen is a 25 year old       female seen today for pap, pelvic and breast exam and discussion about birth control. 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 She says that her periods have become more irregular since the switch and she usually bleeds on the 2nd week of her mini pill pack. Patient says she is sexually active. No pain with sex, but sometimes she experiences mild bleeding.  She was told this was because of being on the pill.  She denies vaginal discharge or foul smelling odors. She denies dysuria, urinary urgency and frequency. No change in her FH of breast, endometrial, uterine or ovarian cancer. No breast complaints.    I have reviewed the patient's medications, allergies, and past medical, surgical, social, obstetrical and family histories, updating these as appropriate.  See Histories section of the electronic medical record for a display of this information.    REVIEW OF SYSTEMS: 14-point Review of Systems form was filled out by the patient and reviewed. No pertinent positives     PHYSICAL EXAM:   Visit Vitals  /80   Wt 131.5 kg   LMP 2020   BMI 52.20 kg/m²     GENERAL: Well-developed and well-nourished, appropriately groomed.  NECK:  Supple.  No lymphadenopathy or thyromegaly noted.  HEART:  Regular rate and rhythm, no murmurs noted.  LUNGS:  Normal respiratory effort.  Clear to auscultation bilaterally.  BREASTS:  Symmetric.  Examined in both the sitting and supine positions. No nipple discharge or discoloration.  No retraction or dimpling of the skin.  Nontender with no masses or lumps palpated.  Supraclavicular and axillary areas also free from mass.  ABDOMEN:  Soft, nontender, nondistended.   SKIN:  No lesion or rashes.  PSYCHIATRIC: Alert and oriented to person, place and time with appropriate affect and mood.  GYNECOLOGIC: Normal female  external genitalia and hair distribution.  Urethra midline with no masses.  Bladder and rectum are nontender.  Normal physiologic discharge noted.  No cervical or vaginal lesions.  Pap collected.  Vaginal tissue is well estrogenized.  Good pelvic support with no evidence of prolapse.  On bimanual exam the uterus palpates to be of normal size, shape and contour in the midline and is freely moveable.  Adnexa are without mass.  No cervical motion, uterine or adnexal tenderness.     ASSESSMENT & PLAN:   25 year old female presents for a well woman exam with a pap-smear and to discuss contraception options.     1. Well woman exam  - benign breast exam  - Pap collected    2. Contraception counseling  - Patient would like a Mirena IUD after discussion of various BC methods. She explains she will schedule an appointment for the procedure when she is ready.       Yes

## 2024-06-12 NOTE — ED BEHAVIORAL HEALTH ASSESSMENT NOTE - CURRENT MEDICATION
Carbamezapine 200mg Qam, 300mg QHs  Prolixin Decanoate 50mg [id number] IM  Cogentin 1mg BID  Prazosin 1mg QHs

## 2024-06-12 NOTE — ED BEHAVIORAL HEALTH ASSESSMENT NOTE - NSSUICPROTFACT_PSY_ALL_CORE
Responsibility to children, family, or others/Identifies reasons for living/Supportive social network of family or friends/Fear of death or the actual act of killing self/Positive therapeutic relationships/Jew beliefs

## 2024-06-12 NOTE — ED BEHAVIORAL HEALTH ASSESSMENT NOTE - HPI (INCLUDE ILLNESS QUALITY, SEVERITY, DURATION, TIMING, CONTEXT, MODIFYING FACTORS, ASSOCIATED SIGNS AND SYMPTOMS)
Ms. Quinn is a 54-year-old woman, , living with her two daughters and dog, on disability, financially supported by her parents who live separately, past medical hx of breast cancer in remission, DM, HTN, HLD, past psychiatric hx of bipolar disorder, in outpatient treatment at [facility] (psychiatrist, Dr. Jean), taking Prolixin Dec and Tegratol, has hx of remote SA in 1986 via overdose, has past IP admissions (most recently at [facility] in Aug 2023 and March 2023), no substance use issues, no hx of violence or legal issues.  BIB mother due to concerns for 10 days of poor sleep, hyperverbal speech, calling people in the middle of the night.    On evaluation patient is calm, cooperative, has good eye contact, is well related.  Speech is normal.  Thought process is grossly linear, but can be mildly tangential at times.  No signs of internal preoccupation.      Patient reports that she has been having some disturbance in sleep for the past 2 nights only, denies having such issues for 10 days as her mother reported to ED staff.  Patient reports that 2 nights ago she was only able to sleep 3-4 hours, causing her to be very tired yesterday and subsequently last night went to bed at 8pm and woke up around 5am.  She reports that her daughters get home late at night, the dog barks and wakes her up, and recently her daughter has been going into [place] and getting back very late at night, and recently brought a man back home with her.  These things have been making her anxious and disrupting her sleep.  Patient denies any increased energy, lack of need for sleep, obsessional thoughts, elated mood, irritability, spending sprees, increased goal directed behaviors, hypersexual behaviors or thoughts.  She denies any depressive symptoms, psychotic symptoms, or significant anxiety. Denies any SI, plan or intent.  Denies any HI or recent aggression.  Denies any recent substance use or alcohol use of any kind.     Patient reports that she saw her psychiatrist Dr. Jean 2 days ago due to her mother's concerns, and the doctor advised her to add oral Prolixin to her regimen.  She's not sure what dose of Prolixin was sent to the pharmacy, but she said that her daughter has been helping her with her medications.  Patient reports that the daughter might have given her extra prazosin instead of adding the prolixin(?) but she's unsure.  She reports feeling more tired since this change was made to her meds.  Patient feels she does not need to be hospitalized at this time and she feels that her mom is being overly cautious.  She reports feeling safe to go home and following up with her psychiatrist.  Patient has been compliant with all of her other medications including her Prolixing dec injection, however she cannot recall the last date of the injection.     Spoke with mother Janki [phone]  Mother reports that patient has had 10 days of irregular sleep - sleeps 3-4 hours at night and then crashes around 3pm the next day.  When she stays up late or is not sleeping the entire night, trenet will call family members at odd hours of the night and has been talking a bit faster than usual, however mother denies any pressured speech or inability to interrupt the patient.  Since the patient hasn't been sleeping throughout the night, she has been Walking the dog at 6am in the park and this is concerning to the mom because she doesn't usually walk the dog this early.  Mom reports that patient recently bought herself an Apple watch, when this is not a financially responsible decision for her, since the parents are supporting her and have to pay her mortgage, however mother denies any significant spending sprees or gambling.  Mom reports pt has been somewhat irritable, but not agitated, aggressive, or violent.  She reports that patient has talked about wanting to go on a vacation with her friends to [facility] at the end of the summer, which she cannot afford.  Mom denies the pt buying plane tickets or actually booking any travel plans.  Patient has been eating sweets/not following her diabetic diet.  Mom took the pt to the psychiatrist Dr. Jean at [facility] 2 days ago and since then pt was instructed to add oral Prolixin to her regimen; mom reports that over the past 2 days patient has been less energized, is sleeping more, speech is slowing down, mood is "more mellow", however mother is still concerned because the patient seems "sloppy", which mom describes as not keeping her mail and bills in order, not following a diabetic diet.  Mom confirms patient is showering and changing her clothes daily. Ms. Quinn is a 54-year-old woman, , living with her two daughters and dog, on disability, financially supported by her parents who live separately, past medical hx of breast cancer in remission, DM, HTN, HLD, past psychiatric hx of bipolar disorder, in outpatient treatment at [facility] (psychiatrist, Dr. Jean), taking Prolixin Dec and Tegratol, has hx of remote SA in 1986 via overdose, has past IP admissions (most recently at [facility] in Aug 2023 and March 2023), no substance use issues, no hx of violence or legal issues.  BIB mother due to concerns for 10 days of poor sleep, hyperverbal speech, calling people in the middle of the night.    On evaluation patient is calm, cooperative, has good eye contact, is well related.  Speech is normal.  Thought process is grossly linear, but can be mildly tangential at times.  No signs of internal preoccupation.      Patient reports that she has been having some disturbance in sleep for the past 2 nights only, denies having such issues for 10 days as her mother reported to ED staff.  Patient reports that 2 nights ago she was only able to sleep 3-4 hours, causing her to be very tired yesterday and subsequently last night went to bed at 8pm and woke up around 5am.  She reports that her daughters get home late at night, the dog barks and wakes her up, and recently her daughter has been going into [place] and getting back very late at night, and recently brought a man back home with her.  These things have been making her anxious and disrupting her sleep.  Patient denies any increased energy, lack of need for sleep, obsessional thoughts, elated mood, irritability, spending sprees, increased goal directed behaviors, hypersexual behaviors or thoughts.  She denies any depressive symptoms, psychotic symptoms, or significant anxiety. Denies any SI, plan or intent.  Denies any HI or recent aggression.  Denies any recent substance use or alcohol use of any kind.     Patient reports that she saw her psychiatrist Dr. Jean 2 days ago due to her mother's concerns, and the doctor advised her to add oral Prolixin to her regimen.  She's not sure what dose of Prolixin was sent to the pharmacy, but she said that her daughter has been helping her with her medications.  Patient reports that the daughter might have given her extra prazosin instead of adding the prolixin(?) but she's unsure.  She reports feeling more tired since this change was made to her meds.  Patient feels she does not need to be hospitalized at this time and she feels that her mom is being overly cautious.  She reports feeling safe to go home and following up with her psychiatrist.  Patient has been compliant with all of her other medications including her Prolixin dec injection, however she cannot recall the last date of the injection.     Spoke with mother Janki [phone]  Mother reports that patient has had 10 days of irregular sleep - sleeps 3-4 hours at night and then crashes around 3pm the next day.  When she stays up late or is not sleeping the entire night, trenet will call family members at odd hours of the night and has been talking a bit faster than usual, however mother denies any pressured speech or inability to interrupt the patient.  Since the patient hasn't been sleeping throughout the night, she has been Walking the dog at 6am in the park and this is concerning to the mom because she doesn't usually walk the dog this early.  Mom reports that patient recently bought herself an Apple watch, when this is not a financially responsible decision for her, since the parents are supporting her and have to pay her mortgage, however mother denies any significant spending sprees or gambling.  Mom reports pt has been somewhat irritable, but not agitated, aggressive, or violent.  She reports that patient has talked about wanting to go on a vacation with her friends to [place] at the end of the summer, which she cannot afford.  Mom denies the pt buying plane tickets or actually booking any travel plans.  Patient has been eating sweets/not following her diabetic diet.  Mom took the pt to the psychiatrist Dr. Jean at [facility] 2 days ago and since then pt was instructed to add oral Prolixin to her regimen; mom reports that over the past 2 days patient has been less energized, is sleeping more, speech is slowing down, mood is "more mellow", however mother is still concerned because the patient seems "sloppy", which mom describes as not keeping her mail and bills in order, not following a diabetic diet.  Mom confirms patient is showering and changing her clothes daily.

## 2024-06-12 NOTE — ED PROVIDER NOTE - OBJECTIVE STATEMENT
Patient is a 54-year-old female here for evaluation of bipolar disorder, hyperlipidemia, diabetes, hypertension here for evaluation of manic behavior over the past 10 days according to mother who is bedside.  Patient and family have been in contact with their psychiatrist who recommends psychiatric evaluation in the emergency department.  Patient denies suicidal homicidal ideation, illicit drug use

## 2024-06-12 NOTE — ED BEHAVIORAL HEALTH ASSESSMENT NOTE - SAFETY PLAN ADDT'L DETAILS
Education provided regarding environmental safety / lethal means restriction/Provision of National Suicide Prevention Lifeline 3-356-510-TALK (8319) Safety plan discussed with.../Education provided regarding environmental safety / lethal means restriction/Provision of National Suicide Prevention Lifeline 9-888-712-USAO (9341)

## 2024-06-12 NOTE — ED PROVIDER NOTE - NSFOLLOWUPINSTRUCTIONS_ED_ALL_ED_FT
Follow up with your primary care doctor and your psychatrist doctor in 1-2 dyas    Bipolar Disorder    WHAT YOU NEED TO KNOW:    Bipolar disorder is a long-term chemical imbalance that causes rapid changes in mood and behavior. High moods are called casi. Low moods are called depression. Sometimes you will feel manic and sometimes you will feel depressed. You can have alternating episodes of casi and depression. This is called a mixed bipolar state.     DISCHARGE INSTRUCTIONS:    Contact your healthcare provider or psychiatrist if:     You are having trouble managing your bipolar disorder.       You cannot sleep, or are sleeping all the time.       You cannot eat, or are eating more than usual.      You feel dizzy or your stomach is upset.       You cannot make it to your next meeting with your healthcare provider.      You have questions or concerns about your condition or care.    Medicines:     Medicines may be given to help keep your mood stable, or to help you sleep. Changes in medicine are often needed as your bipolar disorder changes.      Take your medicine as directed. Contact your healthcare provider if you think your medicine is not helping or if you have side effects. Tell him or her if you are allergic to any medicine. Keep a list of the medicines, vitamins, and herbs you take. Include the amounts, and when and why you take them. Bring the list or the pill bottles to follow-up visits. Carry your medicine list with you in case of an emergency.    Follow up with your healthcare provider or psychiatrist as directed: You may need to return for blood tests to monitor the levels of bipolar medicine in your blood.    Manage bipolar disorder: Watch for triggers of bipolar disorder symptoms, such as stress. Learn new ways to relax, such as deep breathing, to manage your stress. Tell someone if you feel a manic or depressive period might be coming on. Ask a friend or family member to help watch you for bipolar symptoms. Work to develop skills that will help you manage bipolar disorder. You may need to make lifestyle changes. Ask your healthcare provider or psychiatrist for resources.     For support and more information:     [employer] [place] [employer] ([facility]), Public Information & Communication Branch  [address], [address], [address]  [place]   Phone: 1-162.395.8858  Phone: 1-292.173.4599  Web Address: http://www.[facility].nih.gov/      [facility]ipolar [facility] [place] ([facility])  [address] [facility], [address],[place]  Phone: [phone]  Web Address: http://www.[employer].iiMonde            © Copyright [employer] 2019 All illustrations and images included in CareNotes are the copyrighted property of A.D.A[employer]M., Inc[employer] or [employer].

## 2024-06-12 NOTE — ED BEHAVIORAL HEALTH ASSESSMENT NOTE - SUMMARY
Ms. Quinn is a 54-year-old woman, , living with her two daughters and dog, on disability, financially supported by her parents who live separately, past medical hx of breast cancer in remission, DM, HTN, HLD, past psychiatric hx of bipolar disorder, in outpatient treatment at [facility] (psychiatrist, Dr. Jean), taking Prolixin Dec and Tegratol, has hx of remote SA in 1986 via overdose, has past IP admissions (most recently at [facility] in Aug 2023 and March 2023), no substance use issues, no hx of violence or legal issues.  BIB mother due to concerns for 10 days of poor sleep, hyperverbal speech, calling people in the middle of the night. Ms. Quinn is a 54-year-old woman, , living with her two daughters and dog, on disability, financially supported by her parents who live separately, past medical hx of breast cancer in remission, DM, HTN, HLD, past psychiatric hx of bipolar disorder, in outpatient treatment at [facility] (psychiatrist, Dr. Jean), taking Prolixin Dec and Tegratol, has hx of remote SA in 1986 via overdose, has past IP admissions (most recently at [facility] in Aug 2023 and March 2023), no substance use issues, no hx of violence or legal issues.  BIB mother due to concerns for 10 days of poor sleep, hyperverbal speech, calling people in the middle of the night.    On evaluation patient is not acutely manic, psychotic, depressed, suicidal, homicidal, or otherwise a danger to self or others.  Although patient denies any manic/hypomanic symptoms last week, patient's mother reports that patient had an irregular sleep schedule, was calling family in the middle of the night, and was slightly irritable.  However, Since medications were adjusted by psychiatrist at [facility], mother and patient both report that her sleep is improved.  Furthermore, she has euthymic affect, normal speech, normal thought process, and has no signs of increased goal directed behavior, impulsivity, violence, aggression, substance use, delusional thoughts, hallucinations or other concerns for casi or psychosis.  Patient is declining voluntary psychiatric hospitalization and does not meet minimum criteria for involuntary hospitalization.      Although pt has risk factors of mood disorder, remote hx of SA, past IP admissions, not employed; patient has multiple protective factors that mitigate these risks including stable mood, future orientation, compliant with treatment, engaged in treatment, not suicidal, no hx of violence, is sober, has supportive family, has care seeking behavior.    Recommendations:  - no psychiatric contraindication to discharge  - pt can followup with Dr. Jean at [facility] at next appointment   - continue home meds as prescribed by Dr. Jean, including getting next PATHAK (unclear what next due date is for injection, as pt did not remember)

## 2024-06-12 NOTE — ED PROVIDER NOTE - PATIENT PORTAL LINK FT
You can access the FollowMyHealth Patient Portal offered by [facility] by registering at the following website: [email or web address]. By joining [employer]’s FollowMyHealth portal, you will also be able to view your health information using other applications (apps) compatible with our system.

## 2024-06-12 NOTE — ED ADULT TRIAGE NOTE - SPO2 (%)
98
Patient to be assessed for home monitoring day 8 condition update, patient is currently admitted. NCM did not call patient. Will follow up with patient after discharge to continue home monitoring.
99

## 2024-06-12 NOTE — ED BEHAVIORAL HEALTH ASSESSMENT NOTE - NSBHATTESTCOMMENTATTENDFT_PSY_A_CORE
53 yo F; domiciled with 2 adult daughters; on disability and pension (previously worked as a teacher); PPHx of bipolar disorder, hx of SA, no known hx of violence; BIB mother; psychiatry consulted for casi.  Pt states it was her mom's idea for her to come to the ED because she has not been sleeping great.  She states that her mom thought she could just talk to the inpatient doctor and doesn't understand the ED process.  She states she talked to her psychiatrist today and that she knows her issues, does not feel she requires admission at this time.  She states her sleep is not great because one of her daughters goes out late and the other wakes up early, states she worries about her daughters being at clubs, also notes the dog barks at night.  She denies excessive spending, states she learned at [facility] in the past that if she is feeling manic she should go to the [employer] instead of [employer].  She reports that she recently traded in her iphone for a newer version and watch because there was a good deal, reportedly received credit towards the new phone and money off her [employer] bill, states she plans to pay the remainder off over 3 years.  Pt reports she won't sleep for 1-2 days then becomes exhausted and sleeps, identifies her symptoms as "rapid cycling."  She denies any recent SI/HI/AVH.  She states when her mood is down she watches Sh[employer] or goes on [employer].  Pt declines offer for voluntary admission, states in the past she has been kept for 2+ weeks and she would rather be home to spend Father's Day with her dad because "it could be his last," also notes it is beautiful outside.  She identifies her children as protective factors.  She states her next appt with psychiatrist is on 6/18, states she can call him sooner, able to report clinic number by memory.  Pt states she does not want to go to [facility] because it is "disgusting" but agrees to call [phone] or return to [facility] ED if symptoms worsen or she develops SI/HI.  She gives consent for telepsych to speak with her daughters ([phone] and [phone]).    Writer spoke to pt's daughter.  She states that it was a joint decision by herself, pt, and pt's mother for pt to come to the ED.  She denies having any specific safety concerns though feels pt shouldn't be driving at night.  She denies that pt has gotten into any accidents but worries about that happening and states that she keeps pt's keys to avoid this.  She states the other day pt wasn't sure which medications she was supposed to be taking.  Collateral states she usually helps administer pt's medications but is not always there, but agrees she can manage the medications and leave the correct medications out for pt moving forward.  She denies that pt has expressed SI/HI recently.  She states when pt is manic she spends money, unable to quantify how much pt has spent recently but states pt buys things they don't need (ex: pt bought books yesterday and likes to buy people presents), admits that pt has been doing this "her whole life."  She states pt has been keeping up with doctor appts.  She feels that if pt wants to return home then she should be allowed to.  She can pick pt up from the ED upon discharge. 53 yo F; domiciled with 2 adult daughters; on disability and pension (previously worked as a teacher); PPHx of bipolar disorder, hx of SA, no known hx of violence; BIB mother; psychiatry consulted for casi.  Pt states it was her mom's idea for her to come to the ED because she has not been sleeping great.  She states that her mom thought she could just talk to the inpatient doctor and doesn't understand the ED process.  She states she talked to her psychiatrist today and that she knows her issues, does not feel she requires admission at this time.  She states her sleep is not great because one of her daughters goes out late and the other wakes up early, states she worries about her daughters being at clubs, also notes the dog barks at night.  She denies excessive spending, states she learned at [facility] in the past that if she is feeling manic she should go to the [employer] instead of [employer].  She reports that she recently traded in her iphone for a newer version and watch because there was a good deal, reportedly received credit towards the new phone and money off her [employer] bill, states she plans to pay the remainder off over 3 years.  Pt reports she won't sleep for 1-2 days then becomes exhausted and sleeps, identifies her symptoms as "rapid cycling."  She denies any recent SI/HI/AVH.  She states when her mood is down she watches Sh[employer] or goes on [employer].  Pt declines offer for voluntary admission, states in the past she has been kept for 2+ weeks and she would rather be home to spend Father's Day with her dad because "it could be his last," also notes it is beautiful outside.  She identifies her children as protective factors.  She states her next appt with psychiatrist is on 6/18, states she can call him sooner, able to report clinic number by memory.  Pt states she does not want to go to [facility] because it is "disgusting" but agrees to call [phone] or return to [facility] ED if symptoms worsen or she develops SI/HI.  She gives consent for telepsych to speak with her daughters ([phone] and [phone]).    Writer spoke to pt's daughter.  She states that it was a joint decision by herself, pt, and pt's mother for pt to come to the ED.  She denies having any specific safety concerns though feels pt shouldn't be driving at night.  She denies that pt has gotten into any accidents but worries about that happening and states that she keeps pt's keys to avoid this.  She states the other day pt wasn't sure which medications she was supposed to be taking.  Collateral states she usually helps administer pt's medications but is not always there, but agrees she can manage the medications and leave the correct medications out for pt moving forward.  She denies that pt has expressed SI/HI recently.  She states when pt is manic she spends money, unable to quantify how much pt has spent recently but states pt buys things they don't need (ex: pt bought books yesterday and likes to buy people presents), admits that pt has been doing this "her whole life."  She states pt has been keeping up with doctor appts.  She feels that if pt wants to return home then she should be allowed to.  She can pick pt up from the ED upon discharge.    Pt does not appear acutely depressed, psychotic, manic, anxious, agitated, or intoxicated.  Per fellow, pt's mother is somewhat concerned about sleep disturbance and spending but no acute dangerousness conveyed.  Pt's daughter has no acute safety concerns and states she can manage pt's medications for her.  Pt has established outpatient treatment and is compliant with follow up.  Pt declines offer for voluntary admission and does not meet criteria for involuntary admission at this time as outpatient setting is least restrictive.

## 2024-06-12 NOTE — ED BEHAVIORAL HEALTH ASSESSMENT NOTE - DESCRIPTION
Vital Signs Last 24 Hrs  T(C): 36.6 (12 Jun 2024 14:30), Max: 36.6 (12 Jun 2024 14:30)  T(F): 97.9 (12 Jun 2024 14:30), Max: 97.9 (12 Jun 2024 14:30)  HR: 116 (12 Jun 2024 14:30) (116 - 116)  BP: 128/87 (12 Jun 2024 14:30) (128/87 - 128/87)  BP(mean): --  RR: 16 (12 Jun 2024 14:30) (16 - 16)  SpO2: 98% (12 Jun 2024 14:30) (98% - 98%)    Parameters below as of 12 Jun 2024 14:30  Patient On (Oxygen Delivery Method): room air DM, HTN, HLD, breast CA in remission, as in hpi

## 2024-06-12 NOTE — ED BEHAVIORAL HEALTH ASSESSMENT NOTE - DETAILS
2 adult daughters who live with her lamictal - rash? denies any SI, plan or intent, denies any prep steps or recent NSSIB.  admits to remote hx of SA in 1986 via overdose not indicated spoke with mother discussed safety plan with pt and daughter

## 2024-06-12 NOTE — ED PROVIDER NOTE - ATTENDING APP SHARED VISIT CONTRIBUTION OF CARE
54-year-old female above past medical history brought in by family for evaluation and possible medication adjustment, patient denies SI/HI/hallucinations, is calm and cooperative in ED, medically and psychiatrically cleared for discharge

## 2024-06-12 NOTE — ED ADULT TRIAGE NOTE - CHIEF COMPLAINT QUOTE
Patient brought in for psychiatric evaluation. Patient family states shes manic and needs her medication adjusted as per Dr. Mares. Patient is endorsing HI/SI at this time, family at bedside.

## 2024-06-12 NOTE — ED BEHAVIORAL HEALTH ASSESSMENT NOTE - ADDITIONAL DETAILS ALL
denies any SI, plan or intent, denies any prep steps or recent NSSIB.  admits to remote hx of SA in 1986 via overdose

## 2024-06-12 NOTE — ED ADULT NURSE NOTE - NS_BH TRG QUESTION1_ED_ALL_ED
Detail Level: Detailed Render Post-Care Instructions In Note?: no Consent: The patient's consent was obtained including but not limited to risks of crusting, scabbing, blistering, scarring, darker or lighter pigmentary change, recurrence, incomplete removal and infection. Post-Care Instructions: I reviewed with the patient in detail post-care instructions. Patient is to wear sunprotection, and avoid picking at any of the treated lesions. Pt may apply Vaseline to crusted or scabbing areas. Duration Of Freeze Thaw-Cycle (Seconds): 0 No Consent 1/Introductory Paragraph: The rationale for Mohs was explained to the patient and informed, written consent was obtained.  The risks, benefits, and alternatives to therapy were discussed in detail.  Specifically, the risk of infection, scarring, bleeding, prolonged wound healing, incomplete removal, allergy to anesthesia, nerve injury and recurrence were addressed.  Prior to the procedure, the treatment site was clearly identified, marked and confirmed by patient.  All components of Universal protocol/PAUSE rule were completed.

## 2024-06-19 ENCOUNTER — OUTPATIENT (OUTPATIENT)
Dept: OUTPATIENT SERVICES | Facility: HOSPITAL | Age: 54
LOS: 1 days | End: 2024-06-19
Payer: MEDICARE

## 2024-06-19 ENCOUNTER — APPOINTMENT (OUTPATIENT)
Dept: CV DIAGNOSTICS | Facility: HOSPITAL | Age: 54
End: 2024-06-19
Payer: MEDICARE

## 2024-06-19 DIAGNOSIS — R07.9 CHEST PAIN, UNSPECIFIED: ICD-10-CM

## 2024-06-19 DIAGNOSIS — Z98.890 OTHER SPECIFIED POSTPROCEDURAL STATES: Chronic | ICD-10-CM

## 2024-06-19 PROCEDURE — 78452 HT MUSCLE IMAGE SPECT MULT: CPT | Mod: MC

## 2024-06-19 PROCEDURE — A9500: CPT

## 2024-06-19 PROCEDURE — 93016 CV STRESS TEST SUPVJ ONLY: CPT

## 2024-06-19 PROCEDURE — 93018 CV STRESS TEST I&R ONLY: CPT

## 2024-06-19 PROCEDURE — 93017 CV STRESS TEST TRACING ONLY: CPT

## 2024-06-19 PROCEDURE — 78452 HT MUSCLE IMAGE SPECT MULT: CPT | Mod: 26,MC

## 2024-06-19 RX ORDER — REGADENOSON 0.08 MG/ML
0.4 INJECTION, SOLUTION INTRAVENOUS ONCE
Refills: 0 | Status: DISCONTINUED | OUTPATIENT
Start: 2024-06-19 | End: 2024-07-03

## 2024-06-20 DIAGNOSIS — R07.9 CHEST PAIN, UNSPECIFIED: ICD-10-CM

## 2024-06-29 NOTE — ED ADULT TRIAGE NOTE - NS ED TRIAGE AVPU SCALE
Alert-The patient is alert, awake and responds to voice. The patient is oriented to time, place, and person. The triage nurse is able to obtain subjective information.
29-Jun-2024 19:11

## 2024-07-17 ENCOUNTER — NON-APPOINTMENT (OUTPATIENT)
Age: 54
End: 2024-07-17

## 2024-07-29 ENCOUNTER — NON-APPOINTMENT (OUTPATIENT)
Age: 54
End: 2024-07-29

## 2024-08-15 DIAGNOSIS — N80.03 ADENOMYOSIS OF THE UTERUS: ICD-10-CM

## 2024-08-15 DIAGNOSIS — R10.2 PELVIC AND PERINEAL PAIN: ICD-10-CM

## 2024-08-19 ENCOUNTER — NON-APPOINTMENT (OUTPATIENT)
Age: 54
End: 2024-08-19

## 2024-08-28 ENCOUNTER — APPOINTMENT (OUTPATIENT)
Dept: CARDIOLOGY | Facility: CLINIC | Age: 54
End: 2024-08-28
Payer: MEDICARE

## 2024-08-28 PROCEDURE — 93923 UPR/LXTR ART STDY 3+ LVLS: CPT

## 2024-08-28 PROCEDURE — 93925 LOWER EXTREMITY STUDY: CPT

## 2024-09-09 ENCOUNTER — APPOINTMENT (OUTPATIENT)
Age: 54
End: 2024-09-09
Payer: MEDICARE

## 2024-09-09 ENCOUNTER — OUTPATIENT (OUTPATIENT)
Dept: OUTPATIENT SERVICES | Facility: HOSPITAL | Age: 54
LOS: 1 days | End: 2024-09-09
Payer: MEDICARE

## 2024-09-09 VITALS
OXYGEN SATURATION: 99 % | WEIGHT: 178 LBS | HEIGHT: 64 IN | HEART RATE: 86 BPM | SYSTOLIC BLOOD PRESSURE: 129 MMHG | DIASTOLIC BLOOD PRESSURE: 82 MMHG | BODY MASS INDEX: 30.39 KG/M2 | RESPIRATION RATE: 19 BRPM | TEMPERATURE: 98 F

## 2024-09-09 DIAGNOSIS — C50.411 MALIGNANT NEOPLASM OF UPPER-OUTER QUADRANT OF RIGHT FEMALE BREAST: ICD-10-CM

## 2024-09-09 DIAGNOSIS — Z98.890 OTHER SPECIFIED POSTPROCEDURAL STATES: Chronic | ICD-10-CM

## 2024-09-09 DIAGNOSIS — Z17.0 MALIGNANT NEOPLASM OF UPPER-OUTER QUADRANT OF RIGHT FEMALE BREAST: ICD-10-CM

## 2024-09-09 DIAGNOSIS — M85.80 OTHER SPECIFIED DISORDERS OF BONE DENSITY AND STRUCTURE, UNSPECIFIED SITE: ICD-10-CM

## 2024-09-09 DIAGNOSIS — C50.919 MALIGNANT NEOPLASM OF UNSPECIFIED SITE OF UNSPECIFIED FEMALE BREAST: ICD-10-CM

## 2024-09-09 DIAGNOSIS — Z79.811 ENCOUNTER FOR THERAPEUTIC DRUG LVL MONITORING: ICD-10-CM

## 2024-09-09 DIAGNOSIS — Z51.81 ENCOUNTER FOR THERAPEUTIC DRUG LVL MONITORING: ICD-10-CM

## 2024-09-09 PROCEDURE — 99214 OFFICE O/P EST MOD 30 MIN: CPT

## 2024-09-09 NOTE — HISTORY OF PRESENT ILLNESS
[de-identified] :  50 year old female with PMH of Bipolar disorder, HTN, DM , asthma is referred for consultation of adjuvant systemic treatment for new diagnosis of breast cancer. She is here accompanied by her mother. \par  \par  She had a screening mammogram (10/26/2020) which showed a 4 mm spiculated lesion in the right breast in the upper outer quadrant. Diagnostic mammogram and ultrasound on 2020 showed a new 4 mm spiculated density in the 11 o'clock, 12 cm FN right breast. BI-RADS 4 Suspicious. Recommend biopsy. On 2020, she had a biopsy of the right breast abnormality at 10 o'clock, 12 cm FN and pathology that showed invasive mammary carcinoma with lobular features, moderately differentiated. \par  \par  On 12/10/2020, outside slides reviewed at [facility] from [facility] shows, invasive well differentiated lobular carcinoma, classical type and lobular carcinoma in-situ. It was ER/NM positive %, HER 2 negative. Ki-67 index was 25%.  She also had an MRI of bilateral breasts on 12/15/2020, which showed in right breast, there is a 0.4 cm mass with associated biopsy clip in the upper outer quadrant of the right breast consistent with the biopsy-proven carcinoma. Surrounding postbiopsy enhancement is noted. Benign intramammary lymph nodes are seen in the upper outer quadrant of the right breast. No additional suspicious abnormal enhancement is seen in the right breast. There is no axillary adenopathy. In the left breast, benign intramammary lymph node in the upper outer quadrant of the left breast. No suspicious abnormal enhancement is seen in the left breast. There is no axillary adenopathy. BI-RADS 6: Known biopsy-proven malignancy.\par  \par  On 2021, She underwent a lumpectomy and sentinel node biopsy. She was found to have a 4.5 mm invasive lobular carcinoma, G2, ER/NM positive % / HER 2 negative, Ki 67 index is 25 %. Surgical margins were negative as well as 0/1 negative sentinel lymph node. There was no LVSI/PNI.  She recovered well since surgery. However, has some breast discomfort/lump at the lumpectomy site but getting better. She also saw Dr Swetha Vega onc and is going to be offered radiation therapy. \par  \par  She is . The age at menarche was 12.  Age at live birth was 32  She received fertility Treatments with Clomid.  She used Birth Control Pill for 14 years. She is perimenopause. She does not remember when her last periods was but was less than a year ago. Also to note she has h/o bipolar disorder and is controlled with medications and receives Prolixin shot every 2 weeks and is on Lithium and carbamazepine. \par  \par  She has no family h/o of breast or ovarian cancer. \par  \par  She was also on oral ferrous sulfate for h/o NESTOR in the past and was started by PMD which she stopped taking in Dec 2020.  [de-identified] : 6/9/21: The patient is here today for f/u visit. She has Stage IA ([id number]) classical type lobular carcinoma of the right breast, G2, ER/NJ positive, Her-2 negative, s/p right breast lumpectomy and SLNB on 1/29/2021 with negative margins. She saw Dr. ray and received adjuvant WBI between 3/11 to 4/1/21. She has started on adjuvant endocrine therapy with Anastrozole and tolerated well so far. She is feeling well and dose not have new complains.  9/8/21: The patient is here today for f/u visit. She has Stage IA ([id number]) classical type lobular carcinoma of the right breast, G2, ER/NJ positive, Her-2 negative, s/p right breast lumpectomy and SLNB on 1/29/2021 with negative margins. She saw Dr. Ray and received adjuvant WBI between 3/11 to 4/1/21. She has been taking adjuvant endocrine therapy with Anastrozole and tolerated well. She is feeling well and dose not have new complains. On 6/28/21, she had right breast dx mammo and US which showed Expected postoperative changes of the right lumpectomy site. There was no suspicious finding. Bone density on 6/28/21 showed osteopenia in the femoral neck with T score -1.7.  3/24/22 The patient is here today for f/u visit. She has Stage IA ([id number]) classical type lobular carcinoma of the right breast, G2, ER/NJ positive, Her-2 negative, s/p right breast lumpectomy and SLNB on 1/29/2021 with negative margins. She saw Dr. Ray and received adjuvant WBI between 3/11 to 4/1/21. She has been taking adjuvant endocrine therapy with Anastrozole since 3/2021 and tolerated well. On 12/17/21, she had b/l breast dx mammo and US which showed Expected postoperative changes of the right lumpectomy site. There was no suspicious finding. Bone density on 6/28/21 showed osteopenia in the femoral neck with T score -1.7. She complains pain in the right lumpectomy site sometimes.   9/12/22 The patient is here today for f/u visit, accompanied by her mother. She has Stage IA ([id number]) classical type lobular carcinoma of the right breast, G2, ER/NJ positive, Her-2 negative, s/p right breast lumpectomy and SLNB on 1/29/2021 with negative margins. She saw Dr. Ray and received adjuvant WBI between 3/11 to 4/1/21. She has been taking adjuvant endocrine therapy with Anastrozole since 3/2021 and tolerated well. On 12/17/21, she had b/l breast dx mammo and US which showed Expected postoperative changes of the right lumpectomy site. There was no suspicious finding. Bone density on 6/28/21 showed osteopenia in the femoral neck with T score -1.7. She had R diagnostic mammogram done in 6/22 which was negative for malignancy, and R US showed post surgical changes in the upper outer quadrant with no suspicious masses BIRADS2. She has had 2  hospitalizations this summer for psychiatric issues, one of the admissions for lithium overdose requiring dialysis in the hospital. She is currently on partial hospitalization program. Per pt, she had her LMP in 8/2022, with spotting for 4-5 days, which occurred after 1-2 years of no menstrual periods.   2/9/23: Shama is here today for f/u visit. She has Stage IA ([id number]) classical type lobular carcinoma of the right breast, G2, ER/NJ positive, Her-2 negative, s/p right breast lumpectomy and SLNB on 1/29/2021 with negative margins. She saw Dr. Ray and received adjuvant WBI between 3/11 to 4/1/21. She has been taking adjuvant endocrine therapy with Anastrozole since 3/2021. She did have no periods for 1-2 years and her estradiol levels in 3/2021 were  6. However, in 8/2022, she had spotting for 4-5 days. Anastrozole was held since then. On 12/28/22, she had b/l breast dx mammo and US which did not show suspicious finding.  Bone density on 6/28/21 showed osteopenia in the femoral neck with T score -1.7. She had her LMP in 8/2022, with spotting for 4-5 days, which occurred after 1-2 years of no menstrual periods.   8/17/23: Shama is here today for f/u visit. She has Stage IA ([id number]) classical type lobular carcinoma of the right breast, G2, ER/NJ positive, Her-2 negative, s/p right breast lumpectomy and SLNB on 1/29/2021 with negative margins. She saw Dr. Ray and received adjuvant WBI between 3/11 to 4/1/21. She has been taking adjuvant endocrine therapy with Anastrozole since 3/2021. She did have no periods for 1-2 years and her estradiol levels in 3/2021 were  6. However, in 8/2022, she had spotting for 4-5 days. Her estradiol level was 116 on 2/9/23 but previously was < 5. She had vaginal spotting after she was amenorrhea for 2 years.  She saw GYN Dr. Slater on 4/28/23. Repeat Estradiol in 4/2023 was 14. She was considered in menopause. She has not had bleeding since then. The patient was told to call me back after she saw GYN and to decide if she should switch to Tamoxifen or resume Anastrozole. However, she did not call. She has been off endocrine therapy.    3/13/24: Shama is here today for f/u visit. She has Stage IA ([id number]) classical type lobular carcinoma of the right breast, G2, ER/NJ positive, Her-2 negative, s/p right breast lumpectomy and SLNB on 1/29/2021 with negative margins. She saw Dr. Ray and received adjuvant WBI between 3/11 to 4/1/21. She has been taking adjuvant endocrine therapy with Anastrozole since 3/2021. She did have no periods for 1-2 years and her estradiol levels in 3/2021 were  6. However, in 8/2022, she had spotting for 4-5 days. Her estradiol level was 116 on 2/9/23 but previously was < 5. She had vaginal spotting after she was amenorrhea for 2 years.  She saw GYN Dr. Slater on 4/28/23. Repeat Estradiol in 4/2023 was 14. She was considered in menopause. She has not had bleeding since then. She resumed Anastrozole since 8/2023 and tolerated well.   9.9.24: Shama is here today for f/u visit, accompanied by her mother. She has Stage IA ([id number]) classical type lobular carcinoma of the right breast, G2, ER/NJ positive, Her-2 negative, s/p right breast lumpectomy and SLNB on 1/29/2021 with negative margins. Received adjuvant WBI between 3/11 to 4/1/21. She has been taking adjuvant endocrine therapy with Anastrozole since 3/2021. She did have no periods for 1-2 years and her estradiol levels in 3/2021 were 6. However, in 8/2022, she had spotting for 4-5 days. Her estradiol level was 116 on 2/9/23 but previously was < 5. She had vaginal spotting after she was amenorrhea for 2 years.  She saw GYN Dr. Slater on 4/28/23. Repeat Estradiol in 4/2023 was 14. She was considered in menopause. She has not had bleeding since then. She resumed Anastrozole since 8/2023 and tolerated well since then. Reports no bleeding.

## 2024-09-09 NOTE — PHYSICAL EXAM
[Restricted in physically strenuous activity but ambulatory and able to carry out work of a light or sedentary nature] : Status 1- Restricted in physically strenuous activity but ambulatory and able to carry out work of a light or sedentary nature, e.g., light house work, office work [Normal] : grossly intact [de-identified] : Rt lumpectomy scar is healing well. No palpable masses in b/l breast and no axillary LNs

## 2024-09-09 NOTE — ASSESSMENT
[FreeTextEntry1] : 53 year old perimenopausal female has Stage IA ([id number]) classical type lobular carcinoma of the right breast, G2, ER/IN positive, Her-2 negative, s/p right breast lumpectomy and SLNB with negative margins.   Assessment and Plan: -- Continue Anastrozole. No longer has vaginal bleeding,  -- Breast exam today revealed stable scar tissue at the lumpectomy site in the right breast. b/l dx mammo in 12/2023 did not show suspicious finding. She will resume annual screening mammo in 12/2024, ordered and script given.  -- Osteopenia. Continue calcium and vitamin D supplement, regular exercise. Repeat DEXA scan due in 3.2026 -- Followup with Breast surgery and Dr. Posada as scheduled.  -- Followup with PCP for health Maintenace.   RTO for followup in 6 months.

## 2024-09-10 DIAGNOSIS — C50.919 MALIGNANT NEOPLASM OF UNSPECIFIED SITE OF UNSPECIFIED FEMALE BREAST: ICD-10-CM

## 2024-09-11 ENCOUNTER — APPOINTMENT (OUTPATIENT)
Dept: CARDIOLOGY | Facility: CLINIC | Age: 54
End: 2024-09-11
Payer: MEDICARE

## 2024-09-11 VITALS
BODY MASS INDEX: 29.37 KG/M2 | WEIGHT: 172 LBS | SYSTOLIC BLOOD PRESSURE: 120 MMHG | DIASTOLIC BLOOD PRESSURE: 88 MMHG | HEART RATE: 88 BPM | HEIGHT: 64 IN

## 2024-09-11 DIAGNOSIS — Z13.6 ENCOUNTER FOR SCREENING FOR CARDIOVASCULAR DISORDERS: ICD-10-CM

## 2024-09-11 DIAGNOSIS — E78.5 HYPERLIPIDEMIA, UNSPECIFIED: ICD-10-CM

## 2024-09-11 DIAGNOSIS — E11.628 TYPE 2 DIABETES MELLITUS WITH OTHER SKIN COMPLICATIONS: ICD-10-CM

## 2024-09-11 PROCEDURE — 99214 OFFICE O/P EST MOD 30 MIN: CPT

## 2024-09-11 NOTE — ASSESSMENT
[FreeTextEntry1] : Assessment: #R great toe cyst s/p I&D - healing #Vascular screening exam  - No claudication, rest pain - 8/2024 LE art US and TINO/PVR with no significant arterial disease #T2DM #DLD  Plan: - Discussed results of arterial testing. No significant PAD.  - Continue daily foot checks - Follow-up with Dr. Zuniga - Cardiac care as per Dr. Wong - Return to clinic PRN

## 2024-09-11 NOTE — HISTORY OF PRESENT ILLNESS
[FreeTextEntry1] : 54F with T2DM c/b neuropathy, DLD here for vascular exam.   Referring: Dr. Zuniga Cards: Dr. Wong  9/11/24 Cyst on plantar surface of right toe s/p I&D. Healing well.  +numbness/tingling of feet. No claudication, rest pain or wounds  never smoker no hxo CAD or CVA  FMH Father: AFib at 82 yo Mother: no CAD or CVA

## 2024-09-23 ENCOUNTER — APPOINTMENT (OUTPATIENT)
Dept: CARDIOLOGY | Facility: CLINIC | Age: 54
End: 2024-09-23

## 2024-10-11 ENCOUNTER — NON-APPOINTMENT (OUTPATIENT)
Age: 54
End: 2024-10-11

## 2024-10-16 ENCOUNTER — APPOINTMENT (OUTPATIENT)
Dept: OTOLARYNGOLOGY | Facility: CLINIC | Age: 54
End: 2024-10-16
Payer: MEDICARE

## 2024-10-16 DIAGNOSIS — J34.89 OTHER SPECIFIED DISORDERS OF NOSE AND NASAL SINUSES: ICD-10-CM

## 2024-10-16 DIAGNOSIS — H91.90 UNSPECIFIED HEARING LOSS, UNSPECIFIED EAR: ICD-10-CM

## 2024-10-16 DIAGNOSIS — R09.81 NASAL CONGESTION: ICD-10-CM

## 2024-10-16 DIAGNOSIS — J01.80 OTHER ACUTE SINUSITIS: ICD-10-CM

## 2024-10-16 PROCEDURE — 99204 OFFICE O/P NEW MOD 45 MIN: CPT | Mod: 25

## 2024-10-16 PROCEDURE — 31231 NASAL ENDOSCOPY DX: CPT

## 2024-10-16 RX ORDER — LEVOFLOXACIN 500 MG/1
500 TABLET, FILM COATED ORAL DAILY
Qty: 21 | Refills: 0 | Status: ACTIVE | COMMUNITY
Start: 2024-10-16 | End: 1900-01-01

## 2024-10-16 RX ORDER — FEXOFENADINE HCL AND PSEUDOEPHEDRINE HCI 60; 120 MG/1; MG/1
60-120 TABLET, EXTENDED RELEASE ORAL
Qty: 40 | Refills: 2 | Status: ACTIVE | COMMUNITY
Start: 2024-10-16 | End: 1900-01-01

## 2024-10-16 RX ORDER — FLUTICASONE PROPIONATE 50 UG/1
50 SPRAY, METERED NASAL TWICE DAILY
Qty: 1 | Refills: 3 | Status: ACTIVE | COMMUNITY
Start: 2024-10-16 | End: 1900-01-01

## 2024-10-17 RX ORDER — METHYLPREDNISOLONE 4 MG/1
4 TABLET ORAL
Qty: 1 | Refills: 0 | Status: ACTIVE | COMMUNITY
Start: 2024-10-17 | End: 1900-01-01

## 2024-11-06 ENCOUNTER — APPOINTMENT (OUTPATIENT)
Dept: OTOLARYNGOLOGY | Facility: CLINIC | Age: 54
End: 2024-11-06
Payer: MEDICARE

## 2024-11-06 DIAGNOSIS — R09.81 NASAL CONGESTION: ICD-10-CM

## 2024-11-06 DIAGNOSIS — J30.89 OTHER ALLERGIC RHINITIS: ICD-10-CM

## 2024-11-06 DIAGNOSIS — H91.90 UNSPECIFIED HEARING LOSS, UNSPECIFIED EAR: ICD-10-CM

## 2024-11-06 PROCEDURE — 99214 OFFICE O/P EST MOD 30 MIN: CPT | Mod: 25

## 2024-11-06 PROCEDURE — 31231 NASAL ENDOSCOPY DX: CPT

## 2024-11-11 NOTE — REASON FOR VISIT
Initial Consultation] : an initial consultation [Parent] : parent [FreeTextEntry2] : Breast Cancer

## 2024-11-20 NOTE — ED ADULT NURSE NOTE - IN THE PAST 12 MONTHS HAVE YOU USED DRUGS OTHER THAN THOSE REQUIRED FOR MEDICAL REASON?
levothyroxine (SYNTHROID) 88 MCG tablet Take 1 tablet by mouth every morning (before breakfast)  Qty: 90 tablet, Refills: 3      apixaban (ELIQUIS) 5 MG TABS tablet Take 1 tablet by mouth 2 times daily      loperamide (IMODIUM A-D) 2 MG tablet       albuterol sulfate HFA (PROVENTIL;VENTOLIN;PROAIR) 108 (90 Base) MCG/ACT inhaler       ferrous sulfate (IRON 325) 325 (65 Fe) MG tablet Take by mouth      DOCEtaxel (TAXOTERE) 80 MG/8ML chemo injection Infuse intravenously           STOP taking these medications       furosemide (LASIX) 40 MG tablet Comments:   Reason for Stopping:         sacubitril-valsartan (ENTRESTO) 24-26 MG per tablet Comments:   Reason for Stopping:         tiotropium (SPIRIVA HANDIHALER) 18 MCG inhalation capsule Comments:   Reason for Stopping:         dexamethasone (DECADRON) 4 MG tablet Comments:   Reason for Stopping:               Activity: activity as tolerated    Diet: cardiac diet    Wound Care: none needed    Follow-up: with PCP, Cora Her MD in 7-10days      Minutes spent on discharge: >30 minutes spent coordinating this discharge (review instructions/follow-up, prescriptions, preparing report for sign off)    
No

## 2024-11-21 ENCOUNTER — NON-APPOINTMENT (OUTPATIENT)
Age: 54
End: 2024-11-21

## 2025-01-08 ENCOUNTER — OUTPATIENT (OUTPATIENT)
Dept: OUTPATIENT SERVICES | Facility: HOSPITAL | Age: 55
LOS: 1 days | End: 2025-01-08
Payer: MEDICARE

## 2025-01-08 ENCOUNTER — RESULT REVIEW (OUTPATIENT)
Age: 55
End: 2025-01-08

## 2025-01-08 DIAGNOSIS — Z12.31 ENCOUNTER FOR SCREENING MAMMOGRAM FOR MALIGNANT NEOPLASM OF BREAST: ICD-10-CM

## 2025-01-08 DIAGNOSIS — Z98.890 OTHER SPECIFIED POSTPROCEDURAL STATES: Chronic | ICD-10-CM

## 2025-01-08 PROCEDURE — 77063 BREAST TOMOSYNTHESIS BI: CPT | Mod: 26

## 2025-01-08 PROCEDURE — 77067 SCR MAMMO BI INCL CAD: CPT

## 2025-01-08 PROCEDURE — 77067 SCR MAMMO BI INCL CAD: CPT | Mod: 26

## 2025-01-08 PROCEDURE — 77063 BREAST TOMOSYNTHESIS BI: CPT

## 2025-01-09 DIAGNOSIS — Z12.31 ENCOUNTER FOR SCREENING MAMMOGRAM FOR MALIGNANT NEOPLASM OF BREAST: ICD-10-CM

## 2025-03-13 ENCOUNTER — APPOINTMENT (OUTPATIENT)
Age: 55
End: 2025-03-13
Payer: MEDICARE

## 2025-03-13 ENCOUNTER — OUTPATIENT (OUTPATIENT)
Dept: OUTPATIENT SERVICES | Facility: HOSPITAL | Age: 55
LOS: 1 days | End: 2025-03-13
Payer: MEDICARE

## 2025-03-13 VITALS
WEIGHT: 172 LBS | DIASTOLIC BLOOD PRESSURE: 68 MMHG | BODY MASS INDEX: 29.37 KG/M2 | OXYGEN SATURATION: 99 % | RESPIRATION RATE: 19 BRPM | HEART RATE: 90 BPM | TEMPERATURE: 98 F | SYSTOLIC BLOOD PRESSURE: 136 MMHG | HEIGHT: 64 IN

## 2025-03-13 DIAGNOSIS — C50.411 MALIGNANT NEOPLASM OF UPPER-OUTER QUADRANT OF RIGHT FEMALE BREAST: ICD-10-CM

## 2025-03-13 DIAGNOSIS — Z98.890 OTHER SPECIFIED POSTPROCEDURAL STATES: Chronic | ICD-10-CM

## 2025-03-13 DIAGNOSIS — Z17.0 MALIGNANT NEOPLASM OF UPPER-OUTER QUADRANT OF RIGHT FEMALE BREAST: ICD-10-CM

## 2025-03-13 DIAGNOSIS — Z79.811 ENCOUNTER FOR THERAPEUTIC DRUG LVL MONITORING: ICD-10-CM

## 2025-03-13 DIAGNOSIS — Z51.81 ENCOUNTER FOR THERAPEUTIC DRUG LVL MONITORING: ICD-10-CM

## 2025-03-13 DIAGNOSIS — M85.80 OTHER SPECIFIED DISORDERS OF BONE DENSITY AND STRUCTURE, UNSPECIFIED SITE: ICD-10-CM

## 2025-03-13 PROCEDURE — 99214 OFFICE O/P EST MOD 30 MIN: CPT

## 2025-03-14 DIAGNOSIS — C50.411 MALIGNANT NEOPLASM OF UPPER-OUTER QUADRANT OF RIGHT FEMALE BREAST: ICD-10-CM

## 2025-04-04 NOTE — ED ADULT NURSE NOTE - SUICIDE SCREENING QUESTION 2
FACILITY: [facility]  DR: Clayton  PHONE: [phone]  FAX: [phone]  PROCEDURE: EGD/ colonoscopy  SCHEDULED: 4/25/2025  MEDS TO HOLD: Xarelto (1-2 days prior)    Placed clearance in Casi GIBSON's folder to sign.   Office Visit with Trever Mtz MD (12/17/2024)    No

## 2025-04-15 NOTE — ASU PATIENT PROFILE, ADULT - BLOOD AVOIDANCE/RESTRICTIONS, PROFILE
[FreeTextEntry1] :  73 year old male patient with likely overactive contractions with changes in position. We discussed guarding mechanisms and he will try that. We will give him a trial back of Mirabegron for 6 months and then he can stop it. We will plan to see him back in 6 months with a flow, PVR, and PSA at that time.  All of the patient's questions were otherwise answered.   The submitted E/M billing level for this visit reflects the total time spent on the day of the visit including face-to-face time spent with the patient, non-face-to-face review of medical records and relevant information, documentation, and asynchronous communication with the patient after a visit via phone, email, or patients EHR portal after the visit. The medical records reviewed are either scanned into the chart or reviewed with the patient using a patients electronic medical records portal for patients with records not available to [facility] via electronic transmission platforms from other institutions and labs. Time spent counseling and performing coordination of care was also included in determining the appropriate EM billing level.   I have reviewed and verified information regarding the chief complaint and history recorded by the ancillary staff and/or the patient. I have independently reviewed and interpreted tests performed by other physicians and facilities as necessary.   I have discussed with the patient differential diagnosis, reason for auxiliary tests if ordered, risks, benefits, alternatives, and complications of each form of therapy were discussed.  I personally performed the services described in the documentation, reviewed the documentation recorded by the scribe in my presence, and it accurately and completely records my words and actions. none

## 2025-05-02 ENCOUNTER — APPOINTMENT (OUTPATIENT)
Dept: OBGYN | Facility: CLINIC | Age: 55
End: 2025-05-02

## 2025-05-05 ENCOUNTER — APPOINTMENT (OUTPATIENT)
Dept: BREAST CENTER | Facility: CLINIC | Age: 55
End: 2025-05-05

## 2025-05-13 ENCOUNTER — APPOINTMENT (OUTPATIENT)
Dept: RADIATION ONCOLOGY | Facility: HOSPITAL | Age: 55
End: 2025-05-13

## 2025-05-27 NOTE — PROGRESS NOTE BEHAVIORAL HEALTH - EYE CONTACT
- - -
----- Message from Magali Wright sent at 9/27/2022 12:54 PM CDT -----  Regarding: refill  Type:  RX Refill Request    Who Called: patient's daughter Taisha  Refill or New Rx:refillrefill  RX Name and Strength:Clotrimazole and betamethasone  How is the patient currently taking it? (ex. 1XDay):  Is this a 30 day or 90 day RX:  Preferred Pharmacy with phone number:[facility] at [facility] and [address]  Local or Mail Order:local  Ordering Provider:Melissa  Would the patient rather a call back or a response via [facility]?   Best Call Back Number:[phone]  Additional Information: Please call Taisha to confirm refill. Also is what was received by the pharmacy on 09/19/22 a replacement to Clotrimazole and betamethasone       
Fair

## 2025-05-29 ENCOUNTER — APPOINTMENT (OUTPATIENT)
Dept: BREAST CENTER | Facility: CLINIC | Age: 55
End: 2025-05-29
Payer: MEDICARE

## 2025-05-29 VITALS
WEIGHT: 172 LBS | HEART RATE: 101 BPM | BODY MASS INDEX: 29.37 KG/M2 | DIASTOLIC BLOOD PRESSURE: 84 MMHG | HEIGHT: 64 IN | SYSTOLIC BLOOD PRESSURE: 117 MMHG

## 2025-05-29 DIAGNOSIS — N63.20 UNSPECIFIED LUMP IN THE LEFT BREAST, UNSPECIFIED QUADRANT: ICD-10-CM

## 2025-05-29 DIAGNOSIS — Z98.890 OTHER SPECIFIED POSTPROCEDURAL STATES: ICD-10-CM

## 2025-05-29 DIAGNOSIS — Z17.0 MALIGNANT NEOPLASM OF UPPER-OUTER QUADRANT OF RIGHT FEMALE BREAST: ICD-10-CM

## 2025-05-29 DIAGNOSIS — C50.411 MALIGNANT NEOPLASM OF UPPER-OUTER QUADRANT OF RIGHT FEMALE BREAST: ICD-10-CM

## 2025-05-29 PROCEDURE — 99204 OFFICE O/P NEW MOD 45 MIN: CPT

## 2025-06-14 ENCOUNTER — RESULT REVIEW (OUTPATIENT)
Age: 55
End: 2025-06-14

## 2025-06-14 ENCOUNTER — OUTPATIENT (OUTPATIENT)
Dept: OUTPATIENT SERVICES | Facility: HOSPITAL | Age: 55
LOS: 1 days | End: 2025-06-14
Payer: MEDICARE

## 2025-06-14 DIAGNOSIS — N63.20 UNSPECIFIED LUMP IN THE LEFT BREAST, UNSPECIFIED QUADRANT: ICD-10-CM

## 2025-06-14 DIAGNOSIS — Z98.890 OTHER SPECIFIED POSTPROCEDURAL STATES: Chronic | ICD-10-CM

## 2025-06-14 DIAGNOSIS — C50.411 MALIGNANT NEOPLASM OF UPPER-OUTER QUADRANT OF RIGHT FEMALE BREAST: ICD-10-CM

## 2025-06-14 PROCEDURE — G0279: CPT | Mod: 26

## 2025-06-14 PROCEDURE — 77065 DX MAMMO INCL CAD UNI: CPT | Mod: LT

## 2025-06-14 PROCEDURE — 77065 DX MAMMO INCL CAD UNI: CPT | Mod: 26,LT

## 2025-06-14 PROCEDURE — 76641 ULTRASOUND BREAST COMPLETE: CPT | Mod: LT

## 2025-06-14 PROCEDURE — 76641 ULTRASOUND BREAST COMPLETE: CPT | Mod: 26,LT

## 2025-06-14 PROCEDURE — G0279: CPT

## 2025-06-15 DIAGNOSIS — C50.411 MALIGNANT NEOPLASM OF UPPER-OUTER QUADRANT OF RIGHT FEMALE BREAST: ICD-10-CM

## 2025-06-15 DIAGNOSIS — N63.20 UNSPECIFIED LUMP IN THE LEFT BREAST, UNSPECIFIED QUADRANT: ICD-10-CM

## 2025-07-03 ENCOUNTER — INPATIENT (INPATIENT)
Facility: HOSPITAL | Age: 55
LOS: 14 days | Discharge: HOME | DRG: 885 | End: 2025-07-18
Attending: PSYCHIATRY & NEUROLOGY | Admitting: PSYCHIATRY & NEUROLOGY
Payer: MEDICARE

## 2025-07-03 VITALS
SYSTOLIC BLOOD PRESSURE: 145 MMHG | RESPIRATION RATE: 20 BRPM | OXYGEN SATURATION: 98 % | HEART RATE: 74 BPM | DIASTOLIC BLOOD PRESSURE: 91 MMHG | TEMPERATURE: 99 F

## 2025-07-03 DIAGNOSIS — F31.60 BIPOLAR DISORDER, CURRENT EPISODE MIXED, UNSPECIFIED: ICD-10-CM

## 2025-07-03 DIAGNOSIS — Z98.890 OTHER SPECIFIED POSTPROCEDURAL STATES: Chronic | ICD-10-CM

## 2025-07-03 LAB
AMPHET UR-MCNC: NEGATIVE — SIGNIFICANT CHANGE UP
ANION GAP SERPL CALC-SCNC: 15 MMOL/L — HIGH (ref 7–14)
APAP SERPL-MCNC: <5 UG/ML — LOW (ref 10–30)
APPEARANCE UR: CLEAR — SIGNIFICANT CHANGE UP
BARBITURATES UR SCN-MCNC: NEGATIVE — SIGNIFICANT CHANGE UP
BASOPHILS # BLD AUTO: 0.03 K/UL — SIGNIFICANT CHANGE UP (ref 0–0.2)
BASOPHILS NFR BLD AUTO: 0.5 % — SIGNIFICANT CHANGE UP (ref 0–2)
BENZODIAZ UR-MCNC: NEGATIVE — SIGNIFICANT CHANGE UP
BILIRUB UR-MCNC: NEGATIVE — SIGNIFICANT CHANGE UP
BUN SERPL-MCNC: 15 MG/DL — SIGNIFICANT CHANGE UP (ref 10–20)
CALCIUM SERPL-MCNC: 9.9 MG/DL — SIGNIFICANT CHANGE UP (ref 8.4–10.5)
CHLORIDE SERPL-SCNC: 102 MMOL/L — SIGNIFICANT CHANGE UP (ref 98–110)
CO2 SERPL-SCNC: 23 MMOL/L — SIGNIFICANT CHANGE UP (ref 17–32)
COCAINE METAB.OTHER UR-MCNC: NEGATIVE — SIGNIFICANT CHANGE UP
COLOR SPEC: YELLOW — SIGNIFICANT CHANGE UP
CREAT SERPL-MCNC: 1 MG/DL — SIGNIFICANT CHANGE UP (ref 0.7–1.5)
DIFF PNL FLD: NEGATIVE — SIGNIFICANT CHANGE UP
DRUG SCREEN 1, URINE RESULT: SIGNIFICANT CHANGE UP
EGFR: 66 ML/MIN/1.73M2 — SIGNIFICANT CHANGE UP
EGFR: 66 ML/MIN/1.73M2 — SIGNIFICANT CHANGE UP
EOSINOPHIL # BLD AUTO: 0.31 K/UL — SIGNIFICANT CHANGE UP (ref 0–0.5)
EOSINOPHIL NFR BLD AUTO: 4.9 % — SIGNIFICANT CHANGE UP (ref 0–6)
ETHANOL SERPL-MCNC: <10 MG/DL — SIGNIFICANT CHANGE UP
FENTANYL UR QL: NEGATIVE — SIGNIFICANT CHANGE UP
GLUCOSE BLDC GLUCOMTR-MCNC: 299 MG/DL — HIGH (ref 70–99)
GLUCOSE SERPL-MCNC: 154 MG/DL — HIGH (ref 70–99)
GLUCOSE UR QL: NEGATIVE MG/DL — SIGNIFICANT CHANGE UP
HCG UR QL: NEGATIVE — SIGNIFICANT CHANGE UP
HCT VFR BLD CALC: 32.6 % — LOW (ref 34.5–45)
HGB BLD-MCNC: 11.2 G/DL — LOW (ref 11.5–15.5)
IMM GRANULOCYTES # BLD AUTO: 0.03 K/UL — SIGNIFICANT CHANGE UP (ref 0–0.07)
IMM GRANULOCYTES NFR BLD AUTO: 0.5 % — SIGNIFICANT CHANGE UP (ref 0–0.9)
IMMATURE PLATELET FRACTION #: 9.8 K/UL — SIGNIFICANT CHANGE UP (ref 4.7–11.1)
IMMATURE PLATELET FRACTION %: 9.6 % — HIGH (ref 1.6–4.9)
KETONES UR QL: ABNORMAL MG/DL
LEUKOCYTE ESTERASE UR-ACNC: ABNORMAL
LYMPHOCYTES # BLD AUTO: 1.75 K/UL — SIGNIFICANT CHANGE UP (ref 1–3.3)
LYMPHOCYTES NFR BLD AUTO: 27.6 % — SIGNIFICANT CHANGE UP (ref 13–44)
MCHC RBC-ENTMCNC: 28.6 PG — SIGNIFICANT CHANGE UP (ref 27–34)
MCHC RBC-ENTMCNC: 34.4 G/DL — SIGNIFICANT CHANGE UP (ref 32–36)
MCV RBC AUTO: 83.4 FL — SIGNIFICANT CHANGE UP (ref 80–100)
METHADONE UR-MCNC: NEGATIVE — SIGNIFICANT CHANGE UP
MONOCYTES # BLD AUTO: 0.37 K/UL — SIGNIFICANT CHANGE UP (ref 0–0.9)
MONOCYTES NFR BLD AUTO: 5.8 % — SIGNIFICANT CHANGE UP (ref 2–14)
NEUTROPHILS # BLD AUTO: 3.86 K/UL — SIGNIFICANT CHANGE UP (ref 1.8–7.4)
NEUTROPHILS NFR BLD AUTO: 60.7 % — SIGNIFICANT CHANGE UP (ref 43–77)
NITRITE UR-MCNC: NEGATIVE — SIGNIFICANT CHANGE UP
NRBC # BLD AUTO: 0 K/UL — SIGNIFICANT CHANGE UP (ref 0–0)
NRBC # FLD: 0 K/UL — SIGNIFICANT CHANGE UP (ref 0–0)
NRBC BLD AUTO-RTO: 0 /100 WBCS — SIGNIFICANT CHANGE UP (ref 0–0)
OPIATES UR-MCNC: NEGATIVE — SIGNIFICANT CHANGE UP
OXYCODONE UR-MCNC: NEGATIVE — SIGNIFICANT CHANGE UP
PCP UR-MCNC: NEGATIVE — SIGNIFICANT CHANGE UP
PH UR: 5.5 — SIGNIFICANT CHANGE UP (ref 5–8)
PLATELET # BLD AUTO: 102 K/UL — LOW (ref 150–400)
PMV BLD: 11.9 FL — SIGNIFICANT CHANGE UP (ref 7–13)
POTASSIUM SERPL-MCNC: 4.3 MMOL/L — SIGNIFICANT CHANGE UP (ref 3.5–5)
POTASSIUM SERPL-SCNC: 4.3 MMOL/L — SIGNIFICANT CHANGE UP (ref 3.5–5)
PROPOXYPHENE QUALITATIVE URINE RESULT: NEGATIVE — SIGNIFICANT CHANGE UP
PROT UR-MCNC: SIGNIFICANT CHANGE UP MG/DL
RBC # BLD: 3.91 M/UL — SIGNIFICANT CHANGE UP (ref 3.8–5.2)
RBC # FLD: 13.8 % — SIGNIFICANT CHANGE UP (ref 10.3–14.5)
SALICYLATES SERPL-MCNC: <0.3 MG/DL — LOW (ref 4–30)
SODIUM SERPL-SCNC: 140 MMOL/L — SIGNIFICANT CHANGE UP (ref 135–146)
SP GR SPEC: 1.02 — SIGNIFICANT CHANGE UP (ref 1–1.03)
THC UR QL: NEGATIVE — SIGNIFICANT CHANGE UP
UROBILINOGEN FLD QL: 0.2 MG/DL — SIGNIFICANT CHANGE UP (ref 0.2–1)
WBC # BLD: 6.35 K/UL — SIGNIFICANT CHANGE UP (ref 3.8–10.5)
WBC # FLD AUTO: 6.35 K/UL — SIGNIFICANT CHANGE UP (ref 3.8–10.5)

## 2025-07-03 PROCEDURE — 90792 PSYCH DIAG EVAL W/MED SRVCS: CPT | Mod: 2W

## 2025-07-03 PROCEDURE — 99285 EMERGENCY DEPT VISIT HI MDM: CPT

## 2025-07-03 RX ORDER — ACETAMINOPHEN 500 MG/5ML
650 LIQUID (ML) ORAL ONCE
Refills: 0 | Status: COMPLETED | OUTPATIENT
Start: 2025-07-03 | End: 2025-07-03

## 2025-07-03 RX ORDER — GABAPENTIN 400 MG/1
300 CAPSULE ORAL ONCE
Refills: 0 | Status: COMPLETED | OUTPATIENT
Start: 2025-07-03 | End: 2025-07-03

## 2025-07-03 RX ADMIN — Medication 7 UNIT(S): at 21:00

## 2025-07-03 RX ADMIN — Medication 650 MILLIGRAM(S): at 23:59

## 2025-07-03 RX ADMIN — GABAPENTIN 300 MILLIGRAM(S): 400 CAPSULE ORAL at 21:00

## 2025-07-03 RX ADMIN — Medication 650 MILLIGRAM(S): at 19:03
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
## 2025-07-03 NOTE — ED PROVIDER NOTE - PHYSICAL EXAMINATION
Vital Signs: I have reviewed the initial vital signs.  Constitutional: well-nourished, appears stated age, no acute distress  Cardiovascular: regular rate, regular rhythm, well-perfused extremities  Respiratory: unlabored respiratory effort, clear to auscultation bilaterally  Gastrointestinal: soft, non-tender abdomen, no pulsatile mass  Musculoskeletal: supple neck, no lower extremity edema, No tenderness to palpation of the right extremity, no swelling of the right extremity, good pulses present in the right and left upper extremity.  Integumentary: warm, dry, no rash  Neurologic: awake, alert, cranial nerves II-XII grossly intact, extremities’ motor and sensory functions grossly intact  Psychiatric: appropriate mood, appropriate affect

## 2025-07-03 NOTE — ED BEHAVIORAL HEALTH ASSESSMENT NOTE - CURRENT MEDICATION
Patient reports:  Carbamazepine 200mg BID  Prolixin Decanoate 25mg Q2 weeks, next due 7/8? pending confirmation  Cogentin 1mg BID

## 2025-07-03 NOTE — ED PROVIDER NOTE - CLINICAL SUMMARY MEDICAL DECISION MAKING FREE TEXT BOX
Patient with bipolar disorder decompensated medically cleared for IPP Patient with bipolar disorder decompensated medically cleared for IPP    Discussed with psych.  Patient to be admitted to [facility] Psych unit. Legals signed.  Patient cooperative throughout ED visit.
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
## 2025-07-03 NOTE — ED ADULT TRIAGE NOTE - HEART RATE (BEATS/MIN)
Chief Complaint   Patient presents with     Follow Up For     new onset atrial fib; mitral valve regurg        72

## 2025-07-03 NOTE — ED BEHAVIORAL HEALTH ASSESSMENT NOTE - NS ED BHA MED ROS PSYCHIATRIC
I spoke with the patient he is aware of the diagnosis of basal cell on all 3 locations. He understands the 2 on his back were treated with curettage no further treatment is needed but the one behind his ear will need Mohs surgery and he is scheduling now. See HPI
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
## 2025-07-03 NOTE — ED BEHAVIORAL HEALTH ASSESSMENT NOTE - HPI (INCLUDE ILLNESS QUALITY, SEVERITY, DURATION, TIMING, CONTEXT, MODIFYING FACTORS, ASSOCIATED SIGNS AND SYMPTOMS)
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 afraid she will try to go out at night and drive to varied locations. Per daughter, she is concerned patient will leave the door to the house open as well. Older daughter reports that mother was admitted to a psych unit it [place] 2 months ago as she drove out in the middle of the road and got out of the car. Daughter states she did not know she had even left until she received a call from the . States patient was found out of her vehicle in traffic over a bridge. She denies mother being aggressive but can become very angry. States that patient has rapid cycling every month or so. She denies any recent self harm or SI statements. She denies any substances. States that mother did call 911 herself and that this happens when she is admitted; as she will fixate on something and "just talk" but that it doesn't make sense or she makes her own version of the story. States that she is not sure if she has been taking her medications. Daughter expresses concern for her safety at this time and advocates for psych admission.

## 2025-07-04 DIAGNOSIS — F31.9 BIPOLAR DISORDER, UNSPECIFIED: ICD-10-CM

## 2025-07-04 LAB
GLUCOSE BLDC GLUCOMTR-MCNC: 248 MG/DL — HIGH (ref 70–99)
GLUCOSE BLDC GLUCOMTR-MCNC: 293 MG/DL — HIGH (ref 70–99)
GLUCOSE BLDC GLUCOMTR-MCNC: 333 MG/DL — HIGH (ref 70–99)
GLUCOSE BLDC GLUCOMTR-MCNC: 364 MG/DL — HIGH (ref 70–99)

## 2025-07-04 PROCEDURE — 85027 COMPLETE CBC AUTOMATED: CPT

## 2025-07-04 PROCEDURE — 82962 GLUCOSE BLOOD TEST: CPT

## 2025-07-04 PROCEDURE — 83036 HEMOGLOBIN GLYCOSYLATED A1C: CPT

## 2025-07-04 PROCEDURE — 83540 ASSAY OF IRON: CPT

## 2025-07-04 PROCEDURE — 99222 1ST HOSP IP/OBS MODERATE 55: CPT

## 2025-07-04 PROCEDURE — 36415 COLL VENOUS BLD VENIPUNCTURE: CPT

## 2025-07-04 PROCEDURE — 82746 ASSAY OF FOLIC ACID SERUM: CPT

## 2025-07-04 PROCEDURE — 84443 ASSAY THYROID STIM HORMONE: CPT

## 2025-07-04 PROCEDURE — 82607 VITAMIN B-12: CPT

## 2025-07-04 PROCEDURE — 80061 LIPID PANEL: CPT

## 2025-07-04 RX ORDER — METFORMIN HYDROCHLORIDE 850 MG/1
1000 TABLET ORAL
Refills: 0 | Status: DISCONTINUED | OUTPATIENT
Start: 2025-07-04 | End: 2025-07-18

## 2025-07-04 RX ORDER — BENZTROPINE MESYLATE 2 MG
1 TABLET ORAL
Refills: 0 | Status: DISCONTINUED | OUTPATIENT
Start: 2025-07-04 | End: 2025-07-18

## 2025-07-04 RX ORDER — SODIUM CHLORIDE 9 G/1000ML
1000 INJECTION, SOLUTION INTRAVENOUS
Refills: 0 | Status: DISCONTINUED | OUTPATIENT
Start: 2025-07-04 | End: 2025-07-18

## 2025-07-04 RX ORDER — CARBAMAZEPINE 200 MG/1
200 TABLET ORAL
Refills: 0 | Status: DISCONTINUED | OUTPATIENT
Start: 2025-07-04 | End: 2025-07-07

## 2025-07-04 RX ORDER — GLUCAGON 3 MG/1
1 POWDER NASAL ONCE
Refills: 0 | Status: DISCONTINUED | OUTPATIENT
Start: 2025-07-04 | End: 2025-07-18

## 2025-07-04 RX ORDER — NICOTINE POLACRILEX 4 MG/1
2 GUM, CHEWING ORAL EVERY 4 HOURS
Refills: 0 | Status: DISCONTINUED | OUTPATIENT
Start: 2025-07-04 | End: 2025-07-18

## 2025-07-04 RX ORDER — DEXTROSE 50 % IN WATER 50 %
25 SYRINGE (ML) INTRAVENOUS ONCE
Refills: 0 | Status: DISCONTINUED | OUTPATIENT
Start: 2025-07-04 | End: 2025-07-18

## 2025-07-04 RX ORDER — FUROSEMIDE 10 MG/ML
20 INJECTION INTRAMUSCULAR; INTRAVENOUS DAILY
Refills: 0 | Status: DISCONTINUED | OUTPATIENT
Start: 2025-07-04 | End: 2025-07-18

## 2025-07-04 RX ORDER — DEXTROSE 50 % IN WATER 50 %
15 SYRINGE (ML) INTRAVENOUS ONCE
Refills: 0 | Status: DISCONTINUED | OUTPATIENT
Start: 2025-07-04 | End: 2025-07-18

## 2025-07-04 RX ORDER — DEXTROSE 50 % IN WATER 50 %
12.5 SYRINGE (ML) INTRAVENOUS ONCE
Refills: 0 | Status: DISCONTINUED | OUTPATIENT
Start: 2025-07-04 | End: 2025-07-18

## 2025-07-04 RX ORDER — INSULIN GLARGINE-YFGN 100 [IU]/ML
20 INJECTION, SOLUTION SUBCUTANEOUS AT BEDTIME
Refills: 0 | Status: DISCONTINUED | OUTPATIENT
Start: 2025-07-04 | End: 2025-07-17

## 2025-07-04 RX ORDER — ACETAMINOPHEN 500 MG/5ML
650 LIQUID (ML) ORAL EVERY 6 HOURS
Refills: 0 | Status: DISCONTINUED | OUTPATIENT
Start: 2025-07-04 | End: 2025-07-18

## 2025-07-04 RX ADMIN — Medication 1 MILLIGRAM(S): at 20:15

## 2025-07-04 RX ADMIN — METFORMIN HYDROCHLORIDE 1000 MILLIGRAM(S): 850 TABLET ORAL at 20:15

## 2025-07-04 RX ADMIN — INSULIN GLARGINE-YFGN 20 UNIT(S): 100 INJECTION, SOLUTION SUBCUTANEOUS at 21:30

## 2025-07-04 RX ADMIN — Medication 650 MILLIGRAM(S): at 03:24

## 2025-07-04 RX ADMIN — Medication 1 MILLIGRAM(S): at 11:18

## 2025-07-04 NOTE — BH INPATIENT PSYCHIATRY ASSESSMENT NOTE - CURRENT MEDICATION
MEDICATIONS  (STANDING):  benztropine 1 milliGRAM(s) Oral two times a day  carBAMazepine 200 milliGRAM(s) Oral two times a day  dextrose 5%. 1000 milliLiter(s) (50 mL/Hr) IV Continuous <Continuous>  dextrose 5%. 1000 milliLiter(s) (100 mL/Hr) IV Continuous <Continuous>  dextrose 50% Injectable 25 Gram(s) IV Push once  dextrose 50% Injectable 12.5 Gram(s) IV Push once  dextrose 50% Injectable 25 Gram(s) IV Push once  glucagon  Injectable 1 milliGRAM(s) IntraMuscular once  insulin glargine Injectable (LANTUS) 20 Unit(s) SubCutaneous at bedtime  metFORMIN 1000 milliGRAM(s) Oral two times a day    MEDICATIONS  (PRN):  dextrose Oral Gel 15 Gram(s) Oral once PRN Blood Glucose LESS THAN 70 milliGRAM(s)/deciliter  nicotine  Polacrilex Gum 2 milliGRAM(s) Oral every 4 hours PRN Smoking Cessation

## 2025-07-04 NOTE — BH PATIENT PROFILE - NSBHSNSOFSAFETY_PSY_A_CORE
being left alone/calling significant other/family member/going to their room/meditating/taking a nap/taking some deep breaths/talking to someone/using a calming object

## 2025-07-04 NOTE — CONSULT NOTE ADULT - ASSESSMENT
54yo F, domiciled with 2 adult daughters, on disability and pension (previously worked as a teacher), past psych history of bipolar disorder, hx prior psych admissions and remote hx of SA, no known hx of violence, currently in outpatient psych care at [facility], recent hospitalization in [place] for erratic behavior/mixed episode presenting to the hospital following an argument with younger daughter.       Impression   Bipolar Disorder, mixed episode H/O suicide attempt  Anemia and Thrombocytopenia - possible Medication  ( carBAMazepine )  Breast CA Right: s/p lumpectomy x 2, + Radiation Rx ( in remission- outpatient US reviewed)  Asthma  DM (diabetes mellitus) Type 2   HTN (hypertension)  High cholesterol  Asthma  ABI (obstructive sleep apnea)     Plan   Start Lantus 10 mg Lispro Sliding scale + 1  BP is ok can resume home amlodipine 5 mg   Check Folate, B12 and iron profile   Continue mood stabalizer as per pscy   monitor hgb and platelets     [facility]   54yo F, domiciled with 2 adult daughters, on disability and pension (previously worked as a teacher), past psych history of bipolar disorder, hx prior psych admissions and remote hx of SA, no known hx of violence, currently in outpatient psych care at [facility], recent hospitalization in [place] for erratic behavior/mixed episode presenting to the hospital following an argument with younger daughter.     attempted to Call [facility] for Med Rec - did not answer  Last PCP note the following meds are her home meds   metformin 1,000 mg tablet Take 1 Tablet(s) Oral two times a day  benztropine 1 mg tablet 2 Tablet(s) PO daily  fexofenadine 60 mg-pseudoephedrine  mg tablet,ext.release,12 hr, oral.   gabapentin 300 mg capsule Take 1 Capsule(s) (300 mg) Oral three times a day   levocetirizine 5 mg tablet Take 1 Tablet(s) Oral every night at bedtime   doxycycline hyclate 20 mg tablet Take 1 Tablet(s) Oral every day  albuterol sulfate 2.5 mg/3 mL (0.083 %) solution for nebulization Take 3 Milliliter(s) Inhalation four times a day  carbamazepine  mg tablet,extended release,12 hr Take 1 Tablet(s) Oral every day,  Prolixin injection  carbamazepine 200 mg tablet Take 1 Tablet(s) Oral two times a day,   Vitamin D3 50 mcg (2,000 unit) tablet Take 1 Tablet(s) Oral every day   cyclobenzaprine 5 mg tablet Take 1 Tablet(s) (5 mg) Oral three times a day  anastrozole 1 mg tablet Take 1 Tablet(s) Oral every day  albuterol sulfate HFA 90 mcg/actuation aerosol inhaler Inhale 2 Puff(s) Oral four times a day  rosuvastatin 40 mg tablet Take 1 Tablet(s) Oral every day   Toujeo Max U-300 SoloStar 300 unit/mL (3 mL) subcutaneous insulin pen ADMINISTER 36 UNITS UNDER THE SKIN EVERY EVENING  meloxicam 7.5 mg tablet Take 1 Tablet(s) (7.5 mg) Oral every day   fluticasone propionate 50 mcg/actuation nasal spray,suspension Take 2 Spray Nasal every day 90 days 2 refills, 90 days, 2 refills, for a total of 3, start on June 17, 2024, end on March 13, 2025, maintenance drug. Completed Rx.     Impression   Bipolar Disorder, mixed episode H/O suicide attempt  Anemia and Thrombocytopenia - possible Medication  ( carBAMazepine )  Breast CA Right: s/p lumpectomy x 2, + Radiation Rx ( in remission- outpatient US reviewed)  Asthma  DM (diabetes mellitus) Type 2   HTN (hypertension)  High cholesterol  Asthma  ABI (obstructive sleep apnea)     Plan   Can start on lantus 20 mg at night  Can resume metformin 1000 mg bid   LE Edema -> would start on lasix 20 mg qd  Check Folate, B12 and iron profile   Continue mood stabalizer as per pscy   monitor hgb and platelets     Dispo as per Psychiatricy

## 2025-07-04 NOTE — BH INPATIENT PSYCHIATRY ASSESSMENT NOTE - NSBHMSERECMEM_PSY_A_CORE
No chief complaint on file.    Pancreas cyst  History Of Present Illness  Wandy is a 41 year old female presenting with pancreas cyst her eofr surveillance eus with fna..       Past Medical History  Past Medical History:   Diagnosis Date   • Abdominal pain 2020   • Anxiety > 10 years   • Blood in stool    • Gallbladder polyp 2020   • Gastroesophageal reflux disease 2018   • High cholesterol    • Pancreatic cyst 2020   • Panic attacks > 10 years        Surgical History  Past Surgical History:   Procedure Laterality Date   • Colonoscopy diagnostic  2020    normal   • Removal gallbladder     • Upper gastrointestinal endoscopy  2020   • Us guidance for egg retrieval ob dept          Social History  Social History     Tobacco Use   • Smoking status: Former     Current packs/day: 0.00     Types: Cigarettes     Quit date: 1/1/2008     Years since quitting: 15.8   • Smokeless tobacco: Never   Vaping Use   • Vaping Use: never used   Substance Use Topics   • Alcohol use: Yes     Alcohol/week: 1.0 standard drink of alcohol     Types: 1 Glasses of wine per week     Comment: rarely   • Drug use: No       Family History  Family History   Problem Relation Age of Onset   • Cancer Mother         ovarian   • Systemic Lupus Erythematosus Father    • Rheumatoid Arthritis Father    • Cancer Maternal Grandmother         stomach   • Cystic Fibrosis Maternal Grandfather    • Cancer Paternal Grandfather         stomach        Allergies  ALLERGIES:  Amoxicillin, Grass, Mold   (environmental), and Penicillins    Medications  (Not in a hospital admission)      Review of Systems  Review of Systems   All other systems reviewed and are negative.       Last Recorded Vitals  Blood pressure (!) 107/90, pulse 79, temperature 98.8 °F (37.1 °C), temperature source Temporal, resp. rate 13, height 5' 2\" (1.575 m), weight 81.6 kg (180 lb), last menstrual period 10/10/2023, SpO2 97 %.    Physical Exam  Constitutional:       Appearance: Normal appearance.    HENT:      Head: Normocephalic.      Right Ear: Tympanic membrane normal.      Left Ear: Tympanic membrane normal.      Nose: Nose normal.      Mouth/Throat:      Mouth: Mucous membranes are moist.      Neck: Normal range of motion.   Eyes:      Extraocular Movements: Extraocular movements intact.      Pupils: Pupils are equal, round, and reactive to light.   Cardiovascular:      Rate and Rhythm: Normal rate and regular rhythm.      Pulses: Normal pulses.   Pulmonary:      Effort: Pulmonary effort is normal.   Abdominal:      General: Abdomen is flat.   Musculoskeletal:         General: Normal range of motion.   Skin:     General: Skin is warm.   Neurological:      General: No focal deficit present.      Mental Status: She is alert and oriented to person, place, and time.   Psychiatric:         Mood and Affect: Mood normal.          Imaging      Labs       Assessment & Plan   Patient Active Problem List   Diagnosis   • Blood in stool   • Chronic neck pain   • Epicondylitis, lateral   • Epigastric pain   • Infertility, female   • Myalgia   • Numbness and tingling   • Numerous moles   • Obesity (BMI 30-39.9)   • Gastroesophageal reflux disease   • Eating disorder   • Diffuse cystic mastopathy       41 year-old female with history of pancreas cyst her eofr eus with fna.  MAC per anesthesia.  Smoking Cessation  Counseling given: Not Answered      DVT Prophylaxis  none    Code Status    Code Status: Prior    Primary Care Physician  Cantu, Haydee, MD            Normal

## 2025-07-04 NOTE — BH INPATIENT PSYCHIATRY ASSESSMENT NOTE - NSBHCHARTREVIEWVS_PSY_A_CORE FT
Vital Signs Last 24 Hrs  T(C): 37.1 (07-04-25 @ 16:07), Max: 37.1 (07-04-25 @ 16:07)  T(F): 98.7 (07-04-25 @ 16:07), Max: 98.7 (07-04-25 @ 16:07)  HR: 103 (07-04-25 @ 16:07) (85 - 103)  BP: 130/84 (07-04-25 @ 16:07) (121/82 - 139/84)  BP(mean): --  RR: 16 (07-04-25 @ 05:30) (16 - 18)  SpO2: 98% (07-04-25 @ 05:30) (96% - 98%)

## 2025-07-04 NOTE — BH PATIENT PROFILE - NSPROGENSOURCEINFO_GEN_A_NUR
After Visit Summary      Facility     Name Address Phone       [facility]  [address] [place] [address] [phone]            Patient Discharge Summary   3/9/2017    Alfonso Wright            Please bring this medication reconciliation form to your next doctor’s appointment(s). Please update the form if you stop taking any of these medications or you start taking any new medications including over the counter medications. Also please carry a copy of this form with you at all times in the event of an emergency.    A copy of these discharge instructions was reviewed with and given to the patient/patient representative/Guardian/Caregiver at discharge.          The doctor who took care of you in the hospital     Provider Specialty    Sneha Navarro, DO Orthopedic Surgery      Allergies as of 3/9/2017     No Known Allergies           Discharge Medications              What to Do with Your Medications      START taking these medications today unless otherwise stated        Details    Morning Noon Evening Bedtime As needed    HYDROcodone-acetaminophen 5-325 MG per tablet   Commonly known as:  NORCO        Take 1-2 tablets by mouth every 6 hours as needed for Pain. A stool softener is recommended with narcotic pain medications   Authorizing Provider:  Sneha Navarro   Last Dose:  1 tablet on 3/9/2017  9:41 AM                                                     CONTINUE taking these medications which have NOT CHANGED        Details    Morning Noon Evening Bedtime As needed    DIAZepam 5 MG tablet   Commonly known as:  VALIUM        Take 5 mg by mouth Every evening as needed.                            piroxicam 20 MG capsule   Commonly known as:  FELDENE        Take 1 capsule by mouth daily. Take 1 cap daily with food.  Fill RX as Work Comp   Authorizing Provider:  Josep Garsia                            * predniSONE 5 MG tablet   Commonly known as:  DELTASONE         Take 6 tabs po am day 1, 5 tabs po am day 2; continue tapering 1 tab each day until all are gone at end of 6 days.  Fill Rx as Work Comp   Authorizing Provider:  Josep Garsia                            * predniSONE 20 MG tablet   Commonly known as:  DELTASONE        Take 3 tabs po q am x 3 days; then 2 tabs po q am x 3 days then 1 tab po q am x 3 days.  Fill Rx as Work Comp   Authorizing Provider:  Josep Garsia                            TYLENOL/CODEINE #3 300-30 MG per tablet        Generic drug:  acetaminophen-codeine   1-2 TABLETS EVERY 4 TO 6 HOURS AS NEEDED                                                   * Notice:  This list has 2 medication(s) that are the same as other medications prescribed for you. Read the directions carefully, and ask your doctor or other care provider to review them with you.         Where to Get Your Medications      You are receiving a paper prescription for the following medications, you can take these to any pharmacy.     Bring a paper prescription for each of these medications     HYDROcodone-acetaminophen 5-325 MG per tablet               Your To Do List     Future Appointments Provider Department Dept Phone    3/15/2017 11:00 AM Josep Garsia MD [facility] Occupational Health [phone]    3/20/2017 8:30 AM Arleen Fuller PA-C [facility] Orthopedics [phone]        Discharge Instructions       Care After Your Upper Extremity Procedure  Dr. Sneha Navarro  [phone] or [phone]  Activity  • For the next 24 hours: Do not stay alone, drive a car, operate electrical/power tools or appliances, drink alcohol, or sign any legal papers.  • The following are all essential to prevent pain:  Elevation: ? If you were placed in a sling, remove the sling at home, place one pillow under your elbow and two under your forearm so that your hand is above heart level. If no sling is present, elevate your surgical arm by placing one pillow under your  elbow and two under your forearm.    You should sleep with your extremity elevated as described above.     Rest: The first 2-3 days after surgery, rest is extremely important and will ultimately speed your recovery.    Ice: Apply ice for 30 minutes every two hours for the first 2-3 days at minimum.    Finger exercises:  ?    ?  Gently stretch your fingers with your other hand (flexing and extending fully).  Finger exercises are extremely important to prevent finger stiffness and swelling.    Diet  • Nausea may occur after general anesthesia. Call Dr. Navarro if nausea persists more than 12 to 18 hours.   • Begin with clear liquids and advance your diet to solids as tolerated.     Bathing  •  Keep the dressing and incision area dry. You may shower with the water resistant dressing in place.  Once you remove the dressing you may shower over the incisions.  Make sure to wash out any soap.  Do not submerge in a bath or do dishes.    Medications  · Do not overmedicate.  If pain is that severe, call us.  These medications can cause a bit of drowsiness and constipation.  Increase your fluid and fiber intake.  You may resume your normal home medications as before your surgery.    Care of your dressing/incision         ?    • Remove dressing in 3 days.  You may redress the incisional area as needed.  • There may be some blood stains on the dressing, but this is normal.   .   Call the doctor if you have:  • Fever greater than 101 degrees  • Severe pain  • Numbness  • Rash or redness.        Discharge References/Attachments     None      Pending Labs/Results     Any lab or diagnostic test results that are \"pending\" at time of discharge are listed below. If no results display then none were \"pending\" at time of discharge. Depending on when the test was completed results may be available within 3-5 days. Results can be reviewed with your provider at your next visits or through [employer]. If needed contact the provider office for  additional information.          Your Opinion Matters To Us  If you receive a patient satisfaction survey in the mail, please complete and return it in the postage-paid envelope.    We truly value and appreciate your feedback.           Alfonso Wright        Your To Do List     Future Appointments Provider Department Dept Phone    3/15/2017 11:00 AM Josep Garsia MD [facility] Occupational Health [phone]    3/20/2017 8:30 AM Arleen Fuller PA-C [facility] Orthopedics [phone]      Contact your doctor for follow-up appointment if not already scheduled.     Follow-up information has not been specified.         Alfonso Wright           Summary of your Discharge Medications      Take these Medications        Details    Morning Noon Evening Bedtime As needed    DIAZepam 5 MG tablet   Commonly known as:  VALIUM    Take 5 mg by mouth Every evening as needed.                            HYDROcodone-acetaminophen 5-325 MG per tablet   Commonly known as:  NORCO    Take 1-2 tablets by mouth every 6 hours as needed for Pain. A stool softener is recommended with narcotic pain medications                            piroxicam 20 MG capsule   Commonly known as:  FELDENE    Take 1 capsule by mouth daily. Take 1 cap daily with food.  Fill RX as Work Comp                            predniSONE 5 MG tablet   Commonly known as:  DELTASONE    Take 6 tabs po am day 1, 5 tabs po am day 2; continue tapering 1 tab each day until all are gone at end of 6 days.  Fill Rx as Work Comp                            predniSONE 20 MG tablet   Commonly known as:  DELTASONE    Take 3 tabs po q am x 3 days; then 2 tabs po q am x 3 days then 1 tab po q am x 3 days.  Fill Rx as Work Comp                            TYLENOL/CODEINE #3 300-30 MG per tablet    Generic drug:  acetaminophen-codeine   1-2 TABLETS EVERY 4 TO 6 HOURS AS NEEDED                                                          Outpatient Administered Medication List       Notice     You have not been prescribed any facility-administered medications.       patient

## 2025-07-04 NOTE — CONSULT NOTE ADULT - TIME BILLING
Patient seen at bedside, time spent , excluding teaching time , evaluating and treating the patient's acute illness as well as time spent reviewing labs, radiology, discussing with patient and/or patient's family and discussing the case with a multidisciplinary team.

## 2025-07-04 NOTE — BH PATIENT PROFILE - FALL HARM RISK - UNIVERSAL INTERVENTIONS
Bed in lowest position, wheels locked, appropriate side rails in place/Call bell, personal items and telephone in reach/Instruct patient to call for assistance before getting out of bed or chair/Non-slip footwear when patient is out of bed/[place] to call system/Physically safe environment - no spills, clutter or unnecessary equipment/Purposeful Proactive Rounding/Room/bathroom lighting operational, light cord in reach

## 2025-07-04 NOTE — CONSULT NOTE ADULT - SUBJECTIVE AND OBJECTIVE BOX
SUBJECTIVE:    Patient is a 55y old Female who presents with a chief complaint of     Today is hospital day .     HPI    56yo F, domiciled with 2 adult daughters, on disability and pension (previously worked as a teacher), past psych history of bipolar disorder, hx prior psych admissions and remote hx of SA, no known hx of violence, currently in outpatient psych care at [facility], recent hospitalization in [place] for erratic behavior/mixed episode presenting to the hospital following an argument with younger daughter.     Although patient reports her mood as "good" and can participate in the interview, patient presents hyperverbal to pressured, tangential, distressed, and labile (tearing up at times). Per collateral, there is concern that patient has been worse the past 2 weeks, prior to the argument, not sleeping, driving the car at night, spending money, "just talking." Older daughter reports that they have been hiding keys to prevent her from going out at night and driving. Given presentation and collateral, patient is currently a danger to self and others and will require psych hospitalization for safety and stabilizatio  Overnight Events:     No acute overnight events. No active complaints    PAST MEDICAL & SURGICAL HISTORY  Breast CA  Right: s/p lumpectomy x 2, + Radiation Rx    Bipolar disorder    Asthma    DM (diabetes mellitus)  Type 2    HTN (hypertension)    High cholesterol    Asthma    H/O suicide attempt  1986    History of herpes zoster of eye    ABI (obstructive sleep apnea)  doesn't use her CPAP at home    H/O breast surgery    History of surgery  urethral sling          ALLERGIES:  cats, trees, pollen, grass dust (Rhinorrhea)  Lamictal (Hives)  penicillins (Rash)    MEDICATIONS:  STANDING MEDICATIONS  benztropine 1 milliGRAM(s) Oral two times a day  carBAMazepine 200 milliGRAM(s) Oral two times a day    PRN MEDICATIONS  nicotine  Polacrilex Gum 2 milliGRAM(s) Oral every 4 hours PRN    VITALS:   ICU Vital Signs Last 24 Hrs  T(C): 36.6 (04 Jul 2025 05:30), Max: 37.2 (03 Jul 2025 09:57)  T(F): 97.8 (04 Jul 2025 05:30), Max: 99 (03 Jul 2025 09:57)  HR: 85 (04 Jul 2025 05:30) (74 - 91)  BP: 135/88 (04 Jul 2025 05:30) (121/82 - 145/91)  RR: 16 (04 Jul 2025 05:30) (16 - 20)  SpO2: 98% (04 Jul 2025 05:30) (96% - 98%)    O2 Parameters below as of 04 Jul 2025 05:30  Patient On (Oxygen Delivery Method): room air            LABS:                        11.2   6.35  )-----------( 102      ( 03 Jul 2025 12:00 )             32.6     07-03    140  |  102  |  15  ----------------------------<  154[H]  4.3   |  23  |  1.0    Ca    9.9      03 Jul 2025 12:00         Urinalysis Basic - ( 03 Jul 2025 12:00 )    Color: x / Appearance: x / SG: x / pH: x  Gluc: 154 mg/dL / Ketone: x  / Bili: x / Urobili: x   Blood: x / Protein: x / Nitrite: x   Leuk Esterase: x / RBC: x / WBC x   Sq Epi: x / Non Sq Epi: x / Bacteria: x            Urinalysis with Rflx Culture (collected 03 Jul 2025 11:30)          Cardiology:            RADIOLOGY:        Lines:  Central line:              Date placed:             Indication:   Intravenous Access:   NG tube:   [place]guiano Catheter:       Diagnositic orders     nicotine  Polacrilex Gum (07-04-25 @ 06:46) [Active]  Diet, DASH/TLC (07-04-25 @ 06:46) [Active]  Blood Glucose Point Of Care Testing (07-04-25 @ 06:46) [Active]  Admit from ED (07-04-25 @ 04:57) [Active]  Transfer In - Clerical Use Only (07-04-25 @ 04:55) [Active]  Admit to Inpatient Level of Care. (07-04-25 @ 04:04) [Active]  Bed Request (07-04-25 @ 04:04) [Active]  carBAMazepine (07-04-25 @ 03:54) [Active]  benztropine (07-04-25 @ 03:54) [Active]  Weight (07-04-25 @ 03:54) [Active]  NO Shoelaces and/or Clothes with Drawstrings (07-04-25 @ 03:54) [Active]  NO Use of Sharps (07-04-25 @ 03:54) [Active]  No Pharmacologic VTE Prophylaxis - Low Risk (07-04-25 @ 03:54) [Active]  Legal Status (07-04-25 @ 03:54) [Active]  Admit to Inpatient Level of Care. (07-04-25 @ 03:54) [Active]  Blood Glucose Point Of Care Testing (07-03-25 @ 20:40) [Active]  Constant Observation (07-03-25 @ 11:40) [Active]  Vital Signs (07-03-25 @ 11:37) [Active]

## 2025-07-04 NOTE — BH INPATIENT PSYCHIATRY ASSESSMENT NOTE - NSBHMETABOLIC_PSY_ALL_CORE_FT
BMI: BMI (kg/m2): 31.3 (07-04-25 @ 05:30)  HbA1c:   Glucose: POCT Blood Glucose.: 333 mg/dL (07-04-25 @ 16:35)    BP: 130/84 (07-04-25 @ 16:07) (121/82 - 145/91)Vital Signs Last 24 Hrs  T(C): 37.1 (07-04-25 @ 16:07), Max: 37.1 (07-04-25 @ 16:07)  T(F): 98.7 (07-04-25 @ 16:07), Max: 98.7 (07-04-25 @ 16:07)  HR: 103 (07-04-25 @ 16:07) (85 - 103)  BP: 130/84 (07-04-25 @ 16:07) (121/82 - 139/84)  BP(mean): --  RR: 16 (07-04-25 @ 05:30) (16 - 18)  SpO2: 98% (07-04-25 @ 05:30) (96% - 98%)      Lipid Panel:

## 2025-07-04 NOTE — BH INPATIENT PSYCHIATRY ASSESSMENT NOTE - RISK ASSESSMENT
RF: current symptoms, patient's lack of insight, driving in the middle of the night/lack of sleep, past [facility] admissions, hx SA  PF: pt is engaged in outpatient psych care at [facility], denies SI/HI

## 2025-07-04 NOTE — BH INPATIENT PSYCHIATRY ASSESSMENT NOTE - NSBHMSETHTPROC_PSY_A_CORE
PRE-OPERATIVE NOTE   Chief Complaint had concerns including Pre-Op Exam (Colonoscopy, Dr Ghosh, [facility], 3/5/24).  Surgery Date: 03/05/2024  Planned Surgery: COLONOSCOPY  Type of Anesthesia: Monitor Anesthesia Care  Pre-op Diagnosis: Colon cancer screening [Z12.11]  Requesting Surgeon: Po Ghosh DO    Subjective   BRIEF HISTORY LEADING to SURGERY: Riley Hernandez is a 69 year old male here for pre-operative anesthesia consult for surgery as requested by the surgeon Po Ghosh DO.    Review of Systems  A complete review of systems was performed and negative except as documented above.    Objective   PHYSICAL EXAM  Vitals:    02/22/24 1317   BP: (!) 151/81   Pulse: 85   Temp: 98.6 °F (37 °C)   TempSrc: Temporal   SpO2: 94%   Weight: (!) 170.1 kg (375 lb)   Height: 6' 5\"     Physical Exam  General: Alert. Normal appearance.  Head: Normocephalic.  Eyes: Pupils equal, round and reactive to light. Conjunctivae normal.  Ear: Tympanic membranes and external ear canals normal.  Nose: Nares normal. No sinus tenderness.  Throat: Lips, mucosa, and tongue normal. Pharyngeal mucosa non-inflamed. Teeth normal.  Neck: Supple.Thyroid normal. Lymphadenopathy is not present.  Cardiovascular: Normal rate and regular rhythm. Normal S1, S2. Murmurs not present.  Respiratory: Normal respiratory effort. No wheezing, rales or rhonchi.  Abdomen: Soft, non-tender and bowel sounds active. No masses or organomegaly.  Musculoskeletal: Upper and lower extremity strength and range of motion is normal. Lower extremity edema not present. Gait is normal.  Neurologic: Cranial nerves 2-12 grossly intact. No focal deficits.  Psychiatric: Mood is normal, affect is normal, thought content is normal.    EKG: EKG was performed here in the office, and revealed normal sinus rhythm at 84 beats per minute with a right bundle branch block, and left interior fascicular block indicating a bifascicular block. When compared to previous EKG dated July 18, 2023,  no significant change was appreciated.    RESULTS REVIEW         Most Recent  Na+  140 (2/22/2024)  K+  4.1 (2/22/2024)   Glucose  114 (2/22/2024)  GFR  58 (2/22/2024)   The last eGFR was abnormal at 58 (2/22/2024).    Most Recent  WBC  6.2 (2/22/2024)  HGB  15.3 (2/22/2024)     Platelet  203 (2/22/2024)       The last INR was elevated at 2.4 (2/13/2024).       PAST MEDICAL HISTORIES        Medications: has a current medication list which includes the following prescription(s): amlodipine, doxazosin, furosemide, glipizide, losartan, metformin, metoprolol tartrate, montelukast, enoxaparin, rosuvastatin, electrolyte/peg 3350, diclofenac, fluticasone, warfarin, one touch ultra 2, one touch ultra test, onetouch delica lancets 33g, famotidine, [START ON 3/7/2024] tirzepatide, and [START ON 4/4/2024] tirzepatide.  Problem List: has Essential hypertension; Dyslipidemia (high LDL; low HDL); Gastroesophageal reflux disease without esophagitis; Lymphedema of both lower extremities; CKD (chronic kidney disease) stage 3, GFR 30-59 ml/min (CMD); BPH (benign prostatic hyperplasia); Morbid obesity with BMI of 40.0-44.9, adult (CMD); Type 2 diabetes mellitus with stage 3 chronic kidney disease, without long-term current use of insulin (CMD); Microalbuminuria due to type 2 diabetes mellitus (CMD); Encounter for therapeutic drug monitoring; History of left PCA stroke; Long term (current) use of anticoagulants; Therapeutic drug monitoring; History of cerebrovascular accident (CVA) due to embolism; Paroxysmal A-fib (CMD); Right peroneal nerve palsy; Impaired mobility and ADLs; Weakness of right leg; and Gait abnormality on their problem list.  Medical:  has a past medical history of BPH (benign prostatic hyperplasia), Cataract, CKD (chronic kidney disease) stage 3, GFR 30-59 ml/min (CMD) (10/16/2015), Essential hypertension, benign, Gastroesophageal reflux disease without esophagitis (10/16/2015), Hyperlipemia, Lymphedema of both lower  extremities (10/16/2015), Obesity, Stroke (CMD), and Vitamin D deficiency.  Surgical:  has a past surgical history that includes Nasal septum surgery (11/28/2006); Tendon repair (12/22/2005); extracapsular cataract removal w insert io lens prosth wo ecp (Right, 07/19/2017); Eye surgery; Transurethral resection of prostate; hernia repair; and Prostate surgery.  Family: family history includes Atrial Fibrilliation in his brother, brother, and brother; Cancer in his sister and sister; Cancer (age of onset: 32) in an other family member; Cancer, Breast in his sister and sister; Diabetes in his brother; Genetic Disorder in an other family member; Hypertension in his brother, father, and mother; Other in his daughter; Patient is unaware of any medical problems in his brother and son.  Social:  reports that he has never smoked. He has never been exposed to tobacco smoke. He has never used smokeless tobacco. He reports current alcohol use of about 3.0 standard drinks of alcohol per week. He reports that he does not use drugs.  Allergies: is allergic to simvastatin.     SURGICAL RISK AND SCREENINGS     Family History Risks  Adverse Reaction to Anesthesia: No  Bleeding or Blood Disorder: No  Malignant Hyperthermia: No    Personal Surgical History  Anesthetic Complications: No  Bleeding Problems: No  Problems with Surgery: No       Malnutrition Screening Tool Current weight 375 pounds  Have you recently lost weight without trying? No  Have you been eating poorly because of a decreased appetite? No  Score = 0, Not at Risk       Functional Capacity  Are you able to do light housework, dusting, wash dishes, climb a flight of stairs or walk up a hill without chest pain or problems breathing (>4 METS)? Yes    Revised Cardiac Risk Index  Intermediate or Higher Risk Surgery No   History of Ischemic Heart Disease or Cardiac Chest Pain No   Congestive Heart Failure No; Ejection Fraction 54 (07/28/2022)   History of Stroke or TIA Yes    Last Creatinine >2 No; 1.32 g/dL (02/22/2024)   Prescribed Insulin No   Estimated Risk of a Major Cardiac Complication 1 risk factor = 6.0%       Acute Kidney Injury Prevention:  ZAFAR Risk is Medium based on criteria below:  Chronic Kidney Disease  GFR 46 - 60. The last eGFR was abnormal at 58 (2/22/2024).  Diabetes  Age >65  Recommendations  - 24 hours before surgery hold ACE-I/ARB, Diuretics and Potassium  - 5 days before surgery hold NSAIDs    Advance care planning documents on file - yes     ASSESSMENT AND PLAN        1. Essential hypertension, benign  -     Electrocardiogram 12-Lead  -     CBC with Automated Differential; Future  -     Basic Metabolic Panel; Future  2. Preop examination  -     Electrocardiogram 12-Lead  -     tirzepatide (MOUNJARO) 2.5 MG/0.5ML Solution Pen-injector; Inject 2.5 mg into the skin every 7 days for 4 doses. Indications: Type 2 Diabetes Do not start before March 7, 2024.  -     tirzepatide (MOUNJARO) 5 MG/0.5ML Solution Pen-injector; Inject 5 mg into the skin every 7 days. Indications: Type 2 Diabetes Do not start before April 4, 2024.  -     CBC with Automated Differential; Future  -     Basic Metabolic Panel; Future  3. Type 2 diabetes mellitus with stage 3a chronic kidney disease, without long-term current use of insulin (CMD)  -     tirzepatide (MOUNJARO) 2.5 MG/0.5ML Solution Pen-injector; Inject 2.5 mg into the skin every 7 days for 4 doses. Indications: Type 2 Diabetes Do not start before March 7, 2024.  -     tirzepatide (MOUNJARO) 5 MG/0.5ML Solution Pen-injector; Inject 5 mg into the skin every 7 days. Indications: Type 2 Diabetes Do not start before April 4, 2024.  -     CBC with Automated Differential; Future  -     Basic Metabolic Panel; Future  Essential hypertension, benign:  Ordered Electrocardiogram 12-Lead  Ordered CBC with Automated Differential; Future  Ordered Basic Metabolic Panel; Future    Preop examination:  Ordered CBC with Automated Differential;  Future  Ordered Basic Metabolic Panel; Future  Ordered Electrocardiogram 12-Lead  Prescribed Tirzepatide (MOUNJARO) 2.5 MG/0.5ML Solution Pen-injector; Inject 2.5 mg into the skin every 7 days for 4 doses. Indications: Type 2 Diabetes Do not start before March 7, 2024.    Prescribed Tirzepatide (MOUNJARO) 5 MG/0.5ML Solution Pen-injector; Inject 5 mg into the skin every 7 days. Indications: Type 2 Diabetes Do not start before April 4, 2024.      Type 2 diabetes mellitus with stage 3a chronic kidney disease, without long-term current use of insulin (CMD):  Ordered CBC with Automated Differential; Future  Ordered Basic Metabolic Panel; Future   Prescribed Tirzepatide (MOUNJARO) 2.5 MG/0.5ML Solution Pen-injector; Inject 2.5 mg into the skin every 7 days for 4 doses. Indications: Type 2 Diabetes Do not start before March 7, 2024.    Prescribed Tirzepatide (MOUNJARO) 5 MG/0.5ML Solution Pen-injector; Inject 5 mg into the skin every 7 days. Indications: Type 2 Diabetes Do not start before April 4, 2024.    Recommended discontinuing Glipizide.   Discussed Glyphosate.     Ankle issues:  Recommended walking.    Follow up as needed.    Preop Optimization  - OPTIMIZED for SURGERY: YES patient is medically optimized for surgery.  - Workup reviewed and/or ordered: CBC, BMP, and ECG   - Pre-Op management of pacemaker, dialysis or steroids: not needed.  - Post-Op DVT prophylaxis: per surgical team  - Smoking Status: Never Smoker  .     Pertinent Conditions  None  #Microalbuminuria Due To Type 2 Diabetes Mellitus (Cmd) the last A1c was 5.5 (01/09/2024), this is trending down compared to  7.4 (07/27/2023).    #Essential Hypertension today's blood pressure was /81    #Type 2 Diabetes Mellitus With Stage 3 Chronic Kidney Disease, Without Long-Term Current Use Of Insulin (Cmd) the last eGFR was 58 (2/22/2024)    #Paroxysmal A-Fib (Cmd) current heart rate is 85.    #History of Stroke or TIA    #BMI 44.47      Medications to Hold    Medications and Supplements:  - Morning of surgery hold any Vitamins AND for 2 weeks hold any Vitamin E or Herbals     Anticoagulation:   - Coordinate with ordering prescriber when to hold Warfarin (Coumadin).  Patient will be bridging with Lovenox for Dr. Cruz  - 12 hours before surgery hold (enoxaparin (LOVENOX) 100 MG/ML injectable solution [[id number]])    Antidiabetic:   - Morning of surgery hold (glipiZIDE (GLUCOTROL XL) 5 MG 24 hr tablet [[id number]], metFORMIN (GLUCOPHAGE-XR) 500 MG 24 hr tablet [[id number]])  - 7 days before surgery do not take (tirzepatide (MOUNJARO) 2.5 MG/0.5ML Solution Pen-injector [[id number]], tirzepatide (MOUNJARO) 5 MG/0.5ML Solution Pen-injector [[id number]]). This is to avoid delayed gastric emptying and a possible full stomach.    ZAFAR Risk (Diuretics, ACE-I/ARB, NSAIDs):   - 24 hours before surgery hold (losartan (COZAAR) 100 MG tablet [[id number]])  - 24 hours before surgery hold (furosemide (LASIX) 20 MG tablet [[id number]]) and if taking potassium      Continue all other medications unless an alternative plan was advised with the surgeon and/or specialist.      Follow Up      Po Ghosh DO  Copy sent to: Po Ghosh DO         Refer to orders.  Medical compliance with plan discussed and risks of non-compliance reviewed.  Patient education completed on disease process, etiology & prognosis.  Proper usage and side effects of medications reviewed & discussed.  Return to clinic as clinically indicated as discussed with patient who verbalized understanding of the plan and is in agreement with the plan.    IRoseann, have created a visit summary document based on the audio recording between Ms. Letty Muñoz PA-C and this patient for the physician to review, edit as needed, and authenticate.    Creation Date: 2/23/2024   I have reviewed and edited the visit summary above and attest that it is accurate.     Letty Muñoz PA-C       Overinclusive/Tangential/Perseverative/Flight of ideas

## 2025-07-04 NOTE — BH INPATIENT PSYCHIATRY ASSESSMENT NOTE - NSBHCHARTREVIEWINVESTIGATE_PSY_A_CORE FT
< from: 12 Lead ECG (07.03.25 @ 12:33) >      Ventricular Rate 99 BPM    Atrial Rate 99 BPM    P-R Interval 162 ms    QRS Duration 94 ms    Q-T Interval 360 ms    QTC Calculation(Bazett) 462 ms    P Axis 59 degrees    R Axis -36 degrees    T Axis 46 degrees    Diagnosis Line Normal sinus rhythm  Left axis deviation  Cannot rule out Anterior infarct , age undetermined  Abnormal ECG    < end of copied text >

## 2025-07-04 NOTE — BH PATIENT PROFILE - NSCMSPTSTRENGTHS_PSY_ALL_CORE
Expressive of emotions/Chiqui/spirituality/Highly motivated for treatment/Intact family/Physically healthy/Positive attitude
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
## 2025-07-04 NOTE — BH PATIENT PROFILE - NSPROEDALEARNPREF_GEN_A_NUR
computer/internet/group instruction/individual instruction/pictorial/skill demonstration/verbal instruction/video

## 2025-07-04 NOTE — BH INPATIENT PSYCHIATRY ASSESSMENT NOTE - NSBHASSESSSUMMFT_PSY_ALL_CORE
55F, domiciled with 2 adult daughters, on disability and pension (previously worked as a teacher), PPH of bipolar disorder, past IPP hospitalizations remote hx of SA via lithium OD, no known hx of violence, currently in outpatient psych care at [facility], recent hospitalization in [place] for erratic behavior a/f acute casi.     On assessment, pt speaks rapidly and tangentially. Her affect is labile and appears mostly to be elated, but at times when speaking about her daughters she becomes irritable. She is cooperative but difficult to interrupt. She describes sleeping only 3 hours last night but does not c/o fatigue and is seen to have a lot of energy around the unit (walking around and playing bowling). On chart review collateral reports manic like behaviors over the past 2 weeks.    Pt presentation is most c/w acute casi in the setting of chronic bipolar d/o. Pt is a danger to self. She lacks insight into this safety risk. She requires IPP hospitalization to stabilize mood and decrease her risk of harm to self.    Plan:    #Bipolar d/o  -continue home carbamazepine 200mg bid  -continue benztropine 1mg bid  -contact OP psychiatrist after holiday weekend    #Diabetes  -lantus 20 mg at night per hospitalist   -metformin 1000 mg bid per hospitalist     #LE Edema  -Lasix 20mg qd per hospitalist     #Anemia  -monitor CBC  -ordered folate, b12 levels  -ordered iron panel
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
## 2025-07-04 NOTE — BH INPATIENT PSYCHIATRY ASSESSMENT NOTE - NSBHADMITIPOBS_PSY_A_CORE
patient feeling much better. interactive bouncing up and down on bed stating that he is "so hungry." patient and family want to go home. explained that pt with likely viral uri. offered strep testing but refused. states that they will see their PMD this weekend. Will d/c with follow up and strict return precautions. ED evaluation and management discussed with the patient and family (if available) in detail.  Close PMD follow up encouraged.  Strict ED return instructions discussed in detail and patient given the opportunity to ask any questions about their discharge diagnosis and instructions. Patient verbalized understanding.  Michael Mon M.D., Attending Physician Routine observation

## 2025-07-04 NOTE — BH PATIENT PROFILE - HOME MEDICATIONS
lidocaine 4% topical film , Apply topically to affected area 2 times a day as needed for  severe pain  fluPHENAZine 5 mg oral tablet , 1 tab(s) orally once a day (at bedtime)  benztropine 1 mg oral tablet , 1 tab(s) orally 2 times a day  anastrozole 1 mg oral tablet , 1 tab(s) orally once a day  doxycycline 25 mg/5 mL oral liquid , 4 milliliter(s) orally every 12 hours  vitamin A and D topical ointment , 1 Apply topically to affected area 2 times a day  amLODIPine 5 mg oral tablet , 1 tab(s) orally once a day  albuterol 90 mcg/inh inhalation aerosol , 2 puff(s) inhaled every 6 hours As needed SOB/home med  atorvastatin 20 mg oral tablet , 1 tab(s) orally once a day (at bedtime)  gemfibrozil 600 mg oral tablet , 1 tab(s) orally 2 times a day  loratadine 10 mg oral tablet , 1 tab(s) orally once a day  metFORMIN 1000 mg oral tablet , 1 tab(s) orally 2 times a day  carBAMazepine 100 mg oral capsule, extended release , 1 cap(s) orally once a day (at bedtime)  carBAMazepine 200 mg oral tablet , 1 tab(s) orally 2 times a day  prazosin 1 mg oral capsule , 1 cap(s) orally once a day (at bedtime)

## 2025-07-04 NOTE — ED ADULT NURSE NOTE - NSFALLUNIVINTERV_ED_ALL_ED
Bed/Stretcher in lowest position, wheels locked, appropriate side rails in place/Call bell, personal items and telephone in reach/Instruct patient to call for assistance before getting out of bed/chair/stretcher/Non-slip footwear applied when patient is off stretcher/[place] to call system/Physically safe environment - no spills, clutter or unnecessary equipment/Purposeful proactive rounding/Room/bathroom lighting operational, light cord in reach

## 2025-07-04 NOTE — BH PATIENT PROFILE - NSBHCOPING_PSY_A_CORE
FRANKLYN Jimenez [facility] Progress Note      Impression:  85 y/o gentleman with history of Parkinson's disease, dysphagia, obstructive uropathy s/p ureteral stents and dysphagia admitted with hypernatremia and acute kidney injury     *Hypernatremia  - inadequate access to enteral fluids while g-tube displaced  - complicated by impaired concentrating capacity  - Na has improved, but up slightly given G-tube dislodgement and therefore lack of TF/FWF     *Acute Kidney Injury  - sCr has increased from baseline ~ 1 to 1.6 with associated hypotension and hypernatremia  - etiology is likely volume depletion, but given history of obstruction, refer for renal ultrasound  - improved renal function  - hydronephrosis noted-->request urology input (recommending outpt f/up w/primary urologist, Dr. Acuna)     *obstructive uropathy  - history of bilateral stent placement  - renal ultrasound noted     *Protein-espinoza malnutrition  - ongoing enteral nutritional access     *Pyuria/dysuria     *Hypercalcemia  - corrected calcium ~ 11  - primary hyperparathyroidism  - not a surgical candidate  - maintain euvolemia     Recommendations  D5 0.45 @ 60 ml/hr  IR to replace G-tube   Amlodipine resumed, however unable to take po currently   Urology input on hydronephrosis  Labs in AM  Further recommendations pending course     Discussed with bedside RN and Dr. Shahzad Rosenberg DNP, FNP-BC    Subjective:  Drowsy but will wake up to verbal command. Pulled out G-tube this morning.     Medications  Medications Prior to Admission   Medication Sig Dispense Refill    ascorbic acid (VITAMIN C) 500 MG tablet 500 mg by Per PEG Tube route daily.      Multiple Vitamins-Minerals (Multivitamin-Minerals) Tab 1 tablet by Per PEG Tube route daily.      ferrous sulfate 325 (65 FE) MG EC tablet 325 mg daily (with breakfast). Per PEG Tube      folic acid (FOLATE) 1 MG tablet 1 mg by Per PEG Tube route daily.      melatonin 1 MG tablet 1  mg by Per G Tube route nightly.      polyethylene glycol (MIRALAX) 17 g packet 17 g by Per PEG Tube route daily as needed (constipation). Stir and dissolve powder in any 4 to 8 ounces of beverage, then drink.      Cholecalciferol 50 mcg (2,000 units) tablet 50 mcg by Per PEG Tube route nightly.      carbidopa-levodopa (SINEMET)  MG per tablet 0.5 tablets by Per PEG Tube route in the morning and 0.5 tablets at noon and 0.5 tablets in the evening.      aspirin 81 MG chewable tablet 81 mg by Per PEG Tube route daily.      Coenzyme Q10 (CoQ-10) 100 MG Cap 100 mg by Per PEG Tube route daily.      acetaminophen (TYLENOL) 500 MG tablet 1,000 mg by Per PEG Tube route every 8 hours as needed for Pain.      amLODIPine (NORVASC) 5 MG tablet 5 mg by Per PEG Tube route daily.      ARIPiprazole (ABILIFY) 2 MG tablet 2 mg by Per PEG Tube route every other day. At bedtime      donepezil (ARICEPT) 10 MG tablet 10 mg by Per PEG Tube route nightly.      mirtazapine (REMERON) 15 MG tablet 15 mg by Per PEG Tube route nightly.      venlafaxine (EFFEXOR) 37.5 MG tablet 37.5 mg by Per PEG Tube route in the morning and 37.5 mg in the evening.         Review of Systems  Review of Systems   Unable to perform ROS: Mental status change         Physical Exam  Visit Vitals  /68 (BP Location: LUE - Left upper extremity, Patient Position: Supine)   Pulse 72   Temp 97.9 °F (36.6 °C)   Resp 16   Ht 5' 7\" (1.702 m)   Wt 68.9 kg (151 lb 14.4 oz)   SpO2 95%   BMI 23.79 kg/m²       Physical Exam  Vitals reviewed.   Constitutional:       Appearance: He is ill-appearing.   HENT:      Head: Normocephalic and atraumatic.      Nose: Nose normal.      Mouth/Throat:      Mouth: Mucous membranes are moist.      Pharynx: Oropharynx is clear.      Neck: Normal range of motion.   Eyes:      Extraocular Movements: Extraocular movements intact.      Conjunctiva/sclera: Conjunctivae normal.      Pupils: Pupils are equal, round, and reactive to light.    Cardiovascular:      Rate and Rhythm: Normal rate and regular rhythm.      Pulses: Normal pulses.   Pulmonary:      Effort: Pulmonary effort is normal.      Breath sounds: Normal breath sounds.   Abdominal:      General: Bowel sounds are normal.      Palpations: Abdomen is soft.   Musculoskeletal:         General: Normal range of motion.   Skin:     General: Skin is warm and dry.   Neurological:      General: No focal deficit present.      Mental Status: He is easily aroused.              I/O's    Intake/Output Summary (Last 24 hours) at 2/10/2025 1242  Last data filed at 2/10/2025 1229  Gross per 24 hour   Intake 1460 ml   Output 1000 ml   Net 460 ml       Relevant Results  Admission on 02/02/2025   Component Date Value Ref Range Status    Sodium 02/02/2025 142  135 - 145 mmol/L Final    Potassium 02/02/2025 4.0  3.4 - 5.1 mmol/L Final    Chloride 02/02/2025 110  97 - 110 mmol/L Final    Carbon Dioxide 02/02/2025 26  21 - 32 mmol/L Final    Anion Gap 02/02/2025 10  7 - 19 mmol/L Final    Glucose 02/02/2025 134 (H)  70 - 99 mg/dL Final    BUN 02/02/2025 38 (H)  6 - 20 mg/dL Final    Creatinine 02/02/2025 1.01  0.67 - 1.17 mg/dL Final    Glomerular Filtration Rate 02/02/2025 72  >=60 Final    eGFR results = or >60 mL/min/1.73m2 = Normal kidney function. Estimated GFR calculated using the CKD-EPI-R (2021) equation that does not include race in the creatinine calculation.    BUN/Cr 02/02/2025 38 (H)  7 - 25 Final    Calcium 02/02/2025 10.3 (H)  8.4 - 10.2 mg/dL Final    WBC 02/02/2025 9.7  4.2 - 11.0 K/mcL Final    RBC 02/02/2025 2.43 (L)  4.50 - 5.90 mil/mcL Final    HGB 02/02/2025 7.9 (L)  13.0 - 17.0 g/dL Final    HCT 02/02/2025 25.7 (L)  39.0 - 51.0 % Final    MCV 02/02/2025 105.8 (H)  78.0 - 100.0 fl Final    MCH 02/02/2025 32.5  26.0 - 34.0 pg Final    MCHC 02/02/2025 30.7 (L)  32.0 - 36.5 g/dL Final    RDW-CV 02/02/2025 18.0 (H)  11.0 - 15.0 % Final    RDW-SD 02/02/2025 69.0 (H)  39.0 - 50.0 fL Final    PLT  02/02/2025 393  140 - 450 K/mcL Final    NRBC 02/02/2025 0  <=0 /100 WBC Final    Neutrophil, Percent 02/02/2025 69  % Final    Lymphocytes, Percent 02/02/2025 18  % Final    Mono, Percent 02/02/2025 11  % Final    Eosinophils, Percent 02/02/2025 0  % Final    Basophils, Percent 02/02/2025 0  % Final    Immature Granulocytes 02/02/2025 2  % Final    Absolute Neutrophils 02/02/2025 6.7  1.8 - 7.7 K/mcL Final    Absolute Lymphocytes 02/02/2025 1.7  1.0 - 4.0 K/mcL Final    Absolute Monocytes 02/02/2025 1.1 (H)  0.3 - 0.9 K/mcL Final    Absolute Eosinophils  02/02/2025 0.0  0.0 - 0.5 K/mcL Final    Absolute Basophils 02/02/2025 0.0  0.0 - 0.3 K/mcL Final    Absolute Immature Granulocytes 02/02/2025 0.2  0.0 - 0.2 K/mcL Final    GLUCOSE, BEDSIDE - POINT OF CARE 02/02/2025 112 (H)  70 - 99 mg/dL Final    GLUCOSE, BEDSIDE - POINT OF CARE 02/02/2025 131 (H)  70 - 99 mg/dL Final    GLUCOSE, BEDSIDE - POINT OF CARE 02/02/2025 138 (H)  70 - 99 mg/dL Final    GLUCOSE, BEDSIDE - POINT OF CARE 02/02/2025 125 (H)  70 - 99 mg/dL Final    GLUCOSE, BEDSIDE - POINT OF CARE 02/03/2025 136 (H)  70 - 99 mg/dL Final    GLUCOSE, BEDSIDE - POINT OF CARE 02/03/2025 137 (H)  70 - 99 mg/dL Final    GLUCOSE, BEDSIDE - POINT OF CARE 02/04/2025 134 (H)  70 - 99 mg/dL Final    GLUCOSE, BEDSIDE - POINT OF CARE 02/04/2025 127 (H)  70 - 99 mg/dL Final    GLUCOSE, BEDSIDE - POINT OF CARE 02/04/2025 135 (H)  70 - 99 mg/dL Final    GLUCOSE, BEDSIDE - POINT OF CARE 02/04/2025 129 (H)  70 - 99 mg/dL Final    GLUCOSE, BEDSIDE - POINT OF CARE 02/05/2025 177 (H)  70 - 99 mg/dL Final    Notified RN  Capillary Sample    GLUCOSE, BEDSIDE - POINT OF CARE 02/05/2025 147 (H)  70 - 99 mg/dL Final    GLUCOSE, BEDSIDE - POINT OF CARE 02/05/2025 125 (H)  70 - 99 mg/dL Final    Sodium 02/06/2025 149 (H)  135 - 145 mmol/L Final    Potassium 02/06/2025 3.0 (L)  3.4 - 5.1 mmol/L Final    Chloride 02/06/2025 120 (H)  97 - 110 mmol/L Final    Carbon Dioxide 02/06/2025 24   21 - 32 mmol/L Final    Anion Gap 02/06/2025 8  7 - 19 mmol/L Final    Glucose 02/06/2025 165 (H)  70 - 99 mg/dL Final    BUN 02/06/2025 56 (H)  6 - 20 mg/dL Final    Creatinine 02/06/2025 1.60 (H)  0.67 - 1.17 mg/dL Final    Glomerular Filtration Rate 02/06/2025 41 (L)  >=60 Final    eGFR 30-59 mL/min/1.73m2 = Moderate decrease in kidney function. Stage 3 CKD (chronic kidney disease) or moderate kidney disease. Estimated GFR calculated using the CKD-EPI-R (2021) equation that does not include race in the creatinine calculation.    BUN/Cr 02/06/2025 35 (H)  7 - 25 Final    Calcium 02/06/2025 9.7  8.4 - 10.2 mg/dL Final    Magnesium 02/06/2025 2.9 (H)  1.7 - 2.4 mg/dL Final    Phosphorus 02/06/2025 2.9  2.4 - 4.7 mg/dL Final    Extra Tube 02/06/2025 Hold for Add Ons   Final    COLOR, URINALYSIS 02/06/2025 Colorless   Final    APPEARANCE, URINALYSIS 02/06/2025 Turbid   Final    GLUCOSE, URINALYSIS 02/06/2025 Negative  Negative mg/dL Final    BILIRUBIN, URINALYSIS 02/06/2025 Negative  Negative Final    KETONES, URINALYSIS 02/06/2025 Negative  Negative mg/dL Final    SPECIFIC GRAVITY, URINALYSIS 02/06/2025 1.009  1.005 - 1.030 Final    OCCULT BLOOD, URINALYSIS 02/06/2025 Moderate (A)  Negative Final    PH, URINALYSIS 02/06/2025 6.0  5.0 - 7.0 Final    PROTEIN, URINALYSIS 02/06/2025 100 (A)  Negative mg/dL Final    UROBILINOGEN, URINALYSIS 02/06/2025 0.2  0.2, 1.0 mg/dL Final    NITRITE, URINALYSIS 02/06/2025 Negative  Negative Final    LEUKOCYTE ESTERASE, URINALYSIS 02/06/2025 Large (A)  Negative Final    SQUAMOUS EPITHELIAL, URINALYSIS 02/06/2025 None Seen  None Seen, 1 to 5 /hpf Final    ERYTHROCYTES, URINALYSIS 02/06/2025 11 to 25 (A)  None Seen, 1 to 2 /hpf Final    LEUKOCYTES, URINALYSIS 02/06/2025 >100 (A)  None Seen, 1 to 5 /hpf Final    BACTERIA, URINALYSIS 02/06/2025 Moderate (A)  None Seen /hpf Final    HYALINE CASTS, URINALYSIS 02/06/2025 None Seen  None Seen, 1 to 5 /lpf Final    Urine, Bacterial Culture  02/06/2025 >100,000 CFU/mL Pseudomonas aeruginosa (A)   Final    Urine, Bacterial Culture 02/06/2025 >100,000 CFU/mL Escherichia coli (A)   Final    Multiple drug resistant organism.  Implement Contact Precautions Per System Isolation/De-Isolation Protocol.          Sodium 02/07/2025 151 (H)  135 - 145 mmol/L Final    Potassium 02/07/2025 3.7  3.4 - 5.1 mmol/L Final    Chloride 02/07/2025 121 (H)  97 - 110 mmol/L Final    Carbon Dioxide 02/07/2025 28  21 - 32 mmol/L Final    Anion Gap 02/07/2025 6 (L)  7 - 19 mmol/L Final    Glucose 02/07/2025 164 (H)  70 - 99 mg/dL Final    BUN 02/07/2025 64 (H)  6 - 20 mg/dL Final    Creatinine 02/07/2025 1.54 (H)  0.67 - 1.17 mg/dL Final    Glomerular Filtration Rate 02/07/2025 43 (L)  >=60 Final    eGFR 30-59 mL/min/1.73m2 = Moderate decrease in kidney function. Stage 3 CKD (chronic kidney disease) or moderate kidney disease. Estimated GFR calculated using the CKD-EPI-R (2021) equation that does not include race in the creatinine calculation.    BUN/Cr 02/07/2025 42 (H)  7 - 25 Final    Calcium 02/07/2025 9.6  8.4 - 10.2 mg/dL Final    Sodium 02/07/2025 151 (H)  135 - 145 mmol/L Final    Potassium 02/07/2025 3.9  3.4 - 5.1 mmol/L Final    Chloride 02/07/2025 122 (H)  97 - 110 mmol/L Final    Carbon Dioxide 02/07/2025 28  21 - 32 mmol/L Final    Anion Gap 02/07/2025 5 (L)  7 - 19 mmol/L Final    Glucose 02/07/2025 166 (H)  70 - 99 mg/dL Final    BUN 02/07/2025 56 (H)  6 - 20 mg/dL Final    Creatinine 02/07/2025 1.17  0.67 - 1.17 mg/dL Final    Glomerular Filtration Rate 02/07/2025 60  >=60 Final    eGFR results = or >60 mL/min/1.73m2 = Normal kidney function. Estimated GFR calculated using the CKD-EPI-R (2021) equation that does not include race in the creatinine calculation.    BUN/Cr 02/07/2025 48 (H)  7 - 25 Final    Calcium 02/07/2025 9.3  8.4 - 10.2 mg/dL Final    Sodium 02/08/2025 145  135 - 145 mmol/L Final    Potassium 02/08/2025 3.4  3.4 - 5.1 mmol/L Final    Chloride  02/08/2025 114 (H)  97 - 110 mmol/L Final    Carbon Dioxide 02/08/2025 28  21 - 32 mmol/L Final    Anion Gap 02/08/2025 6 (L)  7 - 19 mmol/L Final    Calcium 02/08/2025 8.5  8.4 - 10.2 mg/dL Final    Phosphorus 02/08/2025 2.2 (L)  2.4 - 4.7 mg/dL Final    Albumin 02/08/2025 1.5 (L)  3.4 - 5.0 g/dL Final    Glucose 02/08/2025 336 (H)  70 - 99 mg/dL Final    BUN 02/08/2025 46 (H)  6 - 20 mg/dL Final    Creatinine 02/08/2025 0.96  0.67 - 1.17 mg/dL Final    Glomerular Filtration Rate 02/08/2025 76  >=60 Final    eGFR results = or >60 mL/min/1.73m2 = Normal kidney function. Estimated GFR calculated using the CKD-EPI-R (2021) equation that does not include race in the creatinine calculation.    BUN/Cr 02/08/2025 48 (H)  7 - 25 Final    PTH, Intact 02/08/2025 147 (H)  19 - 88 pg/mL Final    GLUCOSE, BEDSIDE - POINT OF CARE 02/07/2025 149 (H)  70 - 99 mg/dL Final    GLUCOSE, BEDSIDE - POINT OF CARE 02/08/2025 297 (H)  70 - 99 mg/dL Final    Extra Tube 02/08/2025 Hold for Add Ons   Final    Sodium 02/08/2025 149 (H)  135 - 145 mmol/L Final    Potassium 02/08/2025 4.1  3.4 - 5.1 mmol/L Final    Chloride 02/08/2025 117 (H)  97 - 110 mmol/L Final    Carbon Dioxide 02/08/2025 28  21 - 32 mmol/L Final    Anion Gap 02/08/2025 8  7 - 19 mmol/L Final    Glucose 02/08/2025 154 (H)  70 - 99 mg/dL Final    BUN 02/08/2025 40 (H)  6 - 20 mg/dL Final    Creatinine 02/08/2025 0.89  0.67 - 1.17 mg/dL Final    Glomerular Filtration Rate 02/08/2025 82  >=60 Final    eGFR results = or >60 mL/min/1.73m2 = Normal kidney function. Estimated GFR calculated using the CKD-EPI-R (2021) equation that does not include race in the creatinine calculation.    BUN/Cr 02/08/2025 45 (H)  7 - 25 Final    Calcium 02/08/2025 8.9  8.4 - 10.2 mg/dL Final    GLUCOSE, BEDSIDE - POINT OF CARE 02/08/2025 97  70 - 99 mg/dL Final    Notified RN  Capillary Sample    Sodium 02/09/2025 145  135 - 145 mmol/L Final    Potassium 02/09/2025 3.9  3.4 - 5.1 mmol/L Final     Chloride 02/09/2025 115 (H)  97 - 110 mmol/L Final    Carbon Dioxide 02/09/2025 27  21 - 32 mmol/L Final    Anion Gap 02/09/2025 7  7 - 19 mmol/L Final    Calcium 02/09/2025 8.7  8.4 - 10.2 mg/dL Final    Phosphorus 02/09/2025 3.6  2.4 - 4.7 mg/dL Final    Albumin 02/09/2025 1.5 (L)  3.4 - 5.0 g/dL Final    Glucose 02/09/2025 113 (H)  70 - 99 mg/dL Final    BUN 02/09/2025 32 (H)  6 - 20 mg/dL Final    Creatinine 02/09/2025 0.94  0.67 - 1.17 mg/dL Final    Glomerular Filtration Rate 02/09/2025 78  >=60 Final    eGFR results = or >60 mL/min/1.73m2 = Normal kidney function. Estimated GFR calculated using the CKD-EPI-R (2021) equation that does not include race in the creatinine calculation.    BUN/Cr 02/09/2025 34 (H)  7 - 25 Final    GLUCOSE, BEDSIDE - POINT OF CARE 02/08/2025 154 (H)  70 - 99 mg/dL Final    GLUCOSE, BEDSIDE - POINT OF CARE 02/09/2025 115 (H)  70 - 99 mg/dL Final    Extra Tube 02/09/2025 Hold for Add Ons   Final    GLUCOSE, BEDSIDE - POINT OF CARE 02/09/2025 96  70 - 99 mg/dL Final    Notified RN  Capillary Sample    Sodium 02/10/2025 147 (H)  135 - 145 mmol/L Final    Potassium 02/10/2025 4.3  3.4 - 5.1 mmol/L Final    Chloride 02/10/2025 116 (H)  97 - 110 mmol/L Final    Carbon Dioxide 02/10/2025 28  21 - 32 mmol/L Final    Anion Gap 02/10/2025 7  7 - 19 mmol/L Final    Calcium 02/10/2025 9.0  8.4 - 10.2 mg/dL Final    Phosphorus 02/10/2025 3.2  2.4 - 4.7 mg/dL Final    Albumin 02/10/2025 1.5 (L)  3.4 - 5.0 g/dL Final    Glucose 02/10/2025 108 (H)  70 - 99 mg/dL Final    BUN 02/10/2025 26 (H)  6 - 20 mg/dL Final    Creatinine 02/10/2025 0.84  0.67 - 1.17 mg/dL Final    Glomerular Filtration Rate 02/10/2025 84  >=60 Final    eGFR results = or >60 mL/min/1.73m2 = Normal kidney function. Estimated GFR calculated using the CKD-EPI-R (2021) equation that does not include race in the creatinine calculation.    BUN/Cr 02/10/2025 31 (H)  7 - 25 Final    GLUCOSE, BEDSIDE - POINT OF CARE 02/09/2025 163 (H)   70 - 99 mg/dL Final    GLUCOSE, BEDSIDE - POINT OF CARE 02/10/2025 102 (H)  70 - 99 mg/dL Final        Principal Problem:    Gastrojejunostomy tube dislodgement  Resolved Problems:    * No resolved hospital problems. *          2/10/2025            ineffective/shopping

## 2025-07-05 LAB
A1C WITH ESTIMATED AVERAGE GLUCOSE RESULT: 6.6 % — HIGH (ref 4–5.6)
CHOLEST SERPL-MCNC: 171 MG/DL — SIGNIFICANT CHANGE UP
ESTIMATED AVERAGE GLUCOSE: 143 MG/DL — HIGH (ref 68–114)
FOLATE SERPL-MCNC: 8.4 NG/ML — SIGNIFICANT CHANGE UP
GLUCOSE BLDC GLUCOMTR-MCNC: 230 MG/DL — HIGH (ref 70–99)
GLUCOSE BLDC GLUCOMTR-MCNC: 271 MG/DL — HIGH (ref 70–99)
GLUCOSE BLDC GLUCOMTR-MCNC: 293 MG/DL — HIGH (ref 70–99)
HCT VFR BLD CALC: 30.2 % — LOW (ref 34.5–45)
HDLC SERPL-MCNC: 42 MG/DL — LOW
HGB BLD-MCNC: 10.5 G/DL — LOW (ref 11.5–15.5)
IMMATURE PLATELET FRACTION #: 8.8 K/UL — SIGNIFICANT CHANGE UP (ref 4.7–11.1)
IMMATURE PLATELET FRACTION %: 8.5 % — HIGH (ref 1.6–4.9)
IRON SATN MFR SERPL: 69 UG/DL — SIGNIFICANT CHANGE UP (ref 35–150)
LDLC SERPL-MCNC: 73 MG/DL — SIGNIFICANT CHANGE UP
LIPID PNL WITH DIRECT LDL SERPL: 73 MG/DL — SIGNIFICANT CHANGE UP
MCHC RBC-ENTMCNC: 29 PG — SIGNIFICANT CHANGE UP (ref 27–34)
MCHC RBC-ENTMCNC: 34.8 G/DL — SIGNIFICANT CHANGE UP (ref 32–36)
MCV RBC AUTO: 83.4 FL — SIGNIFICANT CHANGE UP (ref 80–100)
NONHDLC SERPL-MCNC: 129 MG/DL — SIGNIFICANT CHANGE UP
NRBC # BLD AUTO: 0 K/UL — SIGNIFICANT CHANGE UP (ref 0–0)
NRBC # FLD: 0 K/UL — SIGNIFICANT CHANGE UP (ref 0–0)
NRBC BLD AUTO-RTO: 0 /100 WBCS — SIGNIFICANT CHANGE UP (ref 0–0)
PLATELET # BLD AUTO: 103 K/UL — LOW (ref 150–400)
PMV BLD: 12.2 FL — SIGNIFICANT CHANGE UP (ref 7–13)
RBC # BLD: 3.62 M/UL — LOW (ref 3.8–5.2)
RBC # FLD: 13.7 % — SIGNIFICANT CHANGE UP (ref 10.3–14.5)
TRIGL SERPL-MCNC: 356 MG/DL — HIGH
TSH SERPL-MCNC: 1.28 UIU/ML — SIGNIFICANT CHANGE UP (ref 0.27–4.2)
VIT B12 SERPL-MCNC: 408 PG/ML — SIGNIFICANT CHANGE UP (ref 232–1245)
WBC # BLD: 5.85 K/UL — SIGNIFICANT CHANGE UP (ref 3.8–10.5)
WBC # FLD AUTO: 5.85 K/UL — SIGNIFICANT CHANGE UP (ref 3.8–10.5)

## 2025-07-05 PROCEDURE — 99232 SBSQ HOSP IP/OBS MODERATE 35: CPT

## 2025-07-05 RX ORDER — INSULIN LISPRO 100 U/ML
4 INJECTION, SOLUTION INTRAVENOUS; SUBCUTANEOUS ONCE
Refills: 0 | Status: COMPLETED | OUTPATIENT
Start: 2025-07-05 | End: 2025-07-05

## 2025-07-05 RX ADMIN — Medication 650 MILLIGRAM(S): at 10:12

## 2025-07-05 RX ADMIN — FUROSEMIDE 20 MILLIGRAM(S): 10 INJECTION INTRAMUSCULAR; INTRAVENOUS at 08:19

## 2025-07-05 RX ADMIN — INSULIN LISPRO 4 UNIT(S): 100 INJECTION, SOLUTION INTRAVENOUS; SUBCUTANEOUS at 21:15

## 2025-07-05 RX ADMIN — Medication 650 MILLIGRAM(S): at 10:42

## 2025-07-05 RX ADMIN — INSULIN GLARGINE-YFGN 20 UNIT(S): 100 INJECTION, SOLUTION SUBCUTANEOUS at 21:06

## 2025-07-05 RX ADMIN — METFORMIN HYDROCHLORIDE 1000 MILLIGRAM(S): 850 TABLET ORAL at 08:20

## 2025-07-05 RX ADMIN — METFORMIN HYDROCHLORIDE 1000 MILLIGRAM(S): 850 TABLET ORAL at 21:05

## 2025-07-05 RX ADMIN — Medication 1 MILLIGRAM(S): at 08:20

## 2025-07-05 RX ADMIN — Medication 10 MILLIGRAM(S): at 15:59

## 2025-07-05 NOTE — BH INPATIENT PSYCHIATRY PROGRESS NOTE - NSBHFUPINTERVALHXFT_PSY_A_CORE
Patient was cooperative overnight with no incidents.  Patient is known to the writer from 2 prior hospitalizations.  Her presentation in a somewhat confused and manic state,  was quite similar and she gradually responded to medications .  Today he gives the writer a very incoherent story about having been at her regular clinic ([facility]) going to [facility], from the [facility]  Patient was cooperative overnight with no incidents.  Patient is known to the writer from 2 prior hospitalizations.  Her presentation in a somewhat confused and manic state,  was quite similar and she gradually responded to medications .  Today he gives the writer a very incoherent story about having been at her regular clinic ([facility]) going to [facility], then to hospital in  [facility] and then here.   She reports that she and her outpatient doctor who decided that she should be off carbamazepine and back on Abilify because her cousin has been on Abilify for 25 years and because she herself did well on Abilify 10 mg and went to work every day but it became too expensive so she came here.  Based on information from the emergency room, her mother reported that she has been  declining over the past few weeks, neglecting to change her clothes overspending and calling people in the middle of the night.

## 2025-07-05 NOTE — BH INPATIENT PSYCHIATRY PROGRESS NOTE - NSBHFUPINTERVALCCFT_PSY_A_CORE
"I would not go there and had to come here because of the Abilify"  (patient explaining how she got to the hospital)

## 2025-07-05 NOTE — BH INPATIENT PSYCHIATRY PROGRESS NOTE - NSBHASSESSSUMMFT_PSY_ALL_CORE
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     On assessment, pt speaks rapidly and tangentially. Her affect is labile and appears mostly to be elated, but at times when speaking about her daughters she becomes irritable. She is cooperative but difficult to interrupt. She describes sleeping only 3 hours last night but does not c/o fatigue and is seen to have a lot of energy around the unit (walking around and playing bowling). On chart review collateral reports manic like behaviors over the past 2 weeks.    Pt presentation is most c/w acute casi in the setting of chronic bipolar d/o. Pt is a danger to self. She lacks insight into this safety risk. She requires IPP hospitalization to stabilize mood and decrease her risk of harm to self.    Plan:    #Bipolar d/o  -continue home carbamazepine 200mg bid  -continue benztropine 1mg bid  -contact OP psychiatrist after holiday weekend    #Diabetes  -lantus 20 mg at night per hospitalist   -metformin 1000 mg bid per hospitalist     #LE Edema  -Lasix 20mg qd per hospitalist     #Anemia  -monitor CBC  -ordered folate, b12 levels  -ordered iron panel 55F, domiciled with 2 adult daughters, on disability and pension (previously worked as a teacher), PPH of bipolar disorder, past IPP hospitalizations remote hx of SA via lithium OD, no known hx of violence, currently in outpatient psych care at [facility], recent hospitalization in [place] for erratic behavior a/f acute casi.     On assessment, pt speaks rapidly and tangentially. Her affect is labile and appears mostly to be elated, but at times when speaking about her daughters she becomes irritable. She is cooperative but difficult to interrupt. She describes sleeping only 3 hours last night but does not c/o fatigue and is seen to have a lot of energy around the unit (walking around and playing bowling). On chart review collateral reports manic like behaviors over the past 2 weeks.    Pt presentation is most c/w acute casi in the setting of chronic bipolar d/o. Pt is a danger to self. She lacks insight into this safety risk. She requires IPP hospitalization to stabilize mood and decrease her risk of harm to self.    7/5/2025  patient is elevated, somewhat cheerful and confused in her communications.  However she wishes to get "back on medication and have it adjusted".    Plan:    #Bipolar d/o  -continue home carbamazepine 200mg bid  -continue benztropine 1mg bid  -contact OP psychiatrist after holiday weekend    #Diabetes  -lantus 20 mg at night per hospitalist   -metformin 1000 mg bid per hospitalist     #LE Edema  -Lasix 20mg qd per hospitalist     #Anemia  -monitor CBC  -ordered folate, b12 levels  -ordered iron panel

## 2025-07-05 NOTE — BH INPATIENT PSYCHIATRY PROGRESS NOTE - CURRENT MEDICATION
MEDICATIONS  (STANDING):  benztropine 1 milliGRAM(s) Oral two times a day  carBAMazepine 200 milliGRAM(s) Oral two times a day  dextrose 5%. 1000 milliLiter(s) (50 mL/Hr) IV Continuous <Continuous>  dextrose 5%. 1000 milliLiter(s) (100 mL/Hr) IV Continuous <Continuous>  dextrose 50% Injectable 25 Gram(s) IV Push once  dextrose 50% Injectable 12.5 Gram(s) IV Push once  dextrose 50% Injectable 25 Gram(s) IV Push once  furosemide    Tablet 20 milliGRAM(s) Oral daily  glucagon  Injectable 1 milliGRAM(s) IntraMuscular once  insulin glargine Injectable (LANTUS) 20 Unit(s) SubCutaneous at bedtime  metFORMIN 1000 milliGRAM(s) Oral two times a day    MEDICATIONS  (PRN):  acetaminophen     Tablet .. 650 milliGRAM(s) Oral every 6 hours PRN Temp greater or equal to 38C (100.4F), Mild Pain (1 - 3)  cetirizine 10 milliGRAM(s) Oral daily PRN allergies  dextrose Oral Gel 15 Gram(s) Oral once PRN Blood Glucose LESS THAN 70 milliGRAM(s)/deciliter  nicotine  Polacrilex Gum 2 milliGRAM(s) Oral every 4 hours PRN Smoking Cessation

## 2025-07-05 NOTE — BH INPATIENT PSYCHIATRY PROGRESS NOTE - NSBHATTESTBILLING_PSY_A_CORE
[id number] diagnostic evaluation with medical services [id number]-Ildrlwtwsg OBS or IP - low complexity OR 25-34 mins

## 2025-07-05 NOTE — BH INPATIENT PSYCHIATRY PROGRESS NOTE - NSBHMETABOLIC_PSY_ALL_CORE_FT
BMI: BMI (kg/m2): 31.3 (07-04-25 @ 05:30)  HbA1c:   Glucose: POCT Blood Glucose.: 271 mg/dL (07-05-25 @ 06:35)    BP: 130/84 (07-04-25 @ 16:07) (121/82 - 145/91)Vital Signs Last 24 Hrs  T(C): 37.1 (07-04-25 @ 16:07), Max: 37.1 (07-04-25 @ 16:07)  T(F): 98.7 (07-04-25 @ 16:07), Max: 98.7 (07-04-25 @ 16:07)  HR: 103 (07-04-25 @ 16:07) (103 - 103)  BP: 130/84 (07-04-25 @ 16:07) (130/84 - 130/84)  BP(mean): --  RR: --  SpO2: --      Lipid Panel: Date/Time: 07-05-25 @ 08:48  Cholesterol, Serum: 171  LDL Cholesterol Calculated: 73  HDL Cholesterol, Serum: 42  Total Cholesterol/HDL Ration Measurement: --  Triglycerides, Serum: 356   BMI: BMI (kg/m2): 31.3 (07-04-25 @ 05:30)  HbA1c:   Glucose: POCT Blood Glucose.: 271 mg/dL (07-05-25 @ 06:35)    BP: 130/84 (07-04-25 @ 16:07) (121/82 - 145/91)Vital Signs Last 24 Hrs  T(C): --  T(F): --  HR: --  BP: --  BP(mean): --  RR: --  SpO2: --      Lipid Panel: Date/Time: 07-05-25 @ 08:48  Cholesterol, Serum: 171  LDL Cholesterol Calculated: 73  HDL Cholesterol, Serum: 42  Total Cholesterol/HDL Ration Measurement: --  Triglycerides, Serum: 356

## 2025-07-05 NOTE — BH INPATIENT PSYCHIATRY PROGRESS NOTE - PRN MEDS
MEDICATIONS  (PRN):  acetaminophen     Tablet .. 650 milliGRAM(s) Oral every 6 hours PRN Temp greater or equal to 38C (100.4F), Mild Pain (1 - 3)  cetirizine 10 milliGRAM(s) Oral daily PRN allergies  dextrose Oral Gel 15 Gram(s) Oral once PRN Blood Glucose LESS THAN 70 milliGRAM(s)/deciliter  nicotine  Polacrilex Gum 2 milliGRAM(s) Oral every 4 hours PRN Smoking Cessation

## 2025-07-05 NOTE — BH INPATIENT PSYCHIATRY PROGRESS NOTE - NSBHCHARTREVIEWVS_PSY_A_CORE FT
Vital Signs Last 24 Hrs  T(C): 37.1 (07-04-25 @ 16:07), Max: 37.1 (07-04-25 @ 16:07)  T(F): 98.7 (07-04-25 @ 16:07), Max: 98.7 (07-04-25 @ 16:07)  HR: 103 (07-04-25 @ 16:07) (103 - 103)  BP: 130/84 (07-04-25 @ 16:07) (130/84 - 130/84)  BP(mean): --  RR: --  SpO2: --     Vital Signs Last 24 Hrs  T(C): --  T(F): --  HR: --  BP: --  BP(mean): --  RR: --  SpO2: --

## 2025-07-06 LAB
GLUCOSE BLDC GLUCOMTR-MCNC: 172 MG/DL — HIGH (ref 70–99)
GLUCOSE BLDC GLUCOMTR-MCNC: 179 MG/DL — HIGH (ref 70–99)
GLUCOSE BLDC GLUCOMTR-MCNC: 225 MG/DL — HIGH (ref 70–99)

## 2025-07-06 RX ADMIN — Medication 650 MILLIGRAM(S): at 01:20

## 2025-07-06 RX ADMIN — METFORMIN HYDROCHLORIDE 1000 MILLIGRAM(S): 850 TABLET ORAL at 08:31

## 2025-07-06 RX ADMIN — Medication 10 MILLIGRAM(S): at 10:07

## 2025-07-06 RX ADMIN — INSULIN GLARGINE-YFGN 20 UNIT(S): 100 INJECTION, SOLUTION SUBCUTANEOUS at 20:40

## 2025-07-06 RX ADMIN — FUROSEMIDE 20 MILLIGRAM(S): 10 INJECTION INTRAMUSCULAR; INTRAVENOUS at 08:31

## 2025-07-06 RX ADMIN — METFORMIN HYDROCHLORIDE 1000 MILLIGRAM(S): 850 TABLET ORAL at 20:40

## 2025-07-06 RX ADMIN — Medication 650 MILLIGRAM(S): at 21:20

## 2025-07-06 RX ADMIN — Medication 650 MILLIGRAM(S): at 00:48

## 2025-07-06 NOTE — BH INPATIENT PSYCHIATRY PROGRESS NOTE - NSBHFUPINTERVALHXFT_PSY_A_CORE
Chart reviewed , discussed with staff and patient seen/evaluated . no acute event overnight.    He is compliant with medications . irritable , labile at times but re  -direct able. She reports that she is feeling much better after visited by her family. She denies   feeling depress. denies a/vH. denies s/h ideations intent or plan.

## 2025-07-06 NOTE — BH SOCIAL WORK INITIAL PSYCHOSOCIAL EVALUATION - NSBHCHILDRESP_PSY_ALL_CORE
Anesthesia Post Evaluation    Patient: Delio Daniel Jr.    Procedure(s) Performed: * No procedures listed *    Final Anesthesia Type: general      Patient location during evaluation: Cath Lab  Patient participation: Yes- Able to Participate  Level of consciousness: awake and alert  Post-procedure vital signs: reviewed and stable  Pain management: adequate  Airway patency: patent    PONV status at discharge: No PONV  Anesthetic complications: no      Cardiovascular status: blood pressure returned to baseline  Respiratory status: unassisted and spontaneous ventilation  Hydration status: euvolemic  Follow-up needed               Vitals Value Taken Time   /75 12/20/23 1546   Temp 36.8 °C (98.3 °F) 12/20/23 0400   Pulse 85 12/20/23 1602   Resp 21 12/20/23 1602   SpO2 100 % 12/20/23 1557   Vitals shown include unvalidated device data.      No case tracking events are documented in the log.      Pain/Deana Score: Pain Rating Post Med Admin: 0 (12/20/2023 10:05 AM)          
No

## 2025-07-06 NOTE — BH INPATIENT PSYCHIATRY PROGRESS NOTE - NSBHMETABOLIC_PSY_ALL_CORE_FT
BMI: BMI (kg/m2): 31.3 (07-04-25 @ 05:30)  HbA1c: A1C with Estimated Average Glucose Result: 6.6 % (07-05-25 @ 08:48)    Glucose: POCT Blood Glucose.: 172 mg/dL (07-06-25 @ 11:15)    BP: 129/86 (07-06-25 @ 08:45) (121/81 - 139/84)Vital Signs Last 24 Hrs  T(C): 36.8 (07-06-25 @ 08:45), Max: 36.8 (07-06-25 @ 08:45)  T(F): 98.3 (07-06-25 @ 08:45), Max: 98.3 (07-06-25 @ 08:45)  HR: 96 (07-06-25 @ 08:45) (92 - 96)  BP: 129/86 (07-06-25 @ 08:45) (121/81 - 129/86)  BP(mean): --  RR: --  SpO2: --      Lipid Panel: Date/Time: 07-05-25 @ 08:48  Cholesterol, Serum: 171  LDL Cholesterol Calculated: 73  HDL Cholesterol, Serum: 42  Total Cholesterol/HDL Ration Measurement: --  Triglycerides, Serum: 356

## 2025-07-06 NOTE — BH INPATIENT PSYCHIATRY PROGRESS NOTE - NSBHASSESSSUMMFT_PSY_ALL_CORE
55F, domiciled with 2 adult daughters, on disability and pension (previously worked as a teacher), PPH of bipolar disorder, past IPP hospitalizations remote hx of SA via lithium OD, no known hx of violence, currently in outpatient psych care at [facility], recent hospitalization in [place] for erratic behavior a/f acute casi.     On assessment, pt speaks rapidly and tangentially. Her affect is labile and appears mostly to be elated, but at times when speaking about her daughters she becomes irritable. She is cooperative but difficult to interrupt. She describes sleeping only 3 hours last night but does not c/o fatigue and is seen to have a lot of energy around the unit (walking around and playing bowling). On chart review collateral reports manic like behaviors over the past 2 weeks.    Pt presentation is most c/w acute casi in the setting of chronic bipolar d/o. Pt is a danger to self. She lacks insight into this safety risk. She requires IPP hospitalization to stabilize mood and decrease her risk of harm to self.    7/5/2025  patient is elevated, somewhat cheerful and confused in her communications.  However she wishes to get "back on medication and have it adjusted".    Plan:    #Bipolar d/o  -continue home carbamazepine 200mg bid  -continue benztropine 1mg bid  -contact OP psychiatrist after holiday weekend    #Diabetes  -lantus 20 mg at night per hospitalist   -metformin 1000 mg bid per hospitalist     #LE Edema  -Lasix 20mg qd per hospitalist     #Anemia  -monitor CBC  -ordered folate, b12 levels  -ordered iron panel

## 2025-07-06 NOTE — BH INPATIENT PSYCHIATRY PROGRESS NOTE - NSBHCHARTREVIEWVS_PSY_A_CORE FT
Vital Signs Last 24 Hrs  T(C): 36.8 (07-06-25 @ 08:45), Max: 36.8 (07-06-25 @ 08:45)  T(F): 98.3 (07-06-25 @ 08:45), Max: 98.3 (07-06-25 @ 08:45)  HR: 96 (07-06-25 @ 08:45) (92 - 96)  BP: 129/86 (07-06-25 @ 08:45) (121/81 - 129/86)  BP(mean): --  RR: --  SpO2: --

## 2025-07-06 NOTE — BH SOCIAL WORK INITIAL PSYCHOSOCIAL EVALUATION - NSCMSPTSTRENGTHS_PSY_ALL_CORE
Expressive of emotions/Chiqui/spirituality/Highly motivated for treatment/Intact family/Physically healthy/Positive attitude/Supportive family

## 2025-07-07 LAB
GLUCOSE BLDC GLUCOMTR-MCNC: 178 MG/DL — HIGH (ref 70–99)
GLUCOSE BLDC GLUCOMTR-MCNC: 178 MG/DL — HIGH (ref 70–99)
GLUCOSE BLDC GLUCOMTR-MCNC: 244 MG/DL — HIGH (ref 70–99)
GLUCOSE BLDC GLUCOMTR-MCNC: 258 MG/DL — HIGH (ref 70–99)

## 2025-07-07 PROCEDURE — 99232 SBSQ HOSP IP/OBS MODERATE 35: CPT | Mod: GC

## 2025-07-07 RX ORDER — ARIPIPRAZOLE 2 MG/1
2 TABLET ORAL
Refills: 0 | Status: DISCONTINUED | OUTPATIENT
Start: 2025-07-07 | End: 2025-07-09

## 2025-07-07 RX ADMIN — INSULIN GLARGINE-YFGN 20 UNIT(S): 100 INJECTION, SOLUTION SUBCUTANEOUS at 20:44

## 2025-07-07 RX ADMIN — Medication 650 MILLIGRAM(S): at 08:07

## 2025-07-07 RX ADMIN — Medication 650 MILLIGRAM(S): at 08:39

## 2025-07-07 RX ADMIN — METFORMIN HYDROCHLORIDE 1000 MILLIGRAM(S): 850 TABLET ORAL at 20:44

## 2025-07-07 RX ADMIN — METFORMIN HYDROCHLORIDE 1000 MILLIGRAM(S): 850 TABLET ORAL at 08:06

## 2025-07-07 RX ADMIN — Medication 650 MILLIGRAM(S): at 18:56

## 2025-07-07 RX ADMIN — Medication 650 MILLIGRAM(S): at 18:04

## 2025-07-07 RX ADMIN — Medication 10 MILLIGRAM(S): at 08:08

## 2025-07-07 RX ADMIN — FUROSEMIDE 20 MILLIGRAM(S): 10 INJECTION INTRAMUSCULAR; INTRAVENOUS at 08:08

## 2025-07-07 NOTE — BH INPATIENT PSYCHIATRY PROGRESS NOTE - NSBHASSESSSUMMFT_PSY_ALL_CORE
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     On assessment, pt speaks rapidly and tangentially. Her affect is labile and appears mostly to be elated, but at times when speaking about her daughters she becomes irritable. She is cooperative but difficult to interrupt. She describes sleeping only 3 hours last night but does not c/o fatigue and is seen to have a lot of energy around the unit (walking around and playing bowling). On chart review collateral reports manic like behaviors over the past 2 weeks.    Pt presentation is most c/w acute casi in the setting of chronic bipolar d/o. Pt is a danger to self. She lacks insight into this safety risk. She requires IPP hospitalization to stabilize mood and decrease her risk of harm to self.    7/5/2025  patient is elevated, somewhat cheerful and confused in her communications.  However she wishes to get "back on medication and have it adjusted".    7/7/25- Obtained collateral from provider (please see chart note). Pt continues to have expansive mood, high energy level, and rapid speech. She denies any psychotic symptoms. Pt requests a switch to Abilify and after speaking to OP who confirmed pt was on Abilify at some point treatment team decided to start patient on Abilify. Carbamazepine was d/sam since patient has not been taking it.    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     On assessment, pt speaks rapidly and tangentially. Her affect is labile and appears mostly to be elated, but at times when speaking about her daughters she becomes irritable. She is cooperative but difficult to interrupt. She describes sleeping only 3 hours last night but does not c/o fatigue and is seen to have a lot of energy around the unit (walking around and playing bowling). On chart review collateral reports manic like behaviors over the past 2 weeks.    Pt presentation is most c/w acute casi in the setting of chronic bipolar d/o. Pt is a danger to self. She lacks insight into this safety risk. She requires IPP hospitalization to stabilize mood and decrease her risk of harm to self.    7/5/2025  patient is elevated, somewhat cheerful and confused in her communications.  However she wishes to get "back on medication and have it adjusted".    7/7/25- Obtained collateral from provider (please see chart note). Pt continues to have expansive mood, high energy level, and rapid speech. She denies any psychotic symptoms. Pt requests a switch to Abilify and after speaking to OP who confirmed pt was on Abilify at some point treatment team decided to start patient on Abilify. Carbamazepine was d/sam since patient has not been taking it.    Plan:    #Bipolar d/o  -d/c carbamazepine 200mg bid  -continue benztropine 1mg bid  -start Abilify 2mg qd  -order PATHAK Prolixin Dec 50mg q2 weeks (last administer 6/24)    #Diabetes  -lantus 20 mg at night per hospitalist   -metformin 1000 mg bid per hospitalist     #LE Edema  -Lasix 20mg qd per hospitalist     #Anemia  -monitor CBC= CBC on 07/05 with Hgb= 10.5  -folate, b12 levels wnl  -iron panel wnl

## 2025-07-07 NOTE — BH INPATIENT PSYCHIATRY PROGRESS NOTE - NSBHCHARTREVIEWVS_PSY_A_CORE FT
Vital Signs Last 24 Hrs  T(C): 36.8 (07-07-25 @ 15:46), Max: 36.8 (07-07-25 @ 08:16)  T(F): 98.2 (07-07-25 @ 15:46), Max: 98.3 (07-07-25 @ 08:16)  HR: 97 (07-07-25 @ 15:46) (94 - 97)  BP: 136/74 (07-07-25 @ 15:46) (125/87 - 136/74)  BP(mean): --  RR: --  SpO2: --

## 2025-07-07 NOTE — BH INPATIENT PSYCHIATRY PROGRESS NOTE - NSBHMETABOLIC_PSY_ALL_CORE_FT
BMI: BMI (kg/m2): 31.3 (07-04-25 @ 05:30)  HbA1c: A1C with Estimated Average Glucose Result: 6.6 % (07-05-25 @ 08:48)    Glucose: POCT Blood Glucose.: 258 mg/dL (07-07-25 @ 16:05)    BP: 136/74 (07-07-25 @ 15:46) (114/82 - 136/74)Vital Signs Last 24 Hrs  T(C): 36.8 (07-07-25 @ 15:46), Max: 36.8 (07-07-25 @ 08:16)  T(F): 98.2 (07-07-25 @ 15:46), Max: 98.3 (07-07-25 @ 08:16)  HR: 97 (07-07-25 @ 15:46) (94 - 97)  BP: 136/74 (07-07-25 @ 15:46) (125/87 - 136/74)  BP(mean): --  RR: --  SpO2: --      Lipid Panel: Date/Time: 07-05-25 @ 08:48  Cholesterol, Serum: 171  LDL Cholesterol Calculated: 73  HDL Cholesterol, Serum: 42  Total Cholesterol/HDL Ration Measurement: --  Triglycerides, Serum: 356

## 2025-07-07 NOTE — BH INPATIENT PSYCHIATRY PROGRESS NOTE - NSBHATTESTBILLING_PSY_A_CORE
Pre-application: Motor, sensory, and vascular responses intact in the injured extremity./Post-application: Motor, sensory, and vascular responses intact in the injured extremity./The patient/caregiver verbalized understanding of how to care for the injured extremity with splint Pre-application: Motor, sensory, and vascular responses intact in the injured extremity./Post-application: Motor, sensory, and vascular responses intact in the injured extremity./The patient/caregiver verbalized understanding of how to care for the injured extremity with splint [id number] OBS or IP - moderate complexity OR 35-49 mins
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
## 2025-07-07 NOTE — PSYCHIATRIC REHAB INITIAL EVALUATION - NSBHPRTOOLBOX_PSY_ALL_CORE
Pt is connected to Dr. Hernandez, previously Dr. Yepez and Dr. Caal, at [facility]/Art/Entertainment/Family support/Music/Spirituality/Jain/Treatment team provider support

## 2025-07-07 NOTE — BH INPATIENT PSYCHIATRY PROGRESS NOTE - NSBHATTESTCOMMENTATTENDFT_PSY_A_CORE
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   On assessment, patient continues to exhibit symptoms of casi. She does not wish to continue her mood stabilizer. She reports historically doing well on Abilify and would like to switch her medication to this.

## 2025-07-07 NOTE — BH INPATIENT PSYCHIATRY PROGRESS NOTE - CURRENT MEDICATION
MEDICATIONS  (STANDING):  ARIPiprazole 2 milliGRAM(s) Oral <User Schedule>  benztropine 1 milliGRAM(s) Oral two times a day  dextrose 5%. 1000 milliLiter(s) (50 mL/Hr) IV Continuous <Continuous>  dextrose 5%. 1000 milliLiter(s) (100 mL/Hr) IV Continuous <Continuous>  dextrose 50% Injectable 25 Gram(s) IV Push once  dextrose 50% Injectable 12.5 Gram(s) IV Push once  dextrose 50% Injectable 25 Gram(s) IV Push once  furosemide    Tablet 20 milliGRAM(s) Oral daily  glucagon  Injectable 1 milliGRAM(s) IntraMuscular once  insulin glargine Injectable (LANTUS) 20 Unit(s) SubCutaneous at bedtime  metFORMIN 1000 milliGRAM(s) Oral two times a day    MEDICATIONS  (PRN):  acetaminophen     Tablet .. 650 milliGRAM(s) Oral every 6 hours PRN Temp greater or equal to 38C (100.4F), Mild Pain (1 - 3)  cetirizine 10 milliGRAM(s) Oral daily PRN allergies  dextrose Oral Gel 15 Gram(s) Oral once PRN Blood Glucose LESS THAN 70 milliGRAM(s)/deciliter  nicotine  Polacrilex Gum 2 milliGRAM(s) Oral every 4 hours PRN Smoking Cessation

## 2025-07-07 NOTE — BH CHART NOTE - NSEVENTNOTEFT_PSY_ALL_CORE
Spoke to patient's provider Dr. Hernandez at [facility]. She has not seen pt yet has first visit with pt this friday, however she confirmed pt's current medications.    Current medications:  -Prolixin dec 50mg q2 weeks last administered on 6/24/2025  -carbamazepine 200mg in the AM and 400mg qhs  -cogentin 1mg bid Spoke to patient's provider Dr. Hernandez at [facility]. She has not seen pt yet has first visit with pt this friday, however she confirmed pt's current medications.    Current medications:  -Prolixin dec 50mg q2 weeks last administered on 6/24/2025  -carbamazepine 200mg in the AM and 400mg qhs  -cogentin 1mg bid.

## 2025-07-08 LAB
GLUCOSE BLDC GLUCOMTR-MCNC: 135 MG/DL — HIGH (ref 70–99)
GLUCOSE BLDC GLUCOMTR-MCNC: 152 MG/DL — HIGH (ref 70–99)
GLUCOSE BLDC GLUCOMTR-MCNC: 225 MG/DL — HIGH (ref 70–99)
GLUCOSE BLDC GLUCOMTR-MCNC: 236 MG/DL — HIGH (ref 70–99)

## 2025-07-08 PROCEDURE — 99232 SBSQ HOSP IP/OBS MODERATE 35: CPT | Mod: GC

## 2025-07-08 RX ADMIN — METFORMIN HYDROCHLORIDE 1000 MILLIGRAM(S): 850 TABLET ORAL at 08:23

## 2025-07-08 RX ADMIN — METFORMIN HYDROCHLORIDE 1000 MILLIGRAM(S): 850 TABLET ORAL at 20:52

## 2025-07-08 RX ADMIN — Medication 650 MILLIGRAM(S): at 20:15

## 2025-07-08 RX ADMIN — Medication 10 MILLIGRAM(S): at 09:39

## 2025-07-08 RX ADMIN — ARIPIPRAZOLE 2 MILLIGRAM(S): 2 TABLET ORAL at 08:26

## 2025-07-08 RX ADMIN — Medication 1 MILLIGRAM(S): at 08:23

## 2025-07-08 RX ADMIN — Medication 650 MILLIGRAM(S): at 19:43

## 2025-07-08 RX ADMIN — INSULIN GLARGINE-YFGN 20 UNIT(S): 100 INJECTION, SOLUTION SUBCUTANEOUS at 20:51

## 2025-07-08 RX ADMIN — FUROSEMIDE 20 MILLIGRAM(S): 10 INJECTION INTRAMUSCULAR; INTRAVENOUS at 08:23

## 2025-07-08 NOTE — BH INPATIENT PSYCHIATRY PROGRESS NOTE - NSBHMETABOLIC_PSY_ALL_CORE_FT
BMI: BMI (kg/m2): 31.3 (07-04-25 @ 05:30)  HbA1c: A1C with Estimated Average Glucose Result: 6.6 % (07-05-25 @ 08:48)    Glucose: POCT Blood Glucose.: 135 mg/dL (07-08-25 @ 15:53)    BP: 125/66 (07-08-25 @ 15:38) (110/78 - 136/74)Vital Signs Last 24 Hrs  T(C): 36.9 (07-08-25 @ 15:38), Max: 36.9 (07-08-25 @ 15:38)  T(F): 98.5 (07-08-25 @ 15:38), Max: 98.5 (07-08-25 @ 15:38)  HR: 97 (07-08-25 @ 15:38) (88 - 97)  BP: 125/66 (07-08-25 @ 15:38) (110/78 - 125/66)  BP(mean): --  RR: --  SpO2: --      Lipid Panel: Date/Time: 07-05-25 @ 08:48  Cholesterol, Serum: 171  LDL Cholesterol Calculated: 73  HDL Cholesterol, Serum: 42  Total Cholesterol/HDL Ration Measurement: --  Triglycerides, Serum: 356

## 2025-07-08 NOTE — BH SAFETY PLAN - INTERNAL COPING STRATEGY 1
I have many coping skills. Music, reading, journaling, warm showers, meditating, praying, walking, swimming, movies and calling friends or #[phone] if needed.

## 2025-07-08 NOTE — BH INPATIENT PSYCHIATRY PROGRESS NOTE - NSBHATTESTCOMMENTATTENDFT_PSY_A_CORE
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   Patient continues with symptoms of casi. She is due for her PATHAK and would like to switch to Abilify Maintena which was ordered.

## 2025-07-08 NOTE — BH INPATIENT PSYCHIATRY PROGRESS NOTE - NSBHFUPINTERVALHXFT_PSY_A_CORE
Patient was seen and evaluated this morning. Chart was reviewed and no acute events were noted. No PRN medications were administered overnight. Upon approach, patient is lying in bed comfortably. Patient is alert and oriented and engages with interviewer. Patient is cooperative and answers all questions appropriately. Patient denies any current passive or active suicidal ideation, intent or plan. Patient reports good sleep and appetite. Per nursing staff, she slept 5 hours last night. Denies side effects from medications. Patient is due for prolixin PATHAK, but reports she would prefer abilify maintenna. She reports she has tolerated medication well in the past without any issues, and only stopped it due issues affording the medications.

## 2025-07-08 NOTE — BH SAFETY PLAN - THE ONE THING THAT IS MOST IMPORTANT TO ME AND WORTH LIVING FOR IS:
The one thing that is most important to me is my children. Jessica and Gracia make me love being a mom and I love them.

## 2025-07-08 NOTE — BH INPATIENT PSYCHIATRY PROGRESS NOTE - NSBHCHARTREVIEWVS_PSY_A_CORE FT
Vital Signs Last 24 Hrs  T(C): 36.9 (07-08-25 @ 15:38), Max: 36.9 (07-08-25 @ 15:38)  T(F): 98.5 (07-08-25 @ 15:38), Max: 98.5 (07-08-25 @ 15:38)  HR: 97 (07-08-25 @ 15:38) (88 - 97)  BP: 125/66 (07-08-25 @ 15:38) (110/78 - 125/66)  BP(mean): --  RR: --  SpO2: --

## 2025-07-09 LAB
GLUCOSE BLDC GLUCOMTR-MCNC: 158 MG/DL — HIGH (ref 70–99)
GLUCOSE BLDC GLUCOMTR-MCNC: 218 MG/DL — HIGH (ref 70–99)
GLUCOSE BLDC GLUCOMTR-MCNC: 245 MG/DL — HIGH (ref 70–99)

## 2025-07-09 PROCEDURE — 99232 SBSQ HOSP IP/OBS MODERATE 35: CPT

## 2025-07-09 RX ORDER — ARIPIPRAZOLE 2 MG/1
3 TABLET ORAL
Refills: 0 | Status: DISCONTINUED | OUTPATIENT
Start: 2025-07-09 | End: 2025-07-09

## 2025-07-09 RX ORDER — ARIPIPRAZOLE 2 MG/1
5 TABLET ORAL
Refills: 0 | Status: DISCONTINUED | OUTPATIENT
Start: 2025-07-09 | End: 2025-07-11

## 2025-07-09 RX ADMIN — Medication 650 MILLIGRAM(S): at 03:04

## 2025-07-09 RX ADMIN — ARIPIPRAZOLE 2 MILLIGRAM(S): 2 TABLET ORAL at 08:01

## 2025-07-09 RX ADMIN — INSULIN GLARGINE-YFGN 20 UNIT(S): 100 INJECTION, SOLUTION SUBCUTANEOUS at 20:56

## 2025-07-09 RX ADMIN — Medication 650 MILLIGRAM(S): at 04:20

## 2025-07-09 RX ADMIN — FUROSEMIDE 20 MILLIGRAM(S): 10 INJECTION INTRAMUSCULAR; INTRAVENOUS at 08:01

## 2025-07-09 RX ADMIN — Medication 1 MILLIGRAM(S): at 06:12

## 2025-07-09 RX ADMIN — Medication 10 MILLIGRAM(S): at 08:01

## 2025-07-09 RX ADMIN — Medication 1 MILLIGRAM(S): at 17:04

## 2025-07-09 RX ADMIN — METFORMIN HYDROCHLORIDE 1000 MILLIGRAM(S): 850 TABLET ORAL at 08:01

## 2025-07-09 RX ADMIN — METFORMIN HYDROCHLORIDE 1000 MILLIGRAM(S): 850 TABLET ORAL at 20:56

## 2025-07-09 NOTE — BH INPATIENT PSYCHIATRY PROGRESS NOTE - NSBHCHARTREVIEWVS_PSY_A_CORE FT
Vital Signs Last 24 Hrs  T(C): 36.6 (07-09-25 @ 16:12), Max: 36.6 (07-09-25 @ 08:14)  T(F): 97.9 (07-09-25 @ 16:12), Max: 97.9 (07-09-25 @ 08:14)  HR: 112 (07-09-25 @ 16:12) (98 - 112)  BP: 122/84 (07-09-25 @ 16:12) (122/84 - 133/81)  BP(mean): --  RR: --  SpO2: --

## 2025-07-09 NOTE — BH INPATIENT PSYCHIATRY PROGRESS NOTE - NSBHMETABOLIC_PSY_ALL_CORE_FT
BMI: BMI (kg/m2): 31.3 (07-04-25 @ 05:30)  HbA1c: A1C with Estimated Average Glucose Result: 6.6 % (07-05-25 @ 08:48)    Glucose: POCT Blood Glucose.: 158 mg/dL (07-09-25 @ 11:39)    BP: 122/84 (07-09-25 @ 16:12) (110/78 - 136/74)Vital Signs Last 24 Hrs  T(C): 36.6 (07-09-25 @ 16:12), Max: 36.6 (07-09-25 @ 08:14)  T(F): 97.9 (07-09-25 @ 16:12), Max: 97.9 (07-09-25 @ 08:14)  HR: 112 (07-09-25 @ 16:12) (98 - 112)  BP: 122/84 (07-09-25 @ 16:12) (122/84 - 133/81)  BP(mean): --  RR: --  SpO2: --      Lipid Panel: Date/Time: 07-05-25 @ 08:48  Cholesterol, Serum: 171  LDL Cholesterol Calculated: 73  HDL Cholesterol, Serum: 42  Total Cholesterol/HDL Ration Measurement: --  Triglycerides, Serum: 356

## 2025-07-09 NOTE — BH INPATIENT PSYCHIATRY PROGRESS NOTE - NSBHFUPINTERVALHXFT_PSY_A_CORE
Patient was seen and evaluated this morning. Chart was reviewed and no acute events were noted. No PRN medications were administered overnight. Upon approach, patient is lying in bed comfortably. Patient is alert and oriented and engages with interviewer. Patient is cooperative and answers all questions appropriately. Patient denies any current passive or active suicidal ideation, intent or plan. Patient reports good sleep and appetite. She is intrusive, insisting on commenting on staff member's beauty as she walks down the hallway. She acknowledges that she'll need to go up to abilify 10 mg daily eventually but agrees to 3 mg for tomorrow. No problems with the meds. She says that she slept 7 hours last night.

## 2025-07-09 NOTE — BH INPATIENT PSYCHIATRY PROGRESS NOTE - NSBHASSESSSUMMFT_PSY_ALL_CORE
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     On assessment, pt speaks rapidly and tangentially. Her affect is labile and appears mostly to be elated, but at times when speaking about her daughters she becomes irritable. She is cooperative but difficult to interrupt. She describes sleeping only 3 hours last night but does not c/o fatigue and is seen to have a lot of energy around the unit (walking around and playing bowling). On chart review collateral reports manic like behaviors over the past 2 weeks.    Pt presentation is most c/w acute casi in the setting of chronic bipolar d/o. Pt is a danger to self. She lacks insight into this safety risk. She requires IPP hospitalization to stabilize mood and decrease her risk of harm to self.    7/5/2025  patient is elevated, somewhat cheerful and confused in her communications.  However she wishes to get "back on medication and have it adjusted".    7/7/25- Obtained collateral from provider (please see chart note). Pt continues to have expansive mood, high energy level, and rapid speech. She denies any psychotic symptoms. Pt requests a switch to Abilify and after speaking to OP who confirmed pt was on Abilify at some point treatment team decided to start patient on Abilify. Carbamazepine was d/sam since patient has not been taking it.    7/8- Tolerates abilify well. Wishes to switch from prolixin PATHAK 50mg [id number] (due today) to abilify PATHAK. Will fill non-formulary for abilify maintenna and d/c prolixin PATHAK. Slept 5 hours per staff. Will continue to titrate abilify as needed until PATHAK. Patient reports she has taken abilify 10mg in the past for a long period of time with good effect and only stopped it due to financial constraints.   7/9 continues to be manic and intrusive. Agreeing only to go up to 3 mg abilify tomorrow.     Plan:    #Bipolar d/o  -d/c carbamazepine 200mg bid  -continue benztropine 1mg bid  -c/w Abilify 2mg qd--can increase to 5mg if delay in procurring abilify maintenna PATHAK  -d/c PATHAK Prolixin Dec 50mg q2 weeks (last administer 6/24)--was due 7/8  - will order abilify maintenna 400mg qmonthly for this week     #Diabetes  -lantus 20 mg at night per hospitalist   -metformin 1000 mg bid per hospitalist     #LE Edema  -Lasix 20mg qd per hospitalist     #Anemia  -monitor CBC= CBC on 07/05 with Hgb= 10.5  -folate, b12 levels wnl  -iron panel wnl

## 2025-07-09 NOTE — BH INPATIENT PSYCHIATRY PROGRESS NOTE - CURRENT MEDICATION
MEDICATIONS  (STANDING):  ARIPiprazole 3 milliGRAM(s) Oral <User Schedule>  benztropine 1 milliGRAM(s) Oral two times a day  dextrose 5%. 1000 milliLiter(s) (50 mL/Hr) IV Continuous <Continuous>  dextrose 5%. 1000 milliLiter(s) (100 mL/Hr) IV Continuous <Continuous>  dextrose 50% Injectable 25 Gram(s) IV Push once  dextrose 50% Injectable 12.5 Gram(s) IV Push once  dextrose 50% Injectable 25 Gram(s) IV Push once  furosemide    Tablet 20 milliGRAM(s) Oral daily  glucagon  Injectable 1 milliGRAM(s) IntraMuscular once  insulin glargine Injectable (LANTUS) 20 Unit(s) SubCutaneous at bedtime  metFORMIN 1000 milliGRAM(s) Oral two times a day    MEDICATIONS  (PRN):  acetaminophen     Tablet .. 650 milliGRAM(s) Oral every 6 hours PRN Temp greater or equal to 38C (100.4F), Mild Pain (1 - 3)  cetirizine 10 milliGRAM(s) Oral daily PRN allergies  dextrose Oral Gel 15 Gram(s) Oral once PRN Blood Glucose LESS THAN 70 milliGRAM(s)/deciliter  nicotine  Polacrilex Gum 2 milliGRAM(s) Oral every 4 hours PRN Smoking Cessation

## 2025-07-10 LAB
GLUCOSE BLDC GLUCOMTR-MCNC: 133 MG/DL — HIGH (ref 70–99)
GLUCOSE BLDC GLUCOMTR-MCNC: 184 MG/DL — HIGH (ref 70–99)
GLUCOSE BLDC GLUCOMTR-MCNC: 229 MG/DL — HIGH (ref 70–99)
GLUCOSE BLDC GLUCOMTR-MCNC: 234 MG/DL — HIGH (ref 70–99)
GLUCOSE BLDC GLUCOMTR-MCNC: 282 MG/DL — HIGH (ref 70–99)

## 2025-07-10 PROCEDURE — 99232 SBSQ HOSP IP/OBS MODERATE 35: CPT

## 2025-07-10 RX ORDER — ARIPIPRAZOLE 2 MG/1
400 TABLET ORAL ONCE
Refills: 0 | Status: DISCONTINUED | OUTPATIENT
Start: 2025-07-10 | End: 2025-07-11

## 2025-07-10 RX ADMIN — Medication 1 MILLIGRAM(S): at 20:09

## 2025-07-10 RX ADMIN — Medication 10 MILLIGRAM(S): at 05:18

## 2025-07-10 RX ADMIN — FUROSEMIDE 20 MILLIGRAM(S): 10 INJECTION INTRAMUSCULAR; INTRAVENOUS at 08:18

## 2025-07-10 RX ADMIN — Medication 1 MILLIGRAM(S): at 05:16

## 2025-07-10 RX ADMIN — Medication 650 MILLIGRAM(S): at 22:16

## 2025-07-10 RX ADMIN — ARIPIPRAZOLE 5 MILLIGRAM(S): 2 TABLET ORAL at 08:19

## 2025-07-10 RX ADMIN — METFORMIN HYDROCHLORIDE 1000 MILLIGRAM(S): 850 TABLET ORAL at 08:18

## 2025-07-10 RX ADMIN — INSULIN GLARGINE-YFGN 20 UNIT(S): 100 INJECTION, SOLUTION SUBCUTANEOUS at 20:09

## 2025-07-10 RX ADMIN — METFORMIN HYDROCHLORIDE 1000 MILLIGRAM(S): 850 TABLET ORAL at 20:09

## 2025-07-10 NOTE — BH INPATIENT PSYCHIATRY PROGRESS NOTE - NSBHASSESSSUMMFT_PSY_ALL_CORE
55F, domiciled with 2 adult daughters, on disability and pension (previously worked as a teacher), PPH of bipolar disorder, past IPP hospitalizations remote hx of SA via lithium OD, no known hx of violence, currently in outpatient psych care at [facility], recent hospitalization in [place] for erratic behavior a/f acute casi.     On assessment, pt speaks rapidly and tangentially. Her affect is labile and appears mostly to be elated, but at times when speaking about her daughters she becomes irritable. She is cooperative but difficult to interrupt. She describes sleeping only 3 hours last night but does not c/o fatigue and is seen to have a lot of energy around the unit (walking around and playing bowling). On chart review collateral reports manic like behaviors over the past 2 weeks.    Pt presentation is most c/w acute casi in the setting of chronic bipolar d/o. Pt is a danger to self. She lacks insight into this safety risk. She requires IPP hospitalization to stabilize mood and decrease her risk of harm to self.    7/5/2025  patient is elevated, somewhat cheerful and confused in her communications.  However she wishes to get "back on medication and have it adjusted".    7/7/25- Obtained collateral from provider (please see chart note). Pt continues to have expansive mood, high energy level, and rapid speech. She denies any psychotic symptoms. Pt requests a switch to Abilify and after speaking to OP who confirmed pt was on Abilify at some point treatment team decided to start patient on Abilify. Carbamazepine was d/sam since patient has not been taking it.    7/8- Tolerates abilify well. Wishes to switch from prolixin PATHAK 50mg [id number] (due today) to abilify PATHAK. Will fill non-formulary for abilify maintenna and d/c prolixin PATHAK. Slept 5 hours per staff. Will continue to titrate abilify as needed until PATHAK. Patient reports she has taken abilify 10mg in the past for a long period of time with good effect and only stopped it due to financial constraints.   7/9 continues to be manic and intrusive. Agreeing only to go up to 3 mg abilify tomorrow.   7/10 continues to have elated affect with overinclusive TC and tangential TP. ordered abilify maintena 400mg qmonthly.    Plan:    #Bipolar d/o  -continue benztropine 1mg bid  -c/w Abilify 2mg qd--can increase to 5mg if delay in procurring abilify maintenna PATHAK  -ordered abilify maintenna 400mg qmonthly for this week     #Diabetes  -lantus 20 mg at night per hospitalist   -metformin 1000 mg bid per hospitalist     #LE Edema  -Lasix 20mg qd per hospitalist     #Anemia  -monitor CBC= CBC on 07/05 with Hgb= 10.5  -folate, b12 levels wnl  -iron panel wnl

## 2025-07-10 NOTE — BH INPATIENT PSYCHIATRY PROGRESS NOTE - NSBHCHARTREVIEWVS_PSY_A_CORE FT
Vital Signs Last 24 Hrs  T(C): 36.5 (07-10-25 @ 08:20), Max: 36.6 (07-09-25 @ 16:12)  T(F): 97.7 (07-10-25 @ 08:20), Max: 97.9 (07-09-25 @ 16:12)  HR: 90 (07-10-25 @ 08:20) (90 - 112)  BP: 125/79 (07-10-25 @ 08:20) (122/84 - 130/86)  BP(mean): --  RR: --  SpO2: --     Vital Signs Last 24 Hrs  T(C): 37 (07-10-25 @ 16:06), Max: 37 (07-10-25 @ 16:06)  T(F): 98.6 (07-10-25 @ 16:06), Max: 98.6 (07-10-25 @ 16:06)  HR: 92 (07-10-25 @ 16:06) (90 - 105)  BP: 118/82 (07-10-25 @ 16:06) (118/82 - 130/86)  BP(mean): --  RR: --  SpO2: --

## 2025-07-10 NOTE — BH INPATIENT PSYCHIATRY PROGRESS NOTE - CURRENT MEDICATION
MEDICATIONS  (STANDING):  ARIPiprazole 5 milliGRAM(s) Oral <User Schedule>  benztropine 1 milliGRAM(s) Oral two times a day  dextrose 5%. 1000 milliLiter(s) (50 mL/Hr) IV Continuous <Continuous>  dextrose 5%. 1000 milliLiter(s) (100 mL/Hr) IV Continuous <Continuous>  dextrose 50% Injectable 25 Gram(s) IV Push once  dextrose 50% Injectable 12.5 Gram(s) IV Push once  dextrose 50% Injectable 25 Gram(s) IV Push once  furosemide    Tablet 20 milliGRAM(s) Oral daily  glucagon  Injectable 1 milliGRAM(s) IntraMuscular once  insulin glargine Injectable (LANTUS) 20 Unit(s) SubCutaneous at bedtime  metFORMIN 1000 milliGRAM(s) Oral two times a day    MEDICATIONS  (PRN):  acetaminophen     Tablet .. 650 milliGRAM(s) Oral every 6 hours PRN Temp greater or equal to 38C (100.4F), Mild Pain (1 - 3)  cetirizine 10 milliGRAM(s) Oral daily PRN allergies  dextrose Oral Gel 15 Gram(s) Oral once PRN Blood Glucose LESS THAN 70 milliGRAM(s)/deciliter  nicotine  Polacrilex Gum 2 milliGRAM(s) Oral every 4 hours PRN Smoking Cessation   MEDICATIONS  (STANDING):  ARIPiprazole 5 milliGRAM(s) Oral <User Schedule>  ARIPiprazole Injectable, Long Acting. (ABILIFY MAINTENA) 400 milliGRAM(s) IntraMuscular once  benztropine 1 milliGRAM(s) Oral two times a day  dextrose 5%. 1000 milliLiter(s) (50 mL/Hr) IV Continuous <Continuous>  dextrose 5%. 1000 milliLiter(s) (100 mL/Hr) IV Continuous <Continuous>  dextrose 50% Injectable 25 Gram(s) IV Push once  dextrose 50% Injectable 12.5 Gram(s) IV Push once  dextrose 50% Injectable 25 Gram(s) IV Push once  furosemide    Tablet 20 milliGRAM(s) Oral daily  glucagon  Injectable 1 milliGRAM(s) IntraMuscular once  insulin glargine Injectable (LANTUS) 20 Unit(s) SubCutaneous at bedtime  metFORMIN 1000 milliGRAM(s) Oral two times a day    MEDICATIONS  (PRN):  acetaminophen     Tablet .. 650 milliGRAM(s) Oral every 6 hours PRN Temp greater or equal to 38C (100.4F), Mild Pain (1 - 3)  cetirizine 10 milliGRAM(s) Oral daily PRN allergies  dextrose Oral Gel 15 Gram(s) Oral once PRN Blood Glucose LESS THAN 70 milliGRAM(s)/deciliter  nicotine  Polacrilex Gum 2 milliGRAM(s) Oral every 4 hours PRN Smoking Cessation

## 2025-07-10 NOTE — BH INPATIENT PSYCHIATRY PROGRESS NOTE - NSBHMETABOLIC_PSY_ALL_CORE_FT
BMI: BMI (kg/m2): 31.3 (07-04-25 @ 05:30)  HbA1c: A1C with Estimated Average Glucose Result: 6.6 % (07-05-25 @ 08:48)    Glucose: POCT Blood Glucose.: 133 mg/dL (07-10-25 @ 11:33)    BP: 125/79 (07-10-25 @ 08:20) (110/78 - 136/74)Vital Signs Last 24 Hrs  T(C): 36.5 (07-10-25 @ 08:20), Max: 36.6 (07-09-25 @ 16:12)  T(F): 97.7 (07-10-25 @ 08:20), Max: 97.9 (07-09-25 @ 16:12)  HR: 90 (07-10-25 @ 08:20) (90 - 112)  BP: 125/79 (07-10-25 @ 08:20) (122/84 - 130/86)  BP(mean): --  RR: --  SpO2: --      Lipid Panel: Date/Time: 07-05-25 @ 08:48  Cholesterol, Serum: 171  LDL Cholesterol Calculated: 73  HDL Cholesterol, Serum: 42  Total Cholesterol/HDL Ration Measurement: --  Triglycerides, Serum: 356   BMI: BMI (kg/m2): 31.3 (07-04-25 @ 05:30)  HbA1c: A1C with Estimated Average Glucose Result: 6.6 % (07-05-25 @ 08:48)    Glucose: POCT Blood Glucose.: 234 mg/dL (07-10-25 @ 16:38)    BP: 118/82 (07-10-25 @ 16:06) (110/78 - 133/81)Vital Signs Last 24 Hrs  T(C): 37 (07-10-25 @ 16:06), Max: 37 (07-10-25 @ 16:06)  T(F): 98.6 (07-10-25 @ 16:06), Max: 98.6 (07-10-25 @ 16:06)  HR: 92 (07-10-25 @ 16:06) (90 - 105)  BP: 118/82 (07-10-25 @ 16:06) (118/82 - 130/86)  BP(mean): --  RR: --  SpO2: --      Lipid Panel: Date/Time: 07-05-25 @ 08:48  Cholesterol, Serum: 171  LDL Cholesterol Calculated: 73  HDL Cholesterol, Serum: 42  Total Cholesterol/HDL Ration Measurement: --  Triglycerides, Serum: 356

## 2025-07-10 NOTE — BH INPATIENT PSYCHIATRY PROGRESS NOTE - NSBHATTESTCOMMENTATTENDFT_PSY_A_CORE
Patient was seen and examined by me. I reviewed and agree with the findings and plan as documented in the Resident's note. Pt continues to be quite disorganized and uncooperative. Will give Invega sustenna 234 mg tomorrow and hopefully pt will respond to antipsychotic PATHAK. She is unsafe for discharge due to inability to care for herself at this time.  Patient was seen and examined by me. I reviewed and agree with the findings and plan as documented in the Resident's note. Pt continues to be quite manic though accepted abilify 5 mg PO. Will administer abilify maintena 400 mg IM PATHAK tomorrow and ensure full PO coverage given pt's non-adherence outside the hospital. She continues to be intrusive and easily endangers herself, clearly unable to care for herself outside the hospital.

## 2025-07-10 NOTE — BH INPATIENT PSYCHIATRY PROGRESS NOTE - NSBHFUPINTERVALHXFT_PSY_A_CORE
Patient was seen and evaluated this morning. Chart was reviewed and no acute events were noted. No PRN medications were administered overnight. Upon approach, patient is seen in dayroom. When pt arrives to her room she closes the door and looks in the bathroom to make sure noone is there, she then states "people were complaining that I was touching them and was lesbian". Patient speaks about multiple things and tangentially switches form topic to topic. 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 Patient denies any AH/VH. She is happy to be on abilify and is in agreement with treatment plan.

## 2025-07-11 LAB
GLUCOSE BLDC GLUCOMTR-MCNC: 169 MG/DL — HIGH (ref 70–99)
GLUCOSE BLDC GLUCOMTR-MCNC: 201 MG/DL — HIGH (ref 70–99)
GLUCOSE BLDC GLUCOMTR-MCNC: 208 MG/DL — HIGH (ref 70–99)

## 2025-07-11 PROCEDURE — 99232 SBSQ HOSP IP/OBS MODERATE 35: CPT

## 2025-07-11 PROCEDURE — 99221 1ST HOSP IP/OBS SF/LOW 40: CPT | Mod: GC

## 2025-07-11 RX ORDER — ANASTROZOLE 1 MG/1
1 TABLET ORAL DAILY
Refills: 0 | Status: DISCONTINUED | OUTPATIENT
Start: 2025-07-11 | End: 2025-07-18

## 2025-07-11 RX ORDER — LOPERAMIDE HCL 1 MG/7.5ML
2 SOLUTION ORAL
Refills: 0 | Status: DISCONTINUED | OUTPATIENT
Start: 2025-07-11 | End: 2025-07-18

## 2025-07-11 RX ORDER — ARIPIPRAZOLE 2 MG/1
400 TABLET ORAL ONCE
Refills: 0 | Status: COMPLETED | OUTPATIENT
Start: 2025-07-11 | End: 2025-07-11

## 2025-07-11 RX ORDER — ARIPIPRAZOLE 2 MG/1
10 TABLET ORAL
Refills: 0 | Status: DISCONTINUED | OUTPATIENT
Start: 2025-07-11 | End: 2025-07-18

## 2025-07-11 RX ORDER — ALBUTEROL SULFATE 2.5 MG/3ML
1 VIAL, NEBULIZER (ML) INHALATION
Refills: 0 | Status: DISCONTINUED | OUTPATIENT
Start: 2025-07-11 | End: 2025-07-18

## 2025-07-11 RX ADMIN — ARIPIPRAZOLE 5 MILLIGRAM(S): 2 TABLET ORAL at 09:08

## 2025-07-11 RX ADMIN — FUROSEMIDE 20 MILLIGRAM(S): 10 INJECTION INTRAMUSCULAR; INTRAVENOUS at 09:06

## 2025-07-11 RX ADMIN — Medication 650 MILLIGRAM(S): at 00:29

## 2025-07-11 RX ADMIN — METFORMIN HYDROCHLORIDE 1000 MILLIGRAM(S): 850 TABLET ORAL at 09:06

## 2025-07-11 RX ADMIN — INSULIN GLARGINE-YFGN 20 UNIT(S): 100 INJECTION, SOLUTION SUBCUTANEOUS at 20:17

## 2025-07-11 RX ADMIN — Medication 650 MILLIGRAM(S): at 19:31

## 2025-07-11 RX ADMIN — METFORMIN HYDROCHLORIDE 1000 MILLIGRAM(S): 850 TABLET ORAL at 20:17

## 2025-07-11 RX ADMIN — Medication 650 MILLIGRAM(S): at 20:05

## 2025-07-11 RX ADMIN — Medication 1 MILLIGRAM(S): at 20:17

## 2025-07-11 RX ADMIN — LOPERAMIDE HCL 2 MILLIGRAM(S): 1 SOLUTION ORAL at 15:08

## 2025-07-11 RX ADMIN — Medication 1 MILLIGRAM(S): at 09:06

## 2025-07-11 RX ADMIN — ARIPIPRAZOLE 400 MILLIGRAM(S): 2 TABLET ORAL at 11:32

## 2025-07-11 RX ADMIN — Medication 10 MILLIGRAM(S): at 03:55

## 2025-07-11 NOTE — BH INPATIENT PSYCHIATRY PROGRESS NOTE - CURRENT MEDICATION
MEDICATIONS  (STANDING):  anastrozole 1 milliGRAM(s) Oral daily  ARIPiprazole 10 milliGRAM(s) Oral <User Schedule>  benztropine 1 milliGRAM(s) Oral two times a day  dextrose 5%. 1000 milliLiter(s) (100 mL/Hr) IV Continuous <Continuous>  dextrose 5%. 1000 milliLiter(s) (50 mL/Hr) IV Continuous <Continuous>  dextrose 50% Injectable 25 Gram(s) IV Push once  dextrose 50% Injectable 12.5 Gram(s) IV Push once  dextrose 50% Injectable 25 Gram(s) IV Push once  furosemide    Tablet 20 milliGRAM(s) Oral daily  glucagon  Injectable 1 milliGRAM(s) IntraMuscular once  insulin glargine Injectable (LANTUS) 20 Unit(s) SubCutaneous at bedtime  metFORMIN 1000 milliGRAM(s) Oral two times a day    MEDICATIONS  (PRN):  acetaminophen     Tablet .. 650 milliGRAM(s) Oral every 6 hours PRN Temp greater or equal to 38C (100.4F), Mild Pain (1 - 3)  albuterol    90 MICROgram(s) HFA Inhaler 1 Puff(s) Inhalation four times a day PRN SOB  cetirizine 10 milliGRAM(s) Oral daily PRN allergies  dextrose Oral Gel 15 Gram(s) Oral once PRN Blood Glucose LESS THAN 70 milliGRAM(s)/deciliter  loperamide 2 milliGRAM(s) Oral two times a day PRN diarrhea  nicotine  Polacrilex Gum 2 milliGRAM(s) Oral every 4 hours PRN Smoking Cessation

## 2025-07-11 NOTE — BH INPATIENT PSYCHIATRY PROGRESS NOTE - NSBHASSESSSUMMFT_PSY_ALL_CORE
55F, domiciled with 2 adult daughters, on disability and pension (previously worked as a teacher), PPH of bipolar disorder, past IPP hospitalizations remote hx of SA via lithium OD, no known hx of violence, currently in outpatient psych care at [facility], recent hospitalization in [place] for erratic behavior a/f acute casi.     On assessment, pt speaks rapidly and tangentially. Her affect is labile and appears mostly to be elated, but at times when speaking about her daughters she becomes irritable. She is cooperative but difficult to interrupt. She describes sleeping only 3 hours last night but does not c/o fatigue and is seen to have a lot of energy around the unit (walking around and playing bowling). On chart review collateral reports manic like behaviors over the past 2 weeks.    Pt presentation is most c/w acute casi in the setting of chronic bipolar d/o. Pt is a danger to self. She lacks insight into this safety risk. She requires IPP hospitalization to stabilize mood and decrease her risk of harm to self.    7/5/2025  patient is elevated, somewhat cheerful and confused in her communications.  However she wishes to get "back on medication and have it adjusted".    7/7/25- Obtained collateral from provider (please see chart note). Pt continues to have expansive mood, high energy level, and rapid speech. She denies any psychotic symptoms. Pt requests a switch to Abilify and after speaking to OP who confirmed pt was on Abilify at some point treatment team decided to start patient on Abilify. Carbamazepine was d/sam since patient has not been taking it.    7/8- Tolerates abilify well. Wishes to switch from prolixin PATHAK 50mg [id number] (due today) to abilify PATHAK. Will fill non-formulary for abilify maintenna and d/c prolixin PATHAK. Slept 5 hours per staff. Will continue to titrate abilify as needed until PATHAK. Patient reports she has taken abilify 10mg in the past for a long period of time with good effect and only stopped it due to financial constraints.   7/9 continues to be manic and intrusive. Agreeing only to go up to 3 mg abilify tomorrow.   7/10 continues to have elated affect with overinclusive TC and tangential TP. ordered abilify maintena 400mg qmonthly.  7/11 patient did not sleep last night and has an elated mood and affect. She received Abilify Maintena 400mg IM (PATHAK). Will increase dose of PO Abilify to 10mg.    Plan:    #Bipolar d/o  -continue benztropine 1mg bid  -increase Abilify to 10mg qd  - abilify maintenna 400mg qmonthly- administered on 7/11    #Diabetes  -lantus 20 mg at night per hospitalist   -metformin 1000 mg bid per hospitalist     #LE Edema  -Lasix 20mg qd per hospitalist     #Anemia  -monitor CBC= CBC on 07/05 with Hgb= 10.5  -folate, b12 levels wnl  -iron panel wnl

## 2025-07-11 NOTE — BH INPATIENT PSYCHIATRY PROGRESS NOTE - NSBHATTESTCOMMENTATTENDFT_PSY_A_CORE
Patient was seen and examined by me. I reviewed and agree with the findings and plan as documented in the Resident's note, unless noted below.  Pt continues to be quite manic, didn't sleep last night, is intrusive and pressured. Will give abilify maintenna 400 mg today as well as increase PO abilify to 10 mg daily. Pt is unable to be discharged due to significant manic and psychotic symptoms, making it impossible for her to care for her basic needs outside of the hospital.

## 2025-07-11 NOTE — BH INPATIENT PSYCHIATRY PROGRESS NOTE - NSBHCHARTREVIEWINVESTIGATE_PSY_A_CORE FT
Ventricular Rate 99 BPM    Atrial Rate 99 BPM    P-R Interval 162 ms    QRS Duration 94 ms    Q-T Interval 360 ms    QTC Calculation(Bazett) 462 ms    P Axis 59 degrees    R Axis -36 degrees    T Axis 46 degrees    Diagnosis Line Normal sinus rhythm  Left axis deviation  Cannot rule out Anterior infarct , age undetermined  Abnormal ECG  
< from: 12 Lead ECG (07.03.25 @ 12:33) >      Ventricular Rate 99 BPM    Atrial Rate 99 BPM    P-R Interval 162 ms    QRS Duration 94 ms    Q-T Interval 360 ms    QTC Calculation(Bazett) 462 ms    P Axis 59 degrees    R Axis -36 degrees    T Axis 46 degrees    Diagnosis Line Normal sinus rhythm  Left axis deviation  Cannot rule out Anterior infarct , age undetermined  Abnormal ECG    < end of copied text >    
Ventricular Rate 99 BPM    Atrial Rate 99 BPM    P-R Interval 162 ms    QRS Duration 94 ms    Q-T Interval 360 ms    QTC Calculation(Bazett) 462 ms    P Axis 59 degrees    R Axis -36 degrees    T Axis 46 degrees    Diagnosis Line Normal sinus rhythm  Left axis deviation  Cannot rule out Anterior infarct , age undetermined  Abnormal ECG  
< from: 12 Lead ECG (07.03.25 @ 12:33) >      Ventricular Rate 99 BPM    Atrial Rate 99 BPM    P-R Interval 162 ms    QRS Duration 94 ms    Q-T Interval 360 ms    QTC Calculation(Bazett) 462 ms    P Axis 59 degrees    R Axis -36 degrees    T Axis 46 degrees    Diagnosis Line Normal sinus rhythm  Left axis deviation  Cannot rule out Anterior infarct , age undetermined  Abnormal ECG    < end of copied text >    
< from: 12 Lead ECG (07.03.25 @ 12:33) >      Ventricular Rate 99 BPM    Atrial Rate 99 BPM    P-R Interval 162 ms    QRS Duration 94 ms    Q-T Interval 360 ms    QTC Calculation(Bazett) 462 ms    P Axis 59 degrees    R Axis -36 degrees    T Axis 46 degrees    Diagnosis Line Normal sinus rhythm  Left axis deviation  Cannot rule out Anterior infarct , age undetermined  Abnormal ECG    < end of copied text >

## 2025-07-11 NOTE — BH INPATIENT PSYCHIATRY PROGRESS NOTE - NSBHFUPINTERVALHXFT_PSY_A_CORE
Patient was seen and evaluated this morning. Nursing reports patient did not sleep all night.     Upon approach, patient is seen walking in the hallway and stops treatment team to speak to them. Pt states "I am on a high" and admits that she did not sleep all night. She states that she feels great and does not feel tired. She goes on to speak about a range of unrelated topics in a tangential manner.

## 2025-07-11 NOTE — BH INPATIENT PSYCHIATRY PROGRESS NOTE - NSBHMETABOLIC_PSY_ALL_CORE_FT
BMI: BMI (kg/m2): 31.3 (07-04-25 @ 05:30)  HbA1c: A1C with Estimated Average Glucose Result: 6.6 % (07-05-25 @ 08:48)    Glucose: POCT Blood Glucose.: 208 mg/dL (07-11-25 @ 15:41)    BP: 119/83 (07-11-25 @ 15:57) (118/82 - 133/81)Vital Signs Last 24 Hrs  T(C): 36.8 (07-11-25 @ 15:57), Max: 36.8 (07-11-25 @ 15:57)  T(F): 98.2 (07-11-25 @ 15:57), Max: 98.2 (07-11-25 @ 15:57)  HR: 106 (07-11-25 @ 15:57) (103 - 106)  BP: 119/83 (07-11-25 @ 15:57) (119/83 - 120/76)  BP(mean): --  RR: --  SpO2: --      Lipid Panel: Date/Time: 07-05-25 @ 08:48  Cholesterol, Serum: 171  LDL Cholesterol Calculated: 73  HDL Cholesterol, Serum: 42  Total Cholesterol/HDL Ration Measurement: --  Triglycerides, Serum: 356

## 2025-07-11 NOTE — BH INPATIENT PSYCHIATRY PROGRESS NOTE - PRN MEDS
MEDICATIONS  (PRN):  acetaminophen     Tablet .. 650 milliGRAM(s) Oral every 6 hours PRN Temp greater or equal to 38C (100.4F), Mild Pain (1 - 3)  albuterol    90 MICROgram(s) HFA Inhaler 1 Puff(s) Inhalation four times a day PRN SOB  cetirizine 10 milliGRAM(s) Oral daily PRN allergies  dextrose Oral Gel 15 Gram(s) Oral once PRN Blood Glucose LESS THAN 70 milliGRAM(s)/deciliter  loperamide 2 milliGRAM(s) Oral two times a day PRN diarrhea  nicotine  Polacrilex Gum 2 milliGRAM(s) Oral every 4 hours PRN Smoking Cessation

## 2025-07-11 NOTE — CONSULT NOTE ADULT - SUBJECTIVE AND OBJECTIVE BOX
Podiatry Progress Note    Subjective:  SKYLAR VUONG is a  55y Female.   Seen bedside.   Patient is a 55y old  Female who presents with a chief complaint of elongated toenails. Podiatry was consutled for toenail debridement.     Past Medical History and Surgical History  PAST MEDICAL & SURGICAL HISTORY:  Breast CA  Right: s/p lumpectomy x 2, + Radiation Rx      Bipolar disorder      Asthma      DM (diabetes mellitus)  Type 2      HTN (hypertension)      High cholesterol      Asthma      H/O suicide attempt  1986      History of herpes zoster of eye      ABI (obstructive sleep apnea)  doesn't use her CPAP at home      H/O breast surgery      History of surgery  urethral sling           Objective:  Vital Signs Last 24 Hrs  T(C): 36.8 (11 Jul 2025 15:57), Max: 36.8 (11 Jul 2025 15:57)  T(F): 98.2 (11 Jul 2025 15:57), Max: 98.2 (11 Jul 2025 15:57)  HR: 106 (11 Jul 2025 15:57) (103 - 106)  BP: 119/83 (11 Jul 2025 15:57) (119/83 - 120/76)  BP(mean): --  RR: --  SpO2: --                    Physical Exam - Lower Extremity Focused:   Derm: Nails are slightly elongated X 10, skin is supple and well-hydrated, no signs of an open wound, no erythema, no clinical signs of an infection  Vascular: DP and PT Pulses Intact, Foot is Warm to Warm to the touch; Capillary Refill Time < 3 Seconds;    Neuro: Protective Sensation Diminished / Moderately Neuropathic   MSK: No Pain On Palpation at lower extremities    Assessment:  Onychodystrophy    Plan:  Chart reviewed and Patient evaluated. All Questions and Concerns Addressed and Answered  Aseptic debridement of toenails performed to hygenic length with the use of sterile nail clippers.   Weight Bearing Status; WBAT   No Further Sx Debridement;   Patient Stable Per Podiatry Standpoint; Follow Up as an Outpatient w/ Dr. Walden  Discussed Plan w/ Attending; Dr. Walden.     Podiatry        Podiatry Progress Note    Subjective:  SKYLAR VUONG is a  55y Female.   Seen bedside.   Patient is a 55y old  Female who presents with a chief complaint of elongated toenails. Podiatry was consutled for toenail debridement.     Past Medical History and Surgical History  PAST MEDICAL & SURGICAL HISTORY:  Breast CA  Right: s/p lumpectomy x 2, + Radiation Rx      Bipolar disorder      Asthma      DM (diabetes mellitus)  Type 2      HTN (hypertension)      High cholesterol      Asthma      H/O suicide attempt  1986      History of herpes zoster of eye      ABI (obstructive sleep apnea)  doesn't use her CPAP at home      H/O breast surgery      History of surgery  urethral sling           Objective:  Vital Signs Last 24 Hrs  T(C): 36.8 (11 Jul 2025 15:57), Max: 36.8 (11 Jul 2025 15:57)  T(F): 98.2 (11 Jul 2025 15:57), Max: 98.2 (11 Jul 2025 15:57)  HR: 106 (11 Jul 2025 15:57) (103 - 106)  BP: 119/83 (11 Jul 2025 15:57) (119/83 - 120/76)  BP(mean): --  RR: --  SpO2: --                    Physical Exam - Lower Extremity Focused:   Derm: Nails are slightly elongated X 10, skin is supple and well-hydrated, no signs of an open wound, no erythema, no clinical signs of an infection  Vascular: DP and PT Pulses Intact, Foot is Warm to Warm to the touch; Capillary Refill Time < 3 Seconds;    Neuro: Protective Sensation Diminished / Moderately Neuropathic   MSK: No Pain On Palpation at lower extremities    Assessment:  Onychodystrophy x 10       Plan:  Chart reviewed and Patient evaluated. All Questions and Concerns Addressed and Answered  Aseptic debridement of toenails performed to hygenic length with the use of sterile nail clippers.   Weight Bearing Status; WBAT   No Further Sx Debridement;   Patient Stable Per Podiatry Standpoint; Follow Up as an Outpatient w/ Dr. Walden  Discussed Plan w/ Attending; Dr. Walden.     Podiatry

## 2025-07-11 NOTE — BH INPATIENT PSYCHIATRY PROGRESS NOTE - NSBHCHARTREVIEWVS_PSY_A_CORE FT
Vital Signs Last 24 Hrs  T(C): 36.8 (07-11-25 @ 15:57), Max: 36.8 (07-11-25 @ 15:57)  T(F): 98.2 (07-11-25 @ 15:57), Max: 98.2 (07-11-25 @ 15:57)  HR: 106 (07-11-25 @ 15:57) (103 - 106)  BP: 119/83 (07-11-25 @ 15:57) (119/83 - 120/76)  BP(mean): --  RR: --  SpO2: --

## 2025-07-12 LAB
GLUCOSE BLDC GLUCOMTR-MCNC: 122 MG/DL — HIGH (ref 70–99)
GLUCOSE BLDC GLUCOMTR-MCNC: 194 MG/DL — HIGH (ref 70–99)
GLUCOSE BLDC GLUCOMTR-MCNC: 217 MG/DL — HIGH (ref 70–99)
GLUCOSE BLDC GLUCOMTR-MCNC: 224 MG/DL — HIGH (ref 70–99)

## 2025-07-12 RX ADMIN — Medication 650 MILLIGRAM(S): at 13:43

## 2025-07-12 RX ADMIN — METFORMIN HYDROCHLORIDE 1000 MILLIGRAM(S): 850 TABLET ORAL at 08:08

## 2025-07-12 RX ADMIN — Medication 10 MILLIGRAM(S): at 00:19

## 2025-07-12 RX ADMIN — FUROSEMIDE 20 MILLIGRAM(S): 10 INJECTION INTRAMUSCULAR; INTRAVENOUS at 08:08

## 2025-07-12 RX ADMIN — METFORMIN HYDROCHLORIDE 1000 MILLIGRAM(S): 850 TABLET ORAL at 20:09

## 2025-07-12 RX ADMIN — Medication 1 MILLIGRAM(S): at 08:07

## 2025-07-12 RX ADMIN — ANASTROZOLE 1 MILLIGRAM(S): 1 TABLET ORAL at 08:07

## 2025-07-12 RX ADMIN — Medication 1 MILLIGRAM(S): at 20:09

## 2025-07-12 RX ADMIN — Medication 650 MILLIGRAM(S): at 02:00

## 2025-07-12 RX ADMIN — INSULIN GLARGINE-YFGN 20 UNIT(S): 100 INJECTION, SOLUTION SUBCUTANEOUS at 20:09

## 2025-07-12 RX ADMIN — ARIPIPRAZOLE 10 MILLIGRAM(S): 2 TABLET ORAL at 08:07

## 2025-07-12 RX ADMIN — Medication 650 MILLIGRAM(S): at 01:24

## 2025-07-13 LAB
GLUCOSE BLDC GLUCOMTR-MCNC: 185 MG/DL — HIGH (ref 70–99)
GLUCOSE BLDC GLUCOMTR-MCNC: 195 MG/DL — HIGH (ref 70–99)
GLUCOSE BLDC GLUCOMTR-MCNC: 212 MG/DL — HIGH (ref 70–99)
GLUCOSE BLDC GLUCOMTR-MCNC: 241 MG/DL — HIGH (ref 70–99)

## 2025-07-13 RX ADMIN — INSULIN GLARGINE-YFGN 20 UNIT(S): 100 INJECTION, SOLUTION SUBCUTANEOUS at 20:18

## 2025-07-13 RX ADMIN — Medication 650 MILLIGRAM(S): at 20:30

## 2025-07-13 RX ADMIN — METFORMIN HYDROCHLORIDE 1000 MILLIGRAM(S): 850 TABLET ORAL at 08:08

## 2025-07-13 RX ADMIN — Medication 1 PUFF(S): at 09:05

## 2025-07-13 RX ADMIN — Medication 650 MILLIGRAM(S): at 21:04

## 2025-07-13 RX ADMIN — Medication 10 MILLIGRAM(S): at 20:32

## 2025-07-13 RX ADMIN — METFORMIN HYDROCHLORIDE 1000 MILLIGRAM(S): 850 TABLET ORAL at 20:17

## 2025-07-13 RX ADMIN — FUROSEMIDE 20 MILLIGRAM(S): 10 INJECTION INTRAMUSCULAR; INTRAVENOUS at 08:07

## 2025-07-13 RX ADMIN — Medication 1 PUFF(S): at 21:03

## 2025-07-13 RX ADMIN — Medication 650 MILLIGRAM(S): at 02:51

## 2025-07-13 RX ADMIN — ARIPIPRAZOLE 10 MILLIGRAM(S): 2 TABLET ORAL at 08:08

## 2025-07-13 RX ADMIN — ANASTROZOLE 1 MILLIGRAM(S): 1 TABLET ORAL at 08:07

## 2025-07-13 RX ADMIN — Medication 1 MILLIGRAM(S): at 20:17

## 2025-07-13 RX ADMIN — Medication 1 PUFF(S): at 14:57

## 2025-07-13 RX ADMIN — Medication 1 MILLIGRAM(S): at 08:07

## 2025-07-14 LAB
GLUCOSE BLDC GLUCOMTR-MCNC: 144 MG/DL — HIGH (ref 70–99)
GLUCOSE BLDC GLUCOMTR-MCNC: 178 MG/DL — HIGH (ref 70–99)
GLUCOSE BLDC GLUCOMTR-MCNC: 213 MG/DL — HIGH (ref 70–99)
GLUCOSE BLDC GLUCOMTR-MCNC: 215 MG/DL — HIGH (ref 70–99)

## 2025-07-14 PROCEDURE — 99232 SBSQ HOSP IP/OBS MODERATE 35: CPT

## 2025-07-14 RX ORDER — B1/B2/B3/B5/B6/B12/VIT C/FOLIC 500-0.5 MG
1 TABLET ORAL DAILY
Refills: 0 | Status: DISCONTINUED | OUTPATIENT
Start: 2025-07-14 | End: 2025-07-18

## 2025-07-14 RX ORDER — FLUTICASONE PROPIONATE 50 UG/1
1 SPRAY, METERED NASAL
Refills: 0 | Status: DISCONTINUED | OUTPATIENT
Start: 2025-07-14 | End: 2025-07-18

## 2025-07-14 RX ADMIN — METFORMIN HYDROCHLORIDE 1000 MILLIGRAM(S): 850 TABLET ORAL at 20:25

## 2025-07-14 RX ADMIN — Medication 650 MILLIGRAM(S): at 04:06

## 2025-07-14 RX ADMIN — Medication 650 MILLIGRAM(S): at 04:09

## 2025-07-14 RX ADMIN — FUROSEMIDE 20 MILLIGRAM(S): 10 INJECTION INTRAMUSCULAR; INTRAVENOUS at 08:11

## 2025-07-14 RX ADMIN — Medication 1 PUFF(S): at 20:33

## 2025-07-14 RX ADMIN — Medication 1 MILLIGRAM(S): at 20:25

## 2025-07-14 RX ADMIN — ANASTROZOLE 1 MILLIGRAM(S): 1 TABLET ORAL at 08:11

## 2025-07-14 RX ADMIN — METFORMIN HYDROCHLORIDE 1000 MILLIGRAM(S): 850 TABLET ORAL at 08:10

## 2025-07-14 RX ADMIN — FLUTICASONE PROPIONATE 1 SPRAY(S): 50 SPRAY, METERED NASAL at 20:33

## 2025-07-14 RX ADMIN — Medication 10 MILLIGRAM(S): at 04:06

## 2025-07-14 RX ADMIN — Medication 10 MILLIGRAM(S): at 12:39

## 2025-07-14 RX ADMIN — Medication 1 MILLIGRAM(S): at 08:11

## 2025-07-14 RX ADMIN — ARIPIPRAZOLE 10 MILLIGRAM(S): 2 TABLET ORAL at 08:10

## 2025-07-14 RX ADMIN — Medication 650 MILLIGRAM(S): at 18:38

## 2025-07-14 RX ADMIN — INSULIN GLARGINE-YFGN 20 UNIT(S): 100 INJECTION, SOLUTION SUBCUTANEOUS at 20:25

## 2025-07-14 NOTE — BH INPATIENT PSYCHIATRY PROGRESS NOTE - NSBHASSESSSUMMFT_PSY_ALL_CORE
55F, domiciled with 2 adult daughters, on disability and pension (previously worked as a teacher), PPH of bipolar disorder, past IPP hospitalizations remote hx of SA via lithium OD, no known hx of violence, currently in outpatient psych care at [facility], recent hospitalization in [place] for erratic behavior a/f acute casi.     On assessment, pt speaks rapidly and tangentially. Her affect is labile and appears mostly to be elated, but at times when speaking about her daughters she becomes irritable. She is cooperative but difficult to interrupt. She describes sleeping only 3 hours last night but does not c/o fatigue and is seen to have a lot of energy around the unit (walking around and playing bowling). On chart review collateral reports manic like behaviors over the past 2 weeks.    Pt presentation is most c/w acute casi in the setting of chronic bipolar d/o. Pt is a danger to self. She lacks insight into this safety risk. She requires IPP hospitalization to stabilize mood and decrease her risk of harm to self.    7/5/2025  patient is elevated, somewhat cheerful and confused in her communications.  However she wishes to get "back on medication and have it adjusted".    7/7/25- Obtained collateral from provider (please see chart note). Pt continues to have expansive mood, high energy level, and rapid speech. She denies any psychotic symptoms. Pt requests a switch to Abilify and after speaking to OP who confirmed pt was on Abilify at some point treatment team decided to start patient on Abilify. Carbamazepine was d/sam since patient has not been taking it.    7/8- Tolerates abilify well. Wishes to switch from prolixin PATHAK 50mg [id number] (due today) to abilify PATHAK. Will fill non-formulary for abilify maintenna and d/c prolixin PATHAK. Slept 5 hours per staff. Will continue to titrate abilify as needed until PATHAK. Patient reports she has taken abilify 10mg in the past for a long period of time with good effect and only stopped it due to financial constraints.   7/9 continues to be manic and intrusive. Agreeing only to go up to 3 mg abilify tomorrow.   7/10 continues to have elated affect with overinclusive TC and tangential TP. ordered abilify maintena 400mg qmonthly.  7/11 patient did not sleep last night and has an elated mood and affect. She received Abilify Maintena 400mg IM (PATHAK). Will increase dose of PO Abilify to 10mg.  7/14 patient slept well (8/8:30pm through 4am) and has normal mood and affect. She received Abilify 10mg PO qd. Patient requests allergy medication and inhaler. Per chart patient has been receiving albuterol  90 MICROgram(s) HFA Inhaler 1 Puff(s) & cetirizine 10mg PO    Plan:    #Bipolar d/o  -continue benztropine 1mg bid  -continue Abilify to 10mg qd  - abilify maintenna 400mg qmonthly- administered on 7/11    #Diabetes  -lantus 20 mg at night per hospitalist   -metformin 1000 mg bid per hospitalist     #LE Edema  -Lasix 20mg qd per hospitalist     #Anemia  -monitor CBC= CBC on 07/05 with Hgb= 10.5  -folate, b12 levels wnl  -iron panel wnl 55F, domiciled with 2 adult daughters, on disability and pension (previously worked as a teacher), PPH of bipolar disorder, past IPP hospitalizations remote hx of SA via lithium OD, no known hx of violence, currently in outpatient psych care at [facility], recent hospitalization in [place] for erratic behavior a/f acute casi.     On assessment, pt speaks rapidly and tangentially. Her affect is labile and appears mostly to be elated, but at times when speaking about her daughters she becomes irritable. She is cooperative but difficult to interrupt. She describes sleeping only 3 hours last night but does not c/o fatigue and is seen to have a lot of energy around the unit (walking around and playing bowling). On chart review collateral reports manic like behaviors over the past 2 weeks.    Pt presentation is most c/w acute casi in the setting of chronic bipolar d/o. Pt is a danger to self. She lacks insight into this safety risk. She requires IPP hospitalization to stabilize mood and decrease her risk of harm to self.    7/5/2025  patient is elevated, somewhat cheerful and confused in her communications.  However she wishes to get "back on medication and have it adjusted".    7/7/25- Obtained collateral from provider (please see chart note). Pt continues to have expansive mood, high energy level, and rapid speech. She denies any psychotic symptoms. Pt requests a switch to Abilify and after speaking to OP who confirmed pt was on Abilify at some point treatment team decided to start patient on Abilify. Carbamazepine was d/sam since patient has not been taking it.    7/8- Tolerates abilify well. Wishes to switch from prolixin PATHAK 50mg [id number] (due today) to abilify PATHAK. Will fill non-formulary for abilify maintenna and d/c prolixin PATHAK. Slept 5 hours per staff. Will continue to titrate abilify as needed until PATHAK. Patient reports she has taken abilify 10mg in the past for a long period of time with good effect and only stopped it due to financial constraints.   7/9 continues to be manic and intrusive. Agreeing only to go up to 3 mg abilify tomorrow.   7/10 continues to have elated affect with overinclusive TC and tangential TP. ordered abilify maintena 400mg qmonthly.  7/11 patient did not sleep last night and has an elated mood and affect. She received Abilify Maintena 400mg IM (PATHAK). Will increase dose of PO Abilify to 10mg.  7/14 patient slept well (8/8:30pm through 4am) and has improved affect. Her speech is less rapid and tangential. Her symptoms of casi appear to be improving. She is likely responding well to the Abilify tx.    Plan:    #Bipolar d/o  -continue benztropine 1mg bid  -continue Abilify to 10mg qd  - abilify maintenna 400mg qmonthly- administered on 7/11    #allergic rhinitis  -zyrtec changed to standing qd  -ordered Flonase PRN    #Diabetes  -lantus 20 mg at night per hospitalist   -metformin 1000 mg bid per hospitalist     #LE Edema  -Lasix 20mg qd per hospitalist     #Anemia  -monitor CBC= CBC on 07/05 with Hgb= 10.5  -folate, b12 levels wnl  -iron panel wnl

## 2025-07-14 NOTE — BH INPATIENT PSYCHIATRY PROGRESS NOTE - NSBHCHARTREVIEWVS_PSY_A_CORE FT
Vital Signs Last 24 Hrs  T(C): 36.5 (14 Jul 2025 08:14), Max: 36.5 (13 Jul 2025 17:19)  T(F): 97.7 (14 Jul 2025 08:14), Max: 97.7 (13 Jul 2025 17:19)  HR: 99 (14 Jul 2025 08:14) (99 - 112)  BP: 125/84 (14 Jul 2025 08:14) (125/84 - 145/96)  BP(mean): --  RR: --  SpO2: --     Vital Signs Last 24 Hrs  T(C): 36.5 (07-14-25 @ 08:14), Max: 36.5 (07-13-25 @ 17:19)  T(F): 97.7 (07-14-25 @ 08:14), Max: 97.7 (07-13-25 @ 17:19)  HR: 99 (07-14-25 @ 08:14) (99 - 112)  BP: 125/84 (07-14-25 @ 08:14) (125/84 - 145/96)  BP(mean): --  RR: --  SpO2: --

## 2025-07-14 NOTE — BH INPATIENT PSYCHIATRY PROGRESS NOTE - NSBHATTESTCOMMENTATTENDFT_PSY_A_CORE
Patient was seen and examined by me. I reviewed and agree with the findings and plan as documented in the Resident's note, unless noted below. Pt is slowly improving in manic symptoms though continues to have tangential, illogical thought process and unable to care for herself outside of the hospital. Will continue to titrate up abilify as tolerated. Pt received PATHAK on Friday and continues to be on the PO overlap, which she wouldn't reliably adhere to outside of the hospital.

## 2025-07-14 NOTE — BH INPATIENT PSYCHIATRY PROGRESS NOTE - CURRENT MEDICATION
MEDICATIONS  (STANDING):  anastrozole 1 milliGRAM(s) Oral daily  ARIPiprazole 10 milliGRAM(s) Oral <User Schedule>  benztropine 1 milliGRAM(s) Oral two times a day  dextrose 5%. 1000 milliLiter(s) (100 mL/Hr) IV Continuous <Continuous>  dextrose 5%. 1000 milliLiter(s) (50 mL/Hr) IV Continuous <Continuous>  dextrose 50% Injectable 25 Gram(s) IV Push once  dextrose 50% Injectable 12.5 Gram(s) IV Push once  dextrose 50% Injectable 25 Gram(s) IV Push once  furosemide    Tablet 20 milliGRAM(s) Oral daily  glucagon  Injectable 1 milliGRAM(s) IntraMuscular once  insulin glargine Injectable (LANTUS) 20 Unit(s) SubCutaneous at bedtime  metFORMIN 1000 milliGRAM(s) Oral two times a day   MEDICATIONS  (STANDING):  anastrozole 1 milliGRAM(s) Oral daily  ARIPiprazole 10 milliGRAM(s) Oral <User Schedule>  benztropine 1 milliGRAM(s) Oral two times a day  cetirizine 10 milliGRAM(s) Oral daily  dextrose 5%. 1000 milliLiter(s) (100 mL/Hr) IV Continuous <Continuous>  dextrose 5%. 1000 milliLiter(s) (50 mL/Hr) IV Continuous <Continuous>  dextrose 50% Injectable 25 Gram(s) IV Push once  dextrose 50% Injectable 12.5 Gram(s) IV Push once  dextrose 50% Injectable 25 Gram(s) IV Push once  furosemide    Tablet 20 milliGRAM(s) Oral daily  glucagon  Injectable 1 milliGRAM(s) IntraMuscular once  insulin glargine Injectable (LANTUS) 20 Unit(s) SubCutaneous at bedtime  metFORMIN 1000 milliGRAM(s) Oral two times a day    MEDICATIONS  (PRN):  acetaminophen     Tablet .. 650 milliGRAM(s) Oral every 6 hours PRN Temp greater or equal to 38C (100.4F), Mild Pain (1 - 3)  albuterol    90 MICROgram(s) HFA Inhaler 1 Puff(s) Inhalation four times a day PRN SOB  dextrose Oral Gel 15 Gram(s) Oral once PRN Blood Glucose LESS THAN 70 milliGRAM(s)/deciliter  fluticasone propionate 50 MICROgram(s)/spray Nasal Spray 1 Spray(s) Both Nostrils two times a day PRN allergic rhinitis  loperamide 2 milliGRAM(s) Oral two times a day PRN diarrhea  nicotine  Polacrilex Gum 2 milliGRAM(s) Oral every 4 hours PRN Smoking Cessation

## 2025-07-14 NOTE — BH INPATIENT PSYCHIATRY PROGRESS NOTE - NSBHFUPINTERVALHXFT_PSY_A_CORE
Patient was seen wondering the halls. Patient proceeded to approach us and speak with the team. Patient spoke with a normal rate and rhythm before being informed we would follow up during rounds.    Upon entering the patients room she was seen lying on her bed reading a Restorationism book (Oriental orthodox). Patient mentions she needs her inhaler and allergy medication. She gets "nervous when I can't breathe well and that makes my breathing worse". On review of patient chart both medications are PRN. Patient mentions the need for a multivitamin. Upon further questioning the patient states she slept well (8/8:30pm until 4:00am). The patient endorses no paranoia, clear thoughts, and no abnormally elevated mood. Patients thoughts appear religiously centered. Speech is pressured with tangentiality.  Patient was seen and evaluated this morning. Chart was reviewed and no acute events were noted. No PRN medications were administered overnight.     Upon approach, patient was seen wondering the halls. Patient was enthusiastic to see team and said she would wait in her room to speak with team. Patient speech was rapid however it has improved and is less rapid compared to prior interviews. Pt speaks about her Rabb[employer] wife and how she brought her a Worship book, however the conversation is tangential and does not remain on course. Patient reports good sleep. She denies any side effects to abilify and states "I love it". Pt reflects on her improvement over her hospital stay and states "I'm not paranoid" and that her thoughts are more "clear". Pt requests medications for her allergies.

## 2025-07-14 NOTE — BH INPATIENT PSYCHIATRY PROGRESS NOTE - PRN MEDS
MEDICATIONS  (PRN):  acetaminophen     Tablet .. 650 milliGRAM(s) Oral every 6 hours PRN Temp greater or equal to 38C (100.4F), Mild Pain (1 - 3)  albuterol    90 MICROgram(s) HFA Inhaler 1 Puff(s) Inhalation four times a day PRN SOB  cetirizine 10 milliGRAM(s) Oral daily PRN allergies  dextrose Oral Gel 15 Gram(s) Oral once PRN Blood Glucose LESS THAN 70 milliGRAM(s)/deciliter  loperamide 2 milliGRAM(s) Oral two times a day PRN diarrhea  nicotine  Polacrilex Gum 2 milliGRAM(s) Oral every 4 hours PRN Smoking Cessation   MEDICATIONS  (PRN):  acetaminophen     Tablet .. 650 milliGRAM(s) Oral every 6 hours PRN Temp greater or equal to 38C (100.4F), Mild Pain (1 - 3)  albuterol    90 MICROgram(s) HFA Inhaler 1 Puff(s) Inhalation four times a day PRN SOB  dextrose Oral Gel 15 Gram(s) Oral once PRN Blood Glucose LESS THAN 70 milliGRAM(s)/deciliter  fluticasone propionate 50 MICROgram(s)/spray Nasal Spray 1 Spray(s) Both Nostrils two times a day PRN allergic rhinitis  loperamide 2 milliGRAM(s) Oral two times a day PRN diarrhea  nicotine  Polacrilex Gum 2 milliGRAM(s) Oral every 4 hours PRN Smoking Cessation

## 2025-07-14 NOTE — BH INPATIENT PSYCHIATRY PROGRESS NOTE - NSBHMETABOLIC_PSY_ALL_CORE_FT
Outpatient office visit EXAM note      History    Argentina Montana is a 47 year old year old female who presents for the following issues:    1. Foot infection: patient with several week/month history of foot fungus. Primarily located on the right foot on the big toe and second digit. Has been treated with oral medications in the past, last treated over 1 year ago. Has used polish in the past for this as well.     mEDICATIONS    Current Outpatient Medications   Medication Sig   • terbinafine (LAMISIL) 250 MG tablet Take 1 tablet by mouth daily.     No current facility-administered medications for this visit.        Pertinent past medical, surgical, social and family history reviewed and updated in [employer].      Review of systems    GENERAL:  Denies fever, chills, tiredness, malaise, unintentional weight changes.  CARDIOVASCULAR:  Denies chest pain, shortness of breath, palpitations.  RESPIRATORY:  Denies wheezing, frequent cough, shortness of breath.  SKIN:  + foot infection. Denies skin rashes, lesions, persistent itching.    Physical Exam    Visit Vitals  /90 (BP Location: [facility], Patient Position: Sitting, Cuff Size: Regular)   Pulse 79   Ht 5' 5.75\" (1.67 m)   Wt 94.5 kg   BMI 33.88 kg/m²     GENERAL:  Alert, no acute distress, appropriately dressed.  EXTREMITIES: No edema, cyanosis.  RIGHT FOOT: discoloration of the nails of the first 3 digits appreciated. No erythema or other signs of infection on the surrounding skin.   SKIN: Warm, dry, no rashes or lesions.  PSYCH:  Mood and affect appropriate.    Assessment & Plan    Diagnoses and all orders for this visit:    Onychomycosis of toenail: will check CMP, plan to treat x 12 weeks with terbinafine. Return if no improvement.   -     terbinafine (LAMISIL) 250 MG tablet; Take 1 tablet by mouth daily.  -     COMPREHENSIVE METABOLIC PANEL; Future    Return if symptoms worsen or fail to improve.    Lacey Cates MD   BMI: BMI (kg/m2): 31.3 (07-04-25 @ 05:30)  HbA1c: A1C with Estimated Average Glucose Result: 6.6 % (07-05-25 @ 08:48)    Glucose: POCT Blood Glucose.: 213 mg/dL (07-14-25 @ 06:31)    BP: 125/84 (07-14-25 @ 08:14) (112/79 - 145/96)Vital Signs Last 24 Hrs  T(C): 36.5 (07-14-25 @ 08:14), Max: 36.5 (07-13-25 @ 17:19)  T(F): 97.7 (07-14-25 @ 08:14), Max: 97.7 (07-13-25 @ 17:19)  HR: 99 (07-14-25 @ 08:14) (99 - 112)  BP: 125/84 (07-14-25 @ 08:14) (125/84 - 145/96)  BP(mean): --  RR: --  SpO2: --      Lipid Panel: Date/Time: 07-05-25 @ 08:48  Cholesterol, Serum: 171  LDL Cholesterol Calculated: 73  HDL Cholesterol, Serum: 42  Total Cholesterol/HDL Ration Measurement: --  Triglycerides, Serum: 356   BMI: BMI (kg/m2): 31.3 (07-04-25 @ 05:30)  HbA1c: A1C with Estimated Average Glucose Result: 6.6 % (07-05-25 @ 08:48)    Glucose: POCT Blood Glucose.: 144 mg/dL (07-14-25 @ 11:24)    BP: 125/84 (07-14-25 @ 08:14) (112/79 - 145/96)Vital Signs Last 24 Hrs  T(C): 36.5 (07-14-25 @ 08:14), Max: 36.5 (07-13-25 @ 17:19)  T(F): 97.7 (07-14-25 @ 08:14), Max: 97.7 (07-13-25 @ 17:19)  HR: 99 (07-14-25 @ 08:14) (99 - 112)  BP: 125/84 (07-14-25 @ 08:14) (125/84 - 145/96)  BP(mean): --  RR: --  SpO2: --      Lipid Panel: Date/Time: 07-05-25 @ 08:48  Cholesterol, Serum: 171  LDL Cholesterol Calculated: 73  HDL Cholesterol, Serum: 42  Total Cholesterol/HDL Ration Measurement: --  Triglycerides, Serum: 356

## 2025-07-14 NOTE — BH INPATIENT PSYCHIATRY PROGRESS NOTE - OTHER
Physical impulse control demonstrated. Thought/speech partially lack impulse control Pt continues to have elevated mood, however it has improved and is less elevated than prior exams

## 2025-07-14 NOTE — BH TREATMENT PLAN - NSTXMANICINTERRN_PSY_ALL_CORE
RN to encourage verbalization of feelings.  RN to encourage medication compliance and provide support and education as needed on Dx, coping skills, medication, and safety planning.  RN to encourage daily ADL's  RN to encourage group attendance  RN to assess and intervene for any Manic symptoms

## 2025-07-14 NOTE — BH TREATMENT PLAN - NSTXMANICINTERMD_PSY_ALL_CORE
Patients mood appears to be normal with pressured speech which is a substantial improvement. Patients thoughts continue to be tangential . Continue on medications

## 2025-07-14 NOTE — BH TREATMENT PLAN - NSTXPLANTHERAPYSESSIONSFT_PSY_ALL_CORE
07-09-25  Type of therapy: Coping skills, Health and fitness, Other  Type of session: Group  Level of patient participation: Engaged  Duration of participation: 30 minutes  Therapy conducted by: Nursing  --    07-13-25  Type of therapy: Coping skills, Creative arts therapy, Inspiration and motiviation, Leisure development, Self esteem, Social skills training, Stress management, Symptom management  Type of session: Group  Level of patient participation: Engaged, Participates  Duration of participation: 45 minutes  Therapy conducted by: Psych rehab  Therapy Summary: Pt is attending most RT sessions. She enjoys creative arts and socializing with peers. She is more quiet and better focused when engaged with peers in a game of cards. Pt is visible on the unit and is pleasant to speak to although she can be very talkative at times.

## 2025-07-15 LAB
GLUCOSE BLDC GLUCOMTR-MCNC: 225 MG/DL — HIGH (ref 70–99)
GLUCOSE BLDC GLUCOMTR-MCNC: 228 MG/DL — HIGH (ref 70–99)
GLUCOSE BLDC GLUCOMTR-MCNC: 253 MG/DL — HIGH (ref 70–99)
GLUCOSE BLDC GLUCOMTR-MCNC: 269 MG/DL — HIGH (ref 70–99)

## 2025-07-15 RX ORDER — DEXTROMETHORPHAN HBR, GUAIFENESIN 200 MG/10ML
200 LIQUID ORAL EVERY 6 HOURS
Refills: 0 | Status: DISCONTINUED | OUTPATIENT
Start: 2025-07-15 | End: 2025-07-15

## 2025-07-15 RX ORDER — DEXTROMETHORPHAN HBR, GUAIFENESIN 20; 200 MG/10ML; MG/10ML
10 SOLUTION ORAL ONCE
Refills: 0 | Status: COMPLETED | OUTPATIENT
Start: 2025-07-15 | End: 2025-07-15

## 2025-07-15 RX ORDER — DEXTROMETHORPHAN HBR, GUAIFENESIN 20; 200 MG/10ML; MG/10ML
10 SOLUTION ORAL EVERY 6 HOURS
Refills: 0 | Status: DISCONTINUED | OUTPATIENT
Start: 2025-07-15 | End: 2025-07-16

## 2025-07-15 RX ADMIN — Medication 1 SPRAY(S): at 04:46

## 2025-07-15 RX ADMIN — DEXTROMETHORPHAN HBR, GUAIFENESIN 10 MILLILITER(S): 20; 200 SOLUTION ORAL at 12:28

## 2025-07-15 RX ADMIN — Medication 1 TABLET(S): at 08:20

## 2025-07-15 RX ADMIN — Medication 1 LOZENGE: at 17:11

## 2025-07-15 RX ADMIN — FUROSEMIDE 20 MILLIGRAM(S): 10 INJECTION INTRAMUSCULAR; INTRAVENOUS at 08:20

## 2025-07-15 RX ADMIN — Medication 1 LOZENGE: at 21:21

## 2025-07-15 RX ADMIN — ARIPIPRAZOLE 10 MILLIGRAM(S): 2 TABLET ORAL at 08:18

## 2025-07-15 RX ADMIN — Medication 10 MILLIGRAM(S): at 08:21

## 2025-07-15 RX ADMIN — Medication 1 MILLIGRAM(S): at 08:21

## 2025-07-15 RX ADMIN — Medication 1 SPRAY(S): at 17:11

## 2025-07-15 RX ADMIN — INSULIN GLARGINE-YFGN 20 UNIT(S): 100 INJECTION, SOLUTION SUBCUTANEOUS at 20:08

## 2025-07-15 RX ADMIN — Medication 1 MILLIGRAM(S): at 20:08

## 2025-07-15 RX ADMIN — Medication 650 MILLIGRAM(S): at 05:32

## 2025-07-15 RX ADMIN — METFORMIN HYDROCHLORIDE 1000 MILLIGRAM(S): 850 TABLET ORAL at 08:20

## 2025-07-15 RX ADMIN — DEXTROMETHORPHAN HBR, GUAIFENESIN 10 MILLILITER(S): 20; 200 SOLUTION ORAL at 21:21

## 2025-07-15 RX ADMIN — FLUTICASONE PROPIONATE 1 SPRAY(S): 50 SPRAY, METERED NASAL at 08:19

## 2025-07-15 RX ADMIN — ANASTROZOLE 1 MILLIGRAM(S): 1 TABLET ORAL at 08:20

## 2025-07-15 RX ADMIN — DEXTROMETHORPHAN HBR, GUAIFENESIN 10 MILLILITER(S): 20; 200 SOLUTION ORAL at 04:46

## 2025-07-15 RX ADMIN — Medication 650 MILLIGRAM(S): at 04:47

## 2025-07-15 RX ADMIN — Medication 1 PUFF(S): at 16:21

## 2025-07-15 RX ADMIN — FLUTICASONE PROPIONATE 1 SPRAY(S): 50 SPRAY, METERED NASAL at 21:21

## 2025-07-15 RX ADMIN — METFORMIN HYDROCHLORIDE 1000 MILLIGRAM(S): 850 TABLET ORAL at 20:07

## 2025-07-15 NOTE — BH INPATIENT PSYCHIATRY PROGRESS NOTE - CURRENT MEDICATION
MEDICATIONS  (STANDING):  anastrozole 1 milliGRAM(s) Oral daily  ARIPiprazole 10 milliGRAM(s) Oral <User Schedule>  benztropine 1 milliGRAM(s) Oral two times a day  cetirizine 10 milliGRAM(s) Oral daily  dextrose 5%. 1000 milliLiter(s) (100 mL/Hr) IV Continuous <Continuous>  dextrose 5%. 1000 milliLiter(s) (50 mL/Hr) IV Continuous <Continuous>  dextrose 50% Injectable 25 Gram(s) IV Push once  dextrose 50% Injectable 12.5 Gram(s) IV Push once  dextrose 50% Injectable 25 Gram(s) IV Push once  furosemide    Tablet 20 milliGRAM(s) Oral daily  glucagon  Injectable 1 milliGRAM(s) IntraMuscular once  insulin glargine Injectable (LANTUS) 20 Unit(s) SubCutaneous at bedtime  metFORMIN 1000 milliGRAM(s) Oral two times a day  multivitamin 1 Tablet(s) Oral daily    MEDICATIONS  (PRN):  acetaminophen     Tablet .. 650 milliGRAM(s) Oral every 6 hours PRN Temp greater or equal to 38C (100.4F), Mild Pain (1 - 3)  albuterol    90 MICROgram(s) HFA Inhaler 1 Puff(s) Inhalation four times a day PRN SOB  dextrose Oral Gel 15 Gram(s) Oral once PRN Blood Glucose LESS THAN 70 milliGRAM(s)/deciliter  fluticasone propionate 50 MICROgram(s)/spray Nasal Spray 1 Spray(s) Both Nostrils two times a day PRN allergic rhinitis  guaifenesin/dextromethorphan Oral Liquid 10 milliLiter(s) Oral every 6 hours PRN cough  loperamide 2 milliGRAM(s) Oral two times a day PRN diarrhea  nicotine  Polacrilex Gum 2 milliGRAM(s) Oral every 4 hours PRN Smoking Cessation  oxymetazoline 0.05% Nasal Spray 1 Spray(s) Both Nostrils every 12 hours PRN Nasal Congestion

## 2025-07-15 NOTE — BH INPATIENT PSYCHIATRY PROGRESS NOTE - NSBHATTESTCOMMENTATTENDFT_PSY_A_CORE
Patient was seen and examined by me. I reviewed and agree with the findings and plan as documented in the Resident's note, unless noted below.  Pt is improving daily though still not fully reality-based and still disorganized.

## 2025-07-15 NOTE — BH INPATIENT PSYCHIATRY PROGRESS NOTE - NSBHFUPINTERVALHXFT_PSY_A_CORE
Patient was evaluated by team this morning. Chart was reviewed and patient received PRN Afrin, Fluticasone propionate, and Albuterol for allergy Sx. Patient states these helped minimally and requests further PRNs for allergies and cough.     Patient seen initially in social area and voluntarily went to her room for interview. The patient appears excited to see the team and is very fourth coming with information. Upon questioning the patient endorses feeling "better" with regard to her mood. When asking about sleep, the patient mentions that she slept poorly (9pm-12:30 then 1-6am) as a result of allergies and a cough. The patient mentions feeling "happy" about the thought of DC on Friday and endorses a good appetite. The patient shares her coping strategies and artwork - organization noted with regard to patients belongings. The patients speech was pressured with tangentiality.  Patient was evaluated by team this morning. Chart was reviewed and patient received PRN Afrin, Fluticasone propionate, and Albuterol for allergy Sx. Patient states these helped minimally and requests further PRNs for allergies and cough. Pt's POCT bgls have been elevated recently.    Patient seen initially in social area and voluntarily went to her room for interview. The patient appears excited to see the team and is very fourth coming with information. Upon questioning the patient endorses feeling "better" with regard to her mood. When asking about sleep, the patient mentions that she slept poorly (9pm-12:30 then 1-6am) as a result of allergies and a cough. The patient mentions feeling "happy" about the thought of DC on Friday and endorses a good appetite. The patient shares her coping strategies and artwork - organization noted with regard to patients belongings. The patients speech was pressured. Her TP was overinclusive and mildly tangential, however it was more goal directed and linear when compared to prior exams.

## 2025-07-15 NOTE — BH INPATIENT PSYCHIATRY PROGRESS NOTE - PRN MEDS
MEDICATIONS  (PRN):  acetaminophen     Tablet .. 650 milliGRAM(s) Oral every 6 hours PRN Temp greater or equal to 38C (100.4F), Mild Pain (1 - 3)  albuterol    90 MICROgram(s) HFA Inhaler 1 Puff(s) Inhalation four times a day PRN SOB  dextrose Oral Gel 15 Gram(s) Oral once PRN Blood Glucose LESS THAN 70 milliGRAM(s)/deciliter  fluticasone propionate 50 MICROgram(s)/spray Nasal Spray 1 Spray(s) Both Nostrils two times a day PRN allergic rhinitis  guaifenesin/dextromethorphan Oral Liquid 10 milliLiter(s) Oral every 6 hours PRN cough  loperamide 2 milliGRAM(s) Oral two times a day PRN diarrhea  nicotine  Polacrilex Gum 2 milliGRAM(s) Oral every 4 hours PRN Smoking Cessation  oxymetazoline 0.05% Nasal Spray 1 Spray(s) Both Nostrils every 12 hours PRN Nasal Congestion     MEDICATIONS  (PRN):  acetaminophen     Tablet .. 650 milliGRAM(s) Oral every 6 hours PRN Temp greater or equal to 38C (100.4F), Mild Pain (1 - 3)  albuterol    90 MICROgram(s) HFA Inhaler 1 Puff(s) Inhalation four times a day PRN SOB  dextrose Oral Gel 15 Gram(s) Oral once PRN Blood Glucose LESS THAN 70 milliGRAM(s)/deciliter  fluticasone propionate 50 MICROgram(s)/spray Nasal Spray 1 Spray(s) Both Nostrils two times a day PRN allergic rhinitis  guaifenesin/dextromethorphan Oral Liquid 10 milliLiter(s) Oral every 6 hours PRN cough  loperamide 2 milliGRAM(s) Oral two times a day PRN diarrhea  nicotine  Polacrilex Gum 2 milliGRAM(s) Oral every 4 hours PRN Smoking Cessation  oxymetazoline 0.05% Nasal Spray 1 Spray(s) Both Nostrils every 12 hours PRN Nasal Congestion

## 2025-07-15 NOTE — BH INPATIENT PSYCHIATRY PROGRESS NOTE - NSBHMETABOLIC_PSY_ALL_CORE_FT
BMI: BMI (kg/m2): 31.3 (07-04-25 @ 05:30)  HbA1c: A1C with Estimated Average Glucose Result: 6.6 % (07-05-25 @ 08:48)    Glucose: POCT Blood Glucose.: 228 mg/dL (07-15-25 @ 11:05)    BP: 124/85 (07-15-25 @ 08:34) (107/75 - 145/96)Vital Signs Last 24 Hrs  T(C): 36.7 (07-15-25 @ 08:34), Max: 37 (07-14-25 @ 15:55)  T(F): 98.1 (07-15-25 @ 08:34), Max: 98.6 (07-14-25 @ 15:55)  HR: 81 (07-15-25 @ 08:34) (81 - 106)  BP: 124/85 (07-15-25 @ 08:34) (107/75 - 124/85)  BP(mean): --  RR: --  SpO2: --      Lipid Panel: Date/Time: 07-05-25 @ 08:48  Cholesterol, Serum: 171  LDL Cholesterol Calculated: 73  HDL Cholesterol, Serum: 42  Total Cholesterol/HDL Ration Measurement: --  Triglycerides, Serum: 356

## 2025-07-15 NOTE — BH DISCHARGE NOTE NURSING/SOCIAL WORK/PSYCH REHAB - PATIENT PORTAL LINK FT
You can access the FollowMyHealth Patient Portal offered by [facility] by registering at the following website: [email or web address]. By joining [employer]’s FollowMyHealth portal, you will also be able to view your health information using other applications (apps) compatible with our system.

## 2025-07-15 NOTE — BH INPATIENT PSYCHIATRY PROGRESS NOTE - NSBHCONSDANGERSELF_PSY_A_CORE
Left   Waiting for the infection to clear  
unable to care for self

## 2025-07-15 NOTE — BH DISCHARGE NOTE NURSING/SOCIAL WORK/PSYCH REHAB - NSCDUDCCRISIS_PSY_A_CORE
[facility] Well  1 [phone] [facility] ([phone][facility]  Text "WELL" to [phone]  Website: [email or web address] Suicide Prevention Lifeline 0 [phone] [phone] [phone]/988 Suicide and Crisis Lifeline

## 2025-07-15 NOTE — BH INPATIENT PSYCHIATRY PROGRESS NOTE - NSBHCHARTREVIEWVS_PSY_A_CORE FT
Vital Signs Last 24 Hrs  T(C): 36.7 (07-15-25 @ 08:34), Max: 37 (07-14-25 @ 15:55)  T(F): 98.1 (07-15-25 @ 08:34), Max: 98.6 (07-14-25 @ 15:55)  HR: 81 (07-15-25 @ 08:34) (81 - 106)  BP: 124/85 (07-15-25 @ 08:34) (107/75 - 124/85)  BP(mean): --  RR: --  SpO2: --

## 2025-07-15 NOTE — BH DISCHARGE NOTE NURSING/SOCIAL WORK/PSYCH REHAB - FINANCIAL ASSISTANCE
[facility] provides services at a reduced cost to those who are determined to be eligible through [facility]’s financial assistance program. Information regarding [facility]’s financial assistance program can be found by going to [email or web address] or by calling [phone].

## 2025-07-15 NOTE — BH INPATIENT PSYCHIATRY PROGRESS NOTE - NSBHASSESSSUMMFT_PSY_ALL_CORE
55F, domiciled with 2 adult daughters, on disability and pension (previously worked as a teacher), PPH of bipolar disorder, past IPP hospitalizations remote hx of SA via lithium OD, no known hx of violence, currently in outpatient psych care at [facility], recent hospitalization in [place] for erratic behavior a/f acute casi.     On assessment, pt speaks rapidly and tangentially. Her affect is labile and appears mostly to be elated, but at times when speaking about her daughters she becomes irritable. She is cooperative but difficult to interrupt. She describes sleeping only 3 hours last night but does not c/o fatigue and is seen to have a lot of energy around the unit (walking around and playing bowling). On chart review collateral reports manic like behaviors over the past 2 weeks.    Pt presentation is most c/w acute casi in the setting of chronic bipolar d/o. Pt is a danger to self. She lacks insight into this safety risk. She requires IPP hospitalization to stabilize mood and decrease her risk of harm to self.    7/5/2025  patient is elevated, somewhat cheerful and confused in her communications.  However she wishes to get "back on medication and have it adjusted".    7/7/25- Obtained collateral from provider (please see chart note). Pt continues to have expansive mood, high energy level, and rapid speech. She denies any psychotic symptoms. Pt requests a switch to Abilify and after speaking to OP who confirmed pt was on Abilify at some point treatment team decided to start patient on Abilify. Carbamazepine was d/sam since patient has not been taking it.    7/8- Tolerates abilify well. Wishes to switch from prolixin PATHAK 50mg u6odryu (due today) to abilify PATHAK. Will fill non-formulary for abilify maintenna and d/c prolixin PATHAK. Slept 5 hours per staff. Will continue to titrate abilify as needed until PATHAK. Patient reports she has taken abilify 10mg in the past for a long period of time with good effect and only stopped it due to financial constraints.   7/9 continues to be manic and intrusive. Agreeing only to go up to 3 mg abilify tomorrow.   7/10 continues to have elated affect with overinclusive TC and tangential TP. ordered abilify maintena 400mg qmonthly.  7/11 patient did not sleep last night and has an elated mood and affect. She received Abilify Maintena 400mg IM (PATHAK). Will increase dose of PO Abilify to 10mg.  7/14 patient slept well (8/8:30pm through 4am) and has improved affect. Her speech is less rapid and tangential. Her symptoms of casi appear to be improving. She is likely responding well to the Abilify tx.  7/15 patient did not sleep well (9pm-12:30 then 1-6am) as a result of allergies and a cough. Her speech is still tangential and pressured, but has greatly improved. Pts casi Sx are improving, likely as a result of Abilify Tx. Patients cough likely interrupted her sleep, so robitussin will be added.    Plan:    #Bipolar d/o  -continue benztropine 1mg bid  -continue Abilify to 10mg qd  - abilify maintenna 400mg qmonthly- administered on 7/11    #allergic rhinitis  -zyrtec changed to standing qd  -ordered Flonase PRN    #cough  -ordered robitussin    #Diabetes  -lantus 20 mg at night per hospitalist   -metformin 1000 mg bid per hospitalist     #LE Edema  -Lasix 20mg qd per hospitalist     #Anemia  -monitor CBC= CBC on 07/05 with Hgb= 10.5  -folate, b12 levels wnl  -iron panel wnl   55F, domiciled with 2 adult daughters, on disability and pension (previously worked as a teacher), PPH of bipolar disorder, past IPP hospitalizations remote hx of SA via lithium OD, no known hx of violence, currently in outpatient [email or web address] recent hospitalization in [place] for erratic behavior a/f acute casi.     On assessment, pt speaks rapidly and tangentially. Her affect is labile and appears mostly to be elated, but at times when speaking about her daughters she becomes irritable. She is cooperative but difficult to interrupt. She describes sleeping only 3 hours last night but does not c/o fatigue and is seen to have a lot of energy around the unit (walking around and playing bowling). On chart review collateral reports manic like behaviors over the past 2 weeks.    Pt presentation is most c/w acute casi in the setting of chronic bipolar d/o. Pt is a danger to self. She lacks insight into this safety risk. She requires IPP hospitalization to stabilize mood and decrease her risk of harm to self.    7/5/2025  patient is elevated, somewhat cheerful and confused in her communications.  However she wishes to get "back on medication and have it adjusted".    7/7/25- Obtained collateral from provider (please see chart note). Pt continues to have expansive mood, high energy level, and rapid speech. She denies any psychotic symptoms. Pt requests a switch to Abilify and after speaking to OP who confirmed pt was on Abilify at some point treatment team decided to start patient on Abilify. Carbamazepine was d/sam since patient has not been taking it.    7/8- Tolerates abilify well. Wishes to switch from prolixin PATHAK 50mg [id number] (due today) to abilify PATHAK. Will fill non-formulary for abilify maintenna and d/c prolixin PATHAK. Slept 5 hours per staff. Will continue to titrate abilify as needed until PATHAK. Patient reports she has taken abilify 10mg in the past for a long period of time with good effect and only stopped it due to financial constraints.   7/9 continues to be manic and intrusive. Agreeing only to go up to 3 mg abilify tomorrow.   7/10 continues to have elated affect with overinclusive TC and tangential TP. ordered abilify maintena 400mg qmonthly.  7/11 patient did not sleep last night and has an elated mood and affect. She received Abilify Maintena 400mg IM (PATHAK). Will increase dose of PO Abilify to 10mg.  7/14 patient slept well (8/8:30pm through 4am) and has improved affect. Her speech is less rapid and tangential. Her symptoms of casi appear to be improving. She is likely responding well to the Abilify tx.  7/15 patient did not sleep well (9pm-12:30 then 1-6am) as a result of allergies and a cough. Her TP is still overinclusive and tangential but has greatly improved. Pts casi Sx are improving, likely as a result of Abilify Tx. Patients cough likely interrupted her sleep, so robitussin will be added.    Plan:    #Bipolar d/o  -continue benztropine 1mg bid  -continue Abilify to 10mg qd  - abilify maintenna 400mg qmonthly- administered on 7/11    #allergic rhinitis  -zyrtec changed to standing qd  -ordered Flonase PRN    #cough  -ordered robitussin PRN for cough    #Diabetes  -lantus 20 mg at night per hospitalist   -metformin 1000 mg bid per hospitalist   -consulted hospitalist for recommendations regarding elevated bgl on current regimen    #LE Edema  -Lasix 20mg qd per hospitalist     #Anemia  -monitor CBC= CBC on 07/05 with Hgb= 10.5  -folate, b12 levels wnl  -iron panel wnl

## 2025-07-16 LAB
GLUCOSE BLDC GLUCOMTR-MCNC: 185 MG/DL — HIGH (ref 70–99)
GLUCOSE BLDC GLUCOMTR-MCNC: 199 MG/DL — HIGH (ref 70–99)
GLUCOSE BLDC GLUCOMTR-MCNC: 205 MG/DL — HIGH (ref 70–99)
GLUCOSE BLDC GLUCOMTR-MCNC: 221 MG/DL — HIGH (ref 70–99)

## 2025-07-16 PROCEDURE — 99233 SBSQ HOSP IP/OBS HIGH 50: CPT

## 2025-07-16 PROCEDURE — 99232 SBSQ HOSP IP/OBS MODERATE 35: CPT

## 2025-07-16 RX ORDER — MELATONIN 5 MG
3 TABLET ORAL AT BEDTIME
Refills: 0 | Status: DISCONTINUED | OUTPATIENT
Start: 2025-07-16 | End: 2025-07-18

## 2025-07-16 RX ORDER — DEXTROMETHORPHAN HBR, GUAIFENESIN 20; 200 MG/10ML; MG/10ML
10 SOLUTION ORAL EVERY 4 HOURS
Refills: 0 | Status: DISCONTINUED | OUTPATIENT
Start: 2025-07-16 | End: 2025-07-18

## 2025-07-16 RX ORDER — BENZONATATE 100 MG
100 CAPSULE ORAL EVERY 8 HOURS
Refills: 0 | Status: DISCONTINUED | OUTPATIENT
Start: 2025-07-16 | End: 2025-07-18

## 2025-07-16 RX ADMIN — ANASTROZOLE 1 MILLIGRAM(S): 1 TABLET ORAL at 12:06

## 2025-07-16 RX ADMIN — METFORMIN HYDROCHLORIDE 1000 MILLIGRAM(S): 850 TABLET ORAL at 20:15

## 2025-07-16 RX ADMIN — DEXTROMETHORPHAN HBR, GUAIFENESIN 10 MILLILITER(S): 20; 200 SOLUTION ORAL at 18:44

## 2025-07-16 RX ADMIN — Medication 100 MILLIGRAM(S): at 15:26

## 2025-07-16 RX ADMIN — Medication 10 MILLIGRAM(S): at 08:21

## 2025-07-16 RX ADMIN — Medication 1 MILLIGRAM(S): at 20:15

## 2025-07-16 RX ADMIN — DEXTROMETHORPHAN HBR, GUAIFENESIN 10 MILLILITER(S): 20; 200 SOLUTION ORAL at 08:20

## 2025-07-16 RX ADMIN — Medication 1 SPRAY(S): at 08:22

## 2025-07-16 RX ADMIN — Medication 1 PUFF(S): at 21:55

## 2025-07-16 RX ADMIN — Medication 3 MILLIGRAM(S): at 20:16

## 2025-07-16 RX ADMIN — FUROSEMIDE 20 MILLIGRAM(S): 10 INJECTION INTRAMUSCULAR; INTRAVENOUS at 08:20

## 2025-07-16 RX ADMIN — Medication 1 MILLIGRAM(S): at 08:20

## 2025-07-16 RX ADMIN — Medication 100 MILLIGRAM(S): at 23:50

## 2025-07-16 RX ADMIN — ARIPIPRAZOLE 10 MILLIGRAM(S): 2 TABLET ORAL at 08:19

## 2025-07-16 RX ADMIN — Medication 1 TABLET(S): at 08:20

## 2025-07-16 RX ADMIN — FLUTICASONE PROPIONATE 1 SPRAY(S): 50 SPRAY, METERED NASAL at 20:15

## 2025-07-16 RX ADMIN — INSULIN GLARGINE-YFGN 20 UNIT(S): 100 INJECTION, SOLUTION SUBCUTANEOUS at 20:14

## 2025-07-16 RX ADMIN — METFORMIN HYDROCHLORIDE 1000 MILLIGRAM(S): 850 TABLET ORAL at 08:19

## 2025-07-16 RX ADMIN — Medication 1 LOZENGE: at 05:37

## 2025-07-16 NOTE — BH INPATIENT PSYCHIATRY PROGRESS NOTE - NSBHATTESTCOMMENTATTENDFT_PSY_A_CORE
Patient was seen and examined by me. I reviewed and agree with the findings and plan as documented in the Resident's note, unless noted below.  Pt continues to be hyperthymic though sleeping better and more redirectable. Addressing hyperglycemia and cough with hospitalist assistance- thank you.

## 2025-07-16 NOTE — BH INPATIENT PSYCHIATRY PROGRESS NOTE - CURRENT MEDICATION
MEDICATIONS  (STANDING):  anastrozole 1 milliGRAM(s) Oral daily  ARIPiprazole 10 milliGRAM(s) Oral <User Schedule>  benztropine 1 milliGRAM(s) Oral two times a day  cetirizine 10 milliGRAM(s) Oral daily  dextrose 5%. 1000 milliLiter(s) (100 mL/Hr) IV Continuous <Continuous>  dextrose 5%. 1000 milliLiter(s) (50 mL/Hr) IV Continuous <Continuous>  dextrose 50% Injectable 25 Gram(s) IV Push once  dextrose 50% Injectable 12.5 Gram(s) IV Push once  dextrose 50% Injectable 25 Gram(s) IV Push once  furosemide    Tablet 20 milliGRAM(s) Oral daily  glucagon  Injectable 1 milliGRAM(s) IntraMuscular once  insulin glargine Injectable (LANTUS) 20 Unit(s) SubCutaneous at bedtime  metFORMIN 1000 milliGRAM(s) Oral two times a day  multivitamin 1 Tablet(s) Oral daily
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
## 2025-07-16 NOTE — BH INPATIENT PSYCHIATRY PROGRESS NOTE - NSBHCHARTREVIEWVS_PSY_A_CORE FT
Vital Signs Last 24 Hrs  T(C): 36.5 (07-16-25 @ 08:34), Max: 36.8 (07-15-25 @ 16:16)  T(F): 97.7 (07-16-25 @ 08:34), Max: 98.3 (07-15-25 @ 16:16)  HR: 89 (07-16-25 @ 08:34) (89 - 93)  BP: 119/83 (07-16-25 @ 08:34) (115/76 - 119/83)  BP(mean): --  RR: --  SpO2: --

## 2025-07-16 NOTE — BH INPATIENT PSYCHIATRY PROGRESS NOTE - PRN MEDS
MEDICATIONS  (PRN):  acetaminophen     Tablet .. 650 milliGRAM(s) Oral every 6 hours PRN Temp greater or equal to 38C (100.4F), Mild Pain (1 - 3)  albuterol    90 MICROgram(s) HFA Inhaler 1 Puff(s) Inhalation four times a day PRN SOB  benzocaine/menthol Lozenge 1 Lozenge Oral three times a day PRN sore throat  benzonatate 100 milliGRAM(s) Oral every 8 hours PRN cough  dextrose Oral Gel 15 Gram(s) Oral once PRN Blood Glucose LESS THAN 70 milliGRAM(s)/deciliter  fluticasone propionate 50 MICROgram(s)/spray Nasal Spray 1 Spray(s) Both Nostrils two times a day PRN allergic rhinitis  guaifenesin/dextromethorphan Oral Liquid 10 milliLiter(s) Oral every 4 hours PRN cough  loperamide 2 milliGRAM(s) Oral two times a day PRN diarrhea  melatonin. 3 milliGRAM(s) Oral at bedtime PRN Insomnia  nicotine  Polacrilex Gum 2 milliGRAM(s) Oral every 4 hours PRN Smoking Cessation  oxymetazoline 0.05% Nasal Spray 1 Spray(s) Both Nostrils every 12 hours PRN Nasal Congestion

## 2025-07-16 NOTE — BH INPATIENT PSYCHIATRY PROGRESS NOTE - NSBHASSESSSUMMFT_PSY_ALL_CORE
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     On assessment, pt speaks rapidly and tangentially. Her affect is labile and appears mostly to be elated, but at times when speaking about her daughters she becomes irritable. She is cooperative but difficult to interrupt. She describes sleeping only 3 hours last night but does not c/o fatigue and is seen to have a lot of energy around the unit (walking around and playing bowling). On chart review collateral reports manic like behaviors over the past 2 weeks.    Pt presentation is most c/w acute casi in the setting of chronic bipolar d/o. Pt is a danger to self. She lacks insight into this safety risk. She requires IPP hospitalization to stabilize mood and decrease her risk of harm to self.    7/5/2025  patient is elevated, somewhat cheerful and confused in her communications.  However she wishes to get "back on medication and have it adjusted".    7/7/25- Obtained collateral from provider (please see chart note). Pt continues to have expansive mood, high energy level, and rapid speech. She denies any psychotic symptoms. Pt requests a switch to Abilify and after speaking to OP who confirmed pt was on Abilify at some point treatment team decided to start patient on Abilify. Carbamazepine was d/sam since patient has not been taking it.    7/8- Tolerates abilify well. Wishes to switch from prolixin PATHAK 50mg u2hgofm (due today) to abilify PATHAK. Will fill non-formulary for abilify maintenna and d/c prolixin PATHAK. Slept 5 hours per staff. Will continue to titrate abilify as needed until PATHAK. Patient reports she has taken abilify 10mg in the past for a long period of time with good effect and only stopped it due to financial constraints.   7/9 continues to be manic and intrusive. Agreeing only to go up to 3 mg abilify tomorrow.   7/10 continues to have elated affect with overinclusive TC and tangential TP. ordered abilify maintena 400mg qmonthly.  7/11 patient did not sleep last night and has an elated mood and affect. She received Abilify Maintena 400mg IM (PATHAK). Will increase dose of PO Abilify to 10mg.  7/14 patient slept well (8/8:30pm through 4am) and has improved affect. Her speech is less rapid and tangential. Her symptoms of casi appear to be improving. She is likely responding well to the Abilify tx.  7/15 patient did not sleep well (9pm-12:30 then 1-6am) as a result of allergies and a cough. Her TP is still overinclusive and tangential but has greatly improved. Pts casi Sx are improving, likely as a result of Abilify Tx. Patients cough likely interrupted her sleep, so robitussin will be added.  7/16: Patient slept well (11pm-5am) but was awoken due to cough. Patients TP continues to be overinclusive, but tangentiality has substantially decreased. Patients casi Sx have significantly improved, likely as a result of Abilify Tx. Hospitalist was consulted for elevated bgl & persistent cough. Pts Lantus to be increased to at-home dosage. Pts Robitussin PRN dosing frequency was increased - possibly add Singulair for COPD Sx per hospitalist.    Plan:    #Bipolar d/o  -continue benztropine 1mg bid  -continue Abilify to 10mg qd  - abilify maintenna 400mg qmonthly- administered on 7/11    #allergic rhinitis  -zyrtec changed to standing qd  -ordered Flonase PRN    #cough  -ordered robitussin PRN for cough  -Hospitalist consulted, possibly add Singulair    #Diabetes  -lantus 20 mg at night per hospitalist (Check hospitalist notes, altered dosage on 7/16)  -metformin 1000 mg bid per hospitalist       #LE Edema  -Lasix 20mg qd per hospitalist     #Anemia  -monitor CBC= CBC on 07/05 with Hgb= 10.5  -folate, b12 levels wnl  -iron panel wnl

## 2025-07-16 NOTE — PROGRESS NOTE ADULT - ASSESSMENT
#bipolar with casi - treatment per psych   ekg reviewed - qtc wnl - monitor for any change in meds    #DMII - takes metformin 1gq12 and toujeo 36 units qhs  CAPILLARY BLOOD GLUCOSE      POCT Blood Glucose.: 185 mg/dL (16 Jul 2025 11:10)  POCT Blood Glucose.: 221 mg/dL (16 Jul 2025 06:59)  POCT Blood Glucose.: 253 mg/dL (15 Jul 2025 19:54)  POCT Blood Glucose.: 225 mg/dL (15 Jul 2025 15:56)      on lantus 20 units here - can resume home dose of meds and monitor FS    #persistent dry cought , mild wheeze - hx of asthma - cont albuterol prn , pt states no longer on advair - restart singular 10mg     discussed with psych team bedside

## 2025-07-16 NOTE — BH INPATIENT PSYCHIATRY PROGRESS NOTE - NSBHFUPINTERVALHXFT_PSY_A_CORE
Patient seen and interviewed by team in the morning. Patients chart was checked and PRNs taken included: Afrin (5pm on 7/15 & 8am 7/16), Albuterol (4pm on 7/15), Robitussin (9pm on 7/15 & 8am 7/16), Flonase (9pm on 5/15), and Benzocaine lozenges (9pm on 7/15 & 5am on 7/16).     Upon interview the patient appears to have a mildly elevated, relatively euthymic mood. Patient states she "slept well" (11pm-5am), but did awaken for cough medication. Patients cough/asthma has continued - states she feels as though she is "throwing up phlegm". She asks if she can get additional help/medication to alleviate cough Sx as PRNs have not helped. When questioned about what she will do upon DC, pt states she "I'm going to [place]" with regard to visiting family. Pt goes on to share that she is worried about one of her daughters being home alone, and her other daughter continues to not want to see her again. Patient becomes over-inclusive about family information, but remains relatively goal-oriented. Patients tangentiality has substantially subsided. Upon questioning about psychiatrist appt following DC Friday 7/18, patient appears to be well organized with a plan for her mother to taker her to the appt. Patient acknowledges her new psychiatrist's name and location. Patient denies SI/HI and AH/VH. Patient endorses not feeling "manic" or as though "Everything is racing". Patient further mentions 2 coping skills/plans if she feels manic in future.  Patient appears to be somatically focused during PE by hospitalist. However, patient is cooperative and informative with regard to past medication regimens.

## 2025-07-16 NOTE — BH INPATIENT PSYCHIATRY PROGRESS NOTE - NSBHMETABOLIC_PSY_ALL_CORE_FT
BMI: BMI (kg/m2): 31.3 (07-04-25 @ 05:30)  HbA1c: A1C with Estimated Average Glucose Result: 6.6 % (07-05-25 @ 08:48)    Glucose: POCT Blood Glucose.: 185 mg/dL (07-16-25 @ 11:10)    BP: 119/83 (07-16-25 @ 08:34) (107/75 - 145/96)Vital Signs Last 24 Hrs  T(C): 36.5 (07-16-25 @ 08:34), Max: 36.8 (07-15-25 @ 16:16)  T(F): 97.7 (07-16-25 @ 08:34), Max: 98.3 (07-15-25 @ 16:16)  HR: 89 (07-16-25 @ 08:34) (89 - 93)  BP: 119/83 (07-16-25 @ 08:34) (115/76 - 119/83)  BP(mean): --  RR: --  SpO2: --      Lipid Panel: Date/Time: 07-05-25 @ 08:48  Cholesterol, Serum: 171  LDL Cholesterol Calculated: 73  HDL Cholesterol, Serum: 42  Total Cholesterol/HDL Ration Measurement: --  Triglycerides, Serum: 356

## 2025-07-17 LAB
GLUCOSE BLDC GLUCOMTR-MCNC: 187 MG/DL — HIGH (ref 70–99)
GLUCOSE BLDC GLUCOMTR-MCNC: 211 MG/DL — HIGH (ref 70–99)
GLUCOSE BLDC GLUCOMTR-MCNC: 223 MG/DL — HIGH (ref 70–99)
GLUCOSE BLDC GLUCOMTR-MCNC: 279 MG/DL — HIGH (ref 70–99)

## 2025-07-17 PROCEDURE — 99232 SBSQ HOSP IP/OBS MODERATE 35: CPT

## 2025-07-17 RX ORDER — MONTELUKAST SODIUM 10 MG/1
10 TABLET ORAL AT BEDTIME
Refills: 0 | Status: DISCONTINUED | OUTPATIENT
Start: 2025-07-17 | End: 2025-07-18

## 2025-07-17 RX ORDER — BENZTROPINE MESYLATE 2 MG
1 TABLET ORAL
Qty: 0 | Refills: 0 | DISCHARGE
Start: 2025-07-17

## 2025-07-17 RX ORDER — NICOTINE POLACRILEX 4 MG/1
0 GUM, CHEWING ORAL
Qty: 0 | Refills: 0 | DISCHARGE
Start: 2025-07-17

## 2025-07-17 RX ORDER — MONTELUKAST SODIUM 10 MG/1
1 TABLET ORAL
Qty: 0 | Refills: 0 | DISCHARGE
Start: 2025-07-17

## 2025-07-17 RX ORDER — BENZONATATE 100 MG
1 CAPSULE ORAL
Qty: 0 | Refills: 0 | DISCHARGE
Start: 2025-07-17

## 2025-07-17 RX ORDER — ARIPIPRAZOLE 2 MG/1
1 TABLET ORAL
Qty: 0 | Refills: 0 | DISCHARGE
Start: 2025-07-17

## 2025-07-17 RX ORDER — INSULIN GLARGINE-YFGN 100 [IU]/ML
36 INJECTION, SOLUTION SUBCUTANEOUS AT BEDTIME
Refills: 0 | Status: DISCONTINUED | OUTPATIENT
Start: 2025-07-17 | End: 2025-07-18

## 2025-07-17 RX ORDER — INSULIN GLARGINE-YFGN 100 [IU]/ML
36 INJECTION, SOLUTION SUBCUTANEOUS
Qty: 0 | Refills: 0 | DISCHARGE
Start: 2025-07-17

## 2025-07-17 RX ORDER — MELATONIN 5 MG
1 TABLET ORAL
Qty: 0 | Refills: 0 | DISCHARGE
Start: 2025-07-17

## 2025-07-17 RX ORDER — FUROSEMIDE 10 MG/ML
1 INJECTION INTRAMUSCULAR; INTRAVENOUS
Qty: 0 | Refills: 0 | DISCHARGE
Start: 2025-07-17

## 2025-07-17 RX ADMIN — Medication 650 MILLIGRAM(S): at 09:52

## 2025-07-17 RX ADMIN — MONTELUKAST SODIUM 10 MILLIGRAM(S): 10 TABLET ORAL at 20:33

## 2025-07-17 RX ADMIN — Medication 1 LOZENGE: at 18:27

## 2025-07-17 RX ADMIN — METFORMIN HYDROCHLORIDE 1000 MILLIGRAM(S): 850 TABLET ORAL at 20:33

## 2025-07-17 RX ADMIN — DEXTROMETHORPHAN HBR, GUAIFENESIN 10 MILLILITER(S): 20; 200 SOLUTION ORAL at 04:59

## 2025-07-17 RX ADMIN — ANASTROZOLE 1 MILLIGRAM(S): 1 TABLET ORAL at 11:35

## 2025-07-17 RX ADMIN — Medication 650 MILLIGRAM(S): at 10:52

## 2025-07-17 RX ADMIN — DEXTROMETHORPHAN HBR, GUAIFENESIN 10 MILLILITER(S): 20; 200 SOLUTION ORAL at 20:47

## 2025-07-17 RX ADMIN — Medication 1 MILLIGRAM(S): at 08:52

## 2025-07-17 RX ADMIN — FLUTICASONE PROPIONATE 1 SPRAY(S): 50 SPRAY, METERED NASAL at 13:09

## 2025-07-17 RX ADMIN — Medication 3 MILLIGRAM(S): at 20:33

## 2025-07-17 RX ADMIN — Medication 1 PUFF(S): at 06:50

## 2025-07-17 RX ADMIN — Medication 650 MILLIGRAM(S): at 03:09

## 2025-07-17 RX ADMIN — ARIPIPRAZOLE 10 MILLIGRAM(S): 2 TABLET ORAL at 08:51

## 2025-07-17 RX ADMIN — Medication 1 TABLET(S): at 08:56

## 2025-07-17 RX ADMIN — FUROSEMIDE 20 MILLIGRAM(S): 10 INJECTION INTRAMUSCULAR; INTRAVENOUS at 08:53

## 2025-07-17 RX ADMIN — INSULIN GLARGINE-YFGN 36 UNIT(S): 100 INJECTION, SOLUTION SUBCUTANEOUS at 20:00

## 2025-07-17 RX ADMIN — Medication 650 MILLIGRAM(S): at 03:46

## 2025-07-17 RX ADMIN — DEXTROMETHORPHAN HBR, GUAIFENESIN 10 MILLILITER(S): 20; 200 SOLUTION ORAL at 12:23

## 2025-07-17 RX ADMIN — Medication 10 MILLIGRAM(S): at 08:51

## 2025-07-17 RX ADMIN — METFORMIN HYDROCHLORIDE 1000 MILLIGRAM(S): 850 TABLET ORAL at 08:52

## 2025-07-17 RX ADMIN — Medication 100 MILLIGRAM(S): at 08:51

## 2025-07-17 RX ADMIN — Medication 100 MILLIGRAM(S): at 16:40

## 2025-07-17 RX ADMIN — Medication 1 MILLIGRAM(S): at 20:33

## 2025-07-17 NOTE — BH INPATIENT PSYCHIATRY PROGRESS NOTE - NSBHFUPINTERVALHXFT_PSY_A_CORE
Patient was seen and interviewed by the team in the morning. Patients chart was reviewed and she received PRNs for cough/sore throat. PRNs included melatonin 3mg, albuterol inhaler, robitussin 10mL twice, flonase spray, tessalon pearle 100mg three times, and tylenol 650mg. Patient has no acute overnight events according to nursing staff.     On interview patient states "Thank god, im happy" when mentioning she will be DC tomorrow 7/18/25. The patient admits to having made many friends while on IPP - she made plans to see certain pts on the outside. The patient again mentions plans after DC, including seeing family in [place]. When questioned about sleep the patient states "I was up coughing, so..." Patient was seen and interviewed by the team in the morning. Patients chart was reviewed and she received PRNs for cough/sore throat. PRNs included melatonin 3mg, albuterol inhaler, robitussin 10mL twice, flonase spray, tessalon pearle 100mg three times, and tylenol 650mg. Patient has no acute overnight events according to nursing staff.     On interview patient states "Thank god, im happy" when mentioning she will be DC tomorrow 7/18/25. The patient admits to having made many friends while on IPP - she made plans to see certain pts on the outside. The patient again mentions plans after DC, including seeing family in [place]. When questioned about sleep the patient states "I was up coughing, so...". The patient admits to feeling "good", but has multiple somatic concerns. Patient understands her physician follow-ups that are required - Physiatrist Friday 7/18 and PCP TBD. Patient appears to be well organized with regard to her belongings. Patient is significantly over-inclusive and mildly inappropriate with her TC, however only mildly tangential.

## 2025-07-17 NOTE — BH INPATIENT PSYCHIATRY DISCHARGE NOTE - NSBHDCHANDOFF_PSY_ALL_CORE
Orthopedic: Patient requesting explanation on treatment plan for her clavicle since she feels \"the bones don't meet\". Discussed non weight bearing on the right. Discussed conservative treatment. She is inquiring about a figure 8 brace and possible surgical intervention. Discussed she will be followed by Dr Grover as an outpatient. Appointment made for the patient and the Surgeons card was given to the patient   Yes...

## 2025-07-17 NOTE — BH INPATIENT PSYCHIATRY PROGRESS NOTE - NSBHASSESSSUMMFT_PSY_ALL_CORE
55F, domiciled with 2 adult daughters, on disability and pension (previously worked as a teacher), PPH of bipolar disorder, past IPP hospitalizations remote hx of SA via lithium OD, no known hx of violence, currently in outpatient psych care at [facility], recent hospitalization in [place] for erratic behavior a/f acute casi.     On assessment, pt speaks rapidly and tangentially. Her affect is labile and appears mostly to be elated, but at times when speaking about her daughters she becomes irritable. She is cooperative but difficult to interrupt. She describes sleeping only 3 hours last night but does not c/o fatigue and is seen to have a lot of energy around the unit (walking around and playing bowling). On chart review collateral reports manic like behaviors over the past 2 weeks.    Pt presentation is most c/w acute casi in the setting of chronic bipolar d/o. Pt is a danger to self. She lacks insight into this safety risk. She requires IPP hospitalization to stabilize mood and decrease her risk of harm to self.    7/5/2025  patient is elevated, somewhat cheerful and confused in her communications.  However she wishes to get "back on medication and have it adjusted".    7/7/25- Obtained collateral from provider (please see chart note). Pt continues to have expansive mood, high energy level, and rapid speech. She denies any psychotic symptoms. Pt requests a switch to Abilify and after speaking to OP who confirmed pt was on Abilify at some point treatment team decided to start patient on Abilify. Carbamazepine was d/sam since patient has not been taking it.    7/8- Tolerates abilify well. Wishes to switch from prolixin PATHAK 50mg [id number] (due today) to abilify PATHAK. Will fill non-formulary for abilify maintenna and d/c prolixin PATHAK. Slept 5 hours per staff. Will continue to titrate abilify as needed until PATHAK. Patient reports she has taken abilify 10mg in the past for a long period of time with good effect and only stopped it due to financial constraints.   7/9 continues to be manic and intrusive. Agreeing only to go up to 3 mg abilify tomorrow.   7/10 continues to have elated affect with overinclusive TC and tangential TP. ordered abilify maintena 400mg qmonthly.  7/11 patient did not sleep last night and has an elated mood and affect. She received Abilify Maintena 400mg IM (PATHAK). Will increase dose of PO Abilify to 10mg.  7/14 patient slept well (8/8:30pm through 4am) and has improved affect. Her speech is less rapid and tangential. Her symptoms of casi appear to be improving. She is likely responding well to the Abilify tx.  7/15 patient did not sleep well (9pm-12:30 then 1-6am) as a result of allergies and a cough. Her TP is still overinclusive and tangential but has greatly improved. Pts casi Sx are improving, likely as a result of Abilify Tx. Patients cough likely interrupted her sleep, so robitussin will be added.  7/16: Patient slept well (11pm-5am) but was awoken due to cough. Patients TP continues to be overinclusive, but tangentiality has substantially decreased. Patients casi Sx have significantly improved, likely as a result of Abilify Tx. Hospitalist was consulted for elevated bgl & persistent cough. Pts Lantus to be increased to at-home dosage. Pts Robitussin PRN dosing frequency was increased - possibly add Singulair for COPD Sx per hospitalist.  7/17: Patient sleep was impaired by chronic cough overnight. The patient's TP continues to be over-inclusive and mildly inappropriate, but only shows moderate tangentiality. Patients casi Sx are minimally noticeable and her mood is relatively euthymic - likely a result of the Abilify Tx. Pts Lantus was increased to at-home dose for elevated bgl per hospitalist. Pt was started back on Singulair 10mg for COPD Sx per hospitalist. Planned DC tmr 7/18.    Plan:    #Bipolar d/o  -continue benztropine 1mg bid  -continue Abilify to 10mg qd  - abilify maintenna 400mg qmonthly- administered on 7/11    #allergic rhinitis  -zyrtec changed to standing qd  -ordered Flonase PRN    #cough  -ordered robitussin PRN for cough  -Singulair 10 mg qd per hospitalist 7/17    #Diabetes  -lantus 36 mg at night per hospitalist 7/17  -metformin 1000 mg bid per hospitalist       #LE Edema  -Lasix 20mg qd per hospitalist     #Anemia  -monitor CBC= CBC on 07/05 with Hgb= 10.5  -folate, b12 levels wnl  -iron panel wnl
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
## 2025-07-17 NOTE — BH INPATIENT PSYCHIATRY DISCHARGE NOTE - NSDCMRMEDTOKEN_GEN_ALL_CORE_FT
albuterol 90 mcg/inh inhalation aerosol: 2 puff(s) inhaled every 6 hours As needed SOB/home med  anastrozole 1 mg oral tablet: 1 tab(s) orally once a day  ARIPiprazole 10 mg oral tablet: 1 tab(s) orally once a day  benzonatate 100 mg oral capsule: 1 cap(s) orally every 8 hours As needed cough  benztropine 1 mg oral tablet: 1 tab(s) orally 2 times a day  carBAMazepine 100 mg oral capsule, extended release: 1 cap(s) orally once a day (at bedtime)  carBAMazepine 200 mg oral tablet: 1 tab(s) orally 2 times a day  furosemide 20 mg oral tablet: 1 tab(s) orally once a day  insulin glargine 100 units/mL subcutaneous solution: 36 unit(s) subcutaneous once a day (at bedtime)  loratadine 10 mg oral tablet: 1 tab(s) orally once a day  melatonin 3 mg oral tablet: 1 tab(s) orally once a day (at bedtime) As needed Insomnia  metFORMIN 1000 mg oral tablet: 1 tab(s) orally 2 times a day  montelukast 10 mg oral tablet: 1 tab(s) orally once a day (at bedtime)  nicotine: chew 1 piece every 2 hours as needed   albuterol 90 mcg/inh inhalation aerosol: 2 puff(s) inhaled every 6 hours As needed SOB/home med  anastrozole 1 mg oral tablet: 1 tab(s) orally once a day  ARIPiprazole 10 mg oral tablet: 1 tab(s) orally once a day  benzonatate 100 mg oral capsule: 1 cap(s) orally every 8 hours as needed for cough  benztropine 1 mg oral tablet: 1 tab(s) orally 2 times a day  carBAMazepine 100 mg oral capsule, extended release: 1 cap(s) orally once a day (at bedtime)  carBAMazepine 200 mg oral tablet: 1 tab(s) orally 2 times a day  furosemide 20 mg oral tablet: 1 tab(s) orally once a day  insulin glargine 100 units/mL subcutaneous solution: 36 unit(s) subcutaneous once a day (at bedtime)  loratadine 10 mg oral tablet: 1 tab(s) orally once a day  melatonin 3 mg oral tablet: 1 tab(s) orally once a day (at bedtime) as needed for Insomnia  metFORMIN 1000 mg oral tablet: 1 tab(s) orally 2 times a day  montelukast 10 mg oral tablet: 1 tab(s) orally once a day (at bedtime)  Nicorette 2 mg oral transmucosal gum: 1 gum by transmucosal administration 3 to 4 times a day   albuterol 90 mcg/inh inhalation aerosol: 2 puff(s) inhaled every 6 hours As needed SOB/home med  anastrozole 1 mg oral tablet: 1 tab(s) orally once a day  ARIPiprazole 10 mg oral tablet: 1 tab(s) orally once a day  benzonatate 100 mg oral capsule: 1 cap(s) orally every 8 hours as needed for cough  benztropine 1 mg oral tablet: 1 tab(s) orally 2 times a day  furosemide 20 mg oral tablet: 1 tab(s) orally once a day  insulin glargine 100 units/mL subcutaneous solution: 36 unit(s) subcutaneous once a day (at bedtime)  loratadine 10 mg oral tablet: 1 tab(s) orally once a day  melatonin 3 mg oral tablet: 1 tab(s) orally once a day (at bedtime) as needed for Insomnia  metFORMIN 1000 mg oral tablet: 1 tab(s) orally 2 times a day  montelukast 10 mg oral tablet: 1 tab(s) orally once a day (at bedtime)  Nicorette 2 mg oral transmucosal gum: 1 gum by transmucosal administration 3 to 4 times a day

## 2025-07-17 NOTE — BH INPATIENT PSYCHIATRY PROGRESS NOTE - NSBHMETABOLIC_PSY_ALL_CORE_FT
BMI: BMI (kg/m2): 31.3 (07-04-25 @ 05:30)  HbA1c: A1C with Estimated Average Glucose Result: 6.6 % (07-05-25 @ 08:48)    Glucose: POCT Blood Glucose.: 187 mg/dL (07-17-25 @ 11:17)    BP: 107/76 (07-17-25 @ 08:17) (107/75 - 124/85)Vital Signs Last 24 Hrs  T(C): 36.8 (07-17-25 @ 08:17), Max: 36.8 (07-17-25 @ 08:17)  T(F): 98.3 (07-17-25 @ 08:17), Max: 98.3 (07-17-25 @ 08:17)  HR: 87 (07-17-25 @ 08:17) (83 - 87)  BP: 107/76 (07-17-25 @ 08:17) (107/76 - 119/81)  BP(mean): --  RR: --  SpO2: --      Lipid Panel: Date/Time: 07-05-25 @ 08:48  Cholesterol, Serum: 171  LDL Cholesterol Calculated: 73  HDL Cholesterol, Serum: 42  Total Cholesterol/HDL Ration Measurement: --  Triglycerides, Serum: 356

## 2025-07-17 NOTE — BH INPATIENT PSYCHIATRY PROGRESS NOTE - NSBHCHARTREVIEWVS_PSY_A_CORE FT
Vital Signs Last 24 Hrs  T(C): 36.8 (07-17-25 @ 08:17), Max: 36.8 (07-17-25 @ 08:17)  T(F): 98.3 (07-17-25 @ 08:17), Max: 98.3 (07-17-25 @ 08:17)  HR: 87 (07-17-25 @ 08:17) (83 - 87)  BP: 107/76 (07-17-25 @ 08:17) (107/76 - 119/81)  BP(mean): --  RR: --  SpO2: --

## 2025-07-17 NOTE — BH INPATIENT PSYCHIATRY PROGRESS NOTE - CURRENT MEDICATION
MEDICATIONS  (STANDING):  anastrozole 1 milliGRAM(s) Oral daily  ARIPiprazole 10 milliGRAM(s) Oral <User Schedule>  benztropine 1 milliGRAM(s) Oral two times a day  cetirizine 10 milliGRAM(s) Oral daily  dextrose 5%. 1000 milliLiter(s) (100 mL/Hr) IV Continuous <Continuous>  dextrose 5%. 1000 milliLiter(s) (50 mL/Hr) IV Continuous <Continuous>  dextrose 50% Injectable 25 Gram(s) IV Push once  dextrose 50% Injectable 12.5 Gram(s) IV Push once  dextrose 50% Injectable 25 Gram(s) IV Push once  furosemide    Tablet 20 milliGRAM(s) Oral daily  glucagon  Injectable 1 milliGRAM(s) IntraMuscular once  insulin glargine Injectable (LANTUS) 36 Unit(s) SubCutaneous at bedtime  metFORMIN 1000 milliGRAM(s) Oral two times a day  montelukast 10 milliGRAM(s) Oral at bedtime  multivitamin 1 Tablet(s) Oral daily    MEDICATIONS  (PRN):  acetaminophen     Tablet .. 650 milliGRAM(s) Oral every 6 hours PRN Temp greater or equal to 38C (100.4F), Mild Pain (1 - 3)  albuterol    90 MICROgram(s) HFA Inhaler 1 Puff(s) Inhalation four times a day PRN SOB  benzocaine/menthol Lozenge 1 Lozenge Oral three times a day PRN sore throat  benzonatate 100 milliGRAM(s) Oral every 8 hours PRN cough  dextrose Oral Gel 15 Gram(s) Oral once PRN Blood Glucose LESS THAN 70 milliGRAM(s)/deciliter  fluticasone propionate 50 MICROgram(s)/spray Nasal Spray 1 Spray(s) Both Nostrils two times a day PRN allergic rhinitis  guaifenesin/dextromethorphan Oral Liquid 10 milliLiter(s) Oral every 4 hours PRN cough  loperamide 2 milliGRAM(s) Oral two times a day PRN diarrhea  melatonin. 3 milliGRAM(s) Oral at bedtime PRN Insomnia  nicotine  Polacrilex Gum 2 milliGRAM(s) Oral every 4 hours PRN Smoking Cessation  oxymetazoline 0.05% Nasal Spray 1 Spray(s) Both Nostrils every 12 hours PRN Nasal Congestion

## 2025-07-17 NOTE — BH INPATIENT PSYCHIATRY DISCHARGE NOTE - NSBHMETABOLIC_PSY_ALL_CORE_FT
BMI: BMI (kg/m2): 31.3 (07-04-25 @ 05:30)  HbA1c: A1C with Estimated Average Glucose Result: 6.6 % (07-05-25 @ 08:48)    Glucose: POCT Blood Glucose.: 211 mg/dL (07-17-25 @ 16:12)    BP: 137/93 (07-17-25 @ 16:16) (107/76 - 137/93)Vital Signs Last 24 Hrs  T(C): 36.8 (07-17-25 @ 16:16), Max: 36.8 (07-17-25 @ 08:17)  T(F): 98.2 (07-17-25 @ 16:16), Max: 98.3 (07-17-25 @ 08:17)  HR: 77 (07-17-25 @ 16:16) (77 - 87)  BP: 137/93 (07-17-25 @ 16:16) (107/76 - 137/93)  BP(mean): --  RR: --  SpO2: --      Lipid Panel: Date/Time: 07-05-25 @ 08:48  Cholesterol, Serum: 171  LDL Cholesterol Calculated: 73  HDL Cholesterol, Serum: 42  Total Cholesterol/HDL Ration Measurement: --  Triglycerides, Serum: 356

## 2025-07-17 NOTE — BH INPATIENT PSYCHIATRY DISCHARGE NOTE - HOSPITAL COURSE
When patient presented to the hospital she had overinclusive and tangential TP, sleeplessness, elated mood, and intrusive inappropriate behavior. Pt was dissatisfied with her home medication regimen and expressed that she would like to stop the carbamazepine and start Abilify. Proloxin PO and PATHAK was discontinued as well as carbamazepine and Abilify PO was started. Patient was first given Abilify 2mg PO qd, which was later increased to 5mg PO qd, and then to 10mg PO qd. Abilify Maintenna IM 400mg qmonthly was given on 07/11. On Abilify treatment patient's sxs of casi was improving. He TP became more linear and goal directed. Her affect became euthymic. Her sleep also improved on abilify. Pt was mildly tangential and remained with overinclusive speech on DC, however her sxs of casi were greatly improved and minimally noticeable. Pt demonstrated improved insight and was able to care for self. At time of DC she was no longer a danger to self and was appropriate for OP tx.    At times pt c/o insomnia and nursing staff reports lack of sleep this was treated by increasing the dose of Abilify and adding melatonin to the regimen. At 1 point pt c/o LE edema which was treated with Lasix. Patient c/o sxs of allergic rhinitis during hospital stay. She was given Zytrec, Flonase, albuterol, benzonatate, Robitussin, and Singulair. Pt was encouraged to c OP PCP upon DC. In addiction, pt had elevated POCT bgl during her hospital stay. Hospitalist was consulted and her insulin regimen was adjusted. Pt was told to continue her home insulin regimen on DC and FU with her PCP.    On the unit, pt attended most groups. She interacted well with other patients. She learnt coping skills in group and even expressed learning things from other patients. She was pleasant on the unit and did not have any altercations or arguments with other patients.

## 2025-07-17 NOTE — BH INPATIENT PSYCHIATRY PROGRESS NOTE - OTHER
Pt continues to have elevated mood, however it has improved and is less elevated than prior exams Mildly elevated

## 2025-07-17 NOTE — BH INPATIENT PSYCHIATRY DISCHARGE NOTE - NSDCCPCAREPLAN_GEN_ALL_CORE_FT
PRINCIPAL DISCHARGE DIAGNOSIS  Diagnosis: Bipolar disorder (manic depression)  Assessment and Plan of Treatment:

## 2025-07-17 NOTE — BH INPATIENT PSYCHIATRY DISCHARGE NOTE - NSBHFUPINTERVALHXFT_PSY_A_CORE
Patient approached team in the hallway. When asked the patient states she is "excited" to be DC this morning. Patient endorses feeling "good" with regard to her mood today. Patient is fourth coming with sleeping poorly due to her cough. Patient endorses awakening early for medication to help. The patient mentions her mother will pick her up today. Patient appears to be well organized - understands she has psychiatrist appt later today. Patient appears to have clear TP and has minimal tangentiality. Mood appears relatively euthymic.

## 2025-07-17 NOTE — BH INPATIENT PSYCHIATRY DISCHARGE NOTE - NSBHASSESSSUMMFT_PSY_ALL_CORE
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     On assessment, pt speaks rapidly and tangentially. Her affect is labile and appears mostly to be elated, but at times when speaking about her daughters she becomes irritable. She is cooperative but difficult to interrupt. She describes sleeping only 3 hours last night but does not c/o fatigue and is seen to have a lot of energy around the unit (walking around and playing bowling). On chart review collateral reports manic like behaviors over the past 2 weeks.    Pt presentation is most c/w acute casi in the setting of chronic bipolar d/o. Pt is a danger to self. She lacks insight into this safety risk. She requires IPP hospitalization to stabilize mood and decrease her risk of harm to self.    7/5/2025  patient is elevated, somewhat cheerful and confused in her communications.  However she wishes to get "back on medication and have it adjusted".    7/7/25- Obtained collateral from provider (please see chart note). Pt continues to have expansive mood, high energy level, and rapid speech. She denies any psychotic symptoms. Pt requests a switch to Abilify and after speaking to OP who confirmed pt was on Abilify at some point treatment team decided to start patient on Abilify. Carbamazepine was d/sam since patient has not been taking it.    7/8- Tolerates abilify well. Wishes to switch from prolixin PATHAK 50mg [id number] (due today) to abilify PATHAK. Will fill non-formulary for abilify maintenna and d/c prolixin PATHAK. Slept 5 hours per staff. Will continue to titrate abilify as needed until PATHAK. Patient reports she has taken abilify 10mg in the past for a long period of time with good effect and only stopped it due to financial constraints.   7/9 continues to be manic and intrusive. Agreeing only to go up to 3 mg abilify tomorrow.   7/10 continues to have elated affect with overinclusive TC and tangential TP. ordered abilify maintena 400mg qmonthly.  7/11 patient did not sleep last night and has an elated mood and affect. She received Abilify Maintena 400mg IM (PATHAK). Will increase dose of PO Abilify to 10mg.  7/14 patient slept well (8/8:30pm through 4am) and has improved affect. Her speech is less rapid and tangential. Her symptoms of casi appear to be improving. She is likely responding well to the Abilify tx.  7/15 patient did not sleep well (9pm-12:30 then 1-6am) as a result of allergies and a cough. Her TP is still overinclusive and tangential but has greatly improved. Pts casi Sx are improving, likely as a result of Abilify Tx. Patients cough likely interrupted her sleep, so robitussin will be added.  7/16: Patient slept well (11pm-5am) but was awoken due to cough. Patients TP continues to be overinclusive, but tangentiality has substantially decreased. Patients casi Sx have significantly improved, likely as a result of Abilify Tx. Hospitalist was consulted for elevated bgl & persistent cough. Pts Lantus to be increased to at-home dosage. Pts Robitussin PRN dosing frequency was increased - possibly add Singulair for COPD Sx per hospitalist.  7/17: Patient sleep was impaired by chronic cough overnight. The patient's TP continues to be over-inclusive and mildly inappropriate, but only shows moderate tangentiality. Patients casi Sx are minimally noticeable and her mood is relatively euthymic - likely a result of the Abilify Tx. Pts Lantus was increased to at-home dose for elevated bgl per hospitalist. Pt was started back on Singulair 10mg for COPD Sx per hospitalist. Planned DC tmr 7/18.  7/18: Patients igor Sx are minorly noticeable, pt is internally aware of what these Sx feel like. The patient endorses poor sleep as a result of a continuous cough - pt encouraged to see PCP ASAP following DC. Patients mood is relatively euthymic - much improved since initial evaluation. Patient to be DC by noon 7/18/25 with scheduled OP psych appointment at 3:30pm.     Plan:    #Bipolar d/o  -continue benztropine 1mg bid  -continue Abilify to 10mg qd  - abilify maintenna 400mg qmonthly- administered on 7/11    #allergic rhinitis  -zyrtec changed to standing qd  -Flonase PRN    #cough  -robitussin PRN for cough  -Singulair 10 mg qd per hospitalist 7/17    #Diabetes  -lantus 36 mg at night per hospitalist 7/17  -metformin 1000 mg bid per hospitalist       #LE Edema  -Lasix 20mg qd per hospitalist     #Anemia  -monitor CBC= CBC on 07/05 with Hgb= 10.5  -folate, b12 levels wnl  -iron panel wnl

## 2025-07-17 NOTE — BH INPATIENT PSYCHIATRY PROGRESS NOTE - NSBHATTESTCOMMENTATTENDFT_PSY_A_CORE
Pt seen with Dr. Paris. Writer present for critical portion. Agree with assessment and plan. Pt is improving in terms of her manic symptoms, tolerating meds well. Discharge tentatively planned for tomorrow as long as mood and psychotic symptoms continue to improve.

## 2025-07-18 VITALS
RESPIRATION RATE: 18 BRPM | HEART RATE: 80 BPM | SYSTOLIC BLOOD PRESSURE: 115 MMHG | DIASTOLIC BLOOD PRESSURE: 77 MMHG | TEMPERATURE: 98 F

## 2025-07-18 LAB — GLUCOSE BLDC GLUCOMTR-MCNC: 213 MG/DL — HIGH (ref 70–99)

## 2025-07-18 PROCEDURE — 99238 HOSP IP/OBS DSCHRG MGMT 30/<: CPT

## 2025-07-18 RX ORDER — MELATONIN 5 MG
1 TABLET ORAL
Qty: 14 | Refills: 0
Start: 2025-07-18 | End: 2025-07-31

## 2025-07-18 RX ORDER — BENZTROPINE MESYLATE 2 MG
1 TABLET ORAL
Qty: 28 | Refills: 0
Start: 2025-07-18 | End: 2025-07-31

## 2025-07-18 RX ORDER — BENZONATATE 100 MG
1 CAPSULE ORAL
Qty: 42 | Refills: 0
Start: 2025-07-18 | End: 2025-07-31

## 2025-07-18 RX ORDER — ARIPIPRAZOLE 2 MG/1
1 TABLET ORAL
Qty: 7 | Refills: 0
Start: 2025-07-18 | End: 2025-07-24

## 2025-07-18 RX ORDER — NICOTINE POLACRILEX 4 MG/1
1 GUM, CHEWING ORAL
Qty: 1 | Refills: 0
Start: 2025-07-18 | End: 2025-07-31

## 2025-07-18 RX ADMIN — DEXTROMETHORPHAN HBR, GUAIFENESIN 10 MILLILITER(S): 20; 200 SOLUTION ORAL at 05:19

## 2025-07-18 RX ADMIN — METFORMIN HYDROCHLORIDE 1000 MILLIGRAM(S): 850 TABLET ORAL at 08:48

## 2025-07-18 RX ADMIN — FLUTICASONE PROPIONATE 1 SPRAY(S): 50 SPRAY, METERED NASAL at 05:00

## 2025-07-18 RX ADMIN — ANASTROZOLE 1 MILLIGRAM(S): 1 TABLET ORAL at 08:49

## 2025-07-18 RX ADMIN — Medication 1 MILLIGRAM(S): at 08:49

## 2025-07-18 RX ADMIN — Medication 1 TABLET(S): at 08:49

## 2025-07-18 RX ADMIN — DEXTROMETHORPHAN HBR, GUAIFENESIN 10 MILLILITER(S): 20; 200 SOLUTION ORAL at 01:10

## 2025-07-18 RX ADMIN — Medication 100 MILLIGRAM(S): at 08:48

## 2025-07-18 RX ADMIN — Medication 650 MILLIGRAM(S): at 06:11

## 2025-07-18 RX ADMIN — FUROSEMIDE 20 MILLIGRAM(S): 10 INJECTION INTRAMUSCULAR; INTRAVENOUS at 08:49

## 2025-07-18 RX ADMIN — Medication 100 MILLIGRAM(S): at 01:10

## 2025-07-18 RX ADMIN — Medication 1 LOZENGE: at 02:27

## 2025-07-18 RX ADMIN — ARIPIPRAZOLE 10 MILLIGRAM(S): 2 TABLET ORAL at 08:48

## 2025-07-18 RX ADMIN — Medication 650 MILLIGRAM(S): at 07:17

## 2025-07-18 RX ADMIN — Medication 10 MILLIGRAM(S): at 08:49

## 2025-07-18 NOTE — BH INPATIENT PSYCHIATRY PROGRESS NOTE - NSTXPROBDIAB_PSY_ALL_CORE
DIABETES MANAGEMENT

## 2025-07-18 NOTE — BH INPATIENT PSYCHIATRY PROGRESS NOTE - NSICDXBHPRIMARYDX_PSY_ALL_CORE
Bipolar disorder   F31.9  

## 2025-07-18 NOTE — BH INPATIENT PSYCHIATRY PROGRESS NOTE - NSBHMSEKNOWHOW_PSY_ALL_CORE
Daily Progress Note    Remain in sinus and ST and PAFIB  She is NPO-getting IV meds  Waiting for speech to see her  She is confused also  Could not do MRI of head yesterday due to confusion  PAFIB medical treatment  covid positive   Hx of heart failure but now stable  Moderate AI  Conservative treatment        Pt. Awake, slightly confused  HR elevated, ST in the 110 range, BP elevated  No CP, She is SOB- tachypnea-35 on exam    Covid PNA    Per primary    To start remdesivir    Sating 94 on exam but respiratory rate elevated    PAF with possible TIA    On toprol and amio    Rate sinus but tachy today    Has not received meds yet-also believe it is elevated d/t hypoxia-ABG stable- would consider vapotherm    On Lovenox-Dimer elevated- echo was stable- no signs of RV strain    CTA head nand neck neg. Awaiting MRI    Will cont. To follow    Echo-11/16/20  Summary   This is a limited echocardiogram.   Left ventricular systolic function is normal.   Ejection fraction is visually estimated at 50-55%. Moderate aortic regurgitation; PHT: 336 msec. Moderate mitral regurgitation. Small mobile calcification attached to the posterior mitral valve chordae   tendinae. No evidence of any pericardial effusion. PAST MEDICAL HISTORY:  The patient sees Dr. Vik Rios for her COPD, history  of having heart failure with apical ballooning that improved, history of  heart catheterization in 2017, found to have nonobstructive coronary  artery disease present, hypertension, hyperlipidemia, fibromyalgia,  atrial flutter, 2:1 conduction noted, and she was on anticoagulation for  that.     PAST SURGICAL HISTORY:  Gallbladder surgery, hysterectomy done, and  appendectomy.     SOCIAL HISTORY:  She does not smoke, does not drink.   She came from a  nursing home.     ALLERGIES:  HUMIRA, REMICADE, LORAZEPAM, _____, COMPAZINE, and  DOXYCYCLINE.     MEDICATIONS AT HOME:  She was on iron sulfate, Zoloft, Claritin, Lasix,  Xanax, aspirin,
amiodarone, Toprol 25 mg b.i.d., and Aldactone. Objective:   BP (!) 166/90   Pulse 112   Temp 99.2 °F (37.3 °C) (Axillary)   Resp (!) 48   Ht 5' 7\" (1.702 m)   Wt 149 lb 11.1 oz (67.9 kg)   SpO2 93%   BMI 23.45 kg/m²       Intake/Output Summary (Last 24 hours) at 11/17/2020 0901  Last data filed at 11/17/2020 0405  Gross per 24 hour   Intake 550 ml   Output 400 ml   Net 150 ml       Medications:   Scheduled Meds:   cefTRIAXone (ROCEPHIN) IV  1 g Intravenous Q24H    remdesivir IVPB  100 mg Intravenous Daily    zinc sulfate  50 mg Oral Daily    sodium chloride flush  10 mL Intravenous 2 times per day    aspirin  81 mg Oral Daily    Or    aspirin  300 mg Rectal Daily    rosuvastatin  40 mg Oral Nightly    dexamethasone  6 mg Oral Daily    albuterol sulfate HFA  2 puff Inhalation 4x daily    tiotropium  2 puff Inhalation Daily    enoxaparin  30 mg Subcutaneous BID    traZODone  50 mg Oral Nightly    vitamin B-12  1,000 mcg Oral Daily    pantoprazole  40 mg Oral QAM AC    metoprolol succinate  25 mg Oral BID    vitamin D  2 capsule Oral Daily    busPIRone  5 mg Oral TID    amiodarone  200 mg Oral Daily    amLODIPine  5 mg Oral Daily      Infusions:   [Held by provider] sodium chloride 50 mL/hr at 11/16/20 1806      PRN Meds:  sodium chloride, sodium chloride flush, acetaminophen **OR** acetaminophen, polyethylene glycol, labetalol, oxyCODONE-acetaminophen, ALPRAZolam       Physical Exam:  Vitals:    11/17/20 0808   BP:    Pulse: 112   Resp: (!) 48   Temp:    SpO2: 93%        General: AAO, NAD  Chest: Nontender  Cardiac: First and Second Heart Sounds are Normal, No Murmurs or Gallops noted  Lungs:Clear to auscultation and percussion. Abdomen: Soft, NT, ND, +BS  Extremities: No clubbing, no edema  Vascular:  Equal 2+ peripheral pulses.         Lab Data:  CBC:   Recent Labs     11/15/20  1235 11/17/20  0419   WBC 12.9* 20.1*   HGB 10.1* 10.5*   HCT 32.7* 35.0*   MCV 80.3 78.7    335
BMP:   Recent Labs     11/15/20  1235 11/17/20  0419   * 142   K 4.3 4.4   CL 99 109   CO2 24 21   PHOS  --  2.5   BUN 44* 47*   CREATININE 1.3* 0.9     LIVER PROFILE:   Recent Labs     11/15/20  1235 11/17/20  0419   AST 54* 75*  75*   ALT 82* 69*  69*   BILIDIR  --  0.2   BILITOT 0.2 0.3  0.3   ALKPHOS 76 88  88     PT/INR:   Recent Labs     11/15/20  1235   PROTIME 11.3*   INR 0.93     APTT: No results for input(s): APTT in the last 72 hours. BNP:  No results for input(s): BNP in the last 72 hours.       Assessment:  Patient Active Problem List    Diagnosis Date Noted    Acute hypoxemic respiratory failure due to COVID-19 [facility]) 11/15/2020    Chronic hypoxemic respiratory failure ([address] 09/09/2020    Symptomatic anemia 03/24/2020    Wide-complex tachycardia ([address] 02/10/2020    Gram-negative pneumonia ([facility] [address] 62/98/0267    Acute systolic heart failure ([address] 02/10/2020    Pneumonia of right upper lobe due to infectious organism     Chronic fatigue 07/08/2019    Intractable low back pain 06/11/2019    Back pain 06/09/2019    Shortness of breath 04/25/2019    Former smoker     Acute exacerbation of chronic obstructive pulmonary disease (COPD) ([address] 12/13/2018    COPD, severe ([address] 10/24/2017    Blood loss anemia 08/01/2017    Takotsubo syndrome 05/01/2017    Panic attack 11/18/2016    Lumbar spinal stenosis 01/01/2015    Vitamin B12 deficiency 12/01/2014    Osteoporosis 03/16/2012    Depression     Migraine 10/06/2010    Rheumatoid Arthritis     Hypertension     Hyperlipidemia     Fibromyalgia     Chronic pain syndrome        Electronically signed by Antonette Panda PA-C on 11/17/2020 at 9:01 AM
Educational attainment

## 2025-07-18 NOTE — BH INPATIENT PSYCHIATRY PROGRESS NOTE - NSBHFUPINTERVALHXFT_PSY_A_CORE
Patient was seen and evaluated by team in the morning. Patients chart was reviewed for PRNs. Patient received tylenol 650mg, tessalon pearle twice, flonase, robitussin three times, and melatonin 3mg once - related to cough. Patient had no acute overnight events per staff    Patient approached team in the hallway. When asked the patient states she is "excited" to be DC this morning. Patient endorses feeling "good" with regard to her mood today. Patient is fourth coming with sleeping poorly due to her cough. Patient endorses awakening early for medication to help. The patient mentions her mother will pick her up today. Patient appears to be well organized - understands she has psychiatrist appt later today. Patient appears to have clear TP and has minimal tangentiality. Mood appears relatively euthymic.

## 2025-07-18 NOTE — BH INPATIENT PSYCHIATRY PROGRESS NOTE - NSBHASSESSSUMMFT_PSY_ALL_CORE
55F, domiciled with 2 adult daughters, on disability and pension (previously worked as a teacher), PPH of bipolar disorder, past IPP hospitalizations remote hx of SA via lithium OD, no known hx of violence, currently in outpatient psych care at [facility], [place], recent hospitalization in [place] for erratic behavior a/f acute casi.     On assessment, pt speaks rapidly and tangentially. Her affect is labile and appears mostly to be elated, but at times when speaking about her daughters she becomes irritable. She is cooperative but difficult to interrupt. She describes sleeping only 3 hours last night but does not c/o fatigue and is seen to have a lot of energy around the unit (walking around and playing bowling). On chart review collateral reports manic like behaviors over the past 2 weeks.    Pt presentation is most c/w acute casi in the setting of chronic bipolar d/o. Pt is a danger to self. She lacks insight into this safety risk. She requires IPP hospitalization to stabilize mood and decrease her risk of harm to self.    7/5/2025  patient is elevated, somewhat cheerful and confused in her communications.  However she wishes to get "back on medication and have it adjusted".    7/7/25- Obtained collateral from provider (please see chart note). Pt continues to have expansive mood, high energy level, and rapid speech. She denies any psychotic symptoms. Pt requests a switch to Abilify and after speaking to OP who confirmed pt was on Abilify at some point treatment team decided to start patient on Abilify. Carbamazepine was d/sam since patient has not been taking it.    7/8- Tolerates abilify well. Wishes to switch from prolixin PATHAK 50mg [id number] (due today) to abilify PATHAK. Will fill non-formulary for abilify maintenna and d/c prolixin PATHAK. Slept 5 hours per staff. Will continue to titrate abilify as needed until PATHAK. Patient reports she has taken abilify 10mg in the past for a long period of time with good effect and only stopped it due to financial constraints.   7/9 continues to be manic and intrusive. Agreeing only to go up to 3 mg abilify tomorrow.   7/10 continues to have elated affect with overinclusive TC and tangential TP. ordered abilify maintena 400mg qmonthly.  7/11 patient did not sleep last night and has an elated mood and affect. She received Abilify Maintena 400mg IM (PATHAK). Will increase dose of PO Abilify to 10mg.  7/14 patient slept well (8/8:30pm through 4am) and has improved affect. Her speech is less rapid and tangential. Her symptoms of casi appear to be improving. She is likely responding well to the Abilify tx.  7/15 patient did not sleep well (9pm-12:30 then 1-6am) as a result of allergies and a cough. Her TP is still overinclusive and tangential but has greatly improved. Pts casi Sx are improving, likely as a result of Abilify Tx. Patients cough likely interrupted her sleep, so robitussin will be added.  7/16: Patient slept well (11pm-5am) but was awoken due to cough. Patients TP continues to be overinclusive, but tangentiality has substantially decreased. Patients casi Sx have significantly improved, likely as a result of Abilify Tx. Hospitalist was consulted for elevated bgl & persistent cough. Pts Lantus to be increased to at-home dosage. Pts Robitussin PRN dosing frequency was increased - possibly add Singulair for COPD Sx per hospitalist.  7/17: Patient sleep was impaired by chronic cough overnight. The patient's TP continues to be over-inclusive and mildly inappropriate, but only shows moderate tangentiality. Patients casi Sx are minimally noticeable and her mood is relatively euthymic - likely a result of the Abilify Tx. Pts Lantus was increased to at-home dose for elevated bgl per hospitalist. Pt was started back on Singulair 10mg for COPD Sx per hospitalist. Planned DC tmr 7/18.  7/18: Patients igor Sx are minorly noticeable, pt is internally aware of what these Sx feel like. The patient endorses poor sleep as a result of a continuous cough - pt encouraged to see PCP ASAP following DC. Patients mood is relatively euthymic - much improved since initial evaluation. Patient to be DC by noon 7/18/25 with scheduled OP psych appointment at 3:30pm.     Plan:    #Bipolar d/o  -continue benztropine 1mg bid  -continue Abilify to 10mg qd  - abilify maintenna 400mg qmonthly- administered on 7/11    #allergic rhinitis  -zyrtec changed to standing qd  -ordered Flonase PRN    #cough  -ordered robitussin PRN for cough  -Singulair 10 mg qd per hospitalist 7/17    #Diabetes  -lantus 36 mg at night per hospitalist 7/17  -metformin 1000 mg bid per hospitalist       #LE Edema  -Lasix 20mg qd per hospitalist     #Anemia  -monitor CBC= CBC on 07/05 with Hgb= 10.5  -folate, b12 levels wnl  -iron panel wnl   55F, domiciled with 2 adult daughters, on disability and pension (previously worked as a teacher), PPH of bipolar disorder, past IPP hospitalizations remote hx of SA via lithium OD, no known hx of violence, currently in outpatient psych care at [facility], recent hospitalization in [place] for erratic behavior a/f acute casi.     On assessment, pt speaks rapidly and tangentially. Her affect is labile and appears mostly to be elated, but at times when speaking about her daughters she becomes irritable. She is cooperative but difficult to interrupt. She describes sleeping only 3 hours last night but does not c/o fatigue and is seen to have a lot of energy around the unit (walking around and playing bowling). On chart review collateral reports manic like behaviors over the past 2 weeks.    Pt presentation is most c/w acute casi in the setting of chronic bipolar d/o. Pt is a danger to self. She lacks insight into this safety risk. She requires IPP hospitalization to stabilize mood and decrease her risk of harm to self.    7/5/2025  patient is elevated, somewhat cheerful and confused in her communications.  However she wishes to get "back on medication and have it adjusted".    7/7/25- Obtained collateral from provider (please see chart note). Pt continues to have expansive mood, high energy level, and rapid speech. She denies any psychotic symptoms. Pt requests a switch to Abilify and after speaking to OP who confirmed pt was on Abilify at some point treatment team decided to start patient on Abilify. Carbamazepine was d/sam since patient has not been taking it.    7/8- Tolerates abilify well. Wishes to switch from prolixin PATHAK 50mg [id number] (due today) to abilify PATHAK. Will fill non-formulary for abilify maintenna and d/c prolixin PATHAK. Slept 5 hours per staff. Will continue to titrate abilify as needed until PATHAK. Patient reports she has taken abilify 10mg in the past for a long period of time with good effect and only stopped it due to financial constraints.   7/9 continues to be manic and intrusive. Agreeing only to go up to 3 mg abilify tomorrow.   7/10 continues to have elated affect with overinclusive TC and tangential TP. ordered abilify maintena 400mg qmonthly.  7/11 patient did not sleep last night and has an elated mood and affect. She received Abilify Maintena 400mg IM (PATHAK). Will increase dose of PO Abilify to 10mg.  7/14 patient slept well (8/8:30pm through 4am) and has improved affect. Her speech is less rapid and tangential. Her symptoms of casi appear to be improving. She is likely responding well to the Abilify tx.  7/15 patient did not sleep well (9pm-12:30 then 1-6am) as a result of allergies and a cough. Her TP is still overinclusive and tangential but has greatly improved. Pts casi Sx are improving, likely as a result of Abilify Tx. Patients cough likely interrupted her sleep, so robitussin will be added.  7/16: Patient slept well (11pm-5am) but was awoken due to cough. Patients TP continues to be overinclusive, but tangentiality has substantially decreased. Patients casi Sx have significantly improved, likely as a result of Abilify Tx. Hospitalist was consulted for elevated bgl & persistent cough. Pts Lantus to be increased to at-home dosage. Pts Robitussin PRN dosing frequency was increased - possibly add Singulair for COPD Sx per hospitalist.  7/17: Patient sleep was impaired by chronic cough overnight. The patient's TP continues to be over-inclusive and mildly inappropriate, but only shows moderate tangentiality. Patients casi Sx are minimally noticeable and her mood is relatively euthymic - likely a result of the Abilify Tx. Pts Lantus was increased to at-home dose for elevated bgl per hospitalist. Pt was started back on Singulair 10mg for COPD Sx per hospitalist. Planned DC tmr 7/18.  7/18: Patients igor Sx are minorly noticeable, pt is internally aware of what these Sx feel like. The patient endorses poor sleep as a result of a continuous cough - pt encouraged to see PCP ASAP following DC. Patients mood is relatively euthymic - much improved since initial evaluation. Patient to be DC by noon 7/18/25 with scheduled OP psych appointment at 3:30pm.     Plan:    #Bipolar d/o  -continue benztropine 1mg bid  -continue Abilify 10mg qd, will need just 1 week more of PO coverage   - abilify maintenna 400mg qmonthly- administered on 7/11    #allergic rhinitis  -zyrtec changed to standing qd  -ordered Flonase PRN    #cough  -ordered robitussin PRN for cough  -Singulair 10 mg qd per hospitalist 7/17    #Diabetes  -lantus 36 mg at night per hospitalist 7/17  -metformin 1000 mg bid per hospitalist       #LE Edema  -Lasix 20mg qd per hospitalist     #Anemia  -monitor CBC= CBC on 07/05 with Hgb= 10.5  -folate, b12 levels wnl  -iron panel wnl

## 2025-07-18 NOTE — BH INPATIENT PSYCHIATRY PROGRESS NOTE - NSBHMSEREMMEM_PSY_A_CORE
Normal
Unable to assess
Normal
Normal
Unable to assess
Normal
Normal

## 2025-07-18 NOTE — BH INPATIENT PSYCHIATRY PROGRESS NOTE - NSTXDIABGOAL_PSY_ALL_CORE
Will be able to able to describe prescribed diabetic medications and how to properly take these medications

## 2025-07-18 NOTE — BH INPATIENT PSYCHIATRY PROGRESS NOTE - NSTXDIABDATEEST_PSY_ALL_CORE
04-Jul-2025

## 2025-07-18 NOTE — BH INPATIENT PSYCHIATRY PROGRESS NOTE - NSICDXBHSECONDARYDX_PSY_ALL_CORE
Bipolar affective disorder, mixed   F31.60  

## 2025-07-18 NOTE — BH INPATIENT PSYCHIATRY PROGRESS NOTE - NSBHMSEKNOW_PSY_A_CORE
Please follow up with your primary care physician regarding your recent hospitalization.
Normal

## 2025-07-18 NOTE — BH INPATIENT PSYCHIATRY PROGRESS NOTE - NSTXMANICGOAL_PSY_ALL_CORE
Exhibit a substantial reduction in elated/angry acting out, and pressured speech that prevents mutual conversation

## 2025-07-18 NOTE — BH INPATIENT PSYCHIATRY PROGRESS NOTE - NSBHCHARTREVIEWVS_PSY_A_CORE FT
Vital Signs Last 24 Hrs  T(C): 36.8 (07-18-25 @ 10:41), Max: 36.8 (07-17-25 @ 16:16)  T(F): 98.2 (07-18-25 @ 10:41), Max: 98.2 (07-17-25 @ 16:16)  HR: 80 (07-18-25 @ 10:41) (77 - 80)  BP: 115/77 (07-18-25 @ 10:41) (115/77 - 137/93)  BP(mean): --  RR: 18 (07-18-25 @ 10:41) (18 - 18)  SpO2: --

## 2025-07-18 NOTE — BH INPATIENT PSYCHIATRY PROGRESS NOTE - NS ED BHA REVIEW OF ED CHART AVAILABLE LABS REVIEWED
None available
None available
Yes
None available
None available
Yes
Yes
None available

## 2025-07-18 NOTE — BH INPATIENT PSYCHIATRY PROGRESS NOTE - NSBHMSETHTPROC_PSY_A_CORE
Overinclusive
Overinclusive/Perseverative
Overinclusive/Tangential/Perseverative/Flight of ideas
Overinclusive/Perseverative
Overinclusive/Tangential/Flight of ideas
Overinclusive/Tangential/Perseverative/Flight of ideas
Overinclusive
Overinclusive/Tangential/Perseverative
Overinclusive/Tangential
Overinclusive

## 2025-07-18 NOTE — BH INPATIENT PSYCHIATRY PROGRESS NOTE - NSCGIIMPROVESX_PSY_ALL_CORE
2 = Much improved - notably better with signficant reduction of symptoms; increase in the level of functioning but some symptoms remain
3 = Minimally improved - slightly better with little or no clinically meaningful reduction of symptoms.  Represents very little change in basic clinical status, level of care, or functional capacity.
1 - Very much improved - nearly all better; good level of functioning; minimal symptoms; represents a very substantial change
2 = Much improved - notably better with signficant reduction of symptoms; increase in the level of functioning but some symptoms remain

## 2025-07-18 NOTE — BH INPATIENT PSYCHIATRY PROGRESS NOTE - NSBHMSESPEECH_PSY_A_CORE
Abnormal as indicated, otherwise normal...

## 2025-07-18 NOTE — BH INPATIENT PSYCHIATRY PROGRESS NOTE - NSBHMSEGAIT_PSY_A_CORE
Normal gait / station
Unable to assess
Normal gait / station
Unable to assess
Normal gait / station

## 2025-07-18 NOTE — BH INPATIENT PSYCHIATRY PROGRESS NOTE - NSTXDIABINTERMD_PSY_ALL_CORE
IM consulted for possible management alteration
Hospitalist advised alteration of insulin & addition of metformin

## 2025-07-18 NOTE — BH INPATIENT PSYCHIATRY PROGRESS NOTE - NSBHATTESTTYPESTAFF_PSY_A_CORE
Resident
Resident/Student
Resident
Resident/Student

## 2025-07-18 NOTE — BH INPATIENT PSYCHIATRY PROGRESS NOTE - NSDCCRITERIA_PSY_ALL_CORE
No longer danger to self

## 2025-07-18 NOTE — BH INPATIENT PSYCHIATRY PROGRESS NOTE - NSTXDIABDATETRGT_PSY_ALL_CORE
11-Jul-2025

## 2025-07-18 NOTE — BH INPATIENT PSYCHIATRY PROGRESS NOTE - NSTXMANICDATEEST_PSY_ALL_CORE
06-Jul-2025
07-Jul-2025
04-Jul-2025
07-Jul-2025

## 2025-07-18 NOTE — BH INPATIENT PSYCHIATRY PROGRESS NOTE - NSBHATTESTCOMMENTATTENDFT_PSY_A_CORE
Pt seen with Dr. Paris and student doctor. Writer present for critical portion. Agree with assessment and plan. Pt is euthymic and at baseline and appropriate for discharge.  Will follow up with her psychiatrist this afternoon.

## 2025-07-18 NOTE — BH INPATIENT PSYCHIATRY PROGRESS NOTE - NSBHCONSBHPROVCNTCTNOFT_PSY_A_CORE
It is a holiday

## 2025-07-18 NOTE — BH INPATIENT PSYCHIATRY PROGRESS NOTE - PRN MEDS
Collateral Contact Note     Writer spoke with pt girlfriend Carmella (MARIFER on file) to obtain collateral information. She shared pt \"drinks everyday, smokes marijuana, and I can usually tell what he's high from based on interactions.\" She reported pt has recently started using cocaine, referring to it as \"that powder\", noting this is newer for him and he is more agitated and in \"rage\" when he is on it. She reported when pt isn't drinking he can sometimes be a bit agitated or mean, but has never been physically aggressive with or towards her. However, regarding pt's current presentation, Carmella indicated she has only seen him like this when he is under the influence of PCP. She reported pt only appears to present \"manic\" like when he has taken PCP. She reported pt is not hyperreligious at all at baseline,  \"he don't talk about God everyday\", has not been to [employer] in several years, and typically does not pray but when he has used PCP, pt becomes very hyperreligious, talking in tongues, reports seeing God, talking to God, is up at 3am yelling at the top of his lungs for 30 minutes, can appear very happy, singing, grandiose, etc . She also stated this talk of pt wanting to deanna the government is new for him.  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 She stated when she met pt in 2015, \"his pitch was that he was in recovery\", and she shared pt was somewhat forthcoming about his drug use and that he was a heavy drinker but was in recovery. She reported she suspects every now and then pt sneaks and uses PCP when he goes out, and he has told her he dips either a cigarette or marijuana in it and typically the effects will last somewhere between 24-36 hours for him. She also shared pt initially started \"acting weird\" last weekend and suspects that when he started using PCP again and notes she has been  dealing with his current bx all week. She says she does not know if pt has ever been formally dx with a definitive mental illness, but notes that pt really appears most like how he is now when he has taken PCP. She reported pt has moments where he can appear \"coherent\" but then he will go off about wanting to deanna the government and insists he plans to do this when he leaves the hospital. She shared pt does at times have some sleepless nights, but does not appear \"manic\". Of note she shared pt really does not eat pork and that it might upset him if he is served pork because he may become ill due to not having eaten it for several years.  She also noted pt is very adamant that \"he's not crazy and is afraid people will think he is crazy.\"   She did state for now as to not deter pt from pursuing recovery, he can return to her at discharge. Writer provided phone number to unit and visiting hours.   Pepper Avalos, JAG     MEDICATIONS  (PRN):  acetaminophen     Tablet .. 650 milliGRAM(s) Oral every 6 hours PRN Temp greater or equal to 38C (100.4F), Mild Pain (1 - 3)  albuterol    90 MICROgram(s) HFA Inhaler 1 Puff(s) Inhalation four times a day PRN SOB  benzocaine/menthol Lozenge 1 Lozenge Oral three times a day PRN sore throat  benzonatate 100 milliGRAM(s) Oral every 8 hours PRN cough  dextrose Oral Gel 15 Gram(s) Oral once PRN Blood Glucose LESS THAN 70 milliGRAM(s)/deciliter  fluticasone propionate 50 MICROgram(s)/spray Nasal Spray 1 Spray(s) Both Nostrils two times a day PRN allergic rhinitis  guaifenesin/dextromethorphan Oral Liquid 10 milliLiter(s) Oral every 4 hours PRN cough  loperamide 2 milliGRAM(s) Oral two times a day PRN diarrhea  melatonin. 3 milliGRAM(s) Oral at bedtime PRN Insomnia  nicotine  Polacrilex Gum 2 milliGRAM(s) Oral every 4 hours PRN Smoking Cessation  oxymetazoline 0.05% Nasal Spray 1 Spray(s) Both Nostrils every 12 hours PRN Nasal Congestion

## 2025-07-18 NOTE — BH INPATIENT PSYCHIATRY PROGRESS NOTE - NSBHMSETHTCONTENT_PSY_A_CORE
Preoccupations/Ruminations
Unremarkable
Preoccupations/Ruminations
Preoccupations/Ruminations
Unremarkable
Preoccupations/Ruminations
Preoccupations/Ruminations
Unremarkable
Preoccupations/Ruminations
Unremarkable
Unremarkable
Preoccupations/Ruminations

## 2025-07-18 NOTE — BH INPATIENT PSYCHIATRY PROGRESS NOTE - NSTXMANICDATETRGT_PSY_ALL_CORE
21-Jul-2025
28-Jul-2025
21-Jul-2025
11-Jul-2025
21-Jul-2025

## 2025-07-18 NOTE — BH INPATIENT PSYCHIATRY PROGRESS NOTE - NSTXPROBMANIC_PSY_ALL_CORE
MANIC SYMPTOMS

## 2025-07-18 NOTE — BH INPATIENT PSYCHIATRY PROGRESS NOTE - NSCGISEVERILLNESS_PSY_ALL_CORE
2 = Borderline mentally ill – subtle or suspected pathology
3 = Mildly ill – clearly established symptoms with minimal, if any, distress or difficulty in social and occupational function
4 = Moderately ill – overt symptoms causing noticeable, but modest, functional impairment or distress; symptom level may warrant medication
3 = Mildly ill – clearly established symptoms with minimal, if any, distress or difficulty in social and occupational function
3 = Mildly ill – clearly established symptoms with minimal, if any, distress or difficulty in social and occupational function
5 = Markedly ill - intrusive symptoms that distinctly impair social/occupational function or cause intrusive levels of distress

## 2025-07-18 NOTE — BH INPATIENT PSYCHIATRY PROGRESS NOTE - NSBHMETABOLIC_PSY_ALL_CORE_FT
BMI: BMI (kg/m2): 31.3 (07-04-25 @ 05:30)  HbA1c: A1C with Estimated Average Glucose Result: 6.6 % (07-05-25 @ 08:48)    Glucose: POCT Blood Glucose.: 213 mg/dL (07-18-25 @ 06:45)    BP: 115/77 (07-18-25 @ 10:41) (107/76 - 137/93)Vital Signs Last 24 Hrs  T(C): 36.8 (07-18-25 @ 10:41), Max: 36.8 (07-17-25 @ 16:16)  T(F): 98.2 (07-18-25 @ 10:41), Max: 98.2 (07-17-25 @ 16:16)  HR: 80 (07-18-25 @ 10:41) (77 - 80)  BP: 115/77 (07-18-25 @ 10:41) (115/77 - 137/93)  BP(mean): --  RR: 18 (07-18-25 @ 10:41) (18 - 18)  SpO2: --      Lipid Panel: Date/Time: 07-05-25 @ 08:48  Cholesterol, Serum: 171  LDL Cholesterol Calculated: 73  HDL Cholesterol, Serum: 42  Total Cholesterol/HDL Ration Measurement: --  Triglycerides, Serum: 356

## 2025-07-24 DIAGNOSIS — D64.9 ANEMIA, UNSPECIFIED: ICD-10-CM

## 2025-07-24 DIAGNOSIS — J45.909 UNSPECIFIED ASTHMA, UNCOMPLICATED: ICD-10-CM

## 2025-07-24 DIAGNOSIS — E11.9 TYPE 2 DIABETES MELLITUS WITHOUT COMPLICATIONS: ICD-10-CM

## 2025-07-24 DIAGNOSIS — E78.00 PURE HYPERCHOLESTEROLEMIA, UNSPECIFIED: ICD-10-CM

## 2025-07-24 DIAGNOSIS — Z79.84 LONG TERM (CURRENT) USE OF ORAL HYPOGLYCEMIC DRUGS: ICD-10-CM

## 2025-07-24 DIAGNOSIS — Z91.51 PERSONAL HISTORY OF SUICIDAL BEHAVIOR: ICD-10-CM

## 2025-07-24 DIAGNOSIS — F31.60 BIPOLAR DISORDER, CURRENT EPISODE MIXED, UNSPECIFIED: ICD-10-CM

## 2025-07-24 DIAGNOSIS — Z85.3 PERSONAL HISTORY OF MALIGNANT NEOPLASM OF BREAST: ICD-10-CM

## 2025-07-24 DIAGNOSIS — Z88.8 ALLERGY STATUS TO OTHER DRUGS, MEDICAMENTS AND BIOLOGICAL SUBSTANCES: ICD-10-CM

## 2025-07-24 DIAGNOSIS — G47.00 INSOMNIA, UNSPECIFIED: ICD-10-CM

## 2025-07-24 DIAGNOSIS — L60.3 NAIL DYSTROPHY: ICD-10-CM

## 2025-07-24 DIAGNOSIS — Z91.09 OTHER ALLERGY STATUS, OTHER THAN TO DRUGS AND BIOLOGICAL SUBSTANCES: ICD-10-CM

## 2025-07-24 DIAGNOSIS — Z88.0 ALLERGY STATUS TO PENICILLIN: ICD-10-CM

## 2025-07-24 DIAGNOSIS — G47.33 OBSTRUCTIVE SLEEP APNEA (ADULT) (PEDIATRIC): ICD-10-CM

## 2025-07-24 DIAGNOSIS — D69.6 THROMBOCYTOPENIA, UNSPECIFIED: ICD-10-CM

## 2025-08-06 ENCOUNTER — APPOINTMENT (OUTPATIENT)
Age: 55
End: 2025-08-06

## 2025-08-07 ENCOUNTER — NON-APPOINTMENT (OUTPATIENT)
Age: 55
End: 2025-08-07

## 2025-08-28 ENCOUNTER — INPATIENT (INPATIENT)
Facility: HOSPITAL | Age: 55
LOS: 7 days | Discharge: HOME | DRG: 885 | End: 2025-09-05
Attending: PSYCHIATRY & NEUROLOGY | Admitting: PSYCHIATRY & NEUROLOGY
Payer: MEDICARE

## 2025-08-28 VITALS
HEART RATE: 106 BPM | TEMPERATURE: 98 F | OXYGEN SATURATION: 100 % | SYSTOLIC BLOOD PRESSURE: 132 MMHG | DIASTOLIC BLOOD PRESSURE: 84 MMHG | HEIGHT: 64 IN | WEIGHT: 175.05 LBS | RESPIRATION RATE: 20 BRPM

## 2025-08-28 DIAGNOSIS — F31.9 BIPOLAR DISORDER, UNSPECIFIED: ICD-10-CM

## 2025-08-28 DIAGNOSIS — Z98.890 OTHER SPECIFIED POSTPROCEDURAL STATES: Chronic | ICD-10-CM

## 2025-08-28 LAB
A1C WITH ESTIMATED AVERAGE GLUCOSE RESULT: 6.3 % — HIGH (ref 4–5.6)
ALBUMIN SERPL ELPH-MCNC: 4.4 G/DL — SIGNIFICANT CHANGE UP (ref 3.5–5.2)
ALP SERPL-CCNC: 67 U/L — SIGNIFICANT CHANGE UP (ref 30–115)
ALT FLD-CCNC: 30 U/L — SIGNIFICANT CHANGE UP (ref 0–41)
ANION GAP SERPL CALC-SCNC: 14 MMOL/L — SIGNIFICANT CHANGE UP (ref 7–14)
ANION GAP SERPL CALC-SCNC: 17 MMOL/L — HIGH (ref 7–14)
APAP SERPL-MCNC: <5 UG/ML — LOW (ref 10–30)
APPEARANCE UR: ABNORMAL
AST SERPL-CCNC: 24 U/L — SIGNIFICANT CHANGE UP (ref 0–41)
BASOPHILS # BLD AUTO: 0.03 K/UL — SIGNIFICANT CHANGE UP (ref 0–0.2)
BASOPHILS # BLD AUTO: 0.04 K/UL — SIGNIFICANT CHANGE UP (ref 0–0.2)
BASOPHILS NFR BLD AUTO: 0.5 % — SIGNIFICANT CHANGE UP (ref 0–2)
BASOPHILS NFR BLD AUTO: 0.6 % — SIGNIFICANT CHANGE UP (ref 0–2)
BILIRUB SERPL-MCNC: 0.7 MG/DL — SIGNIFICANT CHANGE UP (ref 0.2–1.2)
BILIRUB UR-MCNC: NEGATIVE — SIGNIFICANT CHANGE UP
BUN SERPL-MCNC: 19 MG/DL — SIGNIFICANT CHANGE UP (ref 10–20)
BUN SERPL-MCNC: 20 MG/DL — SIGNIFICANT CHANGE UP (ref 10–20)
CALCIUM SERPL-MCNC: 9.4 MG/DL — SIGNIFICANT CHANGE UP (ref 8.4–10.5)
CALCIUM SERPL-MCNC: 9.9 MG/DL — SIGNIFICANT CHANGE UP (ref 8.4–10.5)
CHLORIDE SERPL-SCNC: 102 MMOL/L — SIGNIFICANT CHANGE UP (ref 98–110)
CHLORIDE SERPL-SCNC: 103 MMOL/L — SIGNIFICANT CHANGE UP (ref 98–110)
CO2 SERPL-SCNC: 20 MMOL/L — SIGNIFICANT CHANGE UP (ref 17–32)
CO2 SERPL-SCNC: 24 MMOL/L — SIGNIFICANT CHANGE UP (ref 17–32)
COLOR SPEC: YELLOW — SIGNIFICANT CHANGE UP
CREAT SERPL-MCNC: 1.2 MG/DL — SIGNIFICANT CHANGE UP (ref 0.7–1.5)
CREAT SERPL-MCNC: 1.4 MG/DL — SIGNIFICANT CHANGE UP (ref 0.7–1.5)
DIFF PNL FLD: ABNORMAL
EGFR: 44 ML/MIN/1.73M2 — LOW
EGFR: 44 ML/MIN/1.73M2 — LOW
EGFR: 53 ML/MIN/1.73M2 — LOW
EGFR: 53 ML/MIN/1.73M2 — LOW
EOSINOPHIL # BLD AUTO: 0.24 K/UL — SIGNIFICANT CHANGE UP (ref 0–0.5)
EOSINOPHIL # BLD AUTO: 0.26 K/UL — SIGNIFICANT CHANGE UP (ref 0–0.5)
EOSINOPHIL NFR BLD AUTO: 3.6 % — SIGNIFICANT CHANGE UP (ref 0–6)
EOSINOPHIL NFR BLD AUTO: 4 % — SIGNIFICANT CHANGE UP (ref 0–6)
ESTIMATED AVERAGE GLUCOSE: 134 MG/DL — HIGH (ref 68–114)
ETHANOL SERPL-MCNC: <10 MG/DL — SIGNIFICANT CHANGE UP
GLUCOSE BLDC GLUCOMTR-MCNC: 155 MG/DL — HIGH (ref 70–99)
GLUCOSE BLDC GLUCOMTR-MCNC: 228 MG/DL — HIGH (ref 70–99)
GLUCOSE SERPL-MCNC: 136 MG/DL — HIGH (ref 70–99)
GLUCOSE SERPL-MCNC: 242 MG/DL — HIGH (ref 70–99)
GLUCOSE UR QL: NEGATIVE MG/DL — SIGNIFICANT CHANGE UP
HCT VFR BLD CALC: 30.7 % — LOW (ref 34.5–45)
HCT VFR BLD CALC: 31 % — LOW (ref 34.5–45)
HGB BLD-MCNC: 10.6 G/DL — LOW (ref 11.5–15.5)
HGB BLD-MCNC: 10.6 G/DL — LOW (ref 11.5–15.5)
IMM GRANULOCYTES # BLD AUTO: 0.01 K/UL — SIGNIFICANT CHANGE UP (ref 0–0.07)
IMM GRANULOCYTES # BLD AUTO: 0.02 K/UL — SIGNIFICANT CHANGE UP (ref 0–0.07)
IMM GRANULOCYTES NFR BLD AUTO: 0.2 % — SIGNIFICANT CHANGE UP (ref 0–0.9)
IMM GRANULOCYTES NFR BLD AUTO: 0.3 % — SIGNIFICANT CHANGE UP (ref 0–0.9)
IMMATURE PLATELET FRACTION #: 8 K/UL — SIGNIFICANT CHANGE UP (ref 4.7–11.1)
IMMATURE PLATELET FRACTION %: 8.8 % — HIGH (ref 1.6–4.9)
KETONES UR QL: ABNORMAL MG/DL
LEUKOCYTE ESTERASE UR-ACNC: ABNORMAL
LYMPHOCYTES # BLD AUTO: 1.73 K/UL — SIGNIFICANT CHANGE UP (ref 1–3.3)
LYMPHOCYTES # BLD AUTO: 2.25 K/UL — SIGNIFICANT CHANGE UP (ref 1–3.3)
LYMPHOCYTES NFR BLD AUTO: 28.9 % — SIGNIFICANT CHANGE UP (ref 13–44)
LYMPHOCYTES NFR BLD AUTO: 31.4 % — SIGNIFICANT CHANGE UP (ref 13–44)
MCHC RBC-ENTMCNC: 28 PG — SIGNIFICANT CHANGE UP (ref 27–34)
MCHC RBC-ENTMCNC: 28 PG — SIGNIFICANT CHANGE UP (ref 27–34)
MCHC RBC-ENTMCNC: 34.2 G/DL — SIGNIFICANT CHANGE UP (ref 32–36)
MCHC RBC-ENTMCNC: 34.5 G/DL — SIGNIFICANT CHANGE UP (ref 32–36)
MCV RBC AUTO: 81.2 FL — SIGNIFICANT CHANGE UP (ref 80–100)
MCV RBC AUTO: 82 FL — SIGNIFICANT CHANGE UP (ref 80–100)
MONOCYTES # BLD AUTO: 0.44 K/UL — SIGNIFICANT CHANGE UP (ref 0–0.9)
MONOCYTES # BLD AUTO: 0.49 K/UL — SIGNIFICANT CHANGE UP (ref 0–0.9)
MONOCYTES NFR BLD AUTO: 6.8 % — SIGNIFICANT CHANGE UP (ref 2–14)
MONOCYTES NFR BLD AUTO: 7.4 % — SIGNIFICANT CHANGE UP (ref 2–14)
NEUTROPHILS # BLD AUTO: 3.53 K/UL — SIGNIFICANT CHANGE UP (ref 1.8–7.4)
NEUTROPHILS # BLD AUTO: 4.1 K/UL — SIGNIFICANT CHANGE UP (ref 1.8–7.4)
NEUTROPHILS NFR BLD AUTO: 57.3 % — SIGNIFICANT CHANGE UP (ref 43–77)
NEUTROPHILS NFR BLD AUTO: 59 % — SIGNIFICANT CHANGE UP (ref 43–77)
NITRITE UR-MCNC: NEGATIVE — SIGNIFICANT CHANGE UP
NRBC # BLD AUTO: 0 K/UL — SIGNIFICANT CHANGE UP (ref 0–0)
NRBC # BLD AUTO: 0 K/UL — SIGNIFICANT CHANGE UP (ref 0–0)
NRBC # FLD: 0 K/UL — SIGNIFICANT CHANGE UP (ref 0–0)
NRBC # FLD: 0 K/UL — SIGNIFICANT CHANGE UP (ref 0–0)
NRBC BLD AUTO-RTO: 0 /100 WBCS — SIGNIFICANT CHANGE UP (ref 0–0)
NRBC BLD AUTO-RTO: 0 /100 WBCS — SIGNIFICANT CHANGE UP (ref 0–0)
PH UR: 5.5 — SIGNIFICANT CHANGE UP (ref 5–8)
PLATELET # BLD AUTO: 107 K/UL — LOW (ref 150–400)
PLATELET # BLD AUTO: 91 K/UL — LOW (ref 150–400)
PMV BLD: 12.4 FL — SIGNIFICANT CHANGE UP (ref 7–13)
PMV BLD: 12.5 FL — SIGNIFICANT CHANGE UP (ref 7–13)
POTASSIUM SERPL-MCNC: 3.1 MMOL/L — LOW (ref 3.5–5)
POTASSIUM SERPL-MCNC: 4 MMOL/L — SIGNIFICANT CHANGE UP (ref 3.5–5)
POTASSIUM SERPL-SCNC: 3.1 MMOL/L — LOW (ref 3.5–5)
POTASSIUM SERPL-SCNC: 4 MMOL/L — SIGNIFICANT CHANGE UP (ref 3.5–5)
PROT SERPL-MCNC: 6.5 G/DL — SIGNIFICANT CHANGE UP (ref 6–8)
PROT UR-MCNC: 100 MG/DL
RBC # BLD: 3.78 M/UL — LOW (ref 3.8–5.2)
RBC # BLD: 3.78 M/UL — LOW (ref 3.8–5.2)
RBC # FLD: 14.5 % — SIGNIFICANT CHANGE UP (ref 10.3–14.5)
RBC # FLD: 14.6 % — HIGH (ref 10.3–14.5)
SALICYLATES SERPL-MCNC: <0.3 MG/DL — LOW (ref 4–30)
SODIUM SERPL-SCNC: 139 MMOL/L — SIGNIFICANT CHANGE UP (ref 135–146)
SODIUM SERPL-SCNC: 141 MMOL/L — SIGNIFICANT CHANGE UP (ref 135–146)
SP GR SPEC: 1.03 — SIGNIFICANT CHANGE UP (ref 1–1.03)
TSH SERPL-MCNC: 0.81 UIU/ML — SIGNIFICANT CHANGE UP (ref 0.27–4.2)
UROBILINOGEN FLD QL: 1 MG/DL — SIGNIFICANT CHANGE UP (ref 0.2–1)
WBC # BLD: 5.98 K/UL — SIGNIFICANT CHANGE UP (ref 3.8–10.5)
WBC # BLD: 7.16 K/UL — SIGNIFICANT CHANGE UP (ref 3.8–10.5)
WBC # FLD AUTO: 5.98 K/UL — SIGNIFICANT CHANGE UP (ref 3.8–10.5)
WBC # FLD AUTO: 7.16 K/UL — SIGNIFICANT CHANGE UP (ref 3.8–10.5)

## 2025-08-28 PROCEDURE — 90792 PSYCH DIAG EVAL W/MED SRVCS: CPT

## 2025-08-28 PROCEDURE — 83036 HEMOGLOBIN GLYCOSYLATED A1C: CPT

## 2025-08-28 PROCEDURE — 80053 COMPREHEN METABOLIC PANEL: CPT

## 2025-08-28 PROCEDURE — 85025 COMPLETE CBC W/AUTO DIFF WBC: CPT

## 2025-08-28 PROCEDURE — 80156 ASSAY CARBAMAZEPINE TOTAL: CPT

## 2025-08-28 PROCEDURE — 80061 LIPID PANEL: CPT

## 2025-08-28 PROCEDURE — 82962 GLUCOSE BLOOD TEST: CPT

## 2025-08-28 PROCEDURE — 36415 COLL VENOUS BLD VENIPUNCTURE: CPT

## 2025-08-28 PROCEDURE — 84443 ASSAY THYROID STIM HORMONE: CPT

## 2025-08-28 PROCEDURE — 99285 EMERGENCY DEPT VISIT HI MDM: CPT | Mod: FS

## 2025-08-28 RX ORDER — MELATONIN 5 MG
5 TABLET ORAL AT BEDTIME
Refills: 0 | Status: DISCONTINUED | OUTPATIENT
Start: 2025-08-28 | End: 2025-09-05

## 2025-08-28 RX ORDER — ICOSAPENT ETHYL 500 MG/1
2 CAPSULE ORAL
Refills: 0 | DISCHARGE

## 2025-08-28 RX ORDER — ACETAMINOPHEN 500 MG/5ML
650 LIQUID (ML) ORAL EVERY 6 HOURS
Refills: 0 | Status: DISCONTINUED | OUTPATIENT
Start: 2025-08-28 | End: 2025-09-05

## 2025-08-28 RX ORDER — HYDROXYZINE HYDROCHLORIDE 25 MG/1
50 TABLET, FILM COATED ORAL EVERY 6 HOURS
Refills: 0 | Status: DISCONTINUED | OUTPATIENT
Start: 2025-08-28 | End: 2025-09-05

## 2025-08-28 RX ORDER — LISINOPRIL 5 MG/1
1 TABLET ORAL
Refills: 0 | DISCHARGE

## 2025-08-28 RX ORDER — INSULIN LISPRO 100 U/ML
INJECTION, SOLUTION INTRAVENOUS; SUBCUTANEOUS
Refills: 0 | Status: DISCONTINUED | OUTPATIENT
Start: 2025-08-28 | End: 2025-09-05

## 2025-08-28 RX ORDER — TRAZODONE HCL 100 MG
50 TABLET ORAL AT BEDTIME
Refills: 0 | Status: DISCONTINUED | OUTPATIENT
Start: 2025-08-28 | End: 2025-09-01

## 2025-08-28 RX ORDER — ALBUTEROL SULFATE 2.5 MG/3ML
2 VIAL, NEBULIZER (ML) INHALATION EVERY 6 HOURS
Refills: 0 | Status: DISCONTINUED | OUTPATIENT
Start: 2025-08-28 | End: 2025-09-05

## 2025-08-28 RX ORDER — HALOPERIDOL 10 MG/1
5 TABLET ORAL EVERY 6 HOURS
Refills: 0 | Status: DISCONTINUED | OUTPATIENT
Start: 2025-08-28 | End: 2025-09-05

## 2025-08-28 RX ORDER — ANASTROZOLE 1 MG/1
1 TABLET ORAL DAILY
Refills: 0 | Status: DISCONTINUED | OUTPATIENT
Start: 2025-08-28 | End: 2025-09-05

## 2025-08-28 RX ORDER — ARIPIPRAZOLE 2 MG/1
10 TABLET ORAL DAILY
Refills: 0 | Status: DISCONTINUED | OUTPATIENT
Start: 2025-08-28 | End: 2025-08-29

## 2025-08-28 RX ORDER — LISINOPRIL 5 MG/1
2.5 TABLET ORAL DAILY
Refills: 0 | Status: DISCONTINUED | OUTPATIENT
Start: 2025-08-28 | End: 2025-09-05

## 2025-08-28 RX ORDER — METFORMIN HYDROCHLORIDE 850 MG/1
1000 TABLET ORAL
Refills: 0 | Status: DISCONTINUED | OUTPATIENT
Start: 2025-08-28 | End: 2025-09-05

## 2025-08-28 RX ORDER — DOXYCYCLINE HYCLATE 100 MG
20 TABLET ORAL EVERY 12 HOURS
Refills: 0 | Status: DISCONTINUED | OUTPATIENT
Start: 2025-08-28 | End: 2025-09-05

## 2025-08-28 RX ORDER — NICOTINE POLACRILEX 4 MG/1
2 GUM, CHEWING ORAL
Refills: 0 | Status: DISCONTINUED | OUTPATIENT
Start: 2025-08-28 | End: 2025-09-05

## 2025-08-28 RX ORDER — INSULIN GLARGINE-YFGN 100 [IU]/ML
36 INJECTION, SOLUTION SUBCUTANEOUS AT BEDTIME
Refills: 0 | Status: DISCONTINUED | OUTPATIENT
Start: 2025-08-28 | End: 2025-09-05

## 2025-08-28 RX ORDER — MONTELUKAST SODIUM 10 MG/1
10 TABLET ORAL AT BEDTIME
Refills: 0 | Status: DISCONTINUED | OUTPATIENT
Start: 2025-08-28 | End: 2025-09-05

## 2025-08-28 RX ORDER — FUROSEMIDE 10 MG/ML
20 INJECTION INTRAMUSCULAR; INTRAVENOUS DAILY
Refills: 0 | Status: DISCONTINUED | OUTPATIENT
Start: 2025-08-28 | End: 2025-09-05

## 2025-08-28 RX ORDER — GABAPENTIN 400 MG/1
1 CAPSULE ORAL
Refills: 0 | DISCHARGE

## 2025-08-28 RX ORDER — ACETAMINOPHEN 500 MG/5ML
975 LIQUID (ML) ORAL ONCE
Refills: 0 | Status: COMPLETED | OUTPATIENT
Start: 2025-08-28 | End: 2025-08-28

## 2025-08-28 RX ORDER — ROSUVASTATIN CALCIUM 20 MG/1
1 TABLET, FILM COATED ORAL
Refills: 0 | DISCHARGE

## 2025-08-28 RX ORDER — BENZTROPINE MESYLATE 2 MG
1 TABLET ORAL
Refills: 0 | Status: DISCONTINUED | OUTPATIENT
Start: 2025-08-28 | End: 2025-09-05

## 2025-08-28 RX ADMIN — Medication 40 MILLIEQUIVALENT(S): at 06:16

## 2025-08-28 RX ADMIN — INSULIN GLARGINE-YFGN 36 UNIT(S): 100 INJECTION, SOLUTION SUBCUTANEOUS at 21:15

## 2025-08-28 RX ADMIN — Medication 1 MILLIGRAM(S): at 21:15

## 2025-08-28 RX ADMIN — Medication 5 MILLIGRAM(S): at 21:16

## 2025-08-28 RX ADMIN — Medication 20 MILLIGRAM(S): at 21:15

## 2025-08-28 RX ADMIN — ARIPIPRAZOLE 10 MILLIGRAM(S): 2 TABLET ORAL at 14:43

## 2025-08-28 RX ADMIN — Medication 975 MILLIGRAM(S): at 04:52

## 2025-08-28 RX ADMIN — METFORMIN HYDROCHLORIDE 1000 MILLIGRAM(S): 850 TABLET ORAL at 21:16

## 2025-08-29 LAB
CHOLEST SERPL-MCNC: 109 MG/DL — SIGNIFICANT CHANGE UP
GLUCOSE BLDC GLUCOMTR-MCNC: 168 MG/DL — HIGH (ref 70–99)
GLUCOSE BLDC GLUCOMTR-MCNC: 175 MG/DL — HIGH (ref 70–99)
GLUCOSE BLDC GLUCOMTR-MCNC: 219 MG/DL — HIGH (ref 70–99)
HDLC SERPL-MCNC: 36 MG/DL — LOW
LDLC SERPL-MCNC: 21 MG/DL — SIGNIFICANT CHANGE UP
LIPID PNL WITH DIRECT LDL SERPL: 21 MG/DL — SIGNIFICANT CHANGE UP
NONHDLC SERPL-MCNC: 73 MG/DL — SIGNIFICANT CHANGE UP
TRIGL SERPL-MCNC: 370 MG/DL — HIGH

## 2025-08-29 PROCEDURE — 99232 SBSQ HOSP IP/OBS MODERATE 35: CPT | Mod: GC

## 2025-08-29 PROCEDURE — 99222 1ST HOSP IP/OBS MODERATE 55: CPT

## 2025-08-29 RX ORDER — MEDPURA VITAMIN A AND D 95 G/100G
1 OINTMENT TOPICAL THREE TIMES A DAY
Refills: 0 | Status: DISCONTINUED | OUTPATIENT
Start: 2025-08-29 | End: 2025-09-05

## 2025-08-29 RX ORDER — OMEGA-3-ACID ETHYL ESTERS CAPSULES 1 G/1
2 CAPSULE, LIQUID FILLED ORAL
Refills: 0 | Status: DISCONTINUED | OUTPATIENT
Start: 2025-08-29 | End: 2025-09-05

## 2025-08-29 RX ORDER — CARBAMAZEPINE 200 MG/1
200 TABLET ORAL EVERY 12 HOURS
Refills: 0 | Status: DISCONTINUED | OUTPATIENT
Start: 2025-08-29 | End: 2025-09-05

## 2025-08-29 RX ORDER — ARIPIPRAZOLE 2 MG/1
15 TABLET ORAL DAILY
Refills: 0 | Status: DISCONTINUED | OUTPATIENT
Start: 2025-08-29 | End: 2025-08-29

## 2025-08-29 RX ORDER — SIMETHICONE 80 MG
80 TABLET,CHEWABLE ORAL THREE TIMES A DAY
Refills: 0 | Status: DISCONTINUED | OUTPATIENT
Start: 2025-08-29 | End: 2025-09-05

## 2025-08-29 RX ADMIN — Medication 650 MILLIGRAM(S): at 18:50

## 2025-08-29 RX ADMIN — HYDROXYZINE HYDROCHLORIDE 50 MILLIGRAM(S): 25 TABLET, FILM COATED ORAL at 23:15

## 2025-08-29 RX ADMIN — Medication 1 MILLIGRAM(S): at 23:00

## 2025-08-29 RX ADMIN — OMEGA-3-ACID ETHYL ESTERS CAPSULES 2 GRAM(S): 1 CAPSULE, LIQUID FILLED ORAL at 23:01

## 2025-08-29 RX ADMIN — CARBAMAZEPINE 200 MILLIGRAM(S): 200 TABLET ORAL at 23:00

## 2025-08-29 RX ADMIN — METFORMIN HYDROCHLORIDE 1000 MILLIGRAM(S): 850 TABLET ORAL at 23:00

## 2025-08-29 RX ADMIN — Medication 650 MILLIGRAM(S): at 04:00

## 2025-08-29 RX ADMIN — ANASTROZOLE 1 MILLIGRAM(S): 1 TABLET ORAL at 08:02

## 2025-08-29 RX ADMIN — Medication 20 MILLIGRAM(S): at 23:15

## 2025-08-29 RX ADMIN — HYDROXYZINE HYDROCHLORIDE 50 MILLIGRAM(S): 25 TABLET, FILM COATED ORAL at 03:07

## 2025-08-29 RX ADMIN — FUROSEMIDE 20 MILLIGRAM(S): 10 INJECTION INTRAMUSCULAR; INTRAVENOUS at 08:03

## 2025-08-29 RX ADMIN — Medication 20 MILLIGRAM(S): at 08:03

## 2025-08-29 RX ADMIN — METFORMIN HYDROCHLORIDE 1000 MILLIGRAM(S): 850 TABLET ORAL at 08:03

## 2025-08-29 RX ADMIN — INSULIN GLARGINE-YFGN 36 UNIT(S): 100 INJECTION, SOLUTION SUBCUTANEOUS at 20:24

## 2025-08-29 RX ADMIN — LISINOPRIL 2.5 MILLIGRAM(S): 5 TABLET ORAL at 08:03

## 2025-08-29 RX ADMIN — INSULIN LISPRO 1: 100 INJECTION, SOLUTION INTRAVENOUS; SUBCUTANEOUS at 07:24

## 2025-08-29 RX ADMIN — ARIPIPRAZOLE 10 MILLIGRAM(S): 2 TABLET ORAL at 08:02

## 2025-08-29 RX ADMIN — INSULIN LISPRO 1: 100 INJECTION, SOLUTION INTRAVENOUS; SUBCUTANEOUS at 16:45

## 2025-08-29 RX ADMIN — Medication 1 MILLIGRAM(S): at 08:03

## 2025-08-29 RX ADMIN — Medication 5 MILLIGRAM(S): at 22:59

## 2025-08-29 RX ADMIN — Medication 650 MILLIGRAM(S): at 03:07

## 2025-08-30 LAB
GLUCOSE BLDC GLUCOMTR-MCNC: 154 MG/DL — HIGH (ref 70–99)
GLUCOSE BLDC GLUCOMTR-MCNC: 158 MG/DL — HIGH (ref 70–99)
GLUCOSE BLDC GLUCOMTR-MCNC: 230 MG/DL — HIGH (ref 70–99)
GLUCOSE BLDC GLUCOMTR-MCNC: 340 MG/DL — HIGH (ref 70–99)

## 2025-08-30 RX ORDER — BISACODYL 5 MG
5 TABLET, DELAYED RELEASE (ENTERIC COATED) ORAL EVERY 12 HOURS
Refills: 0 | Status: DISCONTINUED | OUTPATIENT
Start: 2025-08-30 | End: 2025-09-05

## 2025-08-30 RX ORDER — SENNA 187 MG
2 TABLET ORAL AT BEDTIME
Refills: 0 | Status: DISCONTINUED | OUTPATIENT
Start: 2025-08-30 | End: 2025-09-05

## 2025-08-30 RX ADMIN — Medication 1 MILLIGRAM(S): at 08:08

## 2025-08-30 RX ADMIN — Medication 20 MILLIGRAM(S): at 10:14

## 2025-08-30 RX ADMIN — Medication 5 MILLIGRAM(S): at 20:27

## 2025-08-30 RX ADMIN — Medication 80 MILLIGRAM(S): at 03:40

## 2025-08-30 RX ADMIN — Medication 5 MILLIGRAM(S): at 20:24

## 2025-08-30 RX ADMIN — Medication 20 MILLIGRAM(S): at 20:24

## 2025-08-30 RX ADMIN — FUROSEMIDE 20 MILLIGRAM(S): 10 INJECTION INTRAMUSCULAR; INTRAVENOUS at 08:07

## 2025-08-30 RX ADMIN — MONTELUKAST SODIUM 10 MILLIGRAM(S): 10 TABLET ORAL at 20:25

## 2025-08-30 RX ADMIN — OMEGA-3-ACID ETHYL ESTERS CAPSULES 2 GRAM(S): 1 CAPSULE, LIQUID FILLED ORAL at 20:26

## 2025-08-30 RX ADMIN — CARBAMAZEPINE 200 MILLIGRAM(S): 200 TABLET ORAL at 08:09

## 2025-08-30 RX ADMIN — INSULIN LISPRO 1: 100 INJECTION, SOLUTION INTRAVENOUS; SUBCUTANEOUS at 11:59

## 2025-08-30 RX ADMIN — Medication 650 MILLIGRAM(S): at 21:10

## 2025-08-30 RX ADMIN — Medication 2 TABLET(S): at 20:25

## 2025-08-30 RX ADMIN — INSULIN LISPRO 2: 100 INJECTION, SOLUTION INTRAVENOUS; SUBCUTANEOUS at 17:25

## 2025-08-30 RX ADMIN — ANASTROZOLE 1 MILLIGRAM(S): 1 TABLET ORAL at 08:08

## 2025-08-30 RX ADMIN — Medication 80 MILLIGRAM(S): at 10:40

## 2025-08-30 RX ADMIN — METFORMIN HYDROCHLORIDE 1000 MILLIGRAM(S): 850 TABLET ORAL at 20:25

## 2025-08-30 RX ADMIN — Medication 1 MILLIGRAM(S): at 20:25

## 2025-08-30 RX ADMIN — INSULIN LISPRO 1: 100 INJECTION, SOLUTION INTRAVENOUS; SUBCUTANEOUS at 07:27

## 2025-08-30 RX ADMIN — LISINOPRIL 2.5 MILLIGRAM(S): 5 TABLET ORAL at 08:07

## 2025-08-30 RX ADMIN — Medication 650 MILLIGRAM(S): at 20:38

## 2025-08-30 RX ADMIN — OMEGA-3-ACID ETHYL ESTERS CAPSULES 2 GRAM(S): 1 CAPSULE, LIQUID FILLED ORAL at 09:14

## 2025-08-30 RX ADMIN — METFORMIN HYDROCHLORIDE 1000 MILLIGRAM(S): 850 TABLET ORAL at 08:07

## 2025-08-30 RX ADMIN — CARBAMAZEPINE 200 MILLIGRAM(S): 200 TABLET ORAL at 20:25

## 2025-08-30 RX ADMIN — INSULIN GLARGINE-YFGN 36 UNIT(S): 100 INJECTION, SOLUTION SUBCUTANEOUS at 20:23

## 2025-08-30 RX ADMIN — Medication 650 MILLIGRAM(S): at 10:15

## 2025-08-31 LAB
GLUCOSE BLDC GLUCOMTR-MCNC: 118 MG/DL — HIGH (ref 70–99)
GLUCOSE BLDC GLUCOMTR-MCNC: 172 MG/DL — HIGH (ref 70–99)
GLUCOSE BLDC GLUCOMTR-MCNC: 183 MG/DL — HIGH (ref 70–99)
GLUCOSE BLDC GLUCOMTR-MCNC: 240 MG/DL — HIGH (ref 70–99)

## 2025-08-31 RX ADMIN — METFORMIN HYDROCHLORIDE 1000 MILLIGRAM(S): 850 TABLET ORAL at 08:17

## 2025-08-31 RX ADMIN — LISINOPRIL 2.5 MILLIGRAM(S): 5 TABLET ORAL at 08:07

## 2025-08-31 RX ADMIN — Medication 20 MILLIGRAM(S): at 20:19

## 2025-08-31 RX ADMIN — INSULIN LISPRO 1: 100 INJECTION, SOLUTION INTRAVENOUS; SUBCUTANEOUS at 07:44

## 2025-08-31 RX ADMIN — Medication 1 MILLIGRAM(S): at 20:18

## 2025-08-31 RX ADMIN — MONTELUKAST SODIUM 10 MILLIGRAM(S): 10 TABLET ORAL at 20:18

## 2025-08-31 RX ADMIN — Medication 5 MILLIGRAM(S): at 20:18

## 2025-08-31 RX ADMIN — Medication 50 MILLIGRAM(S): at 01:54

## 2025-08-31 RX ADMIN — INSULIN GLARGINE-YFGN 36 UNIT(S): 100 INJECTION, SOLUTION SUBCUTANEOUS at 20:41

## 2025-08-31 RX ADMIN — Medication 50 MILLIGRAM(S): at 23:36

## 2025-08-31 RX ADMIN — Medication 20 MILLIGRAM(S): at 08:09

## 2025-08-31 RX ADMIN — OMEGA-3-ACID ETHYL ESTERS CAPSULES 2 GRAM(S): 1 CAPSULE, LIQUID FILLED ORAL at 20:20

## 2025-08-31 RX ADMIN — INSULIN LISPRO 1: 100 INJECTION, SOLUTION INTRAVENOUS; SUBCUTANEOUS at 16:51

## 2025-08-31 RX ADMIN — METFORMIN HYDROCHLORIDE 1000 MILLIGRAM(S): 850 TABLET ORAL at 20:18

## 2025-08-31 RX ADMIN — CARBAMAZEPINE 200 MILLIGRAM(S): 200 TABLET ORAL at 20:18

## 2025-08-31 RX ADMIN — Medication 1 MILLIGRAM(S): at 08:16

## 2025-08-31 RX ADMIN — CARBAMAZEPINE 200 MILLIGRAM(S): 200 TABLET ORAL at 08:17

## 2025-08-31 RX ADMIN — ANASTROZOLE 1 MILLIGRAM(S): 1 TABLET ORAL at 08:07

## 2025-08-31 RX ADMIN — OMEGA-3-ACID ETHYL ESTERS CAPSULES 2 GRAM(S): 1 CAPSULE, LIQUID FILLED ORAL at 08:06

## 2025-08-31 RX ADMIN — FUROSEMIDE 20 MILLIGRAM(S): 10 INJECTION INTRAMUSCULAR; INTRAVENOUS at 08:07

## 2025-08-31 RX ADMIN — Medication 2 TABLET(S): at 20:18

## 2025-09-01 LAB
GLUCOSE BLDC GLUCOMTR-MCNC: 153 MG/DL — HIGH (ref 70–99)
GLUCOSE BLDC GLUCOMTR-MCNC: 157 MG/DL — HIGH (ref 70–99)
GLUCOSE BLDC GLUCOMTR-MCNC: 227 MG/DL — HIGH (ref 70–99)
GLUCOSE BLDC GLUCOMTR-MCNC: 256 MG/DL — HIGH (ref 70–99)

## 2025-09-01 PROCEDURE — 99231 SBSQ HOSP IP/OBS SF/LOW 25: CPT | Mod: 2W

## 2025-09-01 RX ORDER — TRAZODONE HCL 100 MG
100 TABLET ORAL AT BEDTIME
Refills: 0 | Status: DISCONTINUED | OUTPATIENT
Start: 2025-09-01 | End: 2025-09-05

## 2025-09-01 RX ADMIN — METFORMIN HYDROCHLORIDE 1000 MILLIGRAM(S): 850 TABLET ORAL at 08:36

## 2025-09-01 RX ADMIN — Medication 2 TABLET(S): at 20:32

## 2025-09-01 RX ADMIN — CARBAMAZEPINE 200 MILLIGRAM(S): 200 TABLET ORAL at 08:36

## 2025-09-01 RX ADMIN — Medication 1 MILLIGRAM(S): at 08:36

## 2025-09-01 RX ADMIN — OMEGA-3-ACID ETHYL ESTERS CAPSULES 2 GRAM(S): 1 CAPSULE, LIQUID FILLED ORAL at 08:37

## 2025-09-01 RX ADMIN — INSULIN LISPRO 1: 100 INJECTION, SOLUTION INTRAVENOUS; SUBCUTANEOUS at 11:25

## 2025-09-01 RX ADMIN — Medication 5 MILLIGRAM(S): at 01:34

## 2025-09-01 RX ADMIN — Medication 1 MILLIGRAM(S): at 20:31

## 2025-09-01 RX ADMIN — CARBAMAZEPINE 200 MILLIGRAM(S): 200 TABLET ORAL at 20:31

## 2025-09-01 RX ADMIN — ANASTROZOLE 1 MILLIGRAM(S): 1 TABLET ORAL at 08:37

## 2025-09-01 RX ADMIN — INSULIN LISPRO 3: 100 INJECTION, SOLUTION INTRAVENOUS; SUBCUTANEOUS at 16:26

## 2025-09-01 RX ADMIN — METFORMIN HYDROCHLORIDE 1000 MILLIGRAM(S): 850 TABLET ORAL at 20:32

## 2025-09-01 RX ADMIN — Medication 20 MILLIGRAM(S): at 08:37

## 2025-09-01 RX ADMIN — INSULIN GLARGINE-YFGN 36 UNIT(S): 100 INJECTION, SOLUTION SUBCUTANEOUS at 20:32

## 2025-09-01 RX ADMIN — Medication 5 MILLIGRAM(S): at 20:31

## 2025-09-01 RX ADMIN — HYDROXYZINE HYDROCHLORIDE 50 MILLIGRAM(S): 25 TABLET, FILM COATED ORAL at 01:33

## 2025-09-01 RX ADMIN — INSULIN LISPRO 1: 100 INJECTION, SOLUTION INTRAVENOUS; SUBCUTANEOUS at 07:23

## 2025-09-01 RX ADMIN — Medication 20 MILLIGRAM(S): at 20:32

## 2025-09-01 RX ADMIN — Medication 100 MILLIGRAM(S): at 20:31

## 2025-09-02 LAB
GLUCOSE BLDC GLUCOMTR-MCNC: 105 MG/DL — HIGH (ref 70–99)
GLUCOSE BLDC GLUCOMTR-MCNC: 167 MG/DL — HIGH (ref 70–99)
GLUCOSE BLDC GLUCOMTR-MCNC: 178 MG/DL — HIGH (ref 70–99)
GLUCOSE BLDC GLUCOMTR-MCNC: 89 MG/DL — SIGNIFICANT CHANGE UP (ref 70–99)

## 2025-09-02 PROCEDURE — 99232 SBSQ HOSP IP/OBS MODERATE 35: CPT | Mod: GC

## 2025-09-02 RX ADMIN — MONTELUKAST SODIUM 10 MILLIGRAM(S): 10 TABLET ORAL at 21:01

## 2025-09-02 RX ADMIN — Medication 2 TABLET(S): at 21:00

## 2025-09-02 RX ADMIN — INSULIN LISPRO 1: 100 INJECTION, SOLUTION INTRAVENOUS; SUBCUTANEOUS at 07:33

## 2025-09-02 RX ADMIN — Medication 5 MILLIGRAM(S): at 15:00

## 2025-09-02 RX ADMIN — OMEGA-3-ACID ETHYL ESTERS CAPSULES 2 GRAM(S): 1 CAPSULE, LIQUID FILLED ORAL at 21:03

## 2025-09-02 RX ADMIN — Medication 20 MILLIGRAM(S): at 08:09

## 2025-09-02 RX ADMIN — CARBAMAZEPINE 200 MILLIGRAM(S): 200 TABLET ORAL at 21:00

## 2025-09-02 RX ADMIN — ANASTROZOLE 1 MILLIGRAM(S): 1 TABLET ORAL at 08:08

## 2025-09-02 RX ADMIN — Medication 100 MILLIGRAM(S): at 20:59

## 2025-09-02 RX ADMIN — FUROSEMIDE 20 MILLIGRAM(S): 10 INJECTION INTRAMUSCULAR; INTRAVENOUS at 08:08

## 2025-09-02 RX ADMIN — OMEGA-3-ACID ETHYL ESTERS CAPSULES 2 GRAM(S): 1 CAPSULE, LIQUID FILLED ORAL at 08:09

## 2025-09-02 RX ADMIN — Medication 1 MILLIGRAM(S): at 08:08

## 2025-09-02 RX ADMIN — METFORMIN HYDROCHLORIDE 1000 MILLIGRAM(S): 850 TABLET ORAL at 21:00

## 2025-09-02 RX ADMIN — CARBAMAZEPINE 200 MILLIGRAM(S): 200 TABLET ORAL at 08:08

## 2025-09-02 RX ADMIN — METFORMIN HYDROCHLORIDE 1000 MILLIGRAM(S): 850 TABLET ORAL at 08:08

## 2025-09-02 RX ADMIN — LISINOPRIL 2.5 MILLIGRAM(S): 5 TABLET ORAL at 08:09

## 2025-09-02 RX ADMIN — Medication 1 MILLIGRAM(S): at 21:00

## 2025-09-02 RX ADMIN — Medication 20 MILLIGRAM(S): at 21:01

## 2025-09-02 RX ADMIN — INSULIN GLARGINE-YFGN 36 UNIT(S): 100 INJECTION, SOLUTION SUBCUTANEOUS at 20:59

## 2025-09-02 RX ADMIN — Medication 5 MILLIGRAM(S): at 21:00

## 2025-09-03 LAB
CARBAMAZEPINE SERPL-MCNC: 8.2 UG/ML — SIGNIFICANT CHANGE UP (ref 4–12)
GLUCOSE BLDC GLUCOMTR-MCNC: 106 MG/DL — HIGH (ref 70–99)
GLUCOSE BLDC GLUCOMTR-MCNC: 108 MG/DL — HIGH (ref 70–99)
GLUCOSE BLDC GLUCOMTR-MCNC: 123 MG/DL — HIGH (ref 70–99)
GLUCOSE BLDC GLUCOMTR-MCNC: 230 MG/DL — HIGH (ref 70–99)

## 2025-09-03 PROCEDURE — 99232 SBSQ HOSP IP/OBS MODERATE 35: CPT | Mod: GC

## 2025-09-03 RX ORDER — POLYETHYLENE GLYCOL 3350 17 G/17G
17 POWDER, FOR SOLUTION ORAL DAILY
Refills: 0 | Status: DISCONTINUED | OUTPATIENT
Start: 2025-09-03 | End: 2025-09-05

## 2025-09-03 RX ADMIN — MEDPURA VITAMIN A AND D 1 APPLICATION(S): 95 OINTMENT TOPICAL at 20:40

## 2025-09-03 RX ADMIN — Medication 5 MILLIGRAM(S): at 08:54

## 2025-09-03 RX ADMIN — Medication 20 MILLIGRAM(S): at 21:11

## 2025-09-03 RX ADMIN — METFORMIN HYDROCHLORIDE 1000 MILLIGRAM(S): 850 TABLET ORAL at 08:34

## 2025-09-03 RX ADMIN — INSULIN GLARGINE-YFGN 36 UNIT(S): 100 INJECTION, SOLUTION SUBCUTANEOUS at 20:41

## 2025-09-03 RX ADMIN — Medication 100 MILLIGRAM(S): at 20:40

## 2025-09-03 RX ADMIN — Medication 20 MILLIGRAM(S): at 08:35

## 2025-09-03 RX ADMIN — FUROSEMIDE 20 MILLIGRAM(S): 10 INJECTION INTRAMUSCULAR; INTRAVENOUS at 08:35

## 2025-09-03 RX ADMIN — OMEGA-3-ACID ETHYL ESTERS CAPSULES 2 GRAM(S): 1 CAPSULE, LIQUID FILLED ORAL at 20:43

## 2025-09-03 RX ADMIN — POLYETHYLENE GLYCOL 3350 17 GRAM(S): 17 POWDER, FOR SOLUTION ORAL at 16:20

## 2025-09-03 RX ADMIN — CARBAMAZEPINE 200 MILLIGRAM(S): 200 TABLET ORAL at 08:34

## 2025-09-03 RX ADMIN — LISINOPRIL 2.5 MILLIGRAM(S): 5 TABLET ORAL at 08:36

## 2025-09-03 RX ADMIN — CARBAMAZEPINE 200 MILLIGRAM(S): 200 TABLET ORAL at 20:40

## 2025-09-03 RX ADMIN — Medication 1 MILLIGRAM(S): at 08:34

## 2025-09-03 RX ADMIN — Medication 1 MILLIGRAM(S): at 20:39

## 2025-09-03 RX ADMIN — MONTELUKAST SODIUM 10 MILLIGRAM(S): 10 TABLET ORAL at 20:44

## 2025-09-03 RX ADMIN — OMEGA-3-ACID ETHYL ESTERS CAPSULES 2 GRAM(S): 1 CAPSULE, LIQUID FILLED ORAL at 08:36

## 2025-09-03 RX ADMIN — Medication 5 MILLIGRAM(S): at 20:40

## 2025-09-03 RX ADMIN — METFORMIN HYDROCHLORIDE 1000 MILLIGRAM(S): 850 TABLET ORAL at 20:39

## 2025-09-03 RX ADMIN — Medication 2 TABLET(S): at 20:40

## 2025-09-03 RX ADMIN — ANASTROZOLE 1 MILLIGRAM(S): 1 TABLET ORAL at 08:35

## 2025-09-04 LAB
GLUCOSE BLDC GLUCOMTR-MCNC: 122 MG/DL — HIGH (ref 70–99)
GLUCOSE BLDC GLUCOMTR-MCNC: 141 MG/DL — HIGH (ref 70–99)
GLUCOSE BLDC GLUCOMTR-MCNC: 161 MG/DL — HIGH (ref 70–99)
GLUCOSE BLDC GLUCOMTR-MCNC: 93 MG/DL — SIGNIFICANT CHANGE UP (ref 70–99)

## 2025-09-04 PROCEDURE — 99232 SBSQ HOSP IP/OBS MODERATE 35: CPT | Mod: GC

## 2025-09-04 RX ORDER — MAGNESIUM CITRATE
296 SOLUTION, ORAL ORAL ONCE
Refills: 0 | Status: COMPLETED | OUTPATIENT
Start: 2025-09-04 | End: 2025-09-04

## 2025-09-04 RX ORDER — SALINE 7; 19 G/118ML; G/118ML
1 ENEMA RECTAL ONCE
Refills: 0 | Status: COMPLETED | OUTPATIENT
Start: 2025-09-04 | End: 2025-09-05

## 2025-09-04 RX ORDER — SALINE 7; 19 G/118ML; G/118ML
1 ENEMA RECTAL ONCE
Refills: 0 | Status: DISCONTINUED | OUTPATIENT
Start: 2025-09-04 | End: 2025-09-04

## 2025-09-04 RX ORDER — ONDANSETRON HCL/PF 4 MG/2 ML
4 VIAL (ML) INJECTION EVERY 6 HOURS
Refills: 0 | Status: DISCONTINUED | OUTPATIENT
Start: 2025-09-04 | End: 2025-09-05

## 2025-09-04 RX ADMIN — ANASTROZOLE 1 MILLIGRAM(S): 1 TABLET ORAL at 08:40

## 2025-09-04 RX ADMIN — FUROSEMIDE 20 MILLIGRAM(S): 10 INJECTION INTRAMUSCULAR; INTRAVENOUS at 08:40

## 2025-09-04 RX ADMIN — Medication 1 MILLIGRAM(S): at 20:49

## 2025-09-04 RX ADMIN — Medication 80 MILLIGRAM(S): at 02:00

## 2025-09-04 RX ADMIN — Medication 5 MILLIGRAM(S): at 20:50

## 2025-09-04 RX ADMIN — Medication 100 MILLIGRAM(S): at 20:49

## 2025-09-04 RX ADMIN — MONTELUKAST SODIUM 10 MILLIGRAM(S): 10 TABLET ORAL at 20:50

## 2025-09-04 RX ADMIN — Medication 20 MILLIGRAM(S): at 20:48

## 2025-09-04 RX ADMIN — CARBAMAZEPINE 200 MILLIGRAM(S): 200 TABLET ORAL at 08:40

## 2025-09-04 RX ADMIN — Medication 4 MILLIGRAM(S): at 16:42

## 2025-09-04 RX ADMIN — INSULIN GLARGINE-YFGN 36 UNIT(S): 100 INJECTION, SOLUTION SUBCUTANEOUS at 20:48

## 2025-09-04 RX ADMIN — Medication 1 MILLIGRAM(S): at 08:41

## 2025-09-04 RX ADMIN — CARBAMAZEPINE 200 MILLIGRAM(S): 200 TABLET ORAL at 20:49

## 2025-09-04 RX ADMIN — Medication 4 MILLIGRAM(S): at 05:36

## 2025-09-04 RX ADMIN — Medication 296 MILLILITER(S): at 18:49

## 2025-09-04 RX ADMIN — METFORMIN HYDROCHLORIDE 1000 MILLIGRAM(S): 850 TABLET ORAL at 08:40

## 2025-09-04 RX ADMIN — LISINOPRIL 2.5 MILLIGRAM(S): 5 TABLET ORAL at 08:41

## 2025-09-04 RX ADMIN — OMEGA-3-ACID ETHYL ESTERS CAPSULES 2 GRAM(S): 1 CAPSULE, LIQUID FILLED ORAL at 20:52

## 2025-09-04 RX ADMIN — Medication 2 TABLET(S): at 20:50

## 2025-09-04 RX ADMIN — OMEGA-3-ACID ETHYL ESTERS CAPSULES 2 GRAM(S): 1 CAPSULE, LIQUID FILLED ORAL at 08:41

## 2025-09-04 RX ADMIN — METFORMIN HYDROCHLORIDE 1000 MILLIGRAM(S): 850 TABLET ORAL at 20:50

## 2025-09-05 VITALS — DIASTOLIC BLOOD PRESSURE: 73 MMHG | SYSTOLIC BLOOD PRESSURE: 111 MMHG | HEART RATE: 105 BPM

## 2025-09-05 LAB — GLUCOSE BLDC GLUCOMTR-MCNC: 132 MG/DL — HIGH (ref 70–99)

## 2025-09-05 PROCEDURE — 99238 HOSP IP/OBS DSCHRG MGMT 30/<: CPT

## 2025-09-05 RX ORDER — OMEGA-3-ACID ETHYL ESTERS CAPSULES 1 G/1
2 CAPSULE, LIQUID FILLED ORAL
Qty: 0 | Refills: 0 | DISCHARGE
Start: 2025-09-05

## 2025-09-05 RX ORDER — SENNA 187 MG
2 TABLET ORAL
Qty: 28 | Refills: 0
Start: 2025-09-05 | End: 2025-09-18

## 2025-09-05 RX ORDER — FUROSEMIDE 10 MG/ML
1 INJECTION INTRAMUSCULAR; INTRAVENOUS
Refills: 0 | DISCHARGE

## 2025-09-05 RX ORDER — DOXYCYCLINE HYCLATE 100 MG
4 TABLET ORAL
Qty: 0 | Refills: 0 | DISCHARGE
Start: 2025-09-05

## 2025-09-05 RX ORDER — CLONAZEPAM 0.5 MG/1
1 TABLET ORAL
Refills: 0 | DISCHARGE

## 2025-09-05 RX ORDER — CARBAMAZEPINE 200 MG/1
1 TABLET ORAL
Qty: 28 | Refills: 0
Start: 2025-09-05 | End: 2025-09-18

## 2025-09-05 RX ORDER — TRAZODONE HCL 100 MG
1 TABLET ORAL
Qty: 14 | Refills: 0
Start: 2025-09-05 | End: 2025-09-18

## 2025-09-05 RX ORDER — NICOTINE POLACRILEX 4 MG/1
1 GUM, CHEWING ORAL
Qty: 4 | Refills: 0
Start: 2025-09-05 | End: 2025-09-18

## 2025-09-05 RX ADMIN — ANASTROZOLE 1 MILLIGRAM(S): 1 TABLET ORAL at 08:21

## 2025-09-05 RX ADMIN — Medication 1 MILLIGRAM(S): at 08:20

## 2025-09-05 RX ADMIN — LISINOPRIL 2.5 MILLIGRAM(S): 5 TABLET ORAL at 08:34

## 2025-09-05 RX ADMIN — Medication 20 MILLIGRAM(S): at 08:33

## 2025-09-05 RX ADMIN — METFORMIN HYDROCHLORIDE 1000 MILLIGRAM(S): 850 TABLET ORAL at 08:21

## 2025-09-05 RX ADMIN — SALINE 1 ENEMA: 7; 19 ENEMA RECTAL at 06:50

## 2025-09-05 RX ADMIN — OMEGA-3-ACID ETHYL ESTERS CAPSULES 2 GRAM(S): 1 CAPSULE, LIQUID FILLED ORAL at 08:35

## 2025-09-05 RX ADMIN — CARBAMAZEPINE 200 MILLIGRAM(S): 200 TABLET ORAL at 08:20

## 2025-09-05 RX ADMIN — FUROSEMIDE 20 MILLIGRAM(S): 10 INJECTION INTRAMUSCULAR; INTRAVENOUS at 08:21

## 2025-09-09 DIAGNOSIS — Z79.899 OTHER LONG TERM (CURRENT) DRUG THERAPY: ICD-10-CM

## 2025-09-09 DIAGNOSIS — G47.33 OBSTRUCTIVE SLEEP APNEA (ADULT) (PEDIATRIC): ICD-10-CM

## 2025-09-09 DIAGNOSIS — E11.9 TYPE 2 DIABETES MELLITUS WITHOUT COMPLICATIONS: ICD-10-CM

## 2025-09-09 DIAGNOSIS — K05.30 CHRONIC PERIODONTITIS, UNSPECIFIED: ICD-10-CM

## 2025-09-09 DIAGNOSIS — Z88.1 ALLERGY STATUS TO OTHER ANTIBIOTIC AGENTS: ICD-10-CM

## 2025-09-09 DIAGNOSIS — Z88.0 ALLERGY STATUS TO PENICILLIN: ICD-10-CM

## 2025-09-09 DIAGNOSIS — Z92.3 PERSONAL HISTORY OF IRRADIATION: ICD-10-CM

## 2025-09-09 DIAGNOSIS — Z91.09 OTHER ALLERGY STATUS, OTHER THAN TO DRUGS AND BIOLOGICAL SUBSTANCES: ICD-10-CM

## 2025-09-09 DIAGNOSIS — Z91.51 PERSONAL HISTORY OF SUICIDAL BEHAVIOR: ICD-10-CM

## 2025-09-09 DIAGNOSIS — G47.00 INSOMNIA, UNSPECIFIED: ICD-10-CM

## 2025-09-09 DIAGNOSIS — Z79.84 LONG TERM (CURRENT) USE OF ORAL HYPOGLYCEMIC DRUGS: ICD-10-CM

## 2025-09-09 DIAGNOSIS — D64.9 ANEMIA, UNSPECIFIED: ICD-10-CM

## 2025-09-09 DIAGNOSIS — Z99.89 DEPENDENCE ON OTHER ENABLING MACHINES AND DEVICES: ICD-10-CM

## 2025-09-09 DIAGNOSIS — E78.00 PURE HYPERCHOLESTEROLEMIA, UNSPECIFIED: ICD-10-CM

## 2025-09-09 DIAGNOSIS — I10 ESSENTIAL (PRIMARY) HYPERTENSION: ICD-10-CM

## 2025-09-09 DIAGNOSIS — E78.1 PURE HYPERGLYCERIDEMIA: ICD-10-CM

## 2025-09-09 DIAGNOSIS — K59.00 CONSTIPATION, UNSPECIFIED: ICD-10-CM

## 2025-09-09 DIAGNOSIS — C50.911 MALIGNANT NEOPLASM OF UNSPECIFIED SITE OF RIGHT FEMALE BREAST: ICD-10-CM

## 2025-09-09 DIAGNOSIS — Z79.4 LONG TERM (CURRENT) USE OF INSULIN: ICD-10-CM

## 2025-09-09 DIAGNOSIS — F41.9 ANXIETY DISORDER, UNSPECIFIED: ICD-10-CM

## 2025-09-09 DIAGNOSIS — E66.3 OVERWEIGHT: ICD-10-CM

## 2025-09-09 DIAGNOSIS — Z85.3 PERSONAL HISTORY OF MALIGNANT NEOPLASM OF BREAST: ICD-10-CM

## 2025-09-09 DIAGNOSIS — Z88.8 ALLERGY STATUS TO OTHER DRUGS, MEDICAMENTS AND BIOLOGICAL SUBSTANCES: ICD-10-CM

## 2025-09-09 DIAGNOSIS — J45.909 UNSPECIFIED ASTHMA, UNCOMPLICATED: ICD-10-CM
